# Patient Record
Sex: FEMALE | Race: WHITE | NOT HISPANIC OR LATINO | Employment: OTHER | URBAN - METROPOLITAN AREA
[De-identification: names, ages, dates, MRNs, and addresses within clinical notes are randomized per-mention and may not be internally consistent; named-entity substitution may affect disease eponyms.]

---

## 2017-01-12 DIAGNOSIS — I10 ESSENTIAL HYPERTENSION: Primary | Chronic | ICD-10-CM

## 2017-01-12 DIAGNOSIS — E78.5 HYPERLIPIDEMIA: Chronic | ICD-10-CM

## 2017-01-12 RX ORDER — GEMFIBROZIL 600 MG/1
TABLET, FILM COATED ORAL
Qty: 180 TABLET | Refills: 1 | Status: SHIPPED | OUTPATIENT
Start: 2017-01-12 | End: 2017-01-20

## 2017-01-12 NOTE — TELEPHONE ENCOUNTER
----- Message from Hanh Toledo sent at 1/12/2017  1:31 PM CST -----  Contact: self  Pt calling to schedule annual labs. Please call [phone].

## 2017-01-12 NOTE — TELEPHONE ENCOUNTER
Fasting labs ordered.  Please make patient aware that additional labs may be needed once she has her visit since this will be the first time I see her as a full physical.     Thank you,  Harish

## 2017-01-19 ENCOUNTER — LAB VISIT (OUTPATIENT)
Dept: LAB | Facility: HOSPITAL | Age: 72
End: 2017-01-19
Attending: INTERNAL MEDICINE
Payer: MEDICARE

## 2017-01-19 DIAGNOSIS — I10 ESSENTIAL HYPERTENSION: Chronic | ICD-10-CM

## 2017-01-19 DIAGNOSIS — E78.5 HYPERLIPIDEMIA: Chronic | ICD-10-CM

## 2017-01-19 LAB
ALBUMIN SERPL BCP-MCNC: 3.6 G/DL
ALP SERPL-CCNC: 92 U/L
ALT SERPL W/O P-5'-P-CCNC: 103 U/L
ANION GAP SERPL CALC-SCNC: 10 MMOL/L
AST SERPL-CCNC: 94 U/L
BASOPHILS # BLD AUTO: 0.02 K/UL
BASOPHILS NFR BLD: 0.3 %
BILIRUB SERPL-MCNC: 0.4 MG/DL
BUN SERPL-MCNC: 16 MG/DL
CALCIUM SERPL-MCNC: 9.9 MG/DL
CHLORIDE SERPL-SCNC: 109 MMOL/L
CHOLEST/HDLC SERPL: 4.5 {RATIO}
CO2 SERPL-SCNC: 22 MMOL/L
CREAT SERPL-MCNC: 0.8 MG/DL
DIFFERENTIAL METHOD: NORMAL
EOSINOPHIL # BLD AUTO: 0.3 K/UL
EOSINOPHIL NFR BLD: 3.8 %
ERYTHROCYTE [DISTWIDTH] IN BLOOD BY AUTOMATED COUNT: 14.5 %
EST. GFR  (AFRICAN AMERICAN): >60 ML/MIN/1.73 M^2
EST. GFR  (NON AFRICAN AMERICAN): >60 ML/MIN/1.73 M^2
GLUCOSE SERPL-MCNC: 106 MG/DL
HCT VFR BLD AUTO: 37.8 %
HDL/CHOLESTEROL RATIO: 22.3 %
HDLC SERPL-MCNC: 184 MG/DL
HDLC SERPL-MCNC: 41 MG/DL
HGB BLD-MCNC: 12.6 G/DL
LDLC SERPL CALC-MCNC: 103.4 MG/DL
LYMPHOCYTES # BLD AUTO: 1.7 K/UL
LYMPHOCYTES NFR BLD: 25.4 %
MCH RBC QN AUTO: 30.2 PG
MCHC RBC AUTO-ENTMCNC: 33.3 %
MCV RBC AUTO: 91 FL
MONOCYTES # BLD AUTO: 0.7 K/UL
MONOCYTES NFR BLD: 10.4 %
NEUTROPHILS # BLD AUTO: 4.1 K/UL
NEUTROPHILS NFR BLD: 59.7 %
NONHDLC SERPL-MCNC: 143 MG/DL
PLATELET # BLD AUTO: 299 K/UL
PMV BLD AUTO: 10.7 FL
POTASSIUM SERPL-SCNC: 4.5 MMOL/L
PROT SERPL-MCNC: 7.2 G/DL
RBC # BLD AUTO: 4.17 M/UL
SODIUM SERPL-SCNC: 141 MMOL/L
TRIGL SERPL-MCNC: 198 MG/DL
WBC # BLD AUTO: 6.8 K/UL

## 2017-01-19 PROCEDURE — 85025 COMPLETE CBC W/AUTO DIFF WBC: CPT

## 2017-01-19 PROCEDURE — 80053 COMPREHEN METABOLIC PANEL: CPT

## 2017-01-19 PROCEDURE — 80061 LIPID PANEL: CPT

## 2017-01-19 PROCEDURE — 36415 COLL VENOUS BLD VENIPUNCTURE: CPT | Mod: PO

## 2017-01-20 ENCOUNTER — LAB VISIT (OUTPATIENT)
Dept: LAB | Facility: HOSPITAL | Age: 72
End: 2017-01-20
Attending: INTERNAL MEDICINE
Payer: MEDICARE

## 2017-01-20 ENCOUNTER — OFFICE VISIT (OUTPATIENT)
Dept: FAMILY MEDICINE | Facility: CLINIC | Age: 72
End: 2017-01-20
Payer: MEDICARE

## 2017-01-20 VITALS
WEIGHT: 140.44 LBS | SYSTOLIC BLOOD PRESSURE: 122 MMHG | TEMPERATURE: 98 F | HEIGHT: 59 IN | HEART RATE: 69 BPM | DIASTOLIC BLOOD PRESSURE: 64 MMHG | OXYGEN SATURATION: 95 % | BODY MASS INDEX: 28.31 KG/M2

## 2017-01-20 DIAGNOSIS — Z00.00 ROUTINE MEDICAL EXAM: Primary | ICD-10-CM

## 2017-01-20 DIAGNOSIS — M94.9 DISORDER OF BONE AND CARTILAGE: ICD-10-CM

## 2017-01-20 DIAGNOSIS — N39.0 RECURRENT UTI: ICD-10-CM

## 2017-01-20 DIAGNOSIS — E78.5 HYPERLIPIDEMIA, UNSPECIFIED HYPERLIPIDEMIA TYPE: Chronic | ICD-10-CM

## 2017-01-20 DIAGNOSIS — I73.9 PAD (PERIPHERAL ARTERY DISEASE): Chronic | ICD-10-CM

## 2017-01-20 DIAGNOSIS — R74.01 TRANSAMINITIS: ICD-10-CM

## 2017-01-20 DIAGNOSIS — I10 ESSENTIAL HYPERTENSION: Chronic | ICD-10-CM

## 2017-01-20 DIAGNOSIS — M89.9 DISORDER OF BONE AND CARTILAGE: ICD-10-CM

## 2017-01-20 DIAGNOSIS — F33.0 MILD RECURRENT MAJOR DEPRESSION: Chronic | ICD-10-CM

## 2017-01-20 DIAGNOSIS — I77.811 ECTATIC ABDOMINAL AORTA: ICD-10-CM

## 2017-01-20 PROBLEM — R74.8 ELEVATED LIVER ENZYMES: Status: ACTIVE | Noted: 2017-01-20

## 2017-01-20 LAB — 25(OH)D3+25(OH)D2 SERPL-MCNC: 40 NG/ML

## 2017-01-20 PROCEDURE — 3078F DIAST BP <80 MM HG: CPT | Mod: S$GLB,,, | Performed by: INTERNAL MEDICINE

## 2017-01-20 PROCEDURE — 99397 PER PM REEVAL EST PAT 65+ YR: CPT | Mod: S$GLB,,, | Performed by: INTERNAL MEDICINE

## 2017-01-20 PROCEDURE — 99999 PR PBB SHADOW E&M-EST. PATIENT-LVL IV: CPT | Mod: PBBFAC,,, | Performed by: INTERNAL MEDICINE

## 2017-01-20 PROCEDURE — 82306 VITAMIN D 25 HYDROXY: CPT

## 2017-01-20 PROCEDURE — 3074F SYST BP LT 130 MM HG: CPT | Mod: S$GLB,,, | Performed by: INTERNAL MEDICINE

## 2017-01-20 PROCEDURE — 36415 COLL VENOUS BLD VENIPUNCTURE: CPT | Mod: PO

## 2017-01-20 PROCEDURE — 99499 UNLISTED E&M SERVICE: CPT | Mod: S$GLB,,, | Performed by: INTERNAL MEDICINE

## 2017-01-20 PROCEDURE — 80074 ACUTE HEPATITIS PANEL: CPT

## 2017-01-20 RX ORDER — NITROFURANTOIN MACROCRYSTALS 50 MG/1
50 CAPSULE ORAL 4 TIMES DAILY
COMMUNITY
End: 2017-04-05

## 2017-01-20 NOTE — MR AVS SNAPSHOT
[facility]  [address]  [place] [address]  Phone: [phone]  Fax: [phone]                  Sophia Landis   2017 11:20 AM   Office Visit    Description:  Female : 1945   Provider:  Xavier Lopez MD   Department:  [facility]           Reason for Visit     Establish Care           Diagnoses this Visit        Comments    Routine medical exam    -  Primary     Essential hypertension         PAD (peripheral artery disease)         Hyperlipidemia, unspecified hyperlipidemia type         Mild recurrent major depression         Disorder of bone and cartilage         Transaminitis         Recurrent UTI                To Do List           Future Appointments        Provider Department Dept Phone    2017 12:00 PM LAB, [place] [facility] [phone]    3/3/2017 8:00 AM LAB, [place] [facility] [phone]      Goals (5 Years of Data)              Today    16    Blood Pressure < 150/90   150/60  140/56  170/58      Follow-Up and Disposition     Return in about 3 months (around 2017) for BP check.      [facility]     [facility] -  Assistance  Registered nurses in the [facility] provide clinical advisement, health education, appointment booking, and other advisory services.  Call for this free service at [phone].             Medications           Message regarding Medications     Verify the changes and/or additions to your medication regime listed below are the same as discussed with your clinician today.  If any of these changes or additions are incorrect, please notify your healthcare provider.        STOP taking these medications     clotrimazole (LOTRIMIN) 1 % cream Apply topically 2 (two) times daily.    hydrochlorothiazide (HYDRODIURIL) 25 MG tablet TAKE 1 TABLET ONE TIME DAILY    gemfibrozil (LOPID) 600 MG tablet TAKE 1 TABLET TWICE DAILY  "BEFORE MEALS           Verify that the below list of medications is an accurate representation of the medications you are currently taking.  If none reported, the list may be blank. If incorrect, please contact your healthcare provider. Carry this list with you in case of emergency.           Current Medications     amlodipine (NORVASC) 10 MG tablet TAKE 1 TABLET ONE TIME DAILY    aspirin (ECOTRIN) 81 MG EC tablet Take 81 mg by mouth once daily.    calcium carbonate (OS-PAOLO) 600 mg (1,500 mg) Tab Take 600 mg by mouth 2 (two) times daily with meals.      metoprolol succinate (TOPROL-XL) 50 MG 24 hr tablet TAKE 1 TABLET ONE TIME DAILY    nitrofurantoin (MACRODANTIN) 50 MG capsule Take 50 mg by mouth 4 (four) times daily.    omeprazole (PRILOSEC) 20 MG capsule TAKE 2 CAPSULES ONE TIME DAILY BEFORE FIRST MEAL OF THE DAY    pravastatin (PRAVACHOL) 80 MG tablet TAKE 1 TABLET ONE TIME DAILY    quinapril (ACCUPRIL) 40 MG tablet TAKE 1 TABLET TWICE DAILY    sertraline (ZOLOFT) 50 MG tablet TAKE 1 TABLET ONE TIME DAILY    estradiol (ESTRACE) 1 MG tablet TAKE 1 TABLET ONE TIME DAILY    fluticasone (FLONASE) 50 mcg/actuation nasal spray USE 1 SPRAY IN EACH NOSTRIL ONE TIME DAILY    omega-3 fatty acids 1,000 mg Cap Take by mouth.             Clinical Reference Information           Vital Signs - Last Recorded  Most recent update: 1/20/2017 11:20 AM by Manuel Padilla MA    BP Pulse Temp Ht Wt SpO2    (!) 150/60 (BP Location: Right arm, Patient Position: Sitting, BP Method: Manual) 69 97.9 °F (36.6 °C) (Oral) 4' 11" (1.499 m) 63.7 kg (140 lb 6.9 oz) 95%    BMI                28.36 kg/m2          Blood Pressure          Most Recent Value    BP  (!)  150/60      Allergies as of 1/20/2017     Antihistamines - Alkylamine    Ciprofloxacin    Penicillins      Immunizations Administered on Date of Encounter - 1/20/2017     None      Orders Placed During Today's Visit     Future Labs/Procedures Expected by Expires    DXA Bone Density " Spine And Hip_Axial Skeleton  1/20/2017 1/20/2018    Hepatitis panel, acute  1/20/2017 3/21/2018    Vitamin D  1/20/2017 1/20/2018    Comprehensive metabolic panel  3/3/2017 1/20/2018      [facility] Sign-Up     Activating your [facility] account is as easy as 1-2-3!     1) Visit [email or web address], select Sign Up Now, enter this activation code and your date of birth, then select Next.  [email or web address]  Expires: 3/6/2017 11:56 AM      2) Create a username and password to use when you visit [facility] in the future and select a security question in case you lose your password and select Next.    3) Enter your e-mail address and click Sign Up!    Additional Information  If you have questions, please e-mail [email or web address] or call [phone] to talk to our [facility] staff. Remember, [facility] is NOT to be used for urgent needs. For medical emergencies, dial 911.         Instructions    Call your urologist and inform them that your liver enzymes are quite high compared to the previous labs.  The only think that is different is the nitrofurantoin and the (unknown name) arthritis pill.  The Nitrofurantoin is likely the culprit.      Stay off of the gemfibrozil and hydrochlorothiazide for now.     Repeat liver enzymes in 6 weeks without an OV    Otherwise no changes.

## 2017-01-20 NOTE — PROGRESS NOTES
Chief Complaint  Chief Complaint   Patient presents with    Establish Care       HPI  Sophia Landis is a 71 y.o. female with medical diagnoses as listed in the medical history and problem list that presents for routine physical.  Pt is known to me with her last appointment 06/2016.  She has been taking a low dose antibiotic (nitrofurantoin) for the past 2 months that was prescribed by Dr. Resendiz in [place] (urology) for a persistent UTI.  She had labs drawn prior to this visit that showed moderately high transaminitis.  No dietary change.  No supplement.  No other change in meds.  She has been out of her gemfibrozil for the last 2 weeks or more.  No belly pain or jaundice.        PAST MEDICAL HISTORY:  Past Medical History   Diagnosis Date    Cataract     Depression     GERD (gastroesophageal reflux disease)     Hyperlipidemia     Hypertension     Menopausal syndrome     PAD (peripheral artery disease)      s/p stent       PAST SURGICAL HISTORY:  Past Surgical History   Procedure Laterality Date    Appendectomy      Hysterectomy       MONTY with BSO    Iliac artery stent Bilateral        SOCIAL HISTORY:  Social History     Social History    Marital status:      Spouse name: N/A    Number of children: N/A    Years of education: N/A     Occupational History    Not on file.     Social History Main Topics    Smoking status: Former Smoker    Smokeless tobacco: Not on file      Comment: .  Retired  at [employer]    Alcohol use No    Drug use: No    Sexual activity: Yes     Partners: Male      Comment:      Other Topics Concern    Not on file     Social History Narrative       FAMILY HISTORY:  Family History   Problem Relation Age of Onset    Amblyopia Mother     Cataracts Mother     Hypertension Mother     Thyroid disease Mother     Cancer Father     Strabismus Sister     Cancer Brother     Diabetes Maternal Aunt     Cancer Maternal Uncle     Diabetes  Maternal Uncle     Cancer Maternal Uncle     Cancer Maternal Uncle     Cancer Maternal Uncle     Blindness Neg Hx     Glaucoma Neg Hx     Macular degeneration Neg Hx     Retinal detachment Neg Hx     Stroke Neg Hx        ALLERGIES AND MEDICATIONS: updated and reviewed.  Review of patient's allergies indicates:   Allergen Reactions    Antihistamines - alkylamine Nausea Only    Ciprofloxacin Nausea Only    Penicillins Hives     Current Outpatient Prescriptions   Medication Sig Dispense Refill    amlodipine (NORVASC) 10 MG tablet TAKE 1 TABLET ONE TIME DAILY 90 tablet 1    aspirin (ECOTRIN) 81 MG EC tablet Take 81 mg by mouth once daily.      calcium carbonate (OS-PAOLO) 600 mg (1,500 mg) Tab Take 600 mg by mouth 2 (two) times daily with meals.        metoprolol succinate (TOPROL-XL) 50 MG 24 hr tablet TAKE 1 TABLET ONE TIME DAILY 90 tablet 1    nitrofurantoin (MACRODANTIN) 50 MG capsule Take 50 mg by mouth 4 (four) times daily.      omeprazole (PRILOSEC) 20 MG capsule TAKE 2 CAPSULES ONE TIME DAILY BEFORE FIRST MEAL OF THE  capsule 1    pravastatin (PRAVACHOL) 80 MG tablet TAKE 1 TABLET ONE TIME DAILY 90 tablet 1    quinapril (ACCUPRIL) 40 MG tablet TAKE 1 TABLET TWICE DAILY 180 tablet 1    sertraline (ZOLOFT) 50 MG tablet TAKE 1 TABLET ONE TIME DAILY 90 tablet 1    estradiol (ESTRACE) 1 MG tablet TAKE 1 TABLET ONE TIME DAILY 90 tablet 1    fluticasone (FLONASE) 50 mcg/actuation nasal spray USE 1 SPRAY IN EACH NOSTRIL ONE TIME DAILY 32 g 3    omega-3 fatty acids 1,000 mg Cap Take by mouth.         No current facility-administered medications for this visit.          ROS  Review of Systems   Constitutional: Negative for chills, fever and unexpected weight change.   HENT: Negative for congestion, ear pain, hearing loss, rhinorrhea, sore throat and trouble swallowing.    Eyes: Negative for discharge, redness and visual disturbance.   Respiratory: Negative for cough, chest tightness, shortness  "of breath and wheezing.    Cardiovascular: Negative for chest pain, palpitations and leg swelling.   Gastrointestinal: Negative for abdominal pain, constipation, diarrhea, nausea and vomiting.   Endocrine: Negative for polydipsia, polyphagia and polyuria.   Genitourinary: Negative for decreased urine volume, dysuria and hematuria.   Musculoskeletal: Negative for arthralgias, joint swelling and myalgias.   Skin: Negative for color change and rash.   Neurological: Positive for light-headedness (orthostatic.  Home BPs running 120s/50s). Negative for dizziness, weakness and headaches.   Psychiatric/Behavioral: Negative for decreased concentration, dysphoric mood, sleep disturbance and suicidal ideas.           Physical Exam  Vitals:    01/20/17 1118   BP: (!) 150/60   BP Location: Right arm   Patient Position: Sitting   BP Method: Manual   Pulse: 69   Temp: 97.9 °F (36.6 °C)   TempSrc: Oral   SpO2: 95%   Weight: 63.7 kg (140 lb 6.9 oz)   Height: 4' 11" (1.499 m)    Body mass index is 28.36 kg/(m^2).  Weight: 63.7 kg (140 lb 6.9 oz)   Height: 4' 11" (149.9 cm)   General appearance: alert, appears stated age, cooperative and no distress  Head: Normocephalic, without obvious abnormality, atraumatic  Eyes: PERRL EOMI conjunctiva clear  Ears: normal TM's and external ear canals both ears  Nose: Nares normal. Septum midline. Mucosa normal. No drainage or sinus tenderness.  Throat: lips, mucosa, and tongue normal; teeth and gums normal  Neck: no adenopathy, no carotid bruit, no JVD, supple, symmetrical, trachea midline and thyroid not enlarged, symmetric, no tenderness/mass/nodules  Back: symmetric, no curvature. ROM normal. No CVA tenderness.  Lungs: clear to auscultation bilaterally  Heart: regular rate and rhythm, S1, S2 normal, no murmur, click, rub or gallop  Abdomen: soft, non-tender; bowel sounds normal; no masses,  no organomegaly  Extremities: extremities normal, atraumatic, no cyanosis or edema  Pulses: 2+ and " symmetric  Neurologic: Grossly normal      Health Maintenance       Date Due Completion Date    DEXA SCAN 1/21/2017 1/21/2014    Pneumococcal (65+) (2 of 2 - PPSV23) 6/13/2017 6/13/2016    Mammogram 6/16/2017 6/16/2016    Lipid Panel 1/19/2018 1/19/2017    Colonoscopy 4/2/2019 4/2/2014    TETANUS VACCINE 5/5/2021 5/5/2011 (Done)    Override on 5/5/2011: Done            Assessment & Plan  Routine medical exam - Discussed healthy diet, regular exercise, necessary labs, age appropriate cancer screening, and routine vaccinations.      Essential hypertension/orthostatic hypotension - stop HCTZ.  Continue other meds.  Patient is asked to monitor BP at home or work, several times per month and return with written values at next office visit.    PAD (peripheral artery disease) - followed by cards.  Still having claudication at about a block    Ectatic abdominal aorta - Stable, asymptomatic chronic condition.  Will continue to maximize risk factor reduction and adjust medication as needed.    Hyperlipidemia, unspecified hyperlipidemia type - The current medical regimen is effective;  continue present plan and medications.    Mild recurrent major depression - The current medical regimen is effective;  continue present plan and medications.    Disorder of bone and cartilage - check Vit D.  Due for repeat DEXA  -     DXA Bone Density Spine And Hip_Axial Skeleton; Future; Expected date: 1/20/17  -     Vitamin D; Future; Expected date: 1/20/17    Transaminitis - hep panel today.  Repeat LFTs in 6 weeks  -     Hepatitis panel, acute; Future; Expected date: 1/20/17  -     Comprehensive metabolic panel; Future; Expected date: 3/3/17    Recurrent UTI - managed by outside urologist.  I asked her to call them to discuss changing from nitrofurantoin to another antibiotic since this antibiotic is the only real change that has occurred in the same timeline as her transaminitis.      Other orders  -     Cancel: US Abdomen Limited; Future;  Expected date: 1/20/17        Health Maintenance reviewed, as above.    Follow-up: Return in about 3 months (around 4/20/2017) for BP check.

## 2017-01-20 NOTE — PATIENT INSTRUCTIONS
Call your urologist and inform them that your liver enzymes are quite high compared to the previous labs.  The only think that is different is the nitrofurantoin and the (unknown name) arthritis pill.  The Nitrofurantoin is likely the culprit.      Stay off of the gemfibrozil and hydrochlorothiazide for now.     Repeat liver enzymes in 6 weeks without an OV    Otherwise no changes.

## 2017-01-23 LAB
HAV IGM SERPL QL IA: NEGATIVE
HBV CORE IGM SERPL QL IA: NEGATIVE
HBV SURFACE AG SERPL QL IA: NEGATIVE
HCV AB SERPL QL IA: NEGATIVE

## 2017-01-30 ENCOUNTER — TELEPHONE (OUTPATIENT)
Dept: FAMILY MEDICINE | Facility: CLINIC | Age: 72
End: 2017-01-30

## 2017-01-30 NOTE — TELEPHONE ENCOUNTER
Spoke w/patient, requesting results of clinic notes be faxed to  (Urologist) at [facility]. Patient states she was taken off the antibiotic per , and she continues to have symptoms. notes faxed.

## 2017-01-30 NOTE — TELEPHONE ENCOUNTER
----- Message from Philomena Lua sent at 1/26/2017 10:46 AM CST -----  Contact: Self   Patient states that  would like a copy of the report of why  took her off of her medication. Please fax to [phone] Thank you

## 2017-02-01 ENCOUNTER — TELEPHONE (OUTPATIENT)
Dept: FAMILY MEDICINE | Facility: CLINIC | Age: 72
End: 2017-02-01

## 2017-02-01 NOTE — TELEPHONE ENCOUNTER
Sp[brooke w/patient, requesting antibiotic for symptom (painful when urinating 9) that started when  took her off the lose dose steroid. No fever/chills stated. Patient is requesting doctor to give her a call.

## 2017-02-01 NOTE — TELEPHONE ENCOUNTER
----- Message from Hanh Toledo sent at 1/31/2017 12:00 PM CST -----  Contact: self  Pt calling to only speak with Dr Lopez. Please call [phone]

## 2017-02-01 NOTE — TELEPHONE ENCOUNTER
Patient is followed by urology in [place] for recurrent UTI.  We had to stop her macrobid due to elevated liver enzymes.  She was supposed to contact her urologist about this.  She needs to call them if she feels she is having a UTI.  If patient needs to speak directly to me, she should be made aware that I have a full schedule today and will be unlikely to call her until the very end of the day.     Thank you,  Harish

## 2017-03-03 ENCOUNTER — LAB VISIT (OUTPATIENT)
Dept: LAB | Facility: HOSPITAL | Age: 72
End: 2017-03-03
Attending: INTERNAL MEDICINE
Payer: MEDICARE

## 2017-03-03 DIAGNOSIS — R74.01 TRANSAMINITIS: ICD-10-CM

## 2017-03-03 LAB
ALBUMIN SERPL BCP-MCNC: 3.6 G/DL
ALP SERPL-CCNC: 108 U/L
ALT SERPL W/O P-5'-P-CCNC: 41 U/L
ANION GAP SERPL CALC-SCNC: 11 MMOL/L
AST SERPL-CCNC: 31 U/L
BILIRUB SERPL-MCNC: 0.3 MG/DL
BUN SERPL-MCNC: 14 MG/DL
CALCIUM SERPL-MCNC: 9.7 MG/DL
CHLORIDE SERPL-SCNC: 107 MMOL/L
CO2 SERPL-SCNC: 22 MMOL/L
CREAT SERPL-MCNC: 0.8 MG/DL
EST. GFR  (AFRICAN AMERICAN): >60 ML/MIN/1.73 M^2
EST. GFR  (NON AFRICAN AMERICAN): >60 ML/MIN/1.73 M^2
GLUCOSE SERPL-MCNC: 106 MG/DL
POTASSIUM SERPL-SCNC: 4.3 MMOL/L
PROT SERPL-MCNC: 7.2 G/DL
SODIUM SERPL-SCNC: 140 MMOL/L

## 2017-03-03 PROCEDURE — 80053 COMPREHEN METABOLIC PANEL: CPT

## 2017-03-03 PROCEDURE — 36415 COLL VENOUS BLD VENIPUNCTURE: CPT | Mod: PO

## 2017-03-06 ENCOUNTER — TELEPHONE (OUTPATIENT)
Dept: FAMILY MEDICINE | Facility: CLINIC | Age: 72
End: 2017-03-06

## 2017-03-06 NOTE — TELEPHONE ENCOUNTER
Please inform the patient that her liver enzymes are back to normal.  No medication changes are recommended at this time.  Keep f/u for late next month    Thank you,  Harish

## 2017-03-17 DIAGNOSIS — I10 ESSENTIAL HYPERTENSION: Chronic | ICD-10-CM

## 2017-03-17 RX ORDER — METOPROLOL SUCCINATE 50 MG/1
TABLET, EXTENDED RELEASE ORAL
Qty: 90 TABLET | Refills: 1 | Status: SHIPPED | OUTPATIENT
Start: 2017-03-17 | End: 2017-06-03 | Stop reason: SDUPTHER

## 2017-03-17 NOTE — TELEPHONE ENCOUNTER
----- Message from Hanh Toledo sent at 3/16/2017 11:04 AM CDT -----  Contact: self  Patient need refill on     metoprolol succinate (TOPROL-XL) 50 MG 24 hr tablet

## 2017-03-20 RX ORDER — PRAVASTATIN SODIUM 80 MG/1
TABLET ORAL
Qty: 90 TABLET | Refills: 1 | Status: SHIPPED | OUTPATIENT
Start: 2017-03-20 | End: 2017-06-03 | Stop reason: SDUPTHER

## 2017-04-05 ENCOUNTER — OFFICE VISIT (OUTPATIENT)
Dept: FAMILY MEDICINE | Facility: CLINIC | Age: 72
End: 2017-04-05
Payer: MEDICARE

## 2017-04-05 VITALS
DIASTOLIC BLOOD PRESSURE: 74 MMHG | OXYGEN SATURATION: 96 % | WEIGHT: 140.44 LBS | TEMPERATURE: 98 F | BODY MASS INDEX: 28.31 KG/M2 | HEIGHT: 59 IN | SYSTOLIC BLOOD PRESSURE: 142 MMHG | HEART RATE: 73 BPM

## 2017-04-05 DIAGNOSIS — I10 ESSENTIAL HYPERTENSION: Chronic | ICD-10-CM

## 2017-04-05 PROCEDURE — 3078F DIAST BP <80 MM HG: CPT | Mod: S$GLB,,, | Performed by: INTERNAL MEDICINE

## 2017-04-05 PROCEDURE — 1159F MED LIST DOCD IN RCRD: CPT | Mod: S$GLB,,, | Performed by: INTERNAL MEDICINE

## 2017-04-05 PROCEDURE — 1160F RVW MEDS BY RX/DR IN RCRD: CPT | Mod: S$GLB,,, | Performed by: INTERNAL MEDICINE

## 2017-04-05 PROCEDURE — 3077F SYST BP >= 140 MM HG: CPT | Mod: S$GLB,,, | Performed by: INTERNAL MEDICINE

## 2017-04-05 PROCEDURE — 99213 OFFICE O/P EST LOW 20 MIN: CPT | Mod: S$GLB,,, | Performed by: INTERNAL MEDICINE

## 2017-04-05 PROCEDURE — 1126F AMNT PAIN NOTED NONE PRSNT: CPT | Mod: S$GLB,,, | Performed by: INTERNAL MEDICINE

## 2017-04-05 PROCEDURE — 1157F ADVNC CARE PLAN IN RCRD: CPT | Mod: S$GLB,,, | Performed by: INTERNAL MEDICINE

## 2017-04-05 PROCEDURE — 99999 PR PBB SHADOW E&M-EST. PATIENT-LVL III: CPT | Mod: PBBFAC,,, | Performed by: INTERNAL MEDICINE

## 2017-04-05 NOTE — ASSESSMENT & PLAN NOTE
The current medical regimen is effective;  continue present plan and medications. Counseled on techniques to help her remember to take her medications.

## 2017-04-05 NOTE — MR AVS SNAPSHOT
[facility]  [address]  [place] [address]  Phone: [phone]  Fax: [phone]                  Sophia Landis   2017 11:00 AM   Office Visit    Description:  Female : 1945   Provider:  Xavier Lopez MD   Department:  [facility]           Reason for Visit     Hypertension           Diagnoses this Visit        Comments    Essential hypertension                To Do List           Goals (5 Years of Data)              Today    17    Blood Pressure < 150/90   156/70  156/70  156/70      Follow-Up and Disposition     Return if symptoms worsen or fail to improve.      [facility] On Call     [facility] On Call Nurse Care Line -  Assistance  Unless otherwise directed by your provider, please contact [facility], our nurse care line that is available for  assistance.     Registered nurses in the [facility] provide: appointment scheduling, clinical advisement, health education, and other advisory services.  Call: [phone] (toll free)               Medications           Message regarding Medications     Verify the changes and/or additions to your medication regime listed below are the same as discussed with your clinician today.  If any of these changes or additions are incorrect, please notify your healthcare provider.        STOP taking these medications     nitrofurantoin (MACRODANTIN) 50 MG capsule Take 50 mg by mouth 4 (four) times daily.           Verify that the below list of medications is an accurate representation of the medications you are currently taking.  If none reported, the list may be blank. If incorrect, please contact your healthcare provider. Carry this list with you in case of emergency.           Current Medications     amlodipine (NORVASC) 10 MG tablet TAKE 1 TABLET ONE TIME DAILY    aspirin (ECOTRIN) 81 MG EC tablet Take 81 mg by mouth once daily.    calcium carbonate (OS-PAOLO) 600 mg (1,500 mg) Tab  "Take 600 mg by mouth 2 (two) times daily with meals.      metoprolol succinate (TOPROL-XL) 50 MG 24 hr tablet TAKE 1 TABLET ONE TIME DAILY    omeprazole (PRILOSEC) 20 MG capsule TAKE 2 CAPSULES ONE TIME DAILY BEFORE FIRST MEAL OF THE DAY    pravastatin (PRAVACHOL) 80 MG tablet TAKE 1 TABLET ONE TIME DAILY    quinapril (ACCUPRIL) 40 MG tablet TAKE 1 TABLET TWICE DAILY    sertraline (ZOLOFT) 50 MG tablet TAKE 1 TABLET ONE TIME DAILY    estradiol (ESTRACE) 1 MG tablet TAKE 1 TABLET ONE TIME DAILY    fluticasone (FLONASE) 50 mcg/actuation nasal spray USE 1 SPRAY IN EACH NOSTRIL ONE TIME DAILY    omega-3 fatty acids 1,000 mg Cap Take by mouth.             Clinical Reference Information           Your Vitals Were     BP Pulse Temp Height Weight SpO2    156/70 (BP Location: Left arm, Patient Position: Sitting, BP Method: Manual) 73 97.9 °F (36.6 °C) (Oral) 4' 11" (1.499 m) 63.7 kg (140 lb 6.9 oz) 96%    BMI                28.36 kg/m2          Blood Pressure          Most Recent Value    BP  (!)  156/70      Allergies as of 4/5/2017     Antihistamines - Alkylamine    Ciprofloxacin    Penicillins      Immunizations Administered on Date of Encounter - 4/5/2017     None      [facility] Sign-Up     Activating your [facility] account is as easy as 1-2-3!     1) Visit [email or web address], select Sign Up Now, enter this activation code and your date of birth, then select Next.  [email or web address]  Expires: 5/20/2017 11:27 AM      2) Create a username and password to use when you visit [facility] in the future and select a security question in case you lose your password and select Next.    3) Enter your e-mail address and click Sign Up!    Additional Information  If you have questions, please e-mail [email or web address] or call [phone] to talk to our [facility] staff. Remember, [facility] is NOT to be used for urgent needs. For medical emergencies, dial 911.         Instructions    Try setting a reminder in your phone.  You can also " move the amlodipine and metoprolol to at night if this is helpful for remembering to take it.        Language Assistance Services     ATTENTION: Language assistance services are available, free of charge. Please call [phone].      ATENCIÓN: Si sonali morgan, tiene a ware disposición servicios gratuitos de asistencia lingüística. Llame al [phone].     CHÚ Ý: N?u b?n nói Ti?ng Vi?t, có các d?ch v? h? tr? ngôn ng? mi?n phí dành cho b?n. G?i s? [phone].         [facility] complies with applicable Federal civil rights laws and does not discriminate on the basis of race, color, national origin, age, disability, or sex.

## 2017-04-05 NOTE — PATIENT INSTRUCTIONS
Try setting a reminder in your phone.  You can also move the amlodipine and metoprolol to at night if this is helpful for remembering to take it.

## 2017-04-05 NOTE — PROGRESS NOTES
Chief Complaint  Chief Complaint   Patient presents with    Hypertension     F/U       HPI  Sophia Landis is a 71 y.o. female with medical diagnoses as listed in the medical history and problem list that presents for HTN follow up.  Pt is known to me with her last appointment 1/20/2017.  She has been taking and toelrating her medications.  Often forgets to take the AM doses and will take them at night.  Most of her medications are once a day except quinapril .  No CP, SOB, palpitations.  Home BPs are running 130s-140s/70s-80s.      PAST MEDICAL HISTORY:  Past Medical History:   Diagnosis Date    Cataract     Depression     GERD (gastroesophageal reflux disease)     Hyperlipidemia     Hypertension     Menopausal syndrome     PAD (peripheral artery disease)     s/p stent       PAST SURGICAL HISTORY:  Past Surgical History:   Procedure Laterality Date    APPENDECTOMY      HYSTERECTOMY      MONTY with BSO    ILIAC ARTERY STENT Bilateral        SOCIAL HISTORY:  Social History     Social History    Marital status:      Spouse name: N/A    Number of children: N/A    Years of education: N/A     Occupational History    Not on file.     Social History Main Topics    Smoking status: Former Smoker    Smokeless tobacco: Not on file      Comment: .  Retired  at [employer].    Alcohol use No    Drug use: No    Sexual activity: Yes     Partners: Male      Comment:      Other Topics Concern    Not on file     Social History Narrative    No narrative on file       FAMILY HISTORY:  Family History   Problem Relation Age of Onset    Amblyopia Mother     Cataracts Mother     Hypertension Mother     Thyroid disease Mother     Cancer Father     Strabismus Sister     Cancer Brother     Diabetes Maternal Aunt     Cancer Maternal Uncle     Diabetes Maternal Uncle     Cancer Maternal Uncle     Cancer Maternal Uncle     Cancer Maternal Uncle     Blindness Neg Hx      Glaucoma Neg Hx     Macular degeneration Neg Hx     Retinal detachment Neg Hx     Stroke Neg Hx        ALLERGIES AND MEDICATIONS: updated and reviewed.  Review of patient's allergies indicates:   Allergen Reactions    Antihistamines - alkylamine Nausea Only    Ciprofloxacin Nausea Only    Penicillins Hives     Current Outpatient Prescriptions   Medication Sig Dispense Refill    amlodipine (NORVASC) 10 MG tablet TAKE 1 TABLET ONE TIME DAILY 90 tablet 1    aspirin (ECOTRIN) 81 MG EC tablet Take 81 mg by mouth once daily.      calcium carbonate (OS-PAOLO) 600 mg (1,500 mg) Tab Take 600 mg by mouth 2 (two) times daily with meals.        metoprolol succinate (TOPROL-XL) 50 MG 24 hr tablet TAKE 1 TABLET ONE TIME DAILY 90 tablet 1    omeprazole (PRILOSEC) 20 MG capsule TAKE 2 CAPSULES ONE TIME DAILY BEFORE FIRST MEAL OF THE  capsule 1    pravastatin (PRAVACHOL) 80 MG tablet TAKE 1 TABLET ONE TIME DAILY 90 tablet 1    quinapril (ACCUPRIL) 40 MG tablet TAKE 1 TABLET TWICE DAILY 180 tablet 1    sertraline (ZOLOFT) 50 MG tablet TAKE 1 TABLET ONE TIME DAILY 90 tablet 1    estradiol (ESTRACE) 1 MG tablet TAKE 1 TABLET ONE TIME DAILY 90 tablet 1    fluticasone (FLONASE) 50 mcg/actuation nasal spray USE 1 SPRAY IN EACH NOSTRIL ONE TIME DAILY 32 g 3    omega-3 fatty acids 1,000 mg Cap Take by mouth.         No current facility-administered medications for this visit.          ROS  Review of Systems   Constitutional: Negative for diaphoresis and fever.   Respiratory: Negative for cough and shortness of breath.    Cardiovascular: Negative for chest pain, palpitations and leg swelling.   Neurological: Negative for dizziness, syncope, light-headedness and headaches.           Physical Exam  Vitals:    04/05/17 1109 04/05/17 1128   BP: (!) 156/70 (!) 142/74   BP Location: Left arm    Patient Position: Sitting    BP Method: Manual    Pulse: 73    Temp: 97.9 °F (36.6 °C)    TempSrc: Oral    SpO2: 96%    Weight: 63.7  "kg (140 lb 6.9 oz)    Height: 4' 11" (1.499 m)     Body mass index is 28.36 kg/(m^2).  Weight: 63.7 kg (140 lb 6.9 oz)   Height: 4' 11" (149.9 cm)   General appearance: alert, appears stated age, cooperative and no distress  Neck: no adenopathy, no carotid bruit, no JVD, supple, symmetrical, trachea midline and thyroid not enlarged, symmetric, no tenderness/mass/nodules  Lungs: clear to auscultation bilaterally  Heart: regular rate and rhythm, S1, S2 normal, no murmur, click, rub or gallop  Extremities: extremities normal, atraumatic, no cyanosis or edema  Pulses: 2+ and symmetric  Neurologic: Grossly normal      Health Maintenance       Date Due Completion Date    DEXA SCAN 1/21/2017 1/21/2014    Pneumococcal (65+) (2 of 2 - PPSV23) 6/13/2017 6/13/2016    Mammogram 6/16/2017 6/16/2016    Lipid Panel 1/19/2018 1/19/2017    Colonoscopy 4/2/2019 4/2/2014    TETANUS VACCINE 5/5/2021 5/5/2011 (Done)    Override on 5/5/2011: Done            Assessment & Plan  Problem List Items Addressed This Visit        Other    Essential hypertension (Chronic)    Current Assessment & Plan     The current medical regimen is effective;  continue present plan and medications. Counseled on techniques to help her remember to take her medications.                  Health Maintenance reviewed, deferred by patient.    Follow-up: Return if symptoms worsen or fail to improve.  "

## 2017-05-25 DIAGNOSIS — I10 ESSENTIAL HYPERTENSION: Chronic | ICD-10-CM

## 2017-05-25 DIAGNOSIS — K21.9 GASTROESOPHAGEAL REFLUX DISEASE, ESOPHAGITIS PRESENCE NOT SPECIFIED: ICD-10-CM

## 2017-05-25 RX ORDER — AMLODIPINE BESYLATE 10 MG/1
TABLET ORAL
Qty: 90 TABLET | Refills: 1 | Status: SHIPPED | OUTPATIENT
Start: 2017-05-25 | End: 2017-11-13 | Stop reason: SDUPTHER

## 2017-05-25 RX ORDER — OMEPRAZOLE 20 MG/1
CAPSULE, DELAYED RELEASE ORAL
Qty: 180 CAPSULE | Refills: 1 | Status: SHIPPED | OUTPATIENT
Start: 2017-05-25 | End: 2017-12-05 | Stop reason: SDUPTHER

## 2017-05-25 RX ORDER — SERTRALINE HYDROCHLORIDE 50 MG/1
TABLET, FILM COATED ORAL
Qty: 90 TABLET | Refills: 1 | Status: SHIPPED | OUTPATIENT
Start: 2017-05-25 | End: 2017-11-13 | Stop reason: SDUPTHER

## 2017-06-03 DIAGNOSIS — I10 ESSENTIAL HYPERTENSION: Chronic | ICD-10-CM

## 2017-06-04 RX ORDER — PRAVASTATIN SODIUM 80 MG/1
TABLET ORAL
Qty: 90 TABLET | Refills: 1 | Status: SHIPPED | OUTPATIENT
Start: 2017-06-04 | End: 2017-12-13 | Stop reason: SDUPTHER

## 2017-06-04 RX ORDER — METOPROLOL SUCCINATE 50 MG/1
TABLET, EXTENDED RELEASE ORAL
Qty: 90 TABLET | Refills: 1 | Status: SHIPPED | OUTPATIENT
Start: 2017-06-04 | End: 2017-12-13 | Stop reason: SDUPTHER

## 2017-07-18 ENCOUNTER — OFFICE VISIT (OUTPATIENT)
Dept: FAMILY MEDICINE | Facility: CLINIC | Age: 72
End: 2017-07-18
Payer: MEDICARE

## 2017-07-18 ENCOUNTER — HOSPITAL ENCOUNTER (OUTPATIENT)
Dept: RADIOLOGY | Facility: CLINIC | Age: 72
Discharge: HOME | End: 2017-07-18
Attending: INTERNAL MEDICINE
Payer: MEDICARE

## 2017-07-18 VITALS
DIASTOLIC BLOOD PRESSURE: 68 MMHG | OXYGEN SATURATION: 96 % | WEIGHT: 140.44 LBS | HEIGHT: 59 IN | SYSTOLIC BLOOD PRESSURE: 158 MMHG | HEART RATE: 76 BPM | TEMPERATURE: 98 F | BODY MASS INDEX: 28.31 KG/M2

## 2017-07-18 DIAGNOSIS — Z12.31 OTHER SCREENING MAMMOGRAM: ICD-10-CM

## 2017-07-18 DIAGNOSIS — E78.5 HYPERLIPIDEMIA, UNSPECIFIED HYPERLIPIDEMIA TYPE: Chronic | ICD-10-CM

## 2017-07-18 DIAGNOSIS — I77.811 ECTATIC ABDOMINAL AORTA: Chronic | ICD-10-CM

## 2017-07-18 DIAGNOSIS — I73.9 PAD (PERIPHERAL ARTERY DISEASE): Chronic | ICD-10-CM

## 2017-07-18 DIAGNOSIS — I10 ESSENTIAL HYPERTENSION: Chronic | ICD-10-CM

## 2017-07-18 DIAGNOSIS — M94.9 DISORDER OF BONE AND CARTILAGE: ICD-10-CM

## 2017-07-18 DIAGNOSIS — F33.0 MILD RECURRENT MAJOR DEPRESSION: Chronic | ICD-10-CM

## 2017-07-18 DIAGNOSIS — M89.9 DISORDER OF BONE AND CARTILAGE: ICD-10-CM

## 2017-07-18 DIAGNOSIS — Z00.00 ENCOUNTER FOR PREVENTIVE HEALTH EXAMINATION: Primary | ICD-10-CM

## 2017-07-18 PROCEDURE — 99999 PR PBB SHADOW E&M-EST. PATIENT-LVL V: CPT | Mod: PBBFAC,,, | Performed by: NURSE PRACTITIONER

## 2017-07-18 PROCEDURE — 90732 PPSV23 VACC 2 YRS+ SUBQ/IM: CPT | Mod: S$GLB,,, | Performed by: NURSE PRACTITIONER

## 2017-07-18 PROCEDURE — 77080 DXA BONE DENSITY AXIAL: CPT | Mod: 26,,, | Performed by: INTERNAL MEDICINE

## 2017-07-18 PROCEDURE — G0009 ADMIN PNEUMOCOCCAL VACCINE: HCPCS | Mod: S$GLB,,, | Performed by: NURSE PRACTITIONER

## 2017-07-18 PROCEDURE — G0439 PPPS, SUBSEQ VISIT: HCPCS | Mod: S$GLB,,, | Performed by: NURSE PRACTITIONER

## 2017-07-18 PROCEDURE — 99499 UNLISTED E&M SERVICE: CPT | Mod: S$GLB,,, | Performed by: NURSE PRACTITIONER

## 2017-07-18 PROCEDURE — 77080 DXA BONE DENSITY AXIAL: CPT | Mod: TC,PO

## 2017-07-18 NOTE — PROGRESS NOTES
"Sophia Landis presented for a  Medicare AWV and comprehensive Health Risk Assessment today. The following components were reviewed and updated:    · Medical history  · Family History  · Social history  · Allergies and Current Medications  · Health Risk Assessment  · Health Maintenance  · Care Team     ** See Completed Assessments for Annual Wellness Visit within the encounter summary.**       The following assessments were completed:  · Living Situation  · CAGE  · Depression Screening  · Timed Get Up and Go  · Whisper Test  · Cognitive Function Screening  · Nutrition Screening  · ADL Screening  · PAQ Screening    Vitals:    07/18/17 1041   BP: (!) 158/68   BP Location: Right arm   Patient Position: Sitting   BP Method: Manual   Pulse: 76   Temp: 97.9 °F (36.6 °C)   TempSrc: Oral   SpO2: 96%   Weight: 63.7 kg (140 lb 6.9 oz)   Height: 4' 11" (1.499 m)     Body mass index is 28.36 kg/m².  Physical Exam   Constitutional: She is oriented to person, place, and time. She appears well-developed and well-nourished.   HENT:   Head: Normocephalic and atraumatic.   Cardiovascular: Normal rate, regular rhythm and normal heart sounds.    Pulmonary/Chest: Effort normal and breath sounds normal. No respiratory distress. She has no decreased breath sounds.   Neurological: She is alert and oriented to person, place, and time.   Skin: She is not diaphoretic. No pallor.   Psychiatric: She has a normal mood and affect. Her speech is normal and behavior is normal.         Diagnoses and health risks identified today and associated recommendations/orders:    1. Encounter for preventive health examination    2. Ectatic abdominal aorta    3. Essential hypertension    4. Hyperlipidemia, unspecified hyperlipidemia type    5. PAD (peripheral artery disease)    6. Mild recurrent major depression    7. Other screening mammogram  - Mammo Digital Screening Bilateral With CAD; Future      Problem List Items Addressed This Visit     PAD (peripheral " artery disease) (Chronic)    Current Assessment & Plan     Stable. Monitor         Mild recurrent major depression (Chronic)    Current Assessment & Plan     Stable on Zoloft         Hyperlipidemia (Chronic)    Current Assessment & Plan     Stable and controlled. Continue current treatment plan as previously prescribed with your PCP.   The patient is asked to make an attempt to improve diet and exercise patterns to aid in medical management of this problem.  Followed by PCP         Essential hypertension (Chronic)    Current Assessment & Plan     Stable and controlled. Continue current treatment plan as previously prescribed with your PCP.   The patient is asked to make an attempt to improve diet and exercise patterns to aid in medical management of this problem.  Followed by PCP           Ectatic abdominal aorta (Chronic)    Overview     US 2012         Current Assessment & Plan     Stable. Monitor           Other Visit Diagnoses     Encounter for preventive health examination    -  Primary    Other screening mammogram        Relevant Orders    Mammo Digital Screening Bilateral With CAD            Provided Sophia with a 5-10 year written screening schedule and personal prevention plan. Recommendations were developed using the USPSTF age appropriate recommendations. Education, counseling, and referrals were provided as needed. After Visit Summary printed and given to patient which includes a list of additional screenings\tests needed.    Return in about 1 year (around 7/18/2018).    Joel Jimenez, MONTANAP-C

## 2017-07-18 NOTE — ASSESSMENT & PLAN NOTE
Stable and controlled. Continue current treatment plan as previously prescribed with your PCP.   The patient is asked to make an attempt to improve diet and exercise patterns to aid in medical management of this problem.  Followed by PCP

## 2017-07-18 NOTE — PATIENT INSTRUCTIONS
Counseling and Referral of Other Preventative  (Italic type indicates deductible and co-insurance are waived)    Patient Name: Sophia Landis  Today's Date: 7/18/2017      SERVICE LIMITATIONS RECOMMENDATION    Vaccines    · Pneumococcal (once after 65)    · Influenza (annually)    · Hepatitis B (if medium/high risk)    · Prevnar 13      Hepatitis B medium/high risk factors:       - End-stage renal disease       - Hemophiliacs who received Factor VII or         IX concentrates       - Clients of institutions for the mentally             retarded       - Persons who live in the same house as          a HepB carrier       - Homosexual men       - Illicit injectable drug abusers     Pneumococcal: Recommended to patient, declined     Influenza: N/A     Hepatitis B: N/A     Prevnar 13: Done, no repeat necessary    Mammogram (biennial age 50-74)  Annually (age 40 or over)  Last done 06/16/2016, recommend to repeat every 1  years    Pap (up to age 70 and after 70 if unknown history or abnormal study last 10 years)    N/A     The USPSTF recommends against screening for cervical cancer in women older than age 65 years who have had adequate prior screening and are not otherwise at high risk for cervical cancer.      Colorectal cancer screening (to age 75)    · Fecal occult blood test (annual)  · Flexible sigmoidoscopy (5y)  · Screening colonoscopy (10y)  · Barium enema   Last done 04/02/2014, recommend to repeat every 5  years    Diabetes self-management training (no USPSTF recommendations)  Requires referral by treating physician for patient with diabetes or renal disease. 10 hours of initial DSMT sessions of no less than 30 minutes each in a continuous 12-month period. 2 hours of follow-up DSMT in subsequent years.  N/A    Bone mass measurements (age 65 & older, biennial)  Requires diagnosis related to osteoporosis or estrogen deficiency. Biennial benefit unless patient has history of long-term glucocorticoid  Last done  01/21/2014, recommend to repeat every 2  years    Glaucoma screening (no USPSTF recommendation)  Diabetes mellitus, family history   , age 50 or over    American, age 65 or over  Annual eye exam    Medical nutrition therapy for diabetes or renal disease (no recommended schedule)  Requires referral by treating physician for patient with diabetes or renal disease or kidney transplant within the past 3 years.  Can be provided in same year as diabetes self-management training (DSMT), and CMS recommends medical nutrition therapy take place after DSMT. Up to 3 hours for initial year and 2 hours in subsequent years.  N/A    Cardiovascular screening blood tests (every 5 years)  · Fasting lipid panel  Order as a panel if possible  Last done 01/19/2017, recommend to repeat every 1  years    Diabetes screening tests (at least every 3 years, Medicare covers annually or at 6-month intervals for prediabetic patients)  · Fasting blood sugar (FBS) or glucose tolerance test (GTT)  Patient must be diagnosed with one of the following:       - Hypertension       - Dyslipidemia       - Obesity (BMI 30kg/m2)       - Previous elevated impaired FBS or GTT       ... or any two of the following:       - Overweight (BMI 25 but <30)       - Family history of diabetes       - Age 65 or older       - History of gestational diabetes or birth of baby weighing more than 9 pounds  Last done 01/19/2017, recommend to repeat every 1  years    Abdominal aortic aneurysm screening (once)  · Sonogram   Limited to patients who meet one of the following criteria:       - Men who are 65-75 years old and have smoked more than 100 cigarette in their lifetime       - Anyone with a family history of abdominal aortic aneurysm       - Anyone recommended for screening by the USPSTF  N/A    HIV screening (annually for increased risk patients)  · HIV-1 and HIV-2 by EIA, or MARI, rapid antibody test or oral mucosa transudate  Patients must be  at increased risk for HIV infection per USPSTF guidelines or pregnant. Tests covered annually for patient at increased risk or as requested by the patient. Pregnant patients may receive up to 3 tests during pregnancy.  Risks discussed, screening is not recommended    Smoking cessation counseling (up to 8 sessions per year)  Patients must be asymptomatic of tobacco-related conditions to receive as a preventative service.  Non-smoker    Subsequent annual wellness visit  At least 12 months since last AWV  Return in one year     The following information is provided to all patients.  This information is to help you find resources for any of the problems found today that may be affecting your health:                Living healthy guide: www.[facility].[place].[facility]      Understanding Diabetes: www.diabetes.org      Eating healthy: www.cdc.gov/healthyweight      CDC home safety checklist: www.cdc.gov/steadi/patient.html      Agency on Aging: www.goea.[place].[facility]      Alcoholics anonymous (AA): www.aa.org      Physical Activity: www.shoaib.nih.gov/gd3qlno      Tobacco use: www.quitwithusla.org

## 2017-08-15 ENCOUNTER — TELEPHONE (OUTPATIENT)
Dept: FAMILY MEDICINE | Facility: CLINIC | Age: 72
End: 2017-08-15

## 2017-08-15 DIAGNOSIS — I10 ESSENTIAL HYPERTENSION: Primary | Chronic | ICD-10-CM

## 2017-08-15 NOTE — TELEPHONE ENCOUNTER
----- Message from Paige Hercules sent at 8/15/2017 11:31 AM CDT -----  Contact: self  Patient is requesting an order to have lab work done prior to her appt next week .     [phone] SX

## 2017-08-18 ENCOUNTER — LAB VISIT (OUTPATIENT)
Dept: LAB | Facility: HOSPITAL | Age: 72
End: 2017-08-18
Attending: INTERNAL MEDICINE
Payer: MEDICARE

## 2017-08-18 DIAGNOSIS — I10 ESSENTIAL HYPERTENSION: Chronic | ICD-10-CM

## 2017-08-18 LAB
ANION GAP SERPL CALC-SCNC: 9 MMOL/L
BUN SERPL-MCNC: 14 MG/DL
CALCIUM SERPL-MCNC: 10 MG/DL
CHLORIDE SERPL-SCNC: 110 MMOL/L
CO2 SERPL-SCNC: 23 MMOL/L
CREAT SERPL-MCNC: 0.8 MG/DL
EST. GFR  (AFRICAN AMERICAN): >60 ML/MIN/1.73 M^2
EST. GFR  (NON AFRICAN AMERICAN): >60 ML/MIN/1.73 M^2
GLUCOSE SERPL-MCNC: 125 MG/DL
POTASSIUM SERPL-SCNC: 4.6 MMOL/L
SODIUM SERPL-SCNC: 142 MMOL/L

## 2017-08-18 PROCEDURE — 36415 COLL VENOUS BLD VENIPUNCTURE: CPT | Mod: PO

## 2017-08-18 PROCEDURE — 80048 BASIC METABOLIC PNL TOTAL CA: CPT

## 2017-08-22 ENCOUNTER — OFFICE VISIT (OUTPATIENT)
Dept: FAMILY MEDICINE | Facility: CLINIC | Age: 72
End: 2017-08-22
Payer: MEDICARE

## 2017-08-22 VITALS
HEART RATE: 74 BPM | HEIGHT: 59 IN | WEIGHT: 141.56 LBS | DIASTOLIC BLOOD PRESSURE: 80 MMHG | BODY MASS INDEX: 28.54 KG/M2 | TEMPERATURE: 98 F | SYSTOLIC BLOOD PRESSURE: 130 MMHG | OXYGEN SATURATION: 97 %

## 2017-08-22 DIAGNOSIS — G89.29 CHRONIC LEFT-SIDED LOW BACK PAIN WITHOUT SCIATICA: ICD-10-CM

## 2017-08-22 DIAGNOSIS — I73.9 PAD (PERIPHERAL ARTERY DISEASE): Chronic | ICD-10-CM

## 2017-08-22 DIAGNOSIS — Z80.8 FAMILY HISTORY OF MALIGNANT MELANOMA: ICD-10-CM

## 2017-08-22 DIAGNOSIS — N39.41 URGE INCONTINENCE: ICD-10-CM

## 2017-08-22 DIAGNOSIS — E78.5 HYPERLIPIDEMIA, UNSPECIFIED HYPERLIPIDEMIA TYPE: Chronic | ICD-10-CM

## 2017-08-22 DIAGNOSIS — I10 ESSENTIAL HYPERTENSION: Primary | Chronic | ICD-10-CM

## 2017-08-22 DIAGNOSIS — M54.50 CHRONIC LEFT-SIDED LOW BACK PAIN WITHOUT SCIATICA: ICD-10-CM

## 2017-08-22 PROCEDURE — 3075F SYST BP GE 130 - 139MM HG: CPT | Mod: S$GLB,,, | Performed by: INTERNAL MEDICINE

## 2017-08-22 PROCEDURE — 3079F DIAST BP 80-89 MM HG: CPT | Mod: S$GLB,,, | Performed by: INTERNAL MEDICINE

## 2017-08-22 PROCEDURE — 1159F MED LIST DOCD IN RCRD: CPT | Mod: S$GLB,,, | Performed by: INTERNAL MEDICINE

## 2017-08-22 PROCEDURE — 99214 OFFICE O/P EST MOD 30 MIN: CPT | Mod: S$GLB,,, | Performed by: INTERNAL MEDICINE

## 2017-08-22 PROCEDURE — 99499 UNLISTED E&M SERVICE: CPT | Mod: S$GLB,,, | Performed by: INTERNAL MEDICINE

## 2017-08-22 PROCEDURE — 99999 PR PBB SHADOW E&M-EST. PATIENT-LVL IV: CPT | Mod: PBBFAC,,, | Performed by: INTERNAL MEDICINE

## 2017-08-22 PROCEDURE — 1125F AMNT PAIN NOTED PAIN PRSNT: CPT | Mod: S$GLB,,, | Performed by: INTERNAL MEDICINE

## 2017-08-22 PROCEDURE — 3008F BODY MASS INDEX DOCD: CPT | Mod: S$GLB,,, | Performed by: INTERNAL MEDICINE

## 2017-08-22 RX ORDER — MELOXICAM 15 MG/1
15 TABLET ORAL DAILY
Qty: 90 TABLET | Refills: 1 | Status: SHIPPED | OUTPATIENT
Start: 2017-08-22 | End: 2018-01-15 | Stop reason: SDUPTHER

## 2017-08-22 NOTE — MEDICAL/APP STUDENT
Subjective:       Patient ID: Sophia Landis is a 71 y.o. female.    Chief Complaint: Foreign Body in Skin; Hypertension (f/u); and Hyperlipidemia    HPI   Patient is a 70 yo F w/ h/o of HTN and hyperlipidemia who presents for follow up and discussion of lab results. Patient is currently compliant on amlodipine 10mg, metoprolol 50mg, and quinapril 40mg, as well as pravastatin 80mg. She also takes a baby aspirin daily and omeprazole 20mg, calcium/vit D supplements, and sertraline 50mg. She is experiencing no side effects with the medications and has no related problems. Her current  BP is 130/80    Patient is requesting dermatology referral due to family hx of melanoma in her father. She is concerned about spots popping up on her arms and back as she spends a good amount of time in the sun. She would like a skin check. Patient is also complaining of left hip arthritis. The pain has been present for many years and she is unable to walk and move and sit on the left side without eliciting pain in the left hip. Over the counter pain mediations do not help,and she is requesting a medication that might be more effective in pain management. 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 The mother lives with the patient and she is often engaged in heavy lifting and maneuvering as a part of her mother's activities of daily living.     Patient also reports that she was unable to complete a mammogram when it was ordered recently due to personal issues. She is requesting a rescheduling.     Patient Active Problem List   Diagnosis    Essential hypertension    Hyperlipidemia    Mild recurrent major depression    PAD (peripheral artery disease)    GERD (gastroesophageal reflux disease)    Menopausal syndrome    Sleep apnea syndrome    Recurrent UTI    Ectatic abdominal aorta       Review of Systems   Constitutional: Negative for activity change, appetite change, fever and unexpected weight  "change.   HENT: Negative for congestion, postnasal drip, rhinorrhea, sinus pressure and trouble swallowing.    Eyes: Negative for visual disturbance.   Respiratory: Negative for cough, chest tightness and shortness of breath.    Cardiovascular: Negative for chest pain, palpitations and leg swelling.   Gastrointestinal: Negative for abdominal distention, abdominal pain, blood in stool, constipation, diarrhea, nausea and vomiting.        Occasional bleeding hemorrhoids    Genitourinary: Positive for difficulty urinating and urgency. Negative for dysuria, enuresis, frequency and hematuria.        Incontinence on occasion  Trouble urinating, occasional strain, feels as though "my rectum is falling out of the front"   Musculoskeletal: Positive for arthralgias and joint swelling. Negative for back pain.        Ankle swelling on the right       Objective:       Vitals:    08/22/17 1307   BP: 130/80   Pulse: 74   Temp: 98.2 °F (36.8 °C)       Physical Exam   Constitutional: She is oriented to person, place, and time. She appears well-developed and well-nourished.   HENT:   Head: Normocephalic and atraumatic.   Right Ear: External ear normal.   Left Ear: External ear normal.   Nose: Nose normal.   Mouth/Throat: Oropharynx is clear and moist.   Neck: Normal range of motion. Neck supple. No thyromegaly present.   Cardiovascular: Normal rate, regular rhythm, normal heart sounds and intact distal pulses.    No murmur heard.  Pulmonary/Chest: Effort normal and breath sounds normal. She has no wheezes. She has no rales.   Abdominal: Soft. Bowel sounds are normal. She exhibits no distension. There is tenderness. There is guarding.   Mild discomfort in mid epigastric region with deep palpation, involuntary guarding after moment of discomfort   Lymphadenopathy:     She has no cervical adenopathy.   Neurological: She is alert and oriented to person, place, and time.   Psychiatric: She has a normal mood and affect. Her behavior is " normal. Judgment and thought content normal.       Assessment:       1. Essential hypertension    2. Hyperlipidemia, unspecified hyperlipidemia type    3. PAD (peripheral artery disease)    4. Possible rectocele/cystocele   5. Left SI joint arthritis  Plan:     1. Essential HTN  - Patient currently taking amlodipine 10 mg, metoprolol 50 mg, and quinapril 40 mg  - Patient not experiencing s/e   - BP is stable 130/80  - Continue current medication regimen as patient is compliant     2. Hyperlipidemia  - Patient currently taking pravastatin 80 mg  - Patient not experiencing s/e  - Continue current medications    3. PAD  - Peripheral pulses present, no claudication symptoms currently  - Patient currently taking aspirin and statin   - Continue monitoring     4. Possible Rectocele/Cystocele  - Likely due to urinary symptoms    5. Left SI Joint Arthritis  - Unlikely to be hip pain, examination normal ROM  - Will prescribe Meloxicam  - F/u if pain persists despite new medication   - Referral to Uro-gynecology

## 2017-08-22 NOTE — PROGRESS NOTES
"Chief Complaint  Chief Complaint   Patient presents with    Hypertension     f/u    Hyperlipidemia    Hip Pain       HPI  Sophia Landis is a 71 y.o. female with medical diagnoses as listed in the medical history and problem list that presents for f/u HTN HLD and c/o hip pain.  Pt is known to me with her last appointment 7/18/2017.  She is taking and tolerating her medications without perceived side effects.  No CP, SOB, palpitations, abdominal pain.  She is having left sided "hip pain".  Indicated pain as being in the buttocks.  No weakness or radicular symptoms.  No pain in inguinal line.  Pain is worse with lifting her leg such as to get in the car.  Was on meloxicam in the past with good relief.  She stopped his on her own when she had a transaminitis 2/2 macrobid and wanted to limit meds.  She still has urinary incontinence issues.  No fecal incontinence but reports that it "feels like my rectum is falling out."  Reports that she can feel a bulge in the inferior vagina.  States she can "feel stool," at the introitus.  No actual stool leakage.        PAST MEDICAL HISTORY:  Past Medical History:   Diagnosis Date    Cataract     Depression     GERD (gastroesophageal reflux disease)     Hyperlipidemia     Hypertension     Menopausal syndrome     PAD (peripheral artery disease)     s/p stent       PAST SURGICAL HISTORY:  Past Surgical History:   Procedure Laterality Date    APPENDECTOMY      HYSTERECTOMY      MONTY with BSO    ILIAC ARTERY STENT Bilateral        SOCIAL HISTORY:  Social History     Social History    Marital status:      Spouse name: N/A    Number of children: N/A    Years of education: N/A     Occupational History    Not on file.     Social History Main Topics    Smoking status: Former Smoker    Smokeless tobacco: Former User      Comment: .  Retired  at [employer].    Alcohol use No    Drug use: No    Sexual activity: Yes     Partners: Male      " Comment:      Other Topics Concern    Not on file     Social History Narrative    No narrative on file       FAMILY HISTORY:  Family History   Problem Relation Age of Onset    Amblyopia Mother     Cataracts Mother     Hypertension Mother     Thyroid disease Mother     Cancer Father     Strabismus Sister     Cancer Brother     Diabetes Maternal Aunt     Cancer Maternal Uncle     Diabetes Maternal Uncle     Cancer Maternal Uncle     Cancer Maternal Uncle     Cancer Maternal Uncle     Blindness Neg Hx     Glaucoma Neg Hx     Macular degeneration Neg Hx     Retinal detachment Neg Hx     Stroke Neg Hx        ALLERGIES AND MEDICATIONS: updated and reviewed.  Review of patient's allergies indicates:   Allergen Reactions    Antihistamines - alkylamine Nausea Only    Ciprofloxacin Nausea Only    Penicillins Hives     Current Outpatient Prescriptions   Medication Sig Dispense Refill    amlodipine (NORVASC) 10 MG tablet TAKE 1 TABLET ONE TIME DAILY 90 tablet 1    calcium carbonate (OS-PAOLO) 600 mg (1,500 mg) Tab Take 600 mg by mouth 2 (two) times daily with meals.        metoprolol succinate (TOPROL-XL) 50 MG 24 hr tablet TAKE 1 TABLET EVERY DAY 90 tablet 1    omeprazole (PRILOSEC) 20 MG capsule TAKE 2 CAPSULES ONE TIME DAILY BEFORE FIRST MEAL OF THE  capsule 1    pravastatin (PRAVACHOL) 80 MG tablet TAKE 1 TABLET EVERY DAY 90 tablet 1    quinapril (ACCUPRIL) 40 MG tablet TAKE 1 TABLET TWICE DAILY 180 tablet 1    sertraline (ZOLOFT) 50 MG tablet TAKE 1 TABLET ONE TIME DAILY 90 tablet 1    aspirin (ECOTRIN) 81 MG EC tablet Take 81 mg by mouth once daily.      meloxicam (MOBIC) 15 MG tablet Take 1 tablet (15 mg total) by mouth once daily. Change to daily as needed for pain if possible 90 tablet 1     No current facility-administered medications for this visit.          ROS  Review of Systems   Constitutional: Negative for chills, fever and unexpected weight change.   HENT: Negative  "for congestion, ear pain, hearing loss, rhinorrhea, sore throat and trouble swallowing.    Eyes: Negative for discharge, redness and visual disturbance.   Respiratory: Negative for cough, chest tightness, shortness of breath and wheezing.    Cardiovascular: Negative for chest pain, palpitations and leg swelling.   Gastrointestinal: Negative for abdominal pain, constipation, diarrhea, nausea and vomiting.   Endocrine: Negative for polydipsia, polyphagia and polyuria.   Genitourinary: Positive for urgency. Negative for decreased urine volume, dysuria, hematuria and pelvic pain.   Musculoskeletal: Positive for arthralgias and back pain. Negative for joint swelling and myalgias.   Skin: Negative for color change and rash.   Neurological: Negative for dizziness, weakness, light-headedness and headaches.   Psychiatric/Behavioral: Negative for decreased concentration, dysphoric mood, sleep disturbance and suicidal ideas.           Physical Exam  Vitals:    08/22/17 1307   BP: 130/80   BP Location: Left arm   Patient Position: Sitting   BP Method: Small (Manual)   Pulse: 74   Temp: 98.2 °F (36.8 °C)   SpO2: 97%   Weight: 64.2 kg (141 lb 8.6 oz)   Height: 4' 11" (1.499 m)    Body mass index is 28.59 kg/m².  Weight: 64.2 kg (141 lb 8.6 oz)   Height: 4' 11" (149.9 cm)   General appearance: alert, appears stated age, cooperative and no distress  Head: Normocephalic, without obvious abnormality, atraumatic  Eyes: PERRL EOMI conjunctiva clear  Neck: no adenopathy, no carotid bruit, no JVD, supple, symmetrical, trachea midline and thyroid not enlarged, symmetric, no tenderness/mass/nodules  Back: symmetric, no curvature. ROM normal. No CVA tenderness.  Lungs: clear to auscultation bilaterally  Heart: regular rate and rhythm, S1, S2 normal, no murmur, click, rub or gallop  Abdomen: soft, non-tender; bowel sounds normal; no masses,  no organomegaly  Extremities: extremities normal, atraumatic, no cyanosis or edema  Pulses: 2+ and " symmetric  Neurologic: Grossly normal   Hips: No effusion erythema warmth or TTP.  FROM without pain.        Health Maintenance       Date Due Completion Date    Mammogram 06/16/2017 6/16/2016    Influenza Vaccine 08/01/2017 10/31/2016 (Done)    Override on 10/31/2016: Done (majoria on barataria)    Override on 11/1/2015: Done (Majoria)    Override on 12/16/2014: Done (done 9/2014)    Lipid Panel 01/19/2018 1/19/2017    Colonoscopy 04/02/2019 4/2/2014    DEXA SCAN 07/18/2020 7/18/2017    Override on 7/18/2017: Done    TETANUS VACCINE 05/05/2021 5/5/2011 (Done)    Override on 5/5/2011: Done            Assessment & Plan  Problem List Items Addressed This Visit        Cardiac/Vascular    Essential hypertension - Primary (Chronic)    Current Assessment & Plan     The current medical regimen is effective;  continue present plan and medications.          Hyperlipidemia (Chronic)    Current Assessment & Plan     On high dose statin.  The current medical regimen is effective;  continue present plan and medications.          PAD (peripheral artery disease) (Chronic)    Current Assessment & Plan     Continue medical management.            Other Visit Diagnoses     Family history of malignant melanoma    -  Referred for screening skin exam.     Relevant Orders    Ambulatory consult to Dermatology    Chronic left-sided low back pain without sciatica    -  Intermittent symptoms.  Restart meloxicam.  Suspect some SI joint etiology.     Relevant Medications    meloxicam (MOBIC) 15 MG tablet    Urge incontinence    -  Will refer to Urogyn as she is not interested in returning to the outside urologist that she previously saw.  History concerning for rectocele/cystocele    Relevant Orders    Ambulatory Referral to Urogynecology            Health Maintenance reviewed, I counseled the patient on the new recommendations for breast cancer screening and risk versus benefit of MMGs.  Patient will not proceed with MMG this year.  .    Follow-up: Return in about 6 months (around 2/22/2018) for Routine Physical.

## 2017-08-22 NOTE — ASSESSMENT & PLAN NOTE
On high dose statin.  The current medical regimen is effective;  continue present plan and medications.

## 2017-08-24 ENCOUNTER — TELEPHONE (OUTPATIENT)
Dept: UROGYNECOLOGY | Facility: CLINIC | Age: 72
End: 2017-08-24

## 2017-08-24 ENCOUNTER — TELEPHONE (OUTPATIENT)
Dept: FAMILY MEDICINE | Facility: CLINIC | Age: 72
End: 2017-08-24

## 2017-08-24 NOTE — TELEPHONE ENCOUNTER
Called pt and schedule appointment for new consult due to urge incontinence. Pt voiced understanding and call was ended.

## 2017-08-24 NOTE — TELEPHONE ENCOUNTER
----- Message from Una Doll MA sent at 8/24/2017 11:50 AM CDT -----  Good morning, patient Sophia Landis #[id number] need a scheduled appointment with the first available physician.    Dx: Urge incontinence        Thanks  Una ESPARZA

## 2017-08-30 ENCOUNTER — INITIAL CONSULT (OUTPATIENT)
Dept: UROGYNECOLOGY | Facility: CLINIC | Age: 72
End: 2017-08-30
Payer: MEDICARE

## 2017-08-30 VITALS
DIASTOLIC BLOOD PRESSURE: 64 MMHG | WEIGHT: 142.63 LBS | BODY MASS INDEX: 28.76 KG/M2 | SYSTOLIC BLOOD PRESSURE: 140 MMHG | HEIGHT: 59 IN

## 2017-08-30 DIAGNOSIS — R15.2 RECTAL URGENCY: ICD-10-CM

## 2017-08-30 DIAGNOSIS — N39.46 URINARY INCONTINENCE, MIXED: Primary | ICD-10-CM

## 2017-08-30 DIAGNOSIS — K59.09 CHRONIC CONSTIPATION: ICD-10-CM

## 2017-08-30 DIAGNOSIS — N95.2 VAGINAL ATROPHY: ICD-10-CM

## 2017-08-30 DIAGNOSIS — R19.8 IRREGULAR BOWEL HABITS: ICD-10-CM

## 2017-08-30 PROCEDURE — 3077F SYST BP >= 140 MM HG: CPT | Mod: S$GLB,,, | Performed by: OBSTETRICS & GYNECOLOGY

## 2017-08-30 PROCEDURE — 3078F DIAST BP <80 MM HG: CPT | Mod: S$GLB,,, | Performed by: OBSTETRICS & GYNECOLOGY

## 2017-08-30 PROCEDURE — 51701 INSERT BLADDER CATHETER: CPT | Mod: S$GLB,,, | Performed by: OBSTETRICS & GYNECOLOGY

## 2017-08-30 PROCEDURE — 1126F AMNT PAIN NOTED NONE PRSNT: CPT | Mod: S$GLB,,, | Performed by: OBSTETRICS & GYNECOLOGY

## 2017-08-30 PROCEDURE — 1159F MED LIST DOCD IN RCRD: CPT | Mod: S$GLB,,, | Performed by: OBSTETRICS & GYNECOLOGY

## 2017-08-30 PROCEDURE — 3008F BODY MASS INDEX DOCD: CPT | Mod: S$GLB,,, | Performed by: OBSTETRICS & GYNECOLOGY

## 2017-08-30 PROCEDURE — 99999 PR PBB SHADOW E&M-EST. PATIENT-LVL IV: CPT | Mod: PBBFAC,,, | Performed by: OBSTETRICS & GYNECOLOGY

## 2017-08-30 PROCEDURE — 99205 OFFICE O/P NEW HI 60 MIN: CPT | Mod: 25,S$GLB,, | Performed by: OBSTETRICS & GYNECOLOGY

## 2017-08-30 PROCEDURE — 87086 URINE CULTURE/COLONY COUNT: CPT

## 2017-08-30 NOTE — PATIENT INSTRUCTIONS
Bladder Irritants  Certain foods and drinks have been associated with worsening symptoms of urinary frequency, urgency, urge incontinence, or bladder pain. If you suffer from any of these conditions, you may wish to try eliminating one or more of these foods from your diet and see if your symptoms improve. If bladder symptoms are related to dietary factors, strict adherence to a diet thateliminates the food should bring marked relief in 10 days. Once you are feeling better, you can begin to add foods back into your diet, one at a time. If symptoms return, you will be able to identify the irritant. As you add foods back to your diet it is very important that you drink significant amounts of water.    -----------------------------------------------------------------------------------------------  List of Common Bladder Irritants*  Alcoholic beverages  Apples and apple juice  Cantaloupe  Carbonated beverages  Chili and spicy foods  Chocolate  Citrus fruit  Coffee (including decaffeinated)  Cranberries and cranberry juice  Grapes  Guava  Milk Products: milk, cheese, cottage cheese, yogurt, ice cream  Peaches  Pineapple  Plums  Strawberries  Sugar especially artificial sweeteners, saccharin, aspartame, corn sweeteners, honey, fructose, sucrose, lactose  Tea  Tomatoes and tomato juice  Vitamin B complex  Vinegar  *Most people are not sensitive to ALL of these products; your goal is to find the foods that make YOUR symptoms worse.  ---------------------------------------------------------------------------------------------------    Low-acid fruit substitutions include apricots, papaya, pears and watermelon. Coffee drinkers can drink Kava or other lowacid instant drinks. Tea drinkers can substitute non-citrus herbal and sun brewed teas. Calcium carbonate co-buffered with calcium ascorbate can be substituted for Vitamin C. Prelief is a dietary supplement that works as an acid blocker for the bladder.    Where to get more  information:        Overcoming Bladder Disorders by Luzmaria Rockwell and Rose Olguin, 1990        You Dont Have to Live with Cystitis! By Claudia Hernandez, 1988  · http://[email or web address].org/oab    -----------------------------------------------------    Fiber Information Sheet  Your doctor has recommended that you follow a high fiber diet. The addition of fiber to your diet can make an enormous difference in your bowel control and regularity. Fiber helps people whether they lose stool or have trouble with constipation. Fiber works by bulking the stool and keeping it formed, yet making the movement soft and easy to pass. Fiber helps keep moisture within the stool so that neither diarrhea nor hard stool occurs. Fiber makes the bowels work more regularly, but it is not a laxative. An additional bonus from eating a high fiber diet is that your risk of cancer is reduced, too.    Most of us eat some high fiber foods already, but nearly all of us do not eat the necessary amount. For example, a slice of whole wheat bread contains only about 10% of the daily recommended amount of fiber. This means if you are relying on only whole wheat bread to meet the recommended fiber requirements, you would need to eat  between 10-18 slices every day! Please note that fiber is NOT in any meat or dairy product. It is only found in grains, vegetables and fruits. The recommended daily fiber intake is 20-25 grams. Foods having high fiber content include:     Fiber One Cereal, ½ cup 13.0 g   Mast beans, ¾ cup 10.4 g   Wheat bran cereal, 1 oz 10.0 g   Kidney beans, ¾ cup 9.3 g   All Bran Cereal, ½ cup 6.0 g   Oat Bran Cereal, hot, 1 oz 4.0 g   Banana, 1medium 3.8 g   Canned pears, ½ cup 3.7 g   3 prunes or ¼ cup raisins 3.5 g   Whole Wheat Total, 1 cup 3.0 g   Carrots, ½ cup 3.2 g   Apple, small 2.8 g   Broccoli, ½ cup 2.8 g   Cauliflower, ½ cup 2.6 g   Oatmeal, 1 oz 2.5 g   Whole Wheat Toast 2.0 g    Cheerios, 1 1/3 cup 2.0 g   Baked potato with skin 2.0 g   Corn, ½ cup 1.9 g   Popcorn, 3 cups 1.9 g   Orange, medium 1.9 g   Granola bar 1.0 g   Lettuce, ½ cup 0.9 g    If you dont think that you can get enough fiber through your everyday diet, there are many good fiber supplements you can take along with eating your high fiber diet. Some of these are: Metamucil (1 heaping teaspoon or 1-2 wafers), Citrucel (1 tablespoon), Fiberall (1-2 wafers or 1 teaspoon), Perdium (2 rounded teaspoons) and 1-2 teaspoons unprocessed bran (to mix with foods)    You may need to use the fiber supplement up to 3-4 times daily to produce normal elimination. Please follow specific package directions or call us for help in regulating the dose. You may notice some bloating and/or increased gas at first. These symptoms can be relieved by adding fiber to your diet slowly. Once your body gets used to this increased fiber, these symptoms will go away.   ---------------------------------------------------------------    1)  Urge incontinence:  --Empty bladder every 3 hours.  Empty well: wait a minute, lean forward on toilet.    --Avoid dietary irritants (see sheet).  Keep diary x 3-5 days to determine your irritants.  --KEGELS: do 10 in AM and 10 in PM, holding each x 10 seconds.  When you feel urge to go, STOP, KEGEL, and when urge has passed, then go to bathroom.  Consider PT in future.    --URGE: consider medication in the future. Takes 2-4 weeks to see if will have effect.  For dry mouth: get sour, sugar free lozenge or gum.    --STRESS:  Pessary vs. Sling.     2)  Constipation with fecal smearing:  Controlling constipation may help bladder urgency/leakage and fiber may better control cholesterol and blood glucose.  Start daily fiber.  Take 1 tsp of fiber powder (psyllium or other sugar-free powder).  Mix in 8 oz of water.  Take x 3-5 days.  Then, increase fiber by 1 tsp every 3-5 days until stool is easy to pass, well-formed, not  irregular.  Stop and continue at that dose.   Do not exceed 6 tsps/day.  May also use over the counter stool softener (EX Colace) 1-2 x/day.  AVOID laxatives.  --keep diary and see if anything makes stool worse; if so, cut back    3) Vaginal atrophy (dryness):   Use REPLENS or REFRESH OTC: 1/2 applicator full in vagina twice a week.      4)  Well-woman:  --please schedule mammogram: risks of not doing discussed  --talk with PCP about getting bone density (screening for osteoporosis) if needed  --colonoscopy due 2018  --no further paps needed; but should have GYN exam annually    5)  RTC if needed.

## 2017-08-30 NOTE — PROGRESS NOTES
"[facility]  [address] [facility] [address]    Sophia Landis  [id number]  1945 August 30, 2017    Consulting Physician: Xavier Lopez MD   GYN: none  Primary M.D.: Xavier Lopez MD    Chief Complaint   Patient presents with    Urinary Incontinence    Encopresis    Urinary Urgency       HPI: Pt complaining of both urinary and fecal incontinence. Occurring for a few months. Used to be able to hold it, but not anymore. Fecal incontinence started about 6 months. Occurs about once/month.    1)  UI:  (--) MECHE < (+) UUI  X 1years.  (+) pads:soaking through the pads/day, usually severe wetness and occasionally/night usually minimum wetness.  Daytime frequency: Q 5 hours.  Nocturia: No: 1/night.   (--) dysuria,  (--) hematuria,  (+) frequent UTIs - 3 UTIs within the last year with hematuria (bright red blood).  (+) complete bladder emptying. Usually empties completely    2)  POP:  Absent.  Symptoms:(--).  (--) vaginal bleeding. (--) vaginal discharge. (+) sexually active.  (--) dyspareunia.   (+)  Vaginal dryness.  (--) vaginal estrogen use. Taken off of vaginal estrogen cream.    3)  BM:  (+) constipation/straining - strains occasionally.  (--) chronic diarrhea. (--) hematochezia.  (+) fecal incontinence.  (+) fecal smearing/urgency - unable to hold the stool in once she feels urge.  (--) complete evacuation - tends to "go a little, waits, goes a bit more" etc.  Can feel pressure on the back wall of vagina when defecating. Also has rectal hemorrhoids.    Past Medical History  Past Medical History:   Diagnosis Date    Cataract     Depression     GERD (gastroesophageal reflux disease)     Hyperlipidemia     Hypertension     Menopausal syndrome     PAD (peripheral artery disease)     s/p stent        Past Surgical History  Past Surgical History:   Procedure Laterality Date    APPENDECTOMY      HYSTERECTOMY      MONTY with BSO    ILIAC ARTERY STENT Bilateral    Appy route: " open surgery    Hysterectomy: Yes - complete. Took out uterus 1st.  Then, had to go back and remove ovaries.   Date: ~40 years ago (age 30).  Indication: unknown.    Type: open abdominal surgery  Cervix present: No  Ovaries present: No  Other procedures at time of hysterectomy:  none    Past Ob History     x 2.  C/s x 0.    Largest infant weight: 4 lb 15 oz  yes FAVD. yes lateral episiotomy.      Gynecologic History  LMP: No LMP recorded. Patient has had a hysterectomy.  Age of menarche: 9  Age of menopause: 30 (hysterectomy)  Menstrual history: Periods lasted 6-7 days. Every 30 days.  Pap test: Before hysterectomy, but not since.  History of abnormal paps: No.  History of STIs:  No  Mammogram: Date of last: .  Result: Normal  Colonoscopy: Date of last: .  Result:  2 polyps were resected.  Repeat due: Q 5 years  DEXA:  Date of last: Years ago.  Result: Normal.  Repeat due:  unknown.     Family History  Family History   Problem Relation Age of Onset    Amblyopia Mother     Cataracts Mother     Hypertension Mother     Thyroid disease Mother     Cancer Father     Strabismus Sister     Cancer Brother     Diabetes Maternal Aunt     Cancer Maternal Uncle     Diabetes Maternal Uncle     Cancer Maternal Uncle     Cancer Maternal Uncle     Cancer Maternal Uncle     Blindness Neg Hx     Glaucoma Neg Hx     Macular degeneration Neg Hx     Retinal detachment Neg Hx     Stroke Neg Hx       Colon CA: No  Breast CA: No  GYN CA: No   CA: No    Social History  History   Smoking Status    Former Smoker   Smokeless Tobacco    Former User     Comment: .  Retired  at [employer]   .  Cessation date: .  PPD:  1 x 30 years.   History   Alcohol Use No   .  Drinks occasionally - has a couple of margaritas on cruises.  History   Drug Use No   .  The patient is .  Resides in [place] [address].  Employment status: retired.    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    Allergies  Review of patient's allergies indicates:   Allergen Reactions    Antihistamines - alkylamine Nausea Only    Ciprofloxacin Nausea Only    Penicillins Hives       Medications  Current Outpatient Prescriptions on File Prior to Visit   Medication Sig Dispense Refill    amlodipine (NORVASC) 10 MG tablet TAKE 1 TABLET ONE TIME DAILY 90 tablet 1    calcium carbonate (OS-PAOLO) 600 mg (1,500 mg) Tab Take 600 mg by mouth 2 (two) times daily with meals.        meloxicam (MOBIC) 15 MG tablet Take 1 tablet (15 mg total) by mouth once daily. Change to daily as needed for pain if possible 90 tablet 1    metoprolol succinate (TOPROL-XL) 50 MG 24 hr tablet TAKE 1 TABLET EVERY DAY 90 tablet 1    omeprazole (PRILOSEC) 20 MG capsule TAKE 2 CAPSULES ONE TIME DAILY BEFORE FIRST MEAL OF THE  capsule 1    pravastatin (PRAVACHOL) 80 MG tablet TAKE 1 TABLET EVERY DAY 90 tablet 1    quinapril (ACCUPRIL) 40 MG tablet TAKE 1 TABLET TWICE DAILY 180 tablet 1    sertraline (ZOLOFT) 50 MG tablet TAKE 1 TABLET ONE TIME DAILY 90 tablet 1    aspirin (ECOTRIN) 81 MG EC tablet Take 81 mg by mouth once daily.       No current facility-administered medications on file prior to visit.        Review of Systems A 14 point ROS was reviewed with pertinent positives as noted above in the history of present illness.      Constitutional: negative  Eyes: negative  Endocrine: negative  Gastrointestinal: negative  Cardiovascular: negative  Respiratory: negative  Allergic/Immunologic: negative  Integumentary: negative  Psychiatric: negative  Musculoskeletal: negative   Ear/Nose/Throat: negative  Neurologic: negative  Genitourinary: SEE HPI  Hematologic/Lymphatic: negative   Breast: negative    Urogynecologic Exam  BP (!) 140/64 (BP Location: Left arm, Patient Position: Sitting)   Ht 4' 11" (1.499 m)   Wt 64.7 kg (142 lb 10.2 oz)   BMI 28.81 kg/m²     GENERAL APPEARANCE:  The patient is well-developed, well-nourished.  Neck:  " Supple with no thyromegaly, no carotid bruits.  Heart:  Regular rate and rhythm, no murmurs, rubs or gallops.  Lungs:  Clear.  No CVA tenderness.  Abdomen:  Soft, nontender, nondistended, no hepatosplenomegaly.  Incisions:  Vertical midline well-healed    PELVIC:    External genitalia:  Normal Bartholins, Skenes and labia bilaterally.    Urethra:  No caruncle, diverticulum or masses.  (+) hypermobility.    Vagina:  Atrophy (+) , no bladder masses or tender, no discharge.    Cervix:  absent  Uterus: uterus absent  Adnexa: Not palpable.    POP-Q:   Deferred.  No obvious POP present with valsalva.     NEUROLOGIC:  Cranial nerves 2 through 12 intact.  Strength 5/5.  DTRs 2+ lower extremities.  S2 through 4 normal.  Sacral reflexes intact.    EXT: ZARATE, 2+ pulses bilaterally, no C/C/E    COUGH STRESS TEST:  negative  KEGEL: 1 /5--mostly gluteal squeeze    RECTAL:    External:  Normal, (--) hemorrhoids, (--) dovetailing.   Internal:   (--) tenderness, (--) masses, Normal resting tone, Abnormal - decreased active tone. EAS feels intact circumferentially. Hard stool noted in rectal vault.     PVR: 10 mL    Impression    No diagnosis found.    Initial Plan  The patient was counseled regarding these issues. The patient was given a summary sheet containing each of these issues with possible options for evaluation and management. When appropriate, we also reviewed computer-generated diagrams specific to their diagnoses..  All questions were addressed to the patient's satisfaction.    1)  Urge incontinence:  --Empty bladder every 3 hours.  Empty well: wait a minute, lean forward on toilet.    --Avoid dietary irritants (see sheet).  Keep diary x 3-5 days to determine your irritants.  --KEGELS: do 10 in AM and 10 in PM, holding each x 10 seconds.  When you feel urge to go, STOP, KEGEL, and when urge has passed, then go to bathroom.  Consider PT in future. --URGE: consider medication in the future. Takes 2-4 weeks to see if will  "have effect.  For dry mouth: get sour, sugar free lozenge or gum.    --STRESS:  Pessary vs. Sling.     2)  Constipation with fecal smearing:  Controlling constipation may help bladder urgency/leakage and fiber may better control cholesterol and blood glucose.  Start daily fiber.  Take 1 tsp of fiber powder (psyllium or other sugar-free powder).  Mix in 8 oz of water.  Take x 3-5 days.  Then, increase fiber by 1 tsp every 3-5 days until stool is easy to pass, well-formed, not irregular.  Stop and continue at that dose.   Do not exceed 6 tsps/day.  May also use over the counter stool softener (EX Colace) 1-2 x/day.  AVOID laxatives.  --keep diary and see if anything makes stool worse; if so, cut back  --asked if patient understood plan and if she was ok with trying fiber at a lower dose, gradually weaning up.  She responds that she "doesn't really understand" how fiber can help both loose and hard stool.  Explained again the concept behind having constipated, hard stool (which was noted in rectal vault on today's exam), often followed by loose stool, as common pattern for constipation.  We also discussed that if she achieves good stool consistency, they might be less likely to smudge in her underwear.  She seems like she might try fiber,? Keep diary--seems very wary. When asked if she would like to follow up, she states "I think I can tell if I'm getting better on my own."    3) Vaginal atrophy (dryness):   Use REPLENS or REFRESH OTC: 1/2 applicator full in vagina twice a week.    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  Hard to tell to which test she is referring (bone scan).  Advised to ask her PCP so can make sure up to date.    --colonoscopy due 2018  --no further paps needed; but should have GYN exam annually; she asks "why this is necessary;" explained that we look for other things besides cervical CA screening at her annual and reviewed these things    5)  RTC if needed.  In general, patient seemed skeptical of and resistant to any plans I offered today.  She asked me to "hurry up" several times. When asked if she would like to follow up, she states "I think I can tell if I'm getting better on my own."  Let patient know that we want to help her get better and are happy to see her back if she decides otherwise.    Approximately 45 min were spent in consult, 90 % in discussion.     Thank you for requesting consultation of your patient.  I look forward to participating in their care.    Speedy Gonzáles  Female Pelvic Medicine and Reconstructive Surgery  [facility] [place], [place]    "

## 2017-08-30 NOTE — LETTER
August 30, 2017      Xavier Lopez MD  [address]  [place] [address]           [facility] [address] [place] [address]  Phone: [phone]          Patient: Sophia Landis   MR Number: [id number]   YOB: 1945   Date of Visit: 8/30/2017       Dear Dr. Xavier Lopez:    Thank you for referring Sophia Landis to me for evaluation. Attached you will find relevant portions of my assessment and plan of care.    If you have questions, please do not hesitate to call me. I look forward to following Sophia Landis along with you.    Sincerely,    Speedy Gonzáles MD    Enclosure  CC:  No Recipients    If you would like to receive this communication electronically, please contact [email or web address] or [phone] to request more information on [employer] access.    For providers and/or their staff who would like to refer a patient to [facility], please contact us through our one-stop-shop provider referral line, [facility], at [phone].    If you feel you have received this communication in error or would no longer like to receive these types of communications, please e-mail [email or web address]

## 2017-08-31 LAB — BACTERIA UR CULT: NO GROWTH

## 2017-09-01 ENCOUNTER — TELEPHONE (OUTPATIENT)
Dept: UROGYNECOLOGY | Facility: CLINIC | Age: 72
End: 2017-09-01

## 2017-09-01 NOTE — TELEPHONE ENCOUNTER
----- Message from Speedy Gonzáles MD sent at 9/1/2017  9:49 AM CDT -----  Please let the patient know urine C&S was negative for infection.  Thanks!

## 2017-09-04 RX ORDER — QUINAPRIL 40 MG/1
40 TABLET ORAL 2 TIMES DAILY
Qty: 180 TABLET | Refills: 1 | Status: SHIPPED | OUTPATIENT
Start: 2017-09-04 | End: 2018-01-15 | Stop reason: SDUPTHER

## 2017-09-05 ENCOUNTER — TELEPHONE (OUTPATIENT)
Dept: UROGYNECOLOGY | Facility: CLINIC | Age: 72
End: 2017-09-05

## 2017-09-05 NOTE — TELEPHONE ENCOUNTER
Called pt to notified her of negative urine C&S results. Pt voiced understanding and call was ended.

## 2017-11-13 DIAGNOSIS — I10 ESSENTIAL HYPERTENSION: Chronic | ICD-10-CM

## 2017-11-14 RX ORDER — SERTRALINE HYDROCHLORIDE 50 MG/1
TABLET, FILM COATED ORAL
Qty: 90 TABLET | Refills: 1 | Status: SHIPPED | OUTPATIENT
Start: 2017-11-14 | End: 2018-03-27 | Stop reason: SDUPTHER

## 2017-11-14 RX ORDER — AMLODIPINE BESYLATE 10 MG/1
TABLET ORAL
Qty: 90 TABLET | Refills: 1 | Status: SHIPPED | OUTPATIENT
Start: 2017-11-14 | End: 2018-03-27 | Stop reason: SDUPTHER

## 2017-11-16 ENCOUNTER — OFFICE VISIT (OUTPATIENT)
Dept: FAMILY MEDICINE | Facility: CLINIC | Age: 72
End: 2017-11-16
Payer: MEDICARE

## 2017-11-16 ENCOUNTER — HOSPITAL ENCOUNTER (OUTPATIENT)
Dept: RADIOLOGY | Facility: HOSPITAL | Age: 72
Discharge: HOME | End: 2017-11-16
Attending: INTERNAL MEDICINE
Payer: MEDICARE

## 2017-11-16 VITALS
DIASTOLIC BLOOD PRESSURE: 86 MMHG | HEIGHT: 59 IN | BODY MASS INDEX: 27.96 KG/M2 | HEART RATE: 88 BPM | SYSTOLIC BLOOD PRESSURE: 138 MMHG | WEIGHT: 138.69 LBS | OXYGEN SATURATION: 95 % | TEMPERATURE: 98 F

## 2017-11-16 DIAGNOSIS — J20.9 ACUTE BRONCHITIS, UNSPECIFIED ORGANISM: Primary | ICD-10-CM

## 2017-11-16 DIAGNOSIS — J20.9 ACUTE BRONCHITIS, UNSPECIFIED ORGANISM: ICD-10-CM

## 2017-11-16 PROCEDURE — 94640 AIRWAY INHALATION TREATMENT: CPT | Mod: S$GLB,,, | Performed by: INTERNAL MEDICINE

## 2017-11-16 PROCEDURE — 71020 XR CHEST PA AND LATERAL: CPT | Mod: 26,,, | Performed by: RADIOLOGY

## 2017-11-16 PROCEDURE — 99214 OFFICE O/P EST MOD 30 MIN: CPT | Mod: 25,S$GLB,, | Performed by: INTERNAL MEDICINE

## 2017-11-16 PROCEDURE — 99999 PR PBB SHADOW E&M-EST. PATIENT-LVL III: CPT | Mod: PBBFAC,,, | Performed by: INTERNAL MEDICINE

## 2017-11-16 PROCEDURE — 71020 XR CHEST PA AND LATERAL: CPT | Mod: TC,PO

## 2017-11-16 RX ORDER — METHYLPREDNISOLONE 4 MG/1
TABLET ORAL
Qty: 1 PACKAGE | Refills: 0 | Status: SHIPPED | OUTPATIENT
Start: 2017-11-16 | End: 2018-08-22

## 2017-11-16 RX ORDER — IPRATROPIUM BROMIDE AND ALBUTEROL SULFATE 2.5; .5 MG/3ML; MG/3ML
3 SOLUTION RESPIRATORY (INHALATION)
Status: COMPLETED | OUTPATIENT
Start: 2017-11-16 | End: 2017-11-16

## 2017-11-16 RX ORDER — ALBUTEROL SULFATE 90 UG/1
1-2 AEROSOL, METERED RESPIRATORY (INHALATION)
Qty: 1 INHALER | Refills: 6 | Status: SHIPPED | OUTPATIENT
Start: 2017-11-16 | End: 2023-05-25 | Stop reason: CLARIF

## 2017-11-16 RX ADMIN — IPRATROPIUM BROMIDE AND ALBUTEROL SULFATE 3 ML: 2.5; .5 SOLUTION RESPIRATORY (INHALATION) at 03:11

## 2017-11-16 NOTE — PROGRESS NOTES
Assessment & Plan  Problem List Items Addressed This Visit     None      Visit Diagnoses     Acute bronchitis, unspecified organism    -  Primary  -    Start prednisone and albuterol.  I taught the patient the correct technique for HFAwithout a spacer.   actuations.   Will also check CXR to ensure no consolidation is starting.     Relevant Medications    albuterol-ipratropium 2.5mg-0.5mg/3mL nebulizer solution 3 mL (Completed)    albuterol 90 mcg/actuation inhaler    methylPREDNISolone (MEDROL DOSEPACK) 4 mg tablet    Other Relevant Orders    X-Ray Chest PA And Lateral            Health Maintenance reviewed, deferred.    Follow-up: Return if symptoms worsen or fail to improve.  ______________________________________________________________________    Chief Complaint  Chief Complaint   Patient presents with    Cough    Sore Throat       HPI  Sophia Landis is a 72 y.o. female with medical diagnoses as listed in the medical history and problem list that presents for cough, sore throat for 6 days.  Pt is known to me with her last appointment 8/22/2017.      Felt feverish and had night sweats last night.  Lots of body aches.  Cough is nonproductive.  Particularly bad at night.  Nose is producing mostly clear mucus.  Tried taking cough drops and tylenol without       PAST MEDICAL HISTORY:  Past Medical History:   Diagnosis Date    Cataract     Depression     GERD (gastroesophageal reflux disease)     Hyperlipidemia     Hypertension     Menopausal syndrome     PAD (peripheral artery disease)     s/p stent       PAST SURGICAL HISTORY:  Past Surgical History:   Procedure Laterality Date    APPENDECTOMY      HYSTERECTOMY      MONTY with BSO    ILIAC ARTERY STENT Bilateral        SOCIAL HISTORY:  Social History     Social History    Marital status:      Spouse name: N/A    Number of children: N/A    Years of education: N/A     Occupational History    Not on file.     Social History Main Topics    Smoking  status: Former Smoker    Smokeless tobacco: Former User      Comment: .  Retired  at [employer].    Alcohol use No    Drug use: No    Sexual activity: Yes     Partners: Male      Comment:      Other Topics Concern    Not on file     Social History Narrative    No narrative on file       FAMILY HISTORY:  Family History   Problem Relation Age of Onset    Amblyopia Mother     Cataracts Mother     Hypertension Mother     Thyroid disease Mother     Cancer Father     Strabismus Sister     Cancer Brother     Diabetes Maternal Aunt     Cancer Maternal Uncle     Diabetes Maternal Uncle     Cancer Maternal Uncle     Cancer Maternal Uncle     Cancer Maternal Uncle     Blindness Neg Hx     Glaucoma Neg Hx     Macular degeneration Neg Hx     Retinal detachment Neg Hx     Stroke Neg Hx        ALLERGIES AND MEDICATIONS: updated and reviewed.  Review of patient's allergies indicates:   Allergen Reactions    Antihistamines - alkylamine Nausea Only    Ciprofloxacin Nausea Only    Penicillins Hives     Current Outpatient Prescriptions   Medication Sig Dispense Refill    amLODIPine (NORVASC) 10 MG tablet TAKE 1 TABLET EVERY DAY 90 tablet 1    calcium carbonate (OS-PAOLO) 600 mg (1,500 mg) Tab Take 600 mg by mouth 2 (two) times daily with meals.        meloxicam (MOBIC) 15 MG tablet Take 1 tablet (15 mg total) by mouth once daily. Change to daily as needed for pain if possible 90 tablet 1    metoprolol succinate (TOPROL-XL) 50 MG 24 hr tablet TAKE 1 TABLET EVERY DAY 90 tablet 1    omeprazole (PRILOSEC) 20 MG capsule TAKE 2 CAPSULES ONE TIME DAILY BEFORE FIRST MEAL OF THE  capsule 1    pravastatin (PRAVACHOL) 80 MG tablet TAKE 1 TABLET EVERY DAY 90 tablet 1    quinapril (ACCUPRIL) 40 MG tablet Take 1 tablet (40 mg total) by mouth 2 (two) times daily. 180 tablet 1    sertraline (ZOLOFT) 50 MG tablet TAKE 1 TABLET EVERY DAY 90 tablet 1    albuterol 90 mcg/actuation  "inhaler Inhale 1-2 puffs into the lungs every 4 to 6 hours as needed for Wheezing or Shortness of Breath (chest tightness). 1 Inhaler 6    aspirin (ECOTRIN) 81 MG EC tablet Take 81 mg by mouth once daily.      methylPREDNISolone (MEDROL DOSEPACK) 4 mg tablet use as directed 1 Package 0     No current facility-administered medications for this visit.          ROS  Review of Systems   Constitutional: Positive for fever. Negative for chills and unexpected weight change.   HENT: Positive for congestion. Negative for ear pain, hearing loss, rhinorrhea, sore throat and trouble swallowing.    Eyes: Negative for discharge, redness and visual disturbance.   Respiratory: Positive for cough and wheezing. Negative for chest tightness and shortness of breath.    Cardiovascular: Negative for chest pain, palpitations and leg swelling.   Gastrointestinal: Negative for abdominal pain, constipation, diarrhea, nausea and vomiting.   Endocrine: Negative for polydipsia, polyphagia and polyuria.   Genitourinary: Negative for decreased urine volume, dysuria and hematuria.   Musculoskeletal: Negative for arthralgias, joint swelling and myalgias.   Skin: Negative for color change and rash.   Neurological: Negative for dizziness, weakness, light-headedness and headaches.   Psychiatric/Behavioral: Negative for decreased concentration, dysphoric mood, sleep disturbance and suicidal ideas.           Physical Exam  Vitals:    11/16/17 1434 11/16/17 1610   BP: (!) 150/80 138/86   Pulse: 88    Temp: 98.3 °F (36.8 °C)    SpO2: 95%    Weight: 62.9 kg (138 lb 10.7 oz)    Height: 4' 11" (1.499 m)     Body mass index is 28.01 kg/m².  Weight: 62.9 kg (138 lb 10.7 oz)   Height: 4' 11" (149.9 cm)   Physical Exam   Constitutional: She is oriented to person, place, and time. She appears well-developed and well-nourished. No distress.   HENT:   Head: Normocephalic and atraumatic.   Eyes: Conjunctivae, EOM and lids are normal. Pupils are equal, round, and " reactive to light. No scleral icterus.   Neck: Full passive range of motion without pain. Neck supple. No JVD present. Carotid bruit is not present. No thyromegaly present.   Cardiovascular: Normal rate, regular rhythm, normal heart sounds, intact distal pulses and normal pulses.  Exam reveals no S3, no S4 and no friction rub.    No murmur heard.  Pulmonary/Chest: Effort normal. She has wheezes in the right lower field and the left lower field. She has no rhonchi. She has no rales.   Abdominal: Soft. Bowel sounds are normal. There is no tenderness.   Musculoskeletal: She exhibits no edema or tenderness.   Lymphadenopathy:        Head (right side): No submental and no submandibular adenopathy present.        Head (left side): No submental and no submandibular adenopathy present.     She has no cervical adenopathy.   Neurological: She is alert and oriented to person, place, and time.   Motor grossly intact.  Sensory grossly intact.  Symmetric facial movements palate elevated symmetrically tongue midline   Skin: Skin is warm and dry. No rash noted.   Psychiatric: She has a normal mood and affect. Her speech is normal and behavior is normal. Thought content normal.       Post Duoneb reassessment - LLF with faint expiratory wheeze.  RLF with expiratory wheeze and faint rhonchi.     Health Maintenance       Date Due Completion Date    Influenza Vaccine 08/01/2017 10/31/2016 (Done)    Override on 10/31/2016: Done (majoria on barataria)    Override on 11/1/2015: Done (Majoria)    Override on 12/16/2014: Done (done 9/2014)    Lipid Panel 01/19/2018 1/19/2017    Mammogram 06/16/2018 6/16/2016    Colonoscopy 04/02/2019 4/2/2014    DEXA SCAN 07/18/2020 7/18/2017    Override on 7/18/2017: Done    TETANUS VACCINE 05/05/2021 5/5/2011 (Done)    Override on 5/5/2011: Done

## 2017-11-17 ENCOUNTER — TELEPHONE (OUTPATIENT)
Dept: FAMILY MEDICINE | Facility: CLINIC | Age: 72
End: 2017-11-17

## 2017-11-17 NOTE — TELEPHONE ENCOUNTER
Please call the patient and inform her that there were no signs of pneumonia on her CXR.     Thank you,  Harish

## 2017-12-05 DIAGNOSIS — K21.9 GASTROESOPHAGEAL REFLUX DISEASE, ESOPHAGITIS PRESENCE NOT SPECIFIED: ICD-10-CM

## 2017-12-05 RX ORDER — OMEPRAZOLE 20 MG/1
CAPSULE, DELAYED RELEASE ORAL
Qty: 180 CAPSULE | Refills: 1 | Status: SHIPPED | OUTPATIENT
Start: 2017-12-05 | End: 2018-04-25 | Stop reason: SDUPTHER

## 2017-12-13 DIAGNOSIS — I10 ESSENTIAL HYPERTENSION: Chronic | ICD-10-CM

## 2017-12-13 RX ORDER — PRAVASTATIN SODIUM 80 MG/1
TABLET ORAL
Qty: 90 TABLET | Refills: 1 | Status: SHIPPED | OUTPATIENT
Start: 2017-12-13 | End: 2018-04-25 | Stop reason: SDUPTHER

## 2017-12-13 RX ORDER — METOPROLOL SUCCINATE 50 MG/1
TABLET, EXTENDED RELEASE ORAL
Qty: 90 TABLET | Refills: 1 | Status: SHIPPED | OUTPATIENT
Start: 2017-12-13 | End: 2018-04-25 | Stop reason: SDUPTHER

## 2018-01-15 DIAGNOSIS — G89.29 CHRONIC LEFT-SIDED LOW BACK PAIN WITHOUT SCIATICA: ICD-10-CM

## 2018-01-15 DIAGNOSIS — M54.50 CHRONIC LEFT-SIDED LOW BACK PAIN WITHOUT SCIATICA: ICD-10-CM

## 2018-01-16 RX ORDER — MELOXICAM 15 MG/1
15 TABLET ORAL DAILY
Qty: 90 TABLET | Refills: 1 | Status: SHIPPED | OUTPATIENT
Start: 2018-01-16 | End: 2018-05-11 | Stop reason: SDUPTHER

## 2018-01-16 RX ORDER — QUINAPRIL 40 MG/1
TABLET ORAL
Qty: 180 TABLET | Refills: 1 | Status: SHIPPED | OUTPATIENT
Start: 2018-01-16 | End: 2018-05-11 | Stop reason: SDUPTHER

## 2018-03-27 DIAGNOSIS — F33.0 MILD RECURRENT MAJOR DEPRESSION: Chronic | ICD-10-CM

## 2018-03-27 DIAGNOSIS — I10 ESSENTIAL HYPERTENSION: Primary | Chronic | ICD-10-CM

## 2018-03-27 RX ORDER — AMLODIPINE BESYLATE 10 MG/1
TABLET ORAL
Qty: 90 TABLET | Refills: 1 | Status: SHIPPED | OUTPATIENT
Start: 2018-03-27 | End: 2018-05-11 | Stop reason: SDUPTHER

## 2018-03-27 RX ORDER — SERTRALINE HYDROCHLORIDE 50 MG/1
TABLET, FILM COATED ORAL
Qty: 90 TABLET | Refills: 1 | Status: SHIPPED | OUTPATIENT
Start: 2018-03-27 | End: 2018-05-11 | Stop reason: SDUPTHER

## 2018-04-10 ENCOUNTER — PES CALL (OUTPATIENT)
Dept: ADMINISTRATIVE | Facility: CLINIC | Age: 73
End: 2018-04-10

## 2018-04-25 DIAGNOSIS — I10 ESSENTIAL HYPERTENSION: Chronic | ICD-10-CM

## 2018-04-25 DIAGNOSIS — E78.5 HYPERLIPIDEMIA, UNSPECIFIED HYPERLIPIDEMIA TYPE: Primary | Chronic | ICD-10-CM

## 2018-04-25 DIAGNOSIS — K21.9 GASTROESOPHAGEAL REFLUX DISEASE, ESOPHAGITIS PRESENCE NOT SPECIFIED: ICD-10-CM

## 2018-04-25 RX ORDER — OMEPRAZOLE 20 MG/1
CAPSULE, DELAYED RELEASE ORAL
Qty: 180 CAPSULE | Refills: 0 | Status: SHIPPED | OUTPATIENT
Start: 2018-04-25 | End: 2018-05-11 | Stop reason: SDUPTHER

## 2018-04-25 RX ORDER — METOPROLOL SUCCINATE 50 MG/1
TABLET, EXTENDED RELEASE ORAL
Qty: 90 TABLET | Refills: 0 | Status: SHIPPED | OUTPATIENT
Start: 2018-04-25 | End: 2018-05-11 | Stop reason: SDUPTHER

## 2018-04-25 RX ORDER — PRAVASTATIN SODIUM 80 MG/1
TABLET ORAL
Qty: 90 TABLET | Refills: 0 | Status: SHIPPED | OUTPATIENT
Start: 2018-04-25 | End: 2018-05-11 | Stop reason: SDUPTHER

## 2018-04-25 NOTE — TELEPHONE ENCOUNTER
Attempted to contact patient to schedule a routine check up with labs. No answer, unable leave VM.

## 2018-05-11 ENCOUNTER — TELEPHONE (OUTPATIENT)
Dept: FAMILY MEDICINE | Facility: CLINIC | Age: 73
End: 2018-05-11

## 2018-05-11 ENCOUNTER — OFFICE VISIT (OUTPATIENT)
Dept: FAMILY MEDICINE | Facility: CLINIC | Age: 73
End: 2018-05-11
Payer: MEDICARE

## 2018-05-11 ENCOUNTER — LAB VISIT (OUTPATIENT)
Dept: LAB | Facility: HOSPITAL | Age: 73
End: 2018-05-11
Attending: INTERNAL MEDICINE
Payer: MEDICARE

## 2018-05-11 VITALS
WEIGHT: 141 LBS | TEMPERATURE: 98 F | DIASTOLIC BLOOD PRESSURE: 88 MMHG | SYSTOLIC BLOOD PRESSURE: 138 MMHG | BODY MASS INDEX: 28.43 KG/M2 | HEIGHT: 59 IN | HEART RATE: 74 BPM | OXYGEN SATURATION: 96 %

## 2018-05-11 DIAGNOSIS — R26.89 LOSS OF BALANCE: ICD-10-CM

## 2018-05-11 DIAGNOSIS — Z12.31 ENCOUNTER FOR SCREENING MAMMOGRAM FOR BREAST CANCER: ICD-10-CM

## 2018-05-11 DIAGNOSIS — R53.83 FATIGUE, UNSPECIFIED TYPE: ICD-10-CM

## 2018-05-11 DIAGNOSIS — I10 ESSENTIAL HYPERTENSION: Chronic | ICD-10-CM

## 2018-05-11 DIAGNOSIS — Z00.00 ROUTINE MEDICAL EXAM: Primary | ICD-10-CM

## 2018-05-11 DIAGNOSIS — R73.09 OTHER ABNORMAL GLUCOSE: ICD-10-CM

## 2018-05-11 DIAGNOSIS — M54.50 CHRONIC LEFT-SIDED LOW BACK PAIN WITHOUT SCIATICA: ICD-10-CM

## 2018-05-11 DIAGNOSIS — I73.9 PAD (PERIPHERAL ARTERY DISEASE): Chronic | ICD-10-CM

## 2018-05-11 DIAGNOSIS — G47.30 SLEEP APNEA, UNSPECIFIED TYPE: Chronic | ICD-10-CM

## 2018-05-11 DIAGNOSIS — I77.811 ECTATIC ABDOMINAL AORTA: Chronic | ICD-10-CM

## 2018-05-11 DIAGNOSIS — E78.5 HYPERLIPIDEMIA, UNSPECIFIED HYPERLIPIDEMIA TYPE: Chronic | ICD-10-CM

## 2018-05-11 DIAGNOSIS — G89.29 CHRONIC LEFT-SIDED LOW BACK PAIN WITHOUT SCIATICA: ICD-10-CM

## 2018-05-11 DIAGNOSIS — K21.9 GASTROESOPHAGEAL REFLUX DISEASE, ESOPHAGITIS PRESENCE NOT SPECIFIED: ICD-10-CM

## 2018-05-11 DIAGNOSIS — F33.0 MILD RECURRENT MAJOR DEPRESSION: Chronic | ICD-10-CM

## 2018-05-11 DIAGNOSIS — R09.89 BILATERAL CAROTID BRUITS: ICD-10-CM

## 2018-05-11 LAB
ALBUMIN SERPL BCP-MCNC: 3.9 G/DL
ALP SERPL-CCNC: 110 U/L
ALT SERPL W/O P-5'-P-CCNC: 27 U/L
ANION GAP SERPL CALC-SCNC: 11 MMOL/L
AST SERPL-CCNC: 22 U/L
BASOPHILS # BLD AUTO: 0.03 K/UL
BASOPHILS NFR BLD: 0.4 %
BILIRUB SERPL-MCNC: 0.4 MG/DL
BUN SERPL-MCNC: 18 MG/DL
CALCIUM SERPL-MCNC: 10.3 MG/DL
CHLORIDE SERPL-SCNC: 109 MMOL/L
CHOLEST SERPL-MCNC: 189 MG/DL
CHOLEST/HDLC SERPL: 3.6 {RATIO}
CO2 SERPL-SCNC: 22 MMOL/L
CREAT SERPL-MCNC: 0.8 MG/DL
DIFFERENTIAL METHOD: ABNORMAL
EOSINOPHIL # BLD AUTO: 0.2 K/UL
EOSINOPHIL NFR BLD: 2.4 %
ERYTHROCYTE [DISTWIDTH] IN BLOOD BY AUTOMATED COUNT: 15.4 %
EST. GFR  (AFRICAN AMERICAN): >60 ML/MIN/1.73 M^2
EST. GFR  (NON AFRICAN AMERICAN): >60 ML/MIN/1.73 M^2
ESTIMATED AVG GLUCOSE: 114 MG/DL
GLUCOSE SERPL-MCNC: 99 MG/DL
HBA1C MFR BLD HPLC: 5.6 %
HCT VFR BLD AUTO: 40.3 %
HDLC SERPL-MCNC: 53 MG/DL
HDLC SERPL: 28 %
HGB BLD-MCNC: 13.2 G/DL
IMM GRANULOCYTES # BLD AUTO: 0.02 K/UL
IMM GRANULOCYTES NFR BLD AUTO: 0.3 %
LDLC SERPL CALC-MCNC: 93 MG/DL
LYMPHOCYTES # BLD AUTO: 2.3 K/UL
LYMPHOCYTES NFR BLD: 32.1 %
MCH RBC QN AUTO: 28.5 PG
MCHC RBC AUTO-ENTMCNC: 32.8 G/DL
MCV RBC AUTO: 87 FL
MONOCYTES # BLD AUTO: 0.6 K/UL
MONOCYTES NFR BLD: 9.1 %
NEUTROPHILS # BLD AUTO: 3.9 K/UL
NEUTROPHILS NFR BLD: 55.7 %
NONHDLC SERPL-MCNC: 136 MG/DL
NRBC BLD-RTO: 0 /100 WBC
PLATELET # BLD AUTO: 269 K/UL
PMV BLD AUTO: 10.5 FL
POTASSIUM SERPL-SCNC: 4.4 MMOL/L
PROT SERPL-MCNC: 7.6 G/DL
RBC # BLD AUTO: 4.63 M/UL
SODIUM SERPL-SCNC: 142 MMOL/L
TRIGL SERPL-MCNC: 215 MG/DL
TSH SERPL DL<=0.005 MIU/L-ACNC: 2.75 UIU/ML
VIT B12 SERPL-MCNC: 377 PG/ML
WBC # BLD AUTO: 7 K/UL

## 2018-05-11 PROCEDURE — 99999 PR PBB SHADOW E&M-EST. PATIENT-LVL III: CPT | Mod: PBBFAC,,, | Performed by: INTERNAL MEDICINE

## 2018-05-11 PROCEDURE — 3075F SYST BP GE 130 - 139MM HG: CPT | Mod: CPTII,S$GLB,, | Performed by: INTERNAL MEDICINE

## 2018-05-11 PROCEDURE — 80061 LIPID PANEL: CPT

## 2018-05-11 PROCEDURE — 82607 VITAMIN B-12: CPT

## 2018-05-11 PROCEDURE — 80053 COMPREHEN METABOLIC PANEL: CPT

## 2018-05-11 PROCEDURE — 36415 COLL VENOUS BLD VENIPUNCTURE: CPT | Mod: PO

## 2018-05-11 PROCEDURE — 99499 UNLISTED E&M SERVICE: CPT | Mod: S$GLB,,, | Performed by: INTERNAL MEDICINE

## 2018-05-11 PROCEDURE — 3079F DIAST BP 80-89 MM HG: CPT | Mod: CPTII,S$GLB,, | Performed by: INTERNAL MEDICINE

## 2018-05-11 PROCEDURE — 85025 COMPLETE CBC W/AUTO DIFF WBC: CPT

## 2018-05-11 PROCEDURE — 99397 PER PM REEVAL EST PAT 65+ YR: CPT | Mod: S$GLB,,, | Performed by: INTERNAL MEDICINE

## 2018-05-11 PROCEDURE — 84443 ASSAY THYROID STIM HORMONE: CPT

## 2018-05-11 PROCEDURE — 83036 HEMOGLOBIN GLYCOSYLATED A1C: CPT

## 2018-05-11 RX ORDER — SERTRALINE HYDROCHLORIDE 50 MG/1
50 TABLET, FILM COATED ORAL DAILY
Qty: 90 TABLET | Refills: 1 | Status: SHIPPED | OUTPATIENT
Start: 2018-05-11 | End: 2019-03-13 | Stop reason: SDUPTHER

## 2018-05-11 RX ORDER — OMEPRAZOLE 20 MG/1
CAPSULE, DELAYED RELEASE ORAL
Qty: 180 CAPSULE | Refills: 1 | Status: SHIPPED | OUTPATIENT
Start: 2018-05-11 | End: 2018-11-08 | Stop reason: SDUPTHER

## 2018-05-11 RX ORDER — AMLODIPINE BESYLATE 10 MG/1
10 TABLET ORAL DAILY
Qty: 90 TABLET | Refills: 1 | Status: SHIPPED | OUTPATIENT
Start: 2018-05-11 | End: 2019-04-08 | Stop reason: SDUPTHER

## 2018-05-11 RX ORDER — MELOXICAM 15 MG/1
15 TABLET ORAL DAILY
Qty: 90 TABLET | Refills: 1 | Status: SHIPPED | OUTPATIENT
Start: 2018-05-11 | End: 2019-11-13 | Stop reason: SDUPTHER

## 2018-05-11 RX ORDER — METOPROLOL SUCCINATE 50 MG/1
50 TABLET, EXTENDED RELEASE ORAL DAILY
Qty: 90 TABLET | Refills: 1 | Status: SHIPPED | OUTPATIENT
Start: 2018-05-11 | End: 2018-11-08 | Stop reason: SDUPTHER

## 2018-05-11 RX ORDER — PRAVASTATIN SODIUM 80 MG/1
80 TABLET ORAL DAILY
Qty: 90 TABLET | Refills: 1 | Status: SHIPPED | OUTPATIENT
Start: 2018-05-11 | End: 2018-11-08 | Stop reason: SDUPTHER

## 2018-05-11 RX ORDER — QUINAPRIL 40 MG/1
40 TABLET ORAL 2 TIMES DAILY
Qty: 180 TABLET | Refills: 1 | Status: SHIPPED | OUTPATIENT
Start: 2018-05-11 | End: 2019-03-11 | Stop reason: SDUPTHER

## 2018-05-11 NOTE — ASSESSMENT & PLAN NOTE
Will check home sleep study.  She has been ordered a sleep study for 4 years but has never completed it.  She is now ready

## 2018-05-11 NOTE — PROGRESS NOTES
Assessment & Plan  Problem List Items Addressed This Visit        Psychiatric    Mild recurrent major depression (Chronic)    Current Assessment & Plan     The current medical regimen is effective;  continue present plan and medications.          Relevant Medications    sertraline (ZOLOFT) 50 MG tablet       Cardiac/Vascular    Essential hypertension (Chronic)    Current Assessment & Plan     The current medical regimen is effective;  continue present plan and medications.          Relevant Medications    quinapril (ACCUPRIL) 40 MG tablet    amLODIPine (NORVASC) 10 MG tablet    metoprolol succinate (TOPROL-XL) 50 MG 24 hr tablet    Other Relevant Orders    Lipid panel    CBC auto differential    Comprehensive metabolic panel    Hyperlipidemia (Chronic)    Current Assessment & Plan     Recheck labs.  Continue statin         Relevant Medications    pravastatin (PRAVACHOL) 80 MG tablet    PAD (peripheral artery disease) (Chronic)    Current Assessment & Plan     Recheck ABIs.  May need to see vascular again.          Relevant Orders    US Ankle Brachial Indices Ext LTD WO Str    Ectatic abdominal aorta (Chronic)    Overview     US 2012.  Stable, asymptomatic chronic condition.  Will continue to maximize risk factor reduction and adjust medication as needed.             GI    GERD (gastroesophageal reflux disease)    Current Assessment & Plan     The current medical regimen is effective;  continue present plan and medications.          Relevant Medications    omeprazole (PRILOSEC) 20 MG capsule       Other    Sleep apnea syndrome (Chronic)    Current Assessment & Plan     Will check home sleep study.  She has been ordered a sleep study for 4 years but has never completed it.  She is now ready         Relevant Orders    Home Sleep Studies      Other Visit Diagnoses     Routine medical exam    -  Primary  -    Discussed healthy diet, regular exercise, necessary labs, age appropriate cancer screening, and routine  "vaccinations.       Fatigue, unspecified type     -   Check albs    Relevant Orders    TSH    Hemoglobin A1c    Vitamin B12    Loss of balance     -   Check labs and US    Relevant Orders    Hemoglobin A1c    Vitamin B12    US Carotid Bilateral    Other abnormal glucose    -  Recheck labs    Relevant Orders    Hemoglobin A1c    Bilateral carotid bruits    -  Check carotid US.  Plaque noted on US in 2015    Relevant Orders    US Carotid Bilateral    Chronic left-sided low back pain without sciatica    -  The current medical regimen is effective;  continue present plan and medications. Add tylenol OTC    Relevant Medications    meloxicam (MOBIC) 15 MG tablet    Encounter for screening mammogram for breast cancer    -  Screening Mammo orgered    Relevant Orders    Mammo Digital Screening Bilat with CAD            Health Maintenance reviewed, as above.    Follow-up: Follow-up in about 6 weeks (around 6/22/2018) for follow up for chronic conditions.  ______________________________________________________________________    Chief Complaint  Chief Complaint   Patient presents with    Annual Exam       HPI  Sophia Landis is a 72 y.o. female with medical diagnoses as listed in the medical history and problem list that presents for routine physical.  Pt is known to me with her last appointment 11/16/2017.      She feels that she has no energy.  She has been sleeping a lot during the day.  She is tired "all of the time."  She reports that she feels well resting after a full nights sleep but only for the AM then she is tired all day.  Her  has seen her have apneic episodes.   Also reports that her claudication symptoms are preventing her from walking more than a block.  Hasn't seen cardiology or vascular surgery in many years.  Having some balance issues as well.  Report this is particularly evident with bending forward when working in her garden.       PAST MEDICAL HISTORY:  Past Medical History:   Diagnosis Date    " Cataract     Depression     GERD (gastroesophageal reflux disease)     Hyperlipidemia     Hypertension     Menopausal syndrome     PAD (peripheral artery disease)     s/p stent       PAST SURGICAL HISTORY:  Past Surgical History:   Procedure Laterality Date    APPENDECTOMY      HYSTERECTOMY      MONTY with BSO    ILIAC ARTERY STENT Bilateral        SOCIAL HISTORY:  Social History     Social History    Marital status:      Spouse name: N/A    Number of children: N/A    Years of education: N/A     Occupational History    Not on file.     Social History Main Topics    Smoking status: Former Smoker    Smokeless tobacco: Former User      Comment: .  Retired  at [employer].    Alcohol use No    Drug use: No    Sexual activity: Yes     Partners: Male      Comment:      Other Topics Concern    Not on file     Social History Narrative    No narrative on file       FAMILY HISTORY:  Family History   Problem Relation Age of Onset    Amblyopia Mother     Cataracts Mother     Hypertension Mother     Thyroid disease Mother     Cancer Father     Strabismus Sister     Cancer Brother     Diabetes Maternal Aunt     Cancer Maternal Uncle     Diabetes Maternal Uncle     Cancer Maternal Uncle     Cancer Maternal Uncle     Cancer Maternal Uncle     Blindness Neg Hx     Glaucoma Neg Hx     Macular degeneration Neg Hx     Retinal detachment Neg Hx     Stroke Neg Hx        ALLERGIES AND MEDICATIONS: updated and reviewed.  Review of patient's allergies indicates:   Allergen Reactions    Antihistamines - alkylamine Nausea Only    Ciprofloxacin Nausea Only    Penicillins Hives     Current Outpatient Prescriptions   Medication Sig Dispense Refill    amLODIPine (NORVASC) 10 MG tablet Take 1 tablet (10 mg total) by mouth once daily. 90 tablet 1    calcium carbonate (OS-PAOLO) 600 mg (1,500 mg) Tab Take 600 mg by mouth 2 (two) times daily with meals.        meloxicam  (MOBIC) 15 MG tablet Take 1 tablet (15 mg total) by mouth once daily. Change to daily as needed for pain if possible 90 tablet 1    metoprolol succinate (TOPROL-XL) 50 MG 24 hr tablet Take 1 tablet (50 mg total) by mouth once daily. 90 tablet 1    omeprazole (PRILOSEC) 20 MG capsule TAKE 2 CAPSULES ONE TIME DAILY BEFORE THE FIRST MEAL OF THE  capsule 1    pravastatin (PRAVACHOL) 80 MG tablet Take 1 tablet (80 mg total) by mouth once daily. 90 tablet 1    quinapril (ACCUPRIL) 40 MG tablet Take 1 tablet (40 mg total) by mouth 2 (two) times daily. 180 tablet 1    sertraline (ZOLOFT) 50 MG tablet Take 1 tablet (50 mg total) by mouth once daily. 90 tablet 1    albuterol 90 mcg/actuation inhaler Inhale 1-2 puffs into the lungs every 4 to 6 hours as needed for Wheezing or Shortness of Breath (chest tightness). 1 Inhaler 6    aspirin (ECOTRIN) 81 MG EC tablet Take 81 mg by mouth once daily.      methylPREDNISolone (MEDROL DOSEPACK) 4 mg tablet use as directed 1 Package 0     No current facility-administered medications for this visit.          ROS  Review of Systems   Constitutional: Positive for fatigue. Negative for chills, fever and unexpected weight change.   HENT: Negative for congestion, ear pain, hearing loss, rhinorrhea, sore throat and trouble swallowing.    Eyes: Negative for discharge, redness and visual disturbance.   Respiratory: Negative for cough, chest tightness, shortness of breath and wheezing.    Cardiovascular: Negative for chest pain, palpitations and leg swelling.   Gastrointestinal: Negative for abdominal pain, constipation, diarrhea, nausea and vomiting.   Endocrine: Negative for polydipsia, polyphagia and polyuria.   Genitourinary: Negative for decreased urine volume, dysuria and hematuria.   Musculoskeletal: Positive for arthralgias. Negative for joint swelling and myalgias.   Skin: Negative for color change and rash.   Neurological: Negative for dizziness, weakness, light-headedness  "and headaches.        Loss of balance   Psychiatric/Behavioral: Negative for decreased concentration, dysphoric mood, sleep disturbance and suicidal ideas.           Physical Exam  Vitals:    05/11/18 0827   BP: 138/88   Pulse: 74   Temp: 98.3 °F (36.8 °C)   SpO2: 96%   Weight: 64 kg (141 lb)   Height: 4' 11" (1.499 m)    Body mass index is 28.48 kg/m².  Weight: 64 kg (141 lb)   Height: 4' 11" (149.9 cm)   Physical Exam   Constitutional: She is oriented to person, place, and time. She appears well-developed and well-nourished. No distress.   HENT:   Head: Normocephalic and atraumatic.   Right Ear: Tympanic membrane, external ear and ear canal normal.   Left Ear: Tympanic membrane, external ear and ear canal normal.   Nose: Nose normal. Right sinus exhibits no maxillary sinus tenderness and no frontal sinus tenderness. Left sinus exhibits no maxillary sinus tenderness and no frontal sinus tenderness.   Mouth/Throat: Uvula is midline, oropharynx is clear and moist and mucous membranes are normal. No tonsillar exudate.   Eyes: Conjunctivae, EOM and lids are normal. Pupils are equal, round, and reactive to light. No scleral icterus.   Neck: Full passive range of motion without pain. Neck supple. No JVD present. No spinous process tenderness and no muscular tenderness present. Carotid bruit is present (bialterally, may be radiation of heart murmur). No thyromegaly present.   Cardiovascular: Normal rate, regular rhythm, S1 normal, S2 normal and intact distal pulses.  Exam reveals no S3, no S4 and no friction rub.    Murmur heard.   Systolic (throughout) murmur is present with a grade of 2/6   Pulses:       Dorsalis pedis pulses are 2+ on the right side, and 0 on the left side.        Posterior tibial pulses are 0 on the left side.   Pulmonary/Chest: Effort normal and breath sounds normal. She has no wheezes. She has no rhonchi. She has no rales.   Abdominal: Soft. Bowel sounds are normal. She exhibits no distension. There " is no hepatosplenomegaly. There is no tenderness. There is no rebound and no CVA tenderness.   Musculoskeletal: Normal range of motion. She exhibits no edema or tenderness.   Lymphadenopathy:        Head (right side): No submental and no submandibular adenopathy present.        Head (left side): No submental and no submandibular adenopathy present.     She has no cervical adenopathy.   Neurological: She is alert and oriented to person, place, and time. Coordination normal.   Motor grossly intact.  Sensory grossly intact.  Symmetric facial movements palate elevated symmetrically tongue midline     Skin: Skin is warm and dry. No rash noted. No cyanosis. Nails show no clubbing.   Psychiatric: She has a normal mood and affect. Her speech is normal and behavior is normal. Thought content normal. Cognition and memory are normal.           Health Maintenance       Date Due Completion Date    Lipid Panel 01/19/2018 1/19/2017    Mammogram 06/16/2018 6/16/2016    Influenza Vaccine 08/01/2018 10/31/2016 (Done)    Override on 10/31/2016: Done (majoria on [facility])    Override on 11/1/2015: Done ([facility])    Override on 12/16/2014: Done (done 9/2014)    Colonoscopy 04/02/2019 4/2/2014    DEXA SCAN 07/18/2020 7/18/2017    Override on 7/18/2017: Done    TETANUS VACCINE 05/05/2021 5/5/2011 (Done)    Override on 5/5/2011: Done

## 2018-05-14 ENCOUNTER — HOSPITAL ENCOUNTER (OUTPATIENT)
Dept: RADIOLOGY | Facility: HOSPITAL | Age: 73
Discharge: HOME | End: 2018-05-14
Attending: INTERNAL MEDICINE
Payer: MEDICARE

## 2018-05-14 VITALS — BODY MASS INDEX: 28.43 KG/M2 | HEIGHT: 59 IN | WEIGHT: 141 LBS

## 2018-05-14 DIAGNOSIS — I73.9 PAD (PERIPHERAL ARTERY DISEASE): Chronic | ICD-10-CM

## 2018-05-14 DIAGNOSIS — R09.89 BILATERAL CAROTID BRUITS: ICD-10-CM

## 2018-05-14 DIAGNOSIS — Z12.31 ENCOUNTER FOR SCREENING MAMMOGRAM FOR BREAST CANCER: ICD-10-CM

## 2018-05-14 DIAGNOSIS — R26.89 LOSS OF BALANCE: ICD-10-CM

## 2018-05-14 PROCEDURE — 93880 EXTRACRANIAL BILAT STUDY: CPT | Mod: 26,,, | Performed by: RADIOLOGY

## 2018-05-14 PROCEDURE — 77067 SCR MAMMO BI INCL CAD: CPT | Mod: TC

## 2018-05-14 PROCEDURE — 77063 BREAST TOMOSYNTHESIS BI: CPT | Mod: 26,,, | Performed by: RADIOLOGY

## 2018-05-14 PROCEDURE — 77067 SCR MAMMO BI INCL CAD: CPT | Mod: 26,,, | Performed by: RADIOLOGY

## 2018-05-14 PROCEDURE — 93880 EXTRACRANIAL BILAT STUDY: CPT | Mod: TC

## 2018-05-14 PROCEDURE — 93922 UPR/L XTREMITY ART 2 LEVELS: CPT | Mod: TC

## 2018-05-14 PROCEDURE — 93922 UPR/L XTREMITY ART 2 LEVELS: CPT | Mod: 26,,, | Performed by: STUDENT IN AN ORGANIZED HEALTH CARE EDUCATION/TRAINING PROGRAM

## 2018-05-15 ENCOUNTER — TELEPHONE (OUTPATIENT)
Dept: FAMILY MEDICINE | Facility: CLINIC | Age: 73
End: 2018-05-15

## 2018-05-15 DIAGNOSIS — I73.9 PAD (PERIPHERAL ARTERY DISEASE): Primary | Chronic | ICD-10-CM

## 2018-05-15 DIAGNOSIS — I65.21 STENOSIS OF RIGHT CAROTID ARTERY: ICD-10-CM

## 2018-05-15 NOTE — TELEPHONE ENCOUNTER
Please inform the patient that there has been some progression of the blockage in her legs and neck.  I would like her to follow up with Vascular and have placed this referral.      Thank you,  Harish

## 2018-05-18 ENCOUNTER — TELEPHONE (OUTPATIENT)
Dept: SLEEP MEDICINE | Facility: HOSPITAL | Age: 73
End: 2018-05-18

## 2018-05-29 ENCOUNTER — TELEPHONE (OUTPATIENT)
Dept: SLEEP MEDICINE | Facility: HOSPITAL | Age: 73
End: 2018-05-29

## 2018-05-31 ENCOUNTER — HOSPITAL ENCOUNTER (OUTPATIENT)
Dept: SLEEP MEDICINE | Facility: HOSPITAL | Age: 73
Discharge: HOME | End: 2018-05-31
Attending: INTERNAL MEDICINE
Payer: MEDICARE

## 2018-05-31 DIAGNOSIS — G47.30 SLEEP APNEA, UNSPECIFIED TYPE: Chronic | ICD-10-CM

## 2018-05-31 PROCEDURE — 95800 PR SLEEP STUDY, UNATTENDED, RECORD HEART RATE/O2 SAT/RESP ANAL/SLEEP TIME: ICD-10-PCS | Mod: 26,,, | Performed by: INTERNAL MEDICINE

## 2018-05-31 PROCEDURE — 95800 SLP STDY UNATTENDED: CPT | Mod: 26,,, | Performed by: INTERNAL MEDICINE

## 2018-05-31 PROCEDURE — 95800 SLP STDY UNATTENDED: CPT

## 2018-05-31 NOTE — PATIENT INSTRUCTIONS
Your Home sleep study will be scored and interpreted by one of our physicians who are board certified in sleep medicine.  Within two weeks the results will be sent to the physician who referred you. Your physician should then contact you to go over the results, along with any recommendations. If you do not hear from your physician within two weeks, please call them.

## 2018-06-07 ENCOUNTER — TELEPHONE (OUTPATIENT)
Dept: FAMILY MEDICINE | Facility: CLINIC | Age: 73
End: 2018-06-07

## 2018-06-07 DIAGNOSIS — G47.33 OSA ON CPAP: ICD-10-CM

## 2018-06-07 NOTE — TELEPHONE ENCOUNTER
Please inform the patient that her home sleep study has returned showing that she indeed has sleep apnea and a trial of using a CPAP machine is recommended.     I am ready to order her supplies, but would like to know if she prefers the nasal mask or the full face mask.  Most patient's find the nasal mask more comfortable.      Thank you,  Harish

## 2018-06-21 ENCOUNTER — OFFICE VISIT (OUTPATIENT)
Dept: VASCULAR SURGERY | Facility: CLINIC | Age: 73
End: 2018-06-21
Payer: MEDICARE

## 2018-06-21 VITALS
WEIGHT: 143.88 LBS | SYSTOLIC BLOOD PRESSURE: 130 MMHG | HEART RATE: 68 BPM | HEIGHT: 59 IN | DIASTOLIC BLOOD PRESSURE: 60 MMHG | BODY MASS INDEX: 29 KG/M2

## 2018-06-21 DIAGNOSIS — R09.89 LEFT CAROTID BRUIT: ICD-10-CM

## 2018-06-21 DIAGNOSIS — I73.9 PVD (PERIPHERAL VASCULAR DISEASE) WITH CLAUDICATION: Primary | ICD-10-CM

## 2018-06-21 PROCEDURE — 99999 PR PBB SHADOW E&M-EST. PATIENT-LVL IV: CPT | Mod: PBBFAC,,, | Performed by: SURGERY

## 2018-06-21 PROCEDURE — 99204 OFFICE O/P NEW MOD 45 MIN: CPT | Mod: S$GLB,,, | Performed by: SURGERY

## 2018-06-21 PROCEDURE — 3075F SYST BP GE 130 - 139MM HG: CPT | Mod: CPTII,S$GLB,, | Performed by: SURGERY

## 2018-06-21 PROCEDURE — 3078F DIAST BP <80 MM HG: CPT | Mod: CPTII,S$GLB,, | Performed by: SURGERY

## 2018-06-21 RX ORDER — CILOSTAZOL 50 MG/1
50 TABLET ORAL 2 TIMES DAILY
Qty: 90 TABLET | Refills: 11 | Status: SHIPPED | OUTPATIENT
Start: 2018-06-21 | End: 2018-12-13

## 2018-06-21 NOTE — PATIENT INSTRUCTIONS
Low-Salt Diet  This diet removes foods that are high in salt. It also limits the amount of salt you use when cooking. It is most often used for people with high blood pressure, edema (fluid retention), and kidney, liver, or heart disease.  Table salt contains the mineral sodium. Your body needs sodium to work normally. But too much sodium can make your health problems worse. Your healthcare provider is recommending a low-salt (also called low-sodium) diet for you. Your total daily allowance of salt is 1,500 to 2,300 milligrams (mg). It is less than 1 teaspoon of table salt. This means you can have only about 500 to 700 mg of sodium at each meal. People with certain health problems should limit salt intake to the lower end of the recommended range.    When you cook, dont add much salt. If you can cook without using salt, even better. Dont add salt to your food at the table.  When shopping, read food labels. Salt is often called sodium on the label. Choose foods that are salt-free, low salt, or very low salt. Note that foods with reduced salt may not lower your salt intake enough.    Beans, potatoes, and pasta  Ok: Dry beans, split peas, lentils, potatoes, rice, macaroni, pasta, spaghetti without added salt  Avoid: Potato chips, tortilla chips, and similar products  Breads and cereals  Ok: Low-sodium breads, rolls, cereals, and cakes; low-salt crackers, matzo crackers  Avoid: Salted crackers, pretzels, popcorn, Spanish toast, pancakes, muffins  Dairy  Ok: Milk, chocolate milk, hot chocolate mix, low-salt cheeses, and yogurt  Avoid: Processed cheese and cheese spreads; Roquefort, Camembert, and cottage cheese; buttermilk, instant breakfast drink  Desserts  Ok: Ice cream, frozen yogurt, juice bars, gelatin, cookies and pies, sugar, honey, jelly, hard candy  Avoid: Most pies, cakes and cookies prepared or processed with salt; instant pudding  Drinks  Ok: Tea, coffee, fizzy (carbonated) drinks, juices  Avoid: Flavored  coffees, electrolyte replacement drinks, sports drinks  Meats  Ok: All fresh meat, fish, poultry, low-salt tuna, eggs, egg substitute  Avoid: Smoked, pickled, brine-cured, or salted meats and fish. This includes heredia, chipped beef, corned beef, hot dogs, deli meats, ham, kosher meats, salt pork, sausage, canned tuna, salted codfish, smoked salmon, herring, sardines, or anchovies.  Seasonings and spices  Ok: Most seasonings are okay. Good substitutes for salt include: fresh herb blends, hot sauce, lemon, garlic, contreras, vinegar, dry mustard, parsley, cilantro, horseradish, tomato paste, regular margarine, mayonnaise, unsalted butter, cream cheese, vegetable oil, cream, low-salt salad dressing and gravy.  Avoid: Regular ketchup, relishes, pickles, soy sauce, teriyaki sauce, Worcestershire sauce, BBQ sauce, tartar sauce, meat tenderizer, chili sauce, regular gravy, regular salad dressing, salted butter  Soups  Ok: Low-salt soups and broths made with allowed foods  Avoid: Bouillon cubes, soups with smoked or salted meats, regular soup and broth  Vegetables  Ok: Most vegetables are okay; also low-salt tomato and vegetable juices  Avoid: Sauerkraut and other brine-soaked vegetables; pickles and other pickled vegetables; tomato juice, olives  Date Last Reviewed: 8/1/2016 © 2000-2017 [employer]. [address] [place] [address]. All rights reserved. This information is not intended as a substitute for professional medical care. Always follow your healthcare professional's instructions.        Tips for Using Less Salt    Most people with heart problems need to eat less salt (sodium). Reducing the amount of salt you eat may help control your blood pressure. The higher your blood pressure, the greater your risk for heart disease, stroke, blindness, and kidney problems.  At the store  · Make low-salt choices by reading labels carefully. Look for the total amount of sodium per serving.  · Use more fresh  food. Buy more fruits and vegetables. Select lean meats, fish, and poultry.  · Use fewer frozen, canned, and packaged foods which often contain a lot of sodium.  · Use plain frozen vegetables without sauces or toppings. These products are often low- or no-sodium.  · Opt for reduced-sodium or no-salt-added versions of canned vegetables and soups.  In the kitchen  · Don't add salt to food when you're cooking. Season with flavorings such as onion, garlic, pepper, salt-free herbal blends, and lemon or lime juice.  · Use a cookbook containing low-salt recipes. It can give you ideas for tasty meals that are healthy for your heart.  · Sprinkle salt-free herbal blends on vegetables and meat.  · Drain and rinse canned foods, such as canned beans and vegetables, before cooking or eating.  Eating out  · Tell the  you're on a low-salt diet. Ask questions about the menu.  · Order fish, chicken, and meat broiled, baked, poached, or grilled without salt, butter, or breading.  · Use lemon, pepper, and salt-free herb mixes to add flavor.  · Choose plain steamed rice, boiled noodles, and baked or boiled potatoes. Top potatoes with chives and a little sour cream.     Beware! Salt goes by many other names. Limit foods with these words listed as ingredients: salt, sodium, soy sauce, baking soda, baking powder, MSG, monosodium, Na (the chemical symbol for sodium). Some antacids are also high in salt.   Date Last Reviewed: 6/19/2015  © 4762-6579 [employer]. [address] [place] [address], [place] [address]. All rights reserved. This information is not intended as a substitute for professional medical care. Always follow your healthcare professional's instructions.        Taking Aspirin for Atherosclerosis       Aspirin is a medicine often prescribed to treat atherosclerosis. This condition affects your arteries. These are the blood vessels that carry blood away from your heart. Having atherosclerosis means youre at greater  risk of a heart attack or stroke. Aspirin can help prevent these from happening.  How atherosclerosis affects your arteries   A fatty material (plaque) can build up in your arteries. This makes it harder for blood to flow through them. A blood clot can then form on the plaque. This may block the artery, cutting off blood flow. This can cause conditions such as coronary artery disease (CAD) and peripheral arterial disease (PAD):  · CAD occurs when plaque builds up in the coronary artery. This artery supplies the heart with oxygen-rich blood.  · PAD occurs when plaque forms in leg arteries.  The same things that cause CAD and PAD can also cause plaque to form in other arteries in your body, such as those in the brain. When plaque occurs in any of these arteries, it raises your risk of heart attack or stroke.  What aspirin does  Aspirin is a blood-thinner (antiplatelet medicine). It helps keep blood clots from forming. This reduces the risk of blockage. Aspirin can be taken daily if you are at high risk of or have already had a heart attack or stroke. It is also used after a procedure called a stent placement. This is when a tiny wire mesh tube, or stent, is placed in an artery to keep it open. Aspirin helps prevent blood clots from forming on the stent.  Taking aspirin safely  Tell your healthcare provider about any medicines you are taking. This includes:  · All prescription medicines  · Over-the-counter medicines  · Herbs, vitamins, and other supplements  Also mention if you have a history of ulcers or bleeding problems. Ask whether you will need to stop taking aspirin before having surgery or dental work. Always take medicines as directed.  Tips for taking aspirin  · Have a routine. For example, take aspirin with the same meal each day.  · Dont take more than prescribed. A low dose gives the same benefit as a higher one, with a lower risk of side effects.  · Dont skip doses. Aspirin needs to be taken each day to  work well.  · Keep track of what you take. A pillbox with days of the week can help, especially if you take several medicines. Or use a list or chart to keep track.  When to call your healthcare provider  Side effects of aspirin are not usually serious. If you do have problems, changing your dose may help. Call your healthcare provider if you have any of the following:  · Excessive bruising (some bruising is normal)  · Nosebleeds, bleeding gums, or other excessive bleeding  · An upset stomach or stomach pain   Date Last Reviewed: 6/1/2016 © 2000-2017 [employer]. [address] [place] [address]. All rights reserved. This information is not intended as a substitute for professional medical care. Always follow your healthcare professional's instructions.        A Walking Program for Peripheral Arterial Disease (PAD)  Peripheral arterial disease (PAD) is a condition where the arteries in the legs are severely damaged. When you have PAD, walking can be painful. So you may start to walk less. Walking less makes your leg muscles weaker. It then becomes harder and more painful to walk. A walking program can break this cycle. This sheet gives you tips on how to get started.     Walking farther without pain  Exercise strengthens leg muscles that are out of shape. It also trains these muscles to work with less oxygen. This helps your leg muscles work better even with reduced blood flow to your legs. When you have PAD, a walking program can be helpful. Your program can:  · Let you walk longer and farther without claudication. This is an ache in your legs during exercise that goes away with rest.  · Let you do more and be more active  · Add to your overall health and well-being  · Help you control your blood sugar and blood pressure  · Help you become healthier with no risk and at little or no cost  Getting started  Your local hospital, vascular center, or cardiac rehab center may have a special walking  program for people with PAD. If so, this is your best option. But if you cant find a program, or its not covered by insurance, you can still walk on your own. Follow these steps at each session:  · Step 1. Start at a pace that lets you walk for 5 to 10 minutes before you start to feel claudication. This feeling is unpleasant, but it doesnt hurt you. Keep going until the pain makes you feel that you need to stop.  · Step 2. Stop and rest for 3 to 5 minutes, just long enough for the pain to go away. You can rest standing or sitting. (Some people like to bring along a cane or a lightweight folding chair.)  · Step 3. Again walk at a pace that lets you walk for 5 to 10 minutes more before you feel pain. This may be slower than your starting pace in step 1. Then rest again.  · Step 4. Repeat this process until youve walked for 45 minutes. This should be about 60 to 80 minutes total, including resting time. You may not be able to do a full 45 minutes at first. Do as much as you can, and increase your time as you improve.  Making the most of your program  · Walk at least 3 times a week. Take no more than 2 days off between sessions. If you stop walking, even for a week or two, you can lose the health benefits of your program.  · Find a good place to walk. A treadmill or a track may be better at first. That way, you wont run the risk of going too far and finding that you cant walk back. Be sure to have a place to walk indoors in bad weather, such as a gym or a mall.  · Wear shoes with sturdy, flexible soles. The heel should fit without slipping. You should have room to wiggle your toes.  · Keep track of how long you walk. A pedometer will show your daily progress. It can also show how much farther you can walk as time goes by.  · Ask a friend to keep you company. You may enjoy walking with someone else. Or you may want to make your walking sessions a time to relax by yourself.  · Make it fun. Listen to music while you  walk and rest. Walk in a favorite park. Get to know the people at the gym, or the folks that you pass on your route. While you rest, you can window-shop, read, or feed the birds. Do whatever will help you enjoy your exercise sessions.  Date Last Reviewed: 6/1/2016  © 0342-0950 [employer]. [address] [place] [address], [place] [address]. All rights reserved. This information is not intended as a substitute for professional medical care. Always follow your healthcare professional's instructions.        Exercise for a Healthier Heart  You may wonder how you can improve the health of your heart. If youre thinking about exercise, youre on the right track. You dont need to become an athlete, but you do need a certain amount of brisk exercise to help strengthen your heart. If you have been diagnosed with a heart condition, your doctor may recommend exercise to help stabilize your condition. To help make exercise a habit, choose safe, fun activities.     Exercise with a friend. When activity is fun, you're more likely to stick with it.     Be sure to check with your healthcare provider before starting an exercise program.   Why exercise?  Exercising regularly offers many healthy rewards. It can help you do all of the following:  · Improve your blood cholesterol level to help prevent further heart trouble  · Lower your blood pressure to help prevent a stroke or heart attack  · Control diabetes, or reduce your risk of getting this disease  · Improve your heart and lung function  · Reach and maintain a healthy weight  · Make your muscles stronger and more limber so you can stay active  · Prevent falls and fractures by slowing the loss of bone mass (osteoporosis)  · Manage stress better  · Reduce your blood pressure  · Improve your sense of self and your body image  Exercise tips  Ease into your routine. Set small goals. Then build on them.  Exercise on most days. Aim for a total of 150 or more minutes of moderate  to  vigorous intensity activity each week. Consider 40 minutes, 3 to 4 times a week. For best results, activity should last for 40 minutes on average. It is OK to work up to the 40 minute period over time. Examples of moderate-intensity activity is walking 1 mile in 15 minutes or 30 to 45 minutes of yard work.  Step up your daily activity level. Along with your exercise program, try being more active throughout the day. Walk instead of drive. Do more household tasks or yard work.  Choose one or more activities you enjoy. Walking is one of the easiest things you can do. You can also try swimming, riding a bike, dancing, or taking an exercise class.  Stop exercising and call your doctor if you:  · Have chest pain or feel dizzy or lightheaded  · Feel burning, tightness, pressure, or heaviness in your chest, neck, shoulders, back, or arms  · Have unusual shortness of breath  · Have increased joint or muscle pain  · Have palpitations or an irregular heartbeat   Date Last Reviewed: 5/1/2016  © 8285-1542 [employer]. [address] [place] [address]. All rights reserved. This information is not intended as a substitute for professional medical care. Always follow your healthcare professional's instructions.        Eating Heart-Healthy Foods  Eating has a big impact on your heart health. In fact, eating healthier can improve several of your heart risks at once. For instance, it helps you manage weight, cholesterol, and blood pressure. Here are ideas to help you make heart-healthy changes without giving up all the foods and flavors you love.  Getting started  · Talk with your health care provider about eating plans, such as the DASH or Mediterranean diet. You may also be referred to a dietitian.  · Change a few things at a time. Give yourself time to get used to a few eating changes before adding more.  · Work to create a tasty, healthy eating plan that you can stick to for the rest of your  life.    Goals for healthy eating  Below are some tips to improve your eating habits:  · Limit saturated fats and trans fats. Saturated fats raise your levels of cholesterol, so keep these fats to a minimum. They are found in foods such as fatty meats, whole milk, cheese, and palm and coconut oils. Avoid trans fats because they lower good cholesterol as well as raise bad cholesterol. Trans fats are most often found in processed foods.  · Reduce sodium (salt) intake. Eating too much salt may increase your blood pressure. Limit your sodium intake to 2,300 milligrams (mg) per day, or less if your health care provider recommends it. Dining out less often and eating fewer processed foods are two great ways to decrease the amount of salt you consume.  · Managing calories. A calorie is a unit of energy. Your body burns calories for fuel, but if you eat more calories than your body burns, the extras are stored as fat. Your health care provider can help you create a diet plan to manage your calories. This will likely include eating healthier foods as well as exercising regularly. To help you track your progress, keep a diary to record what you eat and how often you exercise.  Choose the right foods  Aim to make these foods staples of your diet. If you have diabetes, you may have different recommendations than what is listed here:  · Fruits and vegetable provide plenty of nutrients without a lot of calories. At meals, fill half your plate with these foods. Split the other half of your plate between whole grains and lean protein.  · Whole grains are high in fiber and rich in vitamins and nutrients. Good choices include whole-wheat bread, pasta, and brown rice.  · Lean proteins give you nutrition with less fat. Good choices include fish, skinless chicken, and beans.  · Low-fat or nonfat dairy provides nutrients without a lot of fat. Try low-fat or nonfat milk, cheese, or yogurt.  · Healthy fats can be good for you in small  amounts. These are unsaturated fats, such as olive oil, nuts, and fish. Try to have at least 2 servings per week of fatty fish such as salmon, sardines, mackerel, rainbow trout, and albacore tuna. These contain omega-3 fatty acids, which are good for your heart. Flaxseed is another source of a heart-healthy fat.  More on heart healthy eating    Read food labels  Healthy eating starts at the grocery store. Be sure to pay attention to food labels on packaged foods. Look for products that are high in fiber and protein, and low in saturated fat, cholesterol, and sodium. Avoid products that contain trans fat. And pay close attention to serving size. For instance, if you plan to eat two servings, double all the numbers on the label.  Prepare food right  A key part of healthy cooking is cutting down on added fat and salt. Look on the internet for lower-fat, lower-sodium recipes. Also, try these tips:  · Remove fat from meat and skin from poultry before cooking.  · Skim fat from the surface of soups and sauces.  · Broil, boil, bake, steam, grill, and microwave food without added fats.  · Choose ingredients that spice up your food without adding calories, fat, or sodium. Try these items: horseradish, hot sauce, lemon, mustard, nonfat salad dressings, and vinegar. For salt-free herbs and spices, try basil, cilantro, cinnamon, pepper, and rosemary.  Date Last Reviewed: 6/25/2015  © 3264-8876 [employer]. [address] [place] [address], [place] [address]. All rights reserved. This information is not intended as a substitute for professional medical care. Always follow your healthcare professional's instructions.

## 2018-06-21 NOTE — LETTER
June 21, 2018        Xavier Lopez MD  [address] [facility]  [place] [address]             [facility] - Vascular Surgery  [address]  [facility] [address]  Phone: [phone]  Fax: [phone]   Patient: Sophia Landis   MR Number: [id number]   YOB: 1945   Date of Visit: 6/21/2018       Dear Dr. Lopez:    Thank you for referring Sophia Landis to me for evaluation. Below are the relevant portions of my assessment and plan of care.            If you have questions, please do not hesitate to call me. I look forward to following Sophia along with you.    Sincerely,      Dave Huynh MD           CC  No Recipients

## 2018-06-21 NOTE — PROGRESS NOTES
Dave Huynh MD [facility]                         06/21/2018    HPI:  Sophia Landis is a 72 y.o. female with   Patient Active Problem List   Diagnosis    Essential hypertension    Hyperlipidemia    Mild recurrent major depression    PAD (peripheral artery disease)    GERD (gastroesophageal reflux disease)    Menopausal syndrome    ANDRES on CPAP    Recurrent UTI    Ectatic abdominal aorta    Urinary incontinence, mixed    Vaginal atrophy    Irregular bowel habits    Chronic constipation    Rectal urgency    Stenosis of right carotid artery    being managed by PCP and specialists who is here today for evaluation of PVD and BOLA.  H/o bilateral iliac stenting 9/2017 by Dr. Erickson.  She has c/o bilateral calf at 1/2 - 1 block.  No tissue loss.  Patient states location is bilateral calf occurring for 11 yrs, slowly worsening.  Associated signs and symptoms include ankle edema.  Quality is burning and severity is 7/10.  Symptoms began after her 2007 iliac stenting.  Alleviating factors include rest.  Worsening factors include ambulation.    no MI  no Stroke  Tobacco use: no, quit 15 yrs ago    Past Medical History:   Diagnosis Date    Cataract     Depression     GERD (gastroesophageal reflux disease)     Hyperlipidemia     Hypertension     Menopausal syndrome     PAD (peripheral artery disease)     s/p stent     Past Surgical History:   Procedure Laterality Date    APPENDECTOMY      HYSTERECTOMY      MONTY with BSO    ILIAC ARTERY STENT Bilateral      Family History   Problem Relation Age of Onset    Amblyopia Mother     Cataracts Mother     Hypertension Mother     Thyroid disease Mother     Cancer Father     Strabismus Sister     Cancer Brother     Diabetes Maternal Aunt     Cancer Maternal Uncle     Diabetes Maternal Uncle     Cancer Maternal Uncle     Cancer Maternal Uncle     Cancer Maternal Uncle     Blindness Neg Hx      Glaucoma Neg Hx     Macular degeneration Neg Hx     Retinal detachment Neg Hx     Stroke Neg Hx      Social History     Social History    Marital status:      Spouse name: N/A    Number of children: N/A    Years of education: N/A     Occupational History    Not on file.     Social History Main Topics    Smoking status: Former Smoker    Smokeless tobacco: Former User      Comment: .  Retired  at [employer].    Alcohol use No    Drug use: No    Sexual activity: Yes     Partners: Male      Comment:      Other Topics Concern    Not on file     Social History Narrative    No narrative on file       Current Outpatient Prescriptions:     albuterol 90 mcg/actuation inhaler, Inhale 1-2 puffs into the lungs every 4 to 6 hours as needed for Wheezing or Shortness of Breath (chest tightness)., Disp: 1 Inhaler, Rfl: 6    amLODIPine (NORVASC) 10 MG tablet, Take 1 tablet (10 mg total) by mouth once daily., Disp: 90 tablet, Rfl: 1    calcium carbonate (OS-PAOLO) 600 mg (1,500 mg) Tab, Take 600 mg by mouth 2 (two) times daily with meals.  , Disp: , Rfl:     meloxicam (MOBIC) 15 MG tablet, Take 1 tablet (15 mg total) by mouth once daily. Change to daily as needed for pain if possible, Disp: 90 tablet, Rfl: 1    methylPREDNISolone (MEDROL DOSEPACK) 4 mg tablet, use as directed, Disp: 1 Package, Rfl: 0    metoprolol succinate (TOPROL-XL) 50 MG 24 hr tablet, Take 1 tablet (50 mg total) by mouth once daily., Disp: 90 tablet, Rfl: 1    omeprazole (PRILOSEC) 20 MG capsule, TAKE 2 CAPSULES ONE TIME DAILY BEFORE THE FIRST MEAL OF THE DAY, Disp: 180 capsule, Rfl: 1    pravastatin (PRAVACHOL) 80 MG tablet, Take 1 tablet (80 mg total) by mouth once daily., Disp: 90 tablet, Rfl: 1    quinapril (ACCUPRIL) 40 MG tablet, Take 1 tablet (40 mg total) by mouth 2 (two) times daily., Disp: 180 tablet, Rfl: 1    sertraline (ZOLOFT) 50 MG tablet, Take 1 tablet (50 mg total) by mouth once daily.,  Disp: 90 tablet, Rfl: 1    aspirin (ECOTRIN) 81 MG EC tablet, Take 81 mg by mouth once daily., Disp: , Rfl:     REVIEW OF SYSTEMS:  General: No fevers or chills; ENT: No sore throat; Allergy and Immunology: no persistent infections; Hematological and Lymphatic: No history of bleeding or easy bruising; Endocrine: negative; Respiratory: no cough, shortness of breath, or wheezing; Cardiovascular: no chest pain or dyspnea on exertion; Gastrointestinal: no abdominal pain/back, change in bowel habits, or bloody stools; Genito-Urinary: no dysuria, trouble voiding, or hematuria; Musculoskeletal: negative; Neurological: no TIA or stroke symptoms; Psychiatric: no nervousness, anxiety or depression.    PHYSICAL EXAM:   Right Arm BP - Sittin/60 (18 1516)  Left Arm BP - Sittin/60 (18 1516)  Pulse: 68         General appearance:  Alert, well-appearing, and in no distress.  Oriented to person, place, and time                    Neurological: Normal speech, no focal findings noted; CN II - XII grossly intact. RLE with sensation to light touch, LLE with sensation to light touch.            Musculoskeletal: Digits/nail without cyanosis/clubbing.  Strength 5/5 BLE.                    Neck: Supple, no significant adenopathy, L carotid bruit can be auscultated                  Chest:  Clear to auscultation, no wheezes, rales or rhonchi, symmetric air entry. No use of accessory muscles               Cardiac: Normal rate and regular rhythm, S1 and S2 normal            Abdomen: Soft, nontender, nondistended, no masses or organomegaly, no hernia     No rebound tenderness noted; bowel sounds normal     No groin adenopathy      Extremities:   1+ R femoral pulse, 1+ L femoral pulse     1+ R popliteal pulse, 1+ L popliteal pulse     2+ R PT pulse, 2+ L PT pulse     2+ R DP pulse, 2+ L DP pulse     no RLE edema, no LLE edema    Skin: RLE without tissue loss; LLE without tissue loss    LAB RESULTS:  No results found for:  CBC  Lab Results   Component Value Date    LABPROT 9.6 09/04/2007    INR 0.9 09/04/2007     Lab Results   Component Value Date     05/11/2018    K 4.4 05/11/2018     05/11/2018    CO2 22 (L) 05/11/2018    GLU 99 05/11/2018    BUN 18 05/11/2018    CREATININE 0.8 05/11/2018    CALCIUM 10.3 05/11/2018    ANIONGAP 11 05/11/2018    EGFRNONAA >60.0 05/11/2018     Lab Results   Component Value Date    WBC 7.00 05/11/2018    RBC 4.63 05/11/2018    HGB 13.2 05/11/2018    HCT 40.3 05/11/2018    MCV 87 05/11/2018    MCH 28.5 05/11/2018    MCHC 32.8 05/11/2018    RDW 15.4 (H) 05/11/2018     05/11/2018    MPV 10.5 05/11/2018    GRAN 3.9 05/11/2018    GRAN 55.7 05/11/2018    LYMPH 2.3 05/11/2018    LYMPH 32.1 05/11/2018    MONO 0.6 05/11/2018    MONO 9.1 05/11/2018    EOS 0.2 05/11/2018    BASO 0.03 05/11/2018    EOSINOPHIL 2.4 05/11/2018    BASOPHIL 0.4 05/11/2018    DIFFMETHOD Automated 05/11/2018     .  Lab Results   Component Value Date    HGBA1C 5.6 05/11/2018       IMAGING:  All pertinent imaging has been reviewed and interpreted independently.    LISA 0.9/0.7    Carotid US with <70% stenosis bilaterally    IMP/PLAN:  72 y.o. female with   Patient Active Problem List   Diagnosis    Essential hypertension    Hyperlipidemia    Mild recurrent major depression    PAD (peripheral artery disease)    GERD (gastroesophageal reflux disease)    Menopausal syndrome    ANDRES on CPAP    Recurrent UTI    Ectatic abdominal aorta    Urinary incontinence, mixed    Vaginal atrophy    Irregular bowel habits    Chronic constipation    Rectal urgency    Stenosis of right carotid artery    being managed by PCP and specialists who is here today for evaluation of PVD and BOLA.    -Asymptomatic BOLA < 70% - rec best medical therapy with routine surveillance   -BLE non lifestyle limiting claudication - will start Pletal  -Heart healthy lifestyle  -RTC 6 mo with carotid US and BLE arterial US/LISA    I spent 30 minutes  evaluating this patient and greater than 50% of the time was spent counseling, coordinator care and discussing the plan of care.  All questions were answered and patient stated understanding with agreement with the above treatment plan.    Dave Huynh MD [facility]  [facility] and Endovascular Surgery

## 2018-06-21 NOTE — LETTER
June 21, 2018      Xavier Lopez MD  [address] [facility]  [place] [address]           [facility]  [address] [facility] [address]  [place] [address]  Phone: [phone]  Fax: [phone]          Patient: Sophia Landis   MR Number: [id number]   YOB: 1945   Date of Visit: 6/21/2018       Dear Dr. Xavier Lopez:    Thank you for referring Sophia Landis to me for evaluation. Attached you will find relevant portions of my assessment and plan of care.    If you have questions, please do not hesitate to call me. I look forward to following Sophia Landis along with you.    Sincerely,    Dave Huynh MD    Enclosure  CC:  No Recipients    If you would like to receive this communication electronically, please contact [email or web address] or [phone] to request more information on App[employer] Link access.    For providers and/or their staff who would like to refer a patient to [facility], please contact us through our one-stop-shop provider referral line, [facility], at [phone].    If you feel you have received this communication in error or would no longer like to receive these types of communications, please e-mail [email or web address]

## 2018-06-25 ENCOUNTER — OFFICE VISIT (OUTPATIENT)
Dept: FAMILY MEDICINE | Facility: CLINIC | Age: 73
End: 2018-06-25
Payer: MEDICARE

## 2018-06-25 VITALS
WEIGHT: 143 LBS | HEART RATE: 77 BPM | OXYGEN SATURATION: 97 % | DIASTOLIC BLOOD PRESSURE: 74 MMHG | TEMPERATURE: 98 F | SYSTOLIC BLOOD PRESSURE: 160 MMHG | HEIGHT: 59 IN | BODY MASS INDEX: 28.83 KG/M2

## 2018-06-25 DIAGNOSIS — R53.83 CAREGIVER WITH FATIGUE: ICD-10-CM

## 2018-06-25 DIAGNOSIS — I73.9 PAD (PERIPHERAL ARTERY DISEASE): Chronic | ICD-10-CM

## 2018-06-25 DIAGNOSIS — I10 ESSENTIAL HYPERTENSION: Primary | Chronic | ICD-10-CM

## 2018-06-25 DIAGNOSIS — G47.33 OSA ON CPAP: Chronic | ICD-10-CM

## 2018-06-25 PROCEDURE — 99499 UNLISTED E&M SERVICE: CPT | Mod: S$GLB,,, | Performed by: INTERNAL MEDICINE

## 2018-06-25 PROCEDURE — 99999 PR PBB SHADOW E&M-EST. PATIENT-LVL III: CPT | Mod: PBBFAC,,, | Performed by: INTERNAL MEDICINE

## 2018-06-25 PROCEDURE — 3078F DIAST BP <80 MM HG: CPT | Mod: CPTII,S$GLB,, | Performed by: INTERNAL MEDICINE

## 2018-06-25 PROCEDURE — 3077F SYST BP >= 140 MM HG: CPT | Mod: CPTII,S$GLB,, | Performed by: INTERNAL MEDICINE

## 2018-06-25 PROCEDURE — 99214 OFFICE O/P EST MOD 30 MIN: CPT | Mod: S$GLB,,, | Performed by: INTERNAL MEDICINE

## 2018-06-25 NOTE — ASSESSMENT & PLAN NOTE
Elevated but has not taken meds today.  Will take meds when she gets home.  Nurse BP check in 2-4 weeks

## 2018-06-25 NOTE — PROGRESS NOTES
Assessment & Plan  Problem List Items Addressed This Visit        Cardiac/Vascular    Essential hypertension - Primary (Chronic)    Current Assessment & Plan     Elevated but has not taken meds today.  Will take meds when she gets home.  Nurse BP check in 2-4 weeks         PAD (peripheral artery disease) (Chronic)    Current Assessment & Plan     Followed by Vascular.  Monitor             Other    ANDRES on CPAP (Chronic)    Current Assessment & Plan     Continue trial of Auto-Pap           Other Visit Diagnoses     Caregiver with fatigue    -  Discussed need to take some time for herself.  Recommended that she look into caregiver resources through [employer] regarding caregiver fatigue.             Health Maintenance reviewed, up to date.    Follow-up: Follow-up if symptoms worsen or fail to improve, for Routine Physical.  ______________________________________________________________________    Chief Complaint  Chief Complaint   Patient presents with    Hypertension    PAD    Sleep Apnea    Follow-up       HPI  Sophia Landis is a 72 y.o. female with medical diagnoses as listed in the medical history and problem list that presents for HTN, PAD, ANDRES follow up.  Pt is known to me with her last appointment 5/11/2018.      She is present with her  who reports that she missed her antihypertensive dosing.  No CP, SOB, palpitations.  She is normally well controlled.  She recently saw vascular surgery for her PAD and was started on Pletal.  Having some nausea.  She has only been taking the medication for a couple of days.  She is using her Auto-CPAP.  Reports that she often pulls the mask off at night.  Still getting used to it. Loss of balance has been stable.        PAST MEDICAL HISTORY:  Past Medical History:   Diagnosis Date    Cataract     Depression     GERD (gastroesophageal reflux disease)     Hyperlipidemia     Hypertension     Menopausal syndrome     PAD (peripheral artery disease)     s/p stent        PAST SURGICAL HISTORY:  Past Surgical History:   Procedure Laterality Date    APPENDECTOMY      HYSTERECTOMY      MONTY with BSO    ILIAC ARTERY STENT Bilateral        SOCIAL HISTORY:  Social History     Social History    Marital status:      Spouse name: N/A    Number of children: N/A    Years of education: N/A     Occupational History    Not on file.     Social History Main Topics    Smoking status: Former Smoker    Smokeless tobacco: Former User      Comment: .  Retired  at [employer].    Alcohol use No    Drug use: No    Sexual activity: Yes     Partners: Male      Comment:      Other Topics Concern    Not on file     Social History Narrative    No narrative on file       FAMILY HISTORY:  Family History   Problem Relation Age of Onset    Amblyopia Mother     Cataracts Mother     Hypertension Mother     Thyroid disease Mother     Cancer Father     Strabismus Sister     Cancer Brother     Diabetes Maternal Aunt     Cancer Maternal Uncle     Diabetes Maternal Uncle     Cancer Maternal Uncle     Cancer Maternal Uncle     Cancer Maternal Uncle     Blindness Neg Hx     Glaucoma Neg Hx     Macular degeneration Neg Hx     Retinal detachment Neg Hx     Stroke Neg Hx        ALLERGIES AND MEDICATIONS: updated and reviewed.  Review of patient's allergies indicates:   Allergen Reactions    Antihistamines - alkylamine Nausea Only    Ciprofloxacin Nausea Only    Penicillins Hives     Current Outpatient Prescriptions   Medication Sig Dispense Refill    amLODIPine (NORVASC) 10 MG tablet Take 1 tablet (10 mg total) by mouth once daily. 90 tablet 1    calcium carbonate (OS-PAOLO) 600 mg (1,500 mg) Tab Take 600 mg by mouth 2 (two) times daily with meals.        cilostazol (PLETAL) 50 MG Tab Take 1 tablet (50 mg total) by mouth 2 (two) times daily. If patient tolerate medication after 4 weeks, increase dose to 100 mg twice daily. 90 tablet 11    meloxicam  (MOBIC) 15 MG tablet Take 1 tablet (15 mg total) by mouth once daily. Change to daily as needed for pain if possible 90 tablet 1    metoprolol succinate (TOPROL-XL) 50 MG 24 hr tablet Take 1 tablet (50 mg total) by mouth once daily. 90 tablet 1    omeprazole (PRILOSEC) 20 MG capsule TAKE 2 CAPSULES ONE TIME DAILY BEFORE THE FIRST MEAL OF THE  capsule 1    pravastatin (PRAVACHOL) 80 MG tablet Take 1 tablet (80 mg total) by mouth once daily. 90 tablet 1    quinapril (ACCUPRIL) 40 MG tablet Take 1 tablet (40 mg total) by mouth 2 (two) times daily. 180 tablet 1    sertraline (ZOLOFT) 50 MG tablet Take 1 tablet (50 mg total) by mouth once daily. 90 tablet 1    albuterol 90 mcg/actuation inhaler Inhale 1-2 puffs into the lungs every 4 to 6 hours as needed for Wheezing or Shortness of Breath (chest tightness). 1 Inhaler 6    aspirin (ECOTRIN) 81 MG EC tablet Take 81 mg by mouth once daily.      methylPREDNISolone (MEDROL DOSEPACK) 4 mg tablet use as directed 1 Package 0     No current facility-administered medications for this visit.          ROS  Review of Systems   Constitutional: Negative for chills, fever and unexpected weight change.   HENT: Negative for congestion, ear pain, hearing loss, rhinorrhea, sore throat and trouble swallowing.    Eyes: Negative for discharge, redness and visual disturbance.   Respiratory: Negative for cough, chest tightness, shortness of breath and wheezing.    Cardiovascular: Negative for chest pain, palpitations and leg swelling.   Gastrointestinal: Negative for abdominal pain, constipation, diarrhea, nausea and vomiting.   Endocrine: Negative for polydipsia, polyphagia and polyuria.   Genitourinary: Negative for decreased urine volume, dysuria and hematuria.   Musculoskeletal: Negative for arthralgias, joint swelling and myalgias.   Skin: Negative for color change and rash.   Neurological: Negative for dizziness, weakness, light-headedness and headaches.  "  Psychiatric/Behavioral: Negative for decreased concentration, dysphoric mood, sleep disturbance and suicidal ideas.           Physical Exam  Vitals:    06/25/18 1028 06/25/18 1114   BP: (!) 180/60 (!) 160/74   Pulse: 77    Temp: 97.9 °F (36.6 °C)    SpO2: 97%    Weight: 64.9 kg (143 lb)    Height: 4' 11" (1.499 m)     Body mass index is 28.88 kg/m².  Weight: 64.9 kg (143 lb)   Height: 4' 11" (149.9 cm)   Physical Exam   Constitutional: She is oriented to person, place, and time. She appears well-developed and well-nourished. No distress.   HENT:   Head: Normocephalic and atraumatic.   Eyes: Conjunctivae, EOM and lids are normal. Pupils are equal, round, and reactive to light. No scleral icterus.   Cardiovascular: Normal rate, regular rhythm, intact distal pulses and normal pulses.  Exam reveals no S3, no S4 and no friction rub.    Murmur heard.  Pulmonary/Chest: Effort normal and breath sounds normal. She has no wheezes. She has no rhonchi. She has no rales.   Abdominal: Soft. Bowel sounds are normal. There is no tenderness.   Musculoskeletal: She exhibits no edema or tenderness.   Lymphadenopathy:        Head (right side): No submental and no submandibular adenopathy present.        Head (left side): No submental and no submandibular adenopathy present.     She has no cervical adenopathy.   Neurological: She is alert and oriented to person, place, and time.   Motor grossly intact.  Sensory grossly intact.  Symmetric facial movements palate elevated symmetrically tongue midline   Skin: Skin is warm and dry. No rash noted.   Psychiatric: She has a normal mood and affect. Her speech is normal and behavior is normal. Thought content normal.           Health Maintenance       Date Due Completion Date    Influenza Vaccine 08/01/2018 9/29/2017    Override on 10/31/2016: Done (majoria on barataria)    Override on 11/1/2015: Done (Majoria)    Override on 12/16/2014: Done (done 9/2014)    Colonoscopy 04/02/2019 4/2/2014    " Lipid Panel 05/11/2019 5/11/2018    High Dose Statin 06/25/2019 6/25/2018    Mammogram 05/14/2020 5/14/2018    DEXA SCAN 07/18/2020 7/18/2017    Override on 7/18/2017: Done    TETANUS VACCINE 05/05/2021 5/5/2011 (Done)    Override on 5/5/2011: Done

## 2018-07-18 ENCOUNTER — CLINICAL SUPPORT (OUTPATIENT)
Dept: FAMILY MEDICINE | Facility: CLINIC | Age: 73
End: 2018-07-18
Payer: MEDICARE

## 2018-07-18 VITALS — HEART RATE: 66 BPM | SYSTOLIC BLOOD PRESSURE: 130 MMHG | DIASTOLIC BLOOD PRESSURE: 80 MMHG | OXYGEN SATURATION: 98 %

## 2018-07-18 DIAGNOSIS — I10 ESSENTIAL HYPERTENSION: Primary | ICD-10-CM

## 2018-07-18 PROCEDURE — 99499 UNLISTED E&M SERVICE: CPT | Mod: S$GLB,,, | Performed by: INTERNAL MEDICINE

## 2018-07-18 PROCEDURE — 99999 PR PBB SHADOW E&M-EST. PATIENT-LVL III: CPT | Mod: PBBFAC,,,

## 2018-07-18 RX ORDER — HYDROCHLOROTHIAZIDE 25 MG/1
25 TABLET ORAL DAILY
COMMUNITY
End: 2020-08-03 | Stop reason: SDUPTHER

## 2018-07-18 NOTE — PROGRESS NOTES
Sophia Landis 72 y.o. female is here today for Blood Pressure check.   History of HTN yes.    Review of patient's allergies indicates:   Allergen Reactions    Antihistamines - alkylamine Nausea Only    Ciprofloxacin Nausea Only    Penicillins Hives     Creatinine   Date Value Ref Range Status   05/11/2018 0.8 0.5 - 1.4 mg/dL Final     Sodium   Date Value Ref Range Status   05/11/2018 142 136 - 145 mmol/L Final     Potassium   Date Value Ref Range Status   05/11/2018 4.4 3.5 - 5.1 mmol/L Final   ]  Patient denies taking blood pressure medications on a regular basis at the same time of the day. Reports that she takes it when she remembers.     Current Outpatient Prescriptions:     albuterol 90 mcg/actuation inhaler, Inhale 1-2 puffs into the lungs every 4 to 6 hours as needed for Wheezing or Shortness of Breath (chest tightness)., Disp: 1 Inhaler, Rfl: 6    amLODIPine (NORVASC) 10 MG tablet, Take 1 tablet (10 mg total) by mouth once daily., Disp: 90 tablet, Rfl: 1    calcium carbonate (OS-PAOLO) 600 mg (1,500 mg) Tab, Take 600 mg by mouth 2 (two) times daily with meals.  , Disp: , Rfl:     cilostazol (PLETAL) 50 MG Tab, Take 1 tablet (50 mg total) by mouth 2 (two) times daily. If patient tolerate medication after 4 weeks, increase dose to 100 mg twice daily., Disp: 90 tablet, Rfl: 11    hydroCHLOROthiazide (HYDRODIURIL) 25 MG tablet, Take 25 mg by mouth once daily., Disp: , Rfl:     meloxicam (MOBIC) 15 MG tablet, Take 1 tablet (15 mg total) by mouth once daily. Change to daily as needed for pain if possible, Disp: 90 tablet, Rfl: 1    metoprolol succinate (TOPROL-XL) 50 MG 24 hr tablet, Take 1 tablet (50 mg total) by mouth once daily., Disp: 90 tablet, Rfl: 1    omeprazole (PRILOSEC) 20 MG capsule, TAKE 2 CAPSULES ONE TIME DAILY BEFORE THE FIRST MEAL OF THE DAY, Disp: 180 capsule, Rfl: 1    pravastatin (PRAVACHOL) 80 MG tablet, Take 1 tablet (80 mg total) by mouth once daily., Disp: 90 tablet, Rfl: 1     quinapril (ACCUPRIL) 40 MG tablet, Take 1 tablet (40 mg total) by mouth 2 (two) times daily., Disp: 180 tablet, Rfl: 1    sertraline (ZOLOFT) 50 MG tablet, Take 1 tablet (50 mg total) by mouth once daily., Disp: 90 tablet, Rfl: 1    aspirin (ECOTRIN) 81 MG EC tablet, Take 81 mg by mouth once daily., Disp: , Rfl:     methylPREDNISolone (MEDROL DOSEPACK) 4 mg tablet, use as directed, Disp: 1 Package, Rfl: 0  Does patient have record of home blood pressure readings yes. Readings have been averaging 130's/60's.   Last dose of blood pressure medication was taken at approx 8:30am.  Patient is asymptomatic.   Complains of none.    Patient states that her legs were swelling so she restarted her HCTZ 25 mg daily.     Vitals:    07/18/18 1336   BP: 130/80   BP Location: Left arm   Patient Position: Sitting   BP Method: Medium (Manual)   Pulse: 66   SpO2: 98%         Dr. Lopez notified.

## 2018-08-16 ENCOUNTER — TELEPHONE (OUTPATIENT)
Dept: FAMILY MEDICINE | Facility: CLINIC | Age: 73
End: 2018-08-16

## 2018-08-16 NOTE — TELEPHONE ENCOUNTER
----- Message from Hanh Toledo sent at 8/16/2018 12:15 PM CDT -----  Contact: self  Pt calling to get a letter stating the she can not walk up 10 stairs. Pt states she is having her house raised and she is trying to get an elevator installed. Please call [phone].

## 2018-08-16 NOTE — LETTER
August 20, 2018    Sophia Landis  [address] [facility]  [place] [address]             [facility]  [address] [facility]  [place] [address]  Phone: [phone]  Fax: [phone] To whom it may concern,    08/20/18      Per Dr. Xavier Lopez, patient has Peripheral Artery Disease which causes leg pain when patient walks and also has trouble walking up stairs. Okay to install an elevator per patient's wishes.    If you have any questions or concerns, please don't hesitate to call.    Sincerely,        Victoria Roman MA

## 2018-08-22 ENCOUNTER — OFFICE VISIT (OUTPATIENT)
Dept: FAMILY MEDICINE | Facility: CLINIC | Age: 73
End: 2018-08-22
Payer: MEDICARE

## 2018-08-22 VITALS
DIASTOLIC BLOOD PRESSURE: 48 MMHG | WEIGHT: 141 LBS | HEIGHT: 59 IN | SYSTOLIC BLOOD PRESSURE: 136 MMHG | OXYGEN SATURATION: 96 % | BODY MASS INDEX: 28.43 KG/M2 | TEMPERATURE: 98 F | HEART RATE: 74 BPM

## 2018-08-22 DIAGNOSIS — I73.9 CLAUDICATION IN PERIPHERAL VASCULAR DISEASE: ICD-10-CM

## 2018-08-22 DIAGNOSIS — E78.5 HYPERLIPIDEMIA, UNSPECIFIED HYPERLIPIDEMIA TYPE: Chronic | ICD-10-CM

## 2018-08-22 DIAGNOSIS — G47.33 OSA ON CPAP: Primary | Chronic | ICD-10-CM

## 2018-08-22 DIAGNOSIS — I65.21 STENOSIS OF RIGHT CAROTID ARTERY: Chronic | ICD-10-CM

## 2018-08-22 DIAGNOSIS — I10 ESSENTIAL HYPERTENSION: Chronic | ICD-10-CM

## 2018-08-22 DIAGNOSIS — I77.811 ECTATIC ABDOMINAL AORTA: Chronic | ICD-10-CM

## 2018-08-22 DIAGNOSIS — I73.9 PAD (PERIPHERAL ARTERY DISEASE): Chronic | ICD-10-CM

## 2018-08-22 PROCEDURE — 99999 PR PBB SHADOW E&M-EST. PATIENT-LVL IV: CPT | Mod: PBBFAC,,, | Performed by: NURSE PRACTITIONER

## 2018-08-22 PROCEDURE — 3075F SYST BP GE 130 - 139MM HG: CPT | Mod: CPTII,S$GLB,, | Performed by: NURSE PRACTITIONER

## 2018-08-22 PROCEDURE — 3078F DIAST BP <80 MM HG: CPT | Mod: CPTII,S$GLB,, | Performed by: NURSE PRACTITIONER

## 2018-08-22 PROCEDURE — 99214 OFFICE O/P EST MOD 30 MIN: CPT | Mod: S$GLB,,, | Performed by: NURSE PRACTITIONER

## 2018-08-22 NOTE — PROGRESS NOTES
"History of Present Illness   Sophia Landis is a 72 y.o. woman with medical history as listed below who presents today to discuss CPAP and ANDRES. She states that she does not like wearing the CPAP as she feels the mask is "smothering her." She wakes up about four hours in to sleep and removes the mask. She is wondering if it would be okay to stop using the mask. She has an appointment next week with the CPAP nurse. She is also concerned with her leg pain, she is wondering if this will always be present. She does have PAD, taking Pletal, and is followed by vascular surgery. She also reports left knee pain at times, but she is not always remembering to take her Mobic. 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 She has no additional complaints and is otherwise healthy on today's visit.    Past Medical History:   Diagnosis Date    Cataract     Depression     GERD (gastroesophageal reflux disease)     Hyperlipidemia     Hypertension     Menopausal syndrome     PAD (peripheral artery disease)     s/p stent       Past Surgical History:   Procedure Laterality Date    APPENDECTOMY      HYSTERECTOMY      MONTY with BSO    ILIAC ARTERY STENT Bilateral        Social History     Socioeconomic History    Marital status:      Spouse name: None    Number of children: None    Years of education: None    Highest education level: None   Social Needs    Financial resource strain: None    Food insecurity - worry: None    Food insecurity - inability: None    Transportation needs - medical: None    Transportation needs - non-medical: None   Occupational History    None   Tobacco Use    Smoking status: Former Smoker    Smokeless tobacco: Former User    Tobacco comment: .  Retired  at [employer].   Substance and Sexual Activity    Alcohol use: No    Drug use: No    Sexual activity: Yes     " "Partners: Male     Comment:    Other Topics Concern    None   Social History Narrative    None       Family History   Problem Relation Age of Onset    Amblyopia Mother     Cataracts Mother     Hypertension Mother     Thyroid disease Mother     Cancer Father     Strabismus Sister     Cancer Brother     Diabetes Maternal Aunt     Cancer Maternal Uncle     Diabetes Maternal Uncle     Cancer Maternal Uncle     Cancer Maternal Uncle     Cancer Maternal Uncle     Blindness Neg Hx     Glaucoma Neg Hx     Macular degeneration Neg Hx     Retinal detachment Neg Hx     Stroke Neg Hx        Review of Systems  Review of Systems   Respiratory: Negative for shortness of breath.    Cardiovascular: Positive for claudication. Negative for chest pain, palpitations, orthopnea and leg swelling.   Musculoskeletal: Positive for joint pain and myalgias.     A complete review of systems was otherwise negative.    Physical Exam  BP (!) 136/48 (BP Location: Right arm, Patient Position: Sitting, BP Method: Medium (Manual))   Pulse 74   Temp 98 °F (36.7 °C) (Oral)   Ht 4' 11" (1.499 m)   Wt 64 kg (141 lb)   SpO2 96%   BMI 28.48 kg/m²   General appearance: alert, appears stated age, cooperative and no distress  Lungs: clear to auscultation bilaterally  Heart: regular rate and rhythm, S1, S2 normal, no murmur, click, rub or gallop  Extremities: extremities normal, atraumatic, no cyanosis or edema and Homans sign is negative, no sign of DVT  Pulses: 2+ and symmetric  Skin: Skin color, texture, turgor normal. No rashes or lesions  Neurologic: Grossly normal    Assessment/Plan  ANDRES on CPAP  We discussed that given her sleep study results, we do recommend that she use the CPAP at night.  Keep your follow-up in one week with CPAP nurse to discuss different types of mask or tips to help with the discomfort to increase compliance.   RTC PRN.    PAD (peripheral artery disease)  Continue the Pletal with the Mobic as " needed.  Follow-up with vascular surgery if pain/claudication persists.    Claudication in peripheral vascular disease  As above.    Ectatic abdominal aorta  Stable.    Stenosis of right carotid artery  Stable.    Hyperlipidemia, unspecified hyperlipidemia type  The current medical regimen is effective;  continue present plan and medications.    Essential hypertension  The current medical regimen is effective;  continue present plan and medications.    Follow-up in about 1 week (around 8/29/2018) for follow-up as scheduled..

## 2018-11-08 DIAGNOSIS — E78.5 HYPERLIPIDEMIA, UNSPECIFIED HYPERLIPIDEMIA TYPE: Chronic | ICD-10-CM

## 2018-11-08 DIAGNOSIS — I10 ESSENTIAL HYPERTENSION: Chronic | ICD-10-CM

## 2018-11-08 DIAGNOSIS — K21.9 GASTROESOPHAGEAL REFLUX DISEASE, ESOPHAGITIS PRESENCE NOT SPECIFIED: ICD-10-CM

## 2018-11-08 RX ORDER — OMEPRAZOLE 20 MG/1
CAPSULE, DELAYED RELEASE ORAL
Qty: 180 CAPSULE | Refills: 0 | Status: SHIPPED | OUTPATIENT
Start: 2018-11-08 | End: 2019-01-09 | Stop reason: SDUPTHER

## 2018-11-08 RX ORDER — PRAVASTATIN SODIUM 80 MG/1
TABLET ORAL
Qty: 90 TABLET | Refills: 0 | Status: SHIPPED | OUTPATIENT
Start: 2018-11-08 | End: 2019-01-09 | Stop reason: SDUPTHER

## 2018-11-08 RX ORDER — METOPROLOL SUCCINATE 50 MG/1
TABLET, EXTENDED RELEASE ORAL
Qty: 90 TABLET | Refills: 0 | Status: SHIPPED | OUTPATIENT
Start: 2018-11-08 | End: 2019-01-09 | Stop reason: SDUPTHER

## 2018-11-26 ENCOUNTER — OFFICE VISIT (OUTPATIENT)
Dept: FAMILY MEDICINE | Facility: CLINIC | Age: 73
End: 2018-11-26
Payer: MEDICARE

## 2018-11-26 VITALS
BODY MASS INDEX: 28.76 KG/M2 | TEMPERATURE: 98 F | WEIGHT: 142.63 LBS | DIASTOLIC BLOOD PRESSURE: 58 MMHG | HEIGHT: 59 IN | OXYGEN SATURATION: 95 % | SYSTOLIC BLOOD PRESSURE: 130 MMHG | HEART RATE: 84 BPM

## 2018-11-26 DIAGNOSIS — I10 ESSENTIAL HYPERTENSION: ICD-10-CM

## 2018-11-26 DIAGNOSIS — K21.9 GASTROESOPHAGEAL REFLUX DISEASE, ESOPHAGITIS PRESENCE NOT SPECIFIED: ICD-10-CM

## 2018-11-26 DIAGNOSIS — Z86.010 HISTORY OF COLONIC POLYPS: ICD-10-CM

## 2018-11-26 DIAGNOSIS — F39 MOOD DISORDER: ICD-10-CM

## 2018-11-26 DIAGNOSIS — K52.9 CHRONIC DIARRHEA: Primary | ICD-10-CM

## 2018-11-26 PROCEDURE — 1101F PT FALLS ASSESS-DOCD LE1/YR: CPT | Mod: CPTII,HCNC,S$GLB, | Performed by: INTERNAL MEDICINE

## 2018-11-26 PROCEDURE — 99215 OFFICE O/P EST HI 40 MIN: CPT | Mod: HCNC,S$GLB,, | Performed by: INTERNAL MEDICINE

## 2018-11-26 PROCEDURE — 3078F DIAST BP <80 MM HG: CPT | Mod: CPTII,HCNC,S$GLB, | Performed by: INTERNAL MEDICINE

## 2018-11-26 PROCEDURE — 3075F SYST BP GE 130 - 139MM HG: CPT | Mod: CPTII,HCNC,S$GLB, | Performed by: INTERNAL MEDICINE

## 2018-11-26 PROCEDURE — 99999 PR PBB SHADOW E&M-EST. PATIENT-LVL IV: CPT | Mod: PBBFAC,HCNC,, | Performed by: INTERNAL MEDICINE

## 2018-11-26 NOTE — PROGRESS NOTES
Chief complaint:  Chronic diarrhea    73-year-old white female new to me.  She wishes to establish care and get an opinion on chronic problem.  She reports that over 2 years she has had some watery diarrhea associated with intermittent fecal incontinence.  Typically every morning when she gets up to urinate she will do so and then have watery diarrhea.  Some days it is only 1 some days is occasionally 4-5 times.  It is never at night.  She is due for colonoscopy having had history of polyps.  She never has any bleeding or mucus in the bowels.  No abdominal pain. [place] it does appear that it was almost a year and half ago that she saw Urogynecology for both the urinary and fecal incontinence.  It does not appear that by review of the notes a fecal incontinence was much of an issue.  She was somewhat dissatisfied having been told she was constipated at that time and she never feels she has ever been constipated.  She has not associated anything with particular foods but we did discuss possible malabsorption.  She has had some right upper quadrant pain in the past but at least 6 years ago her CT scan and ultrasound were unrevealing for any gallstones or gallbladder pathology but we did discuss such malabsorption issues can occur in that setting.  She has no history of pancreatitis but we discussed pancreatic insufficiency and the numerous causes for her chronic diarrhea.  I reassured her overall pattern of chronic diarrhea appears benign and this could be irritable bowel syndrome with that as a diagnosis of exclusion.    Her 5 year follow-up colonoscopy will be due early 2019 so will put in a referral to GI as she may well need EGD as well as colonoscopy and appropriate stool studies.    Review of systems:  No weight loss, no nausea vomiting, no constipation, no bleeding, no abdominal pain or bloating.  No UTI symptom          Past Medical History:    Diagnosis Date    Cataract     Chronic diarrhea 11/26/2018    Depression     GERD (gastroesophageal reflux disease)     History of colonic polyps 11/26/2018    Hyperlipidemia     Hypertension     Menopausal syndrome     PAD (peripheral artery disease)     s/p stent     Past Surgical History:   Procedure Laterality Date    APPENDECTOMY      COLONOSCOPY N/A 4/2/2014    Performed by [facility]yam Ch[facility]vez [facility] at [facility] ENDO    HYSTERECTOMY      MONTY with BSO    ILIAC ARTERY STENT Bilateral      Social History     Socioeconomic History    Marital status:      Spouse name: Not on file    Number of children: Not on file    Years of education: Not on file    Highest education level: Not on file   Social Needs    Financial resource strain: Not on file    Food insecurity - worry: Not on file    Food insecurity - inability: Not on file    Transportation needs - medical: Not on file    Transportation needs - non-medical: Not on file   Occupational History    Not on file   Tobacco Use    Smoking status: Former Smoker    Smokeless tobacco: Former User    Tobacco comment: .  Retired  at [employer].   Substance and Sexual Activity    Alcohol use: No    Drug use: No    Sexual activity: Yes     Partners: Male     Comment:    Other Topics Concern    Not on file   Social History Narrative    Not on file     family history includes Amblyopia in her mother; Cancer in her brother, father, maternal uncle, maternal uncle, maternal uncle, and maternal uncle; Cataracts in her mother; Diabetes in her maternal aunt and maternal uncle; Hypertension in her mother; Strabismus in her sister; Thyroid disease in her mother.      Labs, x-ray, ultrasound, CT scan, colonoscopy reports and prior clinical notes reviewed and summarized time patient counseled.    Exam otherwise deferred    Sophia was seen today for diarrhea.    Diagnoses and all orders for this visit:    Chronic diarrhea, long  discussion today regarding diagnosis symptoms workup and differential diagnosis.  She was reassured.  After workup we may well establish if she has irritable bowel syndrome.  Does respond to anti motility agents.  As recommended by Urogynecology although not yet instituted, I do recommend she agrees to try some fiber.  Her diarrhea has benign features.  Consideration for malabsorption syndrome and so forth was discussed.  Very unlikely to be infectious but does need appropriate stool studies, GI scopes and so forth will refer to local GI and she is agreeable to that plan of action.  She does say symptoms started prior to start of pletal.Total time over 45 minutes with over 50% counseling.  -     Ambulatory referral to Gastroenterology    History of colonic polyps, due for 5 year scope    Essential hypertension, chronic and stable    Gastroesophageal reflux disease, esophagitis presence not specified, long-term use of PPI, do not believe that is associated with current symptoms    Mood disorder, symptoms do not appear to be associated with Zoloft

## 2018-11-27 ENCOUNTER — TELEPHONE (OUTPATIENT)
Dept: FAMILY MEDICINE | Facility: CLINIC | Age: 73
End: 2018-11-27

## 2018-11-27 NOTE — TELEPHONE ENCOUNTER
----- Message from Alice Davies sent at 11/27/2018  3:57 PM CST -----  Contact: Self   Pt says she misplaced letter to say she cant walk up and down stairs. She is asking for another copy. Please call at [phone].

## 2018-12-13 ENCOUNTER — TELEPHONE (OUTPATIENT)
Dept: VASCULAR SURGERY | Facility: CLINIC | Age: 73
End: 2018-12-13

## 2018-12-13 RX ORDER — CILOSTAZOL 50 MG/1
50 TABLET ORAL 2 TIMES DAILY
Qty: 90 TABLET | Refills: 11 | Status: CANCELLED | OUTPATIENT
Start: 2018-12-13 | End: 2019-12-13

## 2018-12-13 RX ORDER — CILOSTAZOL 50 MG/1
100 TABLET ORAL 2 TIMES DAILY
Qty: 90 TABLET | Refills: 11 | Status: SHIPPED | OUTPATIENT
Start: 2018-12-13 | End: 2018-12-20 | Stop reason: SDUPTHER

## 2018-12-13 NOTE — TELEPHONE ENCOUNTER
Spoke to patient to explain that he ordered the medication but it printed out and it didn't send to the pharmacy. She stated she still had some left so she would just take the 2 pills twice a day to equal 100mg twice daily. Explained that would be fine. She stated she was coming this way next week and she could  the prescription then.

## 2018-12-18 ENCOUNTER — HOSPITAL ENCOUNTER (OUTPATIENT)
Dept: CARDIOLOGY | Facility: HOSPITAL | Age: 73
Discharge: HOME | End: 2018-12-18
Attending: SURGERY
Payer: MEDICARE

## 2018-12-18 DIAGNOSIS — I73.9 PVD (PERIPHERAL VASCULAR DISEASE) WITH CLAUDICATION: ICD-10-CM

## 2018-12-18 DIAGNOSIS — R09.89 LEFT CAROTID BRUIT: ICD-10-CM

## 2018-12-18 LAB
LEFT ABI: 0.61
LEFT ANT TIBIAL SYS PSV: 47 CM/S
LEFT ARM BP: 134 MMHG
LEFT CFA PSV: 115 CM/S
LEFT DORSALIS PEDIS: 90 MMHG
LEFT EXTERNAL ILIAC PSV: 105 CM/S
LEFT PERONEAL SYS PSV: 33 CM/S
LEFT POPLITEAL PSV: 80 CM/S
LEFT POST TIBIAL SYS PSV: 38 CM/S
LEFT POSTERIOR TIBIAL: 70 MMHG
LEFT PROFUNDA SYS PSV: 56 CM/S
LEFT SUPER FEMORAL DIST SYS PSV: 89 CM/S
LEFT SUPER FEMORAL MID SYS PSV: 99 CM/S
LEFT SUPER FEMORAL OSTIAL SYS PSV: 90 CM/S
LEFT SUPER FEMORAL PROX SYS PSV: 99 CM/S
LEFT TBI: 0.31
LEFT TIB/PER TRUNK SYS PSV: 72 CM/S
LEFT TOE PRESSURE: 46 MMHG
OHS CV LEFT LOWER EXTREMITY ABI (NO CALC): 0.61
OHS CV RIGHT ABI LOWER EXTREMITY (NO CALC): 0.88
RIGHT ABI: 0.88
RIGHT ANT TIBIAL SYS PSV: 83 CM/S
RIGHT ARM BP: 147 MMHG
RIGHT CFA PSV: 188 CM/S
RIGHT DORSALIS PEDIS: 117 MMHG
RIGHT EXTERNAL ILLIAC PSV: 145 CM/S
RIGHT PERONEAL SYS PSV: 72 CM/S
RIGHT POPLITEAL PSV: 104 CM/S
RIGHT POST TIBIAL SYS PSV: 57 CM/S
RIGHT POSTERIOR TIBIAL: 130 MMHG
RIGHT PROFUNDA SYS PSV: 224 CM/S
RIGHT SUPER FEMORAL DIST SYS PSV: 168 CM/S
RIGHT SUPER FEMORAL MID SYS PSV: 138 CM/S
RIGHT SUPER FEMORAL OSTIAL SYS PSV: 148 CM/S
RIGHT SUPER FEMORAL PROX SYS PSV: 125 CM/S
RIGHT TBI: 0.67
RIGHT TIB/PER TRUNK SYS PSV: 96 CM/S
RIGHT TOE PRESSURE: 99 MMHG

## 2018-12-18 PROCEDURE — 93925 LOWER EXTREMITY STUDY: CPT | Mod: HCNC

## 2018-12-18 PROCEDURE — 93880 EXTRACRANIAL BILAT STUDY: CPT | Mod: HCNC

## 2018-12-18 PROCEDURE — 93925 LOWER EXTREMITY STUDY: CPT | Mod: 26,HCNC,, | Performed by: SURGERY

## 2018-12-18 PROCEDURE — 93922 UPR/L XTREMITY ART 2 LEVELS: CPT | Mod: HCNC

## 2018-12-18 PROCEDURE — 93922 UPR/L XTREMITY ART 2 LEVELS: CPT | Mod: 26,HCNC,, | Performed by: SURGERY

## 2018-12-18 PROCEDURE — 93880 EXTRACRANIAL BILAT STUDY: CPT | Mod: 26,HCNC,, | Performed by: SURGERY

## 2018-12-20 ENCOUNTER — OFFICE VISIT (OUTPATIENT)
Dept: VASCULAR SURGERY | Facility: CLINIC | Age: 73
End: 2018-12-20
Payer: MEDICARE

## 2018-12-20 VITALS
DIASTOLIC BLOOD PRESSURE: 78 MMHG | BODY MASS INDEX: 28.35 KG/M2 | SYSTOLIC BLOOD PRESSURE: 138 MMHG | WEIGHT: 140.63 LBS | HEIGHT: 59 IN

## 2018-12-20 DIAGNOSIS — I73.9 PVD (PERIPHERAL VASCULAR DISEASE): Primary | ICD-10-CM

## 2018-12-20 DIAGNOSIS — I65.23 CAROTID STENOSIS, ASYMPTOMATIC, BILATERAL: ICD-10-CM

## 2018-12-20 LAB
LEFT CBA DIAS: 10 CM/S
LEFT CBA SYS: 142 CM/S
LEFT CCA DIST DIAS: 10 CM/S
LEFT CCA DIST SYS: 88 CM/S
LEFT CCA MID DIAS: 8 CM/S
LEFT CCA MID SYS: 82 CM/S
LEFT CCA PROX DIAS: 8 CM/S
LEFT CCA PROX SYS: 107 CM/S
LEFT ECA DIAS: 8 CM/S
LEFT ECA SYS: 157 CM/S
LEFT ICA DIST DIAS: 10 CM/S
LEFT ICA DIST SYS: 136 CM/S
LEFT ICA MID DIAS: 27 CM/S
LEFT ICA MID SYS: 192 CM/S
LEFT ICA PROX DIAS: 16 CM/S
LEFT ICA PROX SYS: 184 CM/S
LEFT VERTEBRAL DIAS: 12 CM/S
LEFT VERTEBRAL SYS: 42 CM/S
OHS CV CAROTID RIGHT ICA EDV HIGHEST: 16
OHS CV CAROTID ULTRASOUND LEFT ICA/CCA RATIO: 1.79
OHS CV CAROTID ULTRASOUND RIGHT ICA/CCA RATIO: 1.05
OHS CV PV CAROTID LEFT HIGHEST CCA: 107
OHS CV PV CAROTID LEFT HIGHEST ICA: 192
OHS CV PV CAROTID RIGHT HIGHEST CCA: 170
OHS CV PV CAROTID RIGHT HIGHEST ICA: 179
OHS CV US CAROTID LEFT HIGHEST EDV: 27
RIGHT CBA DIAS: 12 CM/S
RIGHT CBA SYS: 146 CM/S
RIGHT CCA DIST DIAS: 14 CM/S
RIGHT CCA DIST SYS: 170 CM/S
RIGHT CCA MID DIAS: 10 CM/S
RIGHT CCA MID SYS: 129 CM/S
RIGHT CCA PROX DIAS: 9 CM/S
RIGHT CCA PROX SYS: 121 CM/S
RIGHT ECA DIAS: 14 CM/S
RIGHT ECA SYS: 17 CM/S
RIGHT ICA DIST DIAS: 16 CM/S
RIGHT ICA DIST SYS: 109 CM/S
RIGHT ICA MID DIAS: 13 CM/S
RIGHT ICA MID SYS: 127 CM/S
RIGHT ICA PROX DIAS: 8 CM/S
RIGHT ICA PROX SYS: 179 CM/S
RIGHT VERTEBRAL DIAS: 10 CM/S
RIGHT VERTEBRAL SYS: 55 CM/S

## 2018-12-20 PROCEDURE — 3078F DIAST BP <80 MM HG: CPT | Mod: CPTII,HCNC,S$GLB, | Performed by: SURGERY

## 2018-12-20 PROCEDURE — 3075F SYST BP GE 130 - 139MM HG: CPT | Mod: CPTII,HCNC,S$GLB, | Performed by: SURGERY

## 2018-12-20 PROCEDURE — 1101F PT FALLS ASSESS-DOCD LE1/YR: CPT | Mod: CPTII,HCNC,S$GLB, | Performed by: SURGERY

## 2018-12-20 PROCEDURE — 99999 PR PBB SHADOW E&M-EST. PATIENT-LVL III: CPT | Mod: PBBFAC,HCNC,, | Performed by: SURGERY

## 2018-12-20 PROCEDURE — 99214 OFFICE O/P EST MOD 30 MIN: CPT | Mod: HCNC,S$GLB,, | Performed by: SURGERY

## 2018-12-20 RX ORDER — CILOSTAZOL 50 MG/1
100 TABLET ORAL 2 TIMES DAILY
Qty: 120 TABLET | Refills: 11 | Status: SHIPPED | OUTPATIENT
Start: 2018-12-20 | End: 2019-12-20

## 2018-12-20 NOTE — PATIENT INSTRUCTIONS
Taking Aspirin for Atherosclerosis       Aspirin is a medicine often prescribed to treat atherosclerosis. This condition affects your arteries. These are the blood vessels that carry blood away from your heart. Having atherosclerosis means youre at greater risk of a heart attack or stroke. Aspirin can help prevent these from happening.  How atherosclerosis affects your arteries   A fatty material (plaque) can build up in your arteries. This makes it harder for blood to flow through them. A blood clot can then form on the plaque. This may block the artery, cutting off blood flow. This can cause conditions such as coronary artery disease (CAD) and peripheral arterial disease (PAD):  · CAD occurs when plaque builds up in the coronary artery. This artery supplies the heart with oxygen-rich blood.  · PAD occurs when plaque forms in leg arteries.  The same things that cause CAD and PAD can also cause plaque to form in other arteries in your body, such as those in the brain. When plaque occurs in any of these arteries, it raises your risk of heart attack or stroke.  What aspirin does  Aspirin is a blood-thinner (antiplatelet medicine). It helps keep blood clots from forming. This reduces the risk of blockage. Aspirin can be taken daily if you are at high risk of or have already had a heart attack or stroke. It is also used after a procedure called a stent placement. This is when a tiny wire mesh tube, or stent, is placed in an artery to keep it open. Aspirin helps prevent blood clots from forming on the stent.  Taking aspirin safely  Tell your healthcare provider about any medicines you are taking. This includes:  · All prescription medicines  · Over-the-counter medicines  · Herbs, vitamins, and other supplements  Also mention if you have a history of ulcers or bleeding problems. Ask whether you will need to stop taking aspirin before having surgery or dental work. Always take medicines as directed.  Tips for taking  aspirin  · Have a routine. For example, take aspirin with the same meal each day.  · Dont take more than prescribed. A low dose gives the same benefit as a higher one, with a lower risk of side effects.  · Dont skip doses. Aspirin needs to be taken each day to work well.  · Keep track of what you take. A pillbox with days of the week can help, especially if you take several medicines. Or use a list or chart to keep track.  When to call your healthcare provider  Side effects of aspirin are not usually serious. If you do have problems, changing your dose may help. Call your healthcare provider if you have any of the following:  · Excessive bruising (some bruising is normal)  · Nosebleeds, bleeding gums, or other excessive bleeding  · An upset stomach or stomach pain   Date Last Reviewed: 6/1/2016 © 2000-2017 [employer]. [address] [place] [address]. All rights reserved. This information is not intended as a substitute for professional medical care. Always follow your healthcare professional's instructions.        Carotid Artery Problems: Blockage     A healthy carotid artery lets blood flow easily to the brain.   The blood carries oxygen and nutrients throughout the body. The carotid arteries are large blood vessels that carry blood to the brain. Certain health problems can make the inside of the carotid arteries narrow and rough. Over time, this damage increases the chances of having a stroke. A stroke is a sudden loss of brain function.   Open carotid arteries  In a healthy carotid artery, the inside of the artery is open. The lining of the artery wall is also smooth. This lets blood flow freely from the heart to the brain. The brain gets all the oxygen and nutrients it needs to function well.  Narrowed carotid arteries  Health factors, such as high blood pressure, high cholesterol, smoking, and diabetes, can damage artery walls and make them rough. This allows cholesterol and other  particles in the blood to stick to the artery walls and form plaque or fatty deposits. As the plaque builds up, it can narrow the artery. Blood may also collect on the plaque and form blood clots.  How a stroke happens     Emboli can enter the bloodstream and travel to the brain. Brain tissue is damaged when emboli block arteries in the brain.   A stroke can happen when plaque in the carotid artery ruptures. This can allow small pieces of plaque to break off into the bloodstream. At the same time, rupture can make more blood clots. The pieces of plaque and tiny blood clots or emboli can flow in the blood until they get stuck in a small blood vessel in the brain. This blocks blood flow to part of the brain and causes a stroke.  Date Last Reviewed: 6/8/2015  © 8603-4683 [employer]. [address] [place] [address], [place] [address]. All rights reserved. This information is not intended as a substitute for professional medical care. Always follow your healthcare professional's instructions.

## 2018-12-20 NOTE — PROGRESS NOTES
Dave Huynh MD [facility]                         12/20/2018    HPI:  Sophia Landis is a 73 y.o. female with   Patient Active Problem List   Diagnosis    Essential hypertension    Hyperlipidemia    Mild recurrent major depression    PAD (peripheral artery disease)    GERD (gastroesophageal reflux disease)    Menopausal syndrome    ANDRES on CPAP    Recurrent UTI    Ectatic abdominal aorta    Urinary incontinence, mixed    Vaginal atrophy    Irregular bowel habits    Chronic constipation    Rectal urgency    Stenosis of right carotid artery    History of colonic polyps    Chronic diarrhea    being managed by PCP and specialists who is here today for evaluation of PVD and BOLA.  H/o bilateral iliac stenting 9/2017 by Dr. Erickson.  She has c/o bilateral calf at 1/2 - 1 block.  No tissue loss.  Patient states location is bilateral calf occurring for 11 yrs, slowly worsening.  Associated signs and symptoms include ankle edema.  Quality is burning and severity is 7/10.  Symptoms began after her 2007 iliac stenting.  Alleviating factors include rest.  Worsening factors include ambulation.    no MI  no Stroke  Tobacco use: no, quit 15 yrs ago    Interval history: Doing well, no claudication now.  Compliant with Pletal.      Past Medical History:   Diagnosis Date    Cataract     Chronic diarrhea 11/26/2018    Depression     GERD (gastroesophageal reflux disease)     History of colonic polyps 11/26/2018    Hyperlipidemia     Hypertension     Menopausal syndrome     PAD (peripheral artery disease)     s/p stent     Past Surgical History:   Procedure Laterality Date    APPENDECTOMY      COLONOSCOPY N/A 4/2/2014    Performed by [facility] at [facility]    HYSTERECTOMY      MONTY with BSO    ILIAC ARTERY STENT Bilateral      Family History   Problem Relation Age of Onset    Amblyopia Mother     Cataracts Mother     Hypertension Mother      Thyroid disease Mother     Cancer Father     Strabismus Sister     Cancer Brother     Diabetes Maternal Aunt     Cancer Maternal Uncle     Diabetes Maternal Uncle     Cancer Maternal Uncle     Cancer Maternal Uncle     Cancer Maternal Uncle     Blindness Neg Hx     Glaucoma Neg Hx     Macular degeneration Neg Hx     Retinal detachment Neg Hx     Stroke Neg Hx      Social History     Socioeconomic History    Marital status:      Spouse name: Not on file    Number of children: Not on file    Years of education: Not on file    Highest education level: Not on file   Social Needs    Financial resource strain: Not on file    Food insecurity - worry: Not on file    Food insecurity - inability: Not on file    Transportation needs - medical: Not on file    Transportation needs - non-medical: Not on file   Occupational History    Not on file   Tobacco Use    Smoking status: Former Smoker    Smokeless tobacco: Former User    Tobacco comment: .  Retired  at [employer].   Substance and Sexual Activity    Alcohol use: No    Drug use: No    Sexual activity: Yes     Partners: Male     Comment:    Other Topics Concern    Not on file   Social History Narrative    Not on file       Current Outpatient Medications:     amLODIPine (NORVASC) 10 MG tablet, Take 1 tablet (10 mg total) by mouth once daily., Disp: 90 tablet, Rfl: 1    cilostazol (PLETAL) 50 MG Tab, Take 2 tablets (100 mg total) by mouth 2 (two) times daily. If patient tolerate medication after 4 weeks, increase dose to 100 mg twice daily., Disp: 90 tablet, Rfl: 11    meloxicam (MOBIC) 15 MG tablet, Take 1 tablet (15 mg total) by mouth once daily. Change to daily as needed for pain if possible, Disp: 90 tablet, Rfl: 1    metoprolol succinate (TOPROL-XL) 50 MG 24 hr tablet, TAKE 1 TABLET ONE TIME DAILY, Disp: 90 tablet, Rfl: 0    omeprazole (PRILOSEC) 20 MG capsule, TAKE 2 CAPSULES ONE TIME DAILY  BEFORE THE FIRST MEAL OF THE DAY, Disp: 180 capsule, Rfl: 0    pravastatin (PRAVACHOL) 80 MG tablet, TAKE 1 TABLET ONE TIME DAILY, Disp: 90 tablet, Rfl: 0    quinapril (ACCUPRIL) 40 MG tablet, Take 1 tablet (40 mg total) by mouth 2 (two) times daily., Disp: 180 tablet, Rfl: 1    sertraline (ZOLOFT) 50 MG tablet, Take 1 tablet (50 mg total) by mouth once daily., Disp: 90 tablet, Rfl: 1    albuterol 90 mcg/actuation inhaler, Inhale 1-2 puffs into the lungs every 4 to 6 hours as needed for Wheezing or Shortness of Breath (chest tightness)., Disp: 1 Inhaler, Rfl: 6    aspirin (ECOTRIN) 81 MG EC tablet, Take 81 mg by mouth once daily., Disp: , Rfl:     calcium carbonate (OS-PAOLO) 600 mg (1,500 mg) Tab, Take 600 mg by mouth 2 (two) times daily with meals.  , Disp: , Rfl:     hydroCHLOROthiazide (HYDRODIURIL) 25 MG tablet, Take 25 mg by mouth once daily., Disp: , Rfl:     REVIEW OF SYSTEMS:  General: No fevers or chills; ENT: No sore throat; Allergy and Immunology: no persistent infections; Hematological and Lymphatic: No history of bleeding or easy bruising; Endocrine: negative; Respiratory: no cough, shortness of breath, or wheezing; Cardiovascular: no chest pain or dyspnea on exertion; Gastrointestinal: no abdominal pain/back, change in bowel habits, or bloody stools; Genito-Urinary: no dysuria, trouble voiding, or hematuria; Musculoskeletal: negative; Neurological: no TIA or stroke symptoms; Psychiatric: no nervousness, anxiety or depression.    PHYSICAL EXAM:                General appearance:  Alert, well-appearing, and in no distress.  Oriented to person, place, and time                    Neurological: Normal speech, no focal findings noted; CN II - XII grossly intact. RLE with sensation to light touch, LLE with sensation to light touch.            Musculoskeletal: Digits/nail without cyanosis/clubbing.  Strength 5/5 BLE.                    Neck: Supple, no significant adenopathy, L carotid bruit can be  auscultated                  Chest:  Clear to auscultation, no wheezes, rales or rhonchi, symmetric air entry. No use of accessory muscles               Cardiac: Normal rate and regular rhythm, S1 and S2 normal            Abdomen: Soft, nontender, nondistended, no masses or organomegaly, no hernia     No rebound tenderness noted; bowel sounds normal     No groin adenopathy      Extremities:1+ R femoral pulse, 1+ L femoral pulse     1+ R popliteal pulse, 1+ L popliteal pulse     2+ R PT pulse, 2+ L PT pulse     2+ R DP pulse, 2+ L DP pulse     no RLE edema, no LLE edema    Skin: RLE without tissue loss; LLE without tissue loss    LAB RESULTS:  No results found for: CBC  Lab Results   Component Value Date    LABPROT 9.6 09/04/2007    INR 0.9 09/04/2007     Lab Results   Component Value Date     05/11/2018    K 4.4 05/11/2018     05/11/2018    CO2 22 (L) 05/11/2018    GLU 99 05/11/2018    BUN 18 05/11/2018    CREATININE 0.8 05/11/2018    CALCIUM 10.3 05/11/2018    ANIONGAP 11 05/11/2018    EGFRNONAA >60.0 05/11/2018     Lab Results   Component Value Date    WBC 7.00 05/11/2018    RBC 4.63 05/11/2018    HGB 13.2 05/11/2018    HCT 40.3 05/11/2018    MCV 87 05/11/2018    MCH 28.5 05/11/2018    MCHC 32.8 05/11/2018    RDW 15.4 (H) 05/11/2018     05/11/2018    MPV 10.5 05/11/2018    GRAN 3.9 05/11/2018    GRAN 55.7 05/11/2018    LYMPH 2.3 05/11/2018    LYMPH 32.1 05/11/2018    MONO 0.6 05/11/2018    MONO 9.1 05/11/2018    EOS 0.2 05/11/2018    BASO 0.03 05/11/2018    EOSINOPHIL 2.4 05/11/2018    BASOPHIL 0.4 05/11/2018    DIFFMETHOD Automated 05/11/2018     .  Lab Results   Component Value Date    HGBA1C 5.6 05/11/2018       IMAGING:  All pertinent imaging has been reviewed and interpreted independently.    LISA 0.9/0.7    Carotid US with <70% stenosis bilaterally    12/2018:   Carotid US with 40-59% stenosis bilateral ICA    1. Right lower extremity arterial ultrasound shows hemodynamically significant  stenosis of the mid iliac stent.  There is no hemodynamically significant arterial occlusive disease distally.  Right lower extremity pressures indicate mild arterial occlusive disease.  2. Left lower extremity arterial ultrasound shows no hemodynamically significant arterial occlusive disease.  Left lower extremity pressures indicate mild arterial occlusive disease.    1. Right lower extremity pressures and waveforms indicate mild arterial occlusive disease above the level of the ankle.  Toe pressure and waveform indicate no hemodynamically significant stenosis.  2. Left lower extremity pressures and waveforms indicate mild arterial occlusive disease.    IMP/PLAN:  73 y.o. female with   Patient Active Problem List   Diagnosis    Essential hypertension    Hyperlipidemia    Mild recurrent major depression    PAD (peripheral artery disease)    GERD (gastroesophageal reflux disease)    Menopausal syndrome    ANDRES on CPAP    Recurrent UTI    Ectatic abdominal aorta    Urinary incontinence, mixed    Vaginal atrophy    Irregular bowel habits    Chronic constipation    Rectal urgency    Stenosis of right carotid artery    History of colonic polyps    Chronic diarrhea    being managed by PCP and specialists who is here today for evaluation of PVD and BOLA.    -Asymptomatic BOLA 40-59% - rec best medical therapy with routine surveillance   -BLE non lifestyle limiting claudication, now resolved with R iliac stent mid, LISA stable - cont ASA, Pletal and Heart healthy lifestyle  -RTC 6 mo with carotid US and BLE arterial US/LISA    I spent 20 minutes evaluating this patient and greater than 50% of the time was spent counseling, coordinator care and discussing the plan of care.  All questions were answered and patient stated understanding with agreement with the above treatment plan.    Dave Huynh MD [facility]  Vascular and Endovascular Surgery

## 2018-12-20 NOTE — LETTER
December 20, 2018        Sarthak Johnson MD  [address] [facility]  [place] [address]             [facility]  [address] [facility] [address]  [place] [address]  Phone: [phone]  Fax: [phone]   Patient: Sophia Landis   MR Number: [id number]   YOB: 1945   Date of Visit: 12/20/2018       Dear Dr. Johnson:    Thank you for referring Sophia Landis to me for evaluation. Below are the relevant portions of my assessment and plan of care.            If you have questions, please do not hesitate to call me. I look forward to following Sophia along with you.    Sincerely,      Dave Huynh MD           CC  No Recipients

## 2019-01-09 DIAGNOSIS — E78.5 HYPERLIPIDEMIA, UNSPECIFIED HYPERLIPIDEMIA TYPE: Chronic | ICD-10-CM

## 2019-01-09 DIAGNOSIS — K21.9 GASTROESOPHAGEAL REFLUX DISEASE, ESOPHAGITIS PRESENCE NOT SPECIFIED: ICD-10-CM

## 2019-01-09 DIAGNOSIS — I10 ESSENTIAL HYPERTENSION: Chronic | ICD-10-CM

## 2019-01-10 RX ORDER — PRAVASTATIN SODIUM 80 MG/1
TABLET ORAL
Qty: 90 TABLET | Refills: 0 | Status: SHIPPED | OUTPATIENT
Start: 2019-01-10 | End: 2019-03-13 | Stop reason: SDUPTHER

## 2019-01-10 RX ORDER — OMEPRAZOLE 20 MG/1
CAPSULE, DELAYED RELEASE ORAL
Qty: 180 CAPSULE | Refills: 0 | Status: SHIPPED | OUTPATIENT
Start: 2019-01-10 | End: 2019-03-13 | Stop reason: SDUPTHER

## 2019-01-10 RX ORDER — METOPROLOL SUCCINATE 50 MG/1
TABLET, EXTENDED RELEASE ORAL
Qty: 90 TABLET | Refills: 0 | Status: SHIPPED | OUTPATIENT
Start: 2019-01-10 | End: 2019-03-13 | Stop reason: SDUPTHER

## 2019-01-31 ENCOUNTER — PES CALL (OUTPATIENT)
Dept: ADMINISTRATIVE | Facility: CLINIC | Age: 74
End: 2019-01-31

## 2019-02-28 ENCOUNTER — TELEPHONE (OUTPATIENT)
Dept: FAMILY MEDICINE | Facility: CLINIC | Age: 74
End: 2019-02-28

## 2019-02-28 DIAGNOSIS — Z12.11 ENCOUNTER FOR SCREENING COLONOSCOPY FOR NON-HIGH-RISK PATIENT: Primary | ICD-10-CM

## 2019-02-28 NOTE — TELEPHONE ENCOUNTER
----- Message from Anna Barajas sent at 2/28/2019  1:36 PM CST -----  Contact: Sophia [phone]  The patient is requesting an order for a colonoscopy done at [facility].

## 2019-03-11 ENCOUNTER — OFFICE VISIT (OUTPATIENT)
Dept: FAMILY MEDICINE | Facility: CLINIC | Age: 74
End: 2019-03-11
Payer: MEDICARE

## 2019-03-11 VITALS
HEIGHT: 59 IN | WEIGHT: 138.44 LBS | DIASTOLIC BLOOD PRESSURE: 60 MMHG | HEART RATE: 78 BPM | TEMPERATURE: 99 F | SYSTOLIC BLOOD PRESSURE: 144 MMHG | OXYGEN SATURATION: 98 % | BODY MASS INDEX: 27.91 KG/M2

## 2019-03-11 DIAGNOSIS — Z00.00 ENCOUNTER FOR PREVENTIVE HEALTH EXAMINATION: Primary | ICD-10-CM

## 2019-03-11 DIAGNOSIS — F33.0 MILD RECURRENT MAJOR DEPRESSION: Chronic | ICD-10-CM

## 2019-03-11 DIAGNOSIS — I10 ESSENTIAL HYPERTENSION: Chronic | ICD-10-CM

## 2019-03-11 DIAGNOSIS — E78.5 HYPERLIPIDEMIA, UNSPECIFIED HYPERLIPIDEMIA TYPE: Chronic | ICD-10-CM

## 2019-03-11 DIAGNOSIS — I77.811 ECTATIC ABDOMINAL AORTA: Chronic | ICD-10-CM

## 2019-03-11 DIAGNOSIS — I65.21 STENOSIS OF RIGHT CAROTID ARTERY: Chronic | ICD-10-CM

## 2019-03-11 DIAGNOSIS — I73.9 PAD (PERIPHERAL ARTERY DISEASE): Chronic | ICD-10-CM

## 2019-03-11 PROCEDURE — 99999 PR PBB SHADOW E&M-EST. PATIENT-LVL IV: ICD-10-PCS | Mod: PBBFAC,HCNC,, | Performed by: NURSE PRACTITIONER

## 2019-03-11 PROCEDURE — 3078F PR MOST RECENT DIASTOLIC BLOOD PRESSURE < 80 MM HG: ICD-10-PCS | Mod: HCNC,CPTII,S$GLB, | Performed by: NURSE PRACTITIONER

## 2019-03-11 PROCEDURE — G0439 PR MEDICARE ANNUAL WELLNESS SUBSEQUENT VISIT: ICD-10-PCS | Mod: HCNC,S$GLB,, | Performed by: NURSE PRACTITIONER

## 2019-03-11 PROCEDURE — 99999 PR PBB SHADOW E&M-EST. PATIENT-LVL IV: CPT | Mod: PBBFAC,HCNC,, | Performed by: NURSE PRACTITIONER

## 2019-03-11 PROCEDURE — 3077F SYST BP >= 140 MM HG: CPT | Mod: HCNC,CPTII,S$GLB, | Performed by: NURSE PRACTITIONER

## 2019-03-11 PROCEDURE — G0439 PPPS, SUBSEQ VISIT: HCPCS | Mod: HCNC,S$GLB,, | Performed by: NURSE PRACTITIONER

## 2019-03-11 PROCEDURE — 3078F DIAST BP <80 MM HG: CPT | Mod: HCNC,CPTII,S$GLB, | Performed by: NURSE PRACTITIONER

## 2019-03-11 PROCEDURE — 3077F PR MOST RECENT SYSTOLIC BLOOD PRESSURE >= 140 MM HG: ICD-10-PCS | Mod: HCNC,CPTII,S$GLB, | Performed by: NURSE PRACTITIONER

## 2019-03-11 RX ORDER — QUINAPRIL 40 MG/1
40 TABLET ORAL 2 TIMES DAILY
Qty: 180 TABLET | Refills: 1 | Status: SHIPPED | OUTPATIENT
Start: 2019-03-11 | End: 2019-11-01

## 2019-03-11 NOTE — PROGRESS NOTES
"Sophia Landis presented for an initial Medicare AWV today. The following components were reviewed and updated:    · Medical history  · Family History  · Social history  · Allergies and Current Medications  · Health Risk Assessment  · Health Maintenance  · Care Team    **See Completed Assessments for Annual Wellness visit with in the encounter summary    The following assessments were completed:  · Depression Screening  · Cognitive function Screening  · Timed Get Up Test  · Whisper Test    Vitals:    03/11/19 1204   BP: (!) 144/60   BP Location: Right arm   Patient Position: Sitting   Pulse: 78   Temp: 98.5 °F (36.9 °C)   TempSrc: Oral   SpO2: 98%   Weight: 62.8 kg (138 lb 7.2 oz)   Height: 4' 11" (1.499 m)     Body mass index is 27.96 kg/m².   ]    \      Diagnoses and health risks identified today and associated recommendations/orders:  1. Encounter for preventive health examination  Education provided about preventive health examinations and procedures; discussed patient's health concerns, holistically addressed patient's health plan.      2. Essential hypertension  - quinapril (ACCUPRIL) 40 MG tablet; Take 1 tablet (40 mg total) by mouth 2 (two) times daily.  Dispense: 180 tablet; Refill: 1  Discussed sodium restriction, maintaining ideal body weight and regular exercise program as physiologic means to achieve blood pressure control. The patient will strive towards this. The current medical regimen is effective; continue present plan and medications. Recommended patient to check home readings to monitor and see me for followup as scheduled or sooner as needed. Patient was educated that both decongestant and anti-inflammatory medication may raise blood pressure    3. Ectatic abdominal aorta  The current medical regimen is effective;  continue present plan and medications.    4. Hyperlipidemia, unspecified hyperlipidemia type  We discussed low fat diet and regular exercise.The current medical regimen is effective; " continue present plan and medications.       5. PAD (peripheral artery disease)  The current medical regimen is effective;  continue present plan and medications.        6. Mild recurrent major depression  The current medical regimen is effective;  continue present plan and medications.      7. Stenosis of right carotid artery  The current medical regimen is effective;  continue present plan and medications.        Provided Sophia with a 5-10 year written screening schedule and personal prevention plan. Recommendations were developed using the USPSTF age appropriate recommendations. Education, counseling, and referrals were provided as needed.  After Visit Summary printed and given to patient which includes a list of additional screenings\tests needed.    No Follow-up on file.      Gloria Govea NP

## 2019-03-11 NOTE — PATIENT INSTRUCTIONS
Counseling and Referral of Other Preventative  (Italic type indicates deductible and co-insurance are waived)    Patient Name: Sophia Landis  Today's Date: 3/11/2019    Health Maintenance       Date Due Completion Date    Aspirin/Antiplatelet Therapy 09/09/1963 ---    Influenza Vaccine 08/01/2018 9/29/2017    Override on 10/31/2016: Done (majoria on barataria)    Override on 11/1/2015: Done (Majoria)    Override on 12/16/2014: Done (done 9/2014)    Colonoscopy 04/02/2019 4/2/2014    Lipid Panel 05/11/2019 5/11/2018    High Dose Statin 01/10/2020 1/10/2019    Mammogram 05/14/2020 5/14/2018    DEXA SCAN 07/18/2020 7/18/2017    Override on 7/18/2017: Done    TETANUS VACCINE 05/05/2021 5/5/2011 (Done)    Override on 5/5/2011: Done        No orders of the defined types were placed in this encounter.    The following information is provided to all patients.  This information is to help you find resources for any of the problems found today that may be affecting your health:                Living healthy guide: www.[facility].[place].gov      Understanding Diabetes: www.diabetes.org      Eating healthy: www.cdc.gov/healthyweight      CDC home safety checklist: www.cdc.gov/steadi/patient.html      Agency on Aging: www.goea.[place].[facility]      Alcoholics anonymous (AA): www.aa.org      Physical Activity: www.shoaib.nih.gov/mv9zguu      Tobacco use: www.quitwithusla.org

## 2019-03-13 DIAGNOSIS — I10 ESSENTIAL HYPERTENSION: Chronic | ICD-10-CM

## 2019-03-13 DIAGNOSIS — F33.0 MILD RECURRENT MAJOR DEPRESSION: Chronic | ICD-10-CM

## 2019-03-13 DIAGNOSIS — K21.9 GASTROESOPHAGEAL REFLUX DISEASE, ESOPHAGITIS PRESENCE NOT SPECIFIED: ICD-10-CM

## 2019-03-13 DIAGNOSIS — E78.5 HYPERLIPIDEMIA, UNSPECIFIED HYPERLIPIDEMIA TYPE: Chronic | ICD-10-CM

## 2019-03-13 RX ORDER — SERTRALINE HYDROCHLORIDE 50 MG/1
TABLET, FILM COATED ORAL
Qty: 90 TABLET | Refills: 0 | Status: SHIPPED | OUTPATIENT
Start: 2019-03-13 | End: 2019-05-15 | Stop reason: SDUPTHER

## 2019-03-14 RX ORDER — METOPROLOL SUCCINATE 50 MG/1
TABLET, EXTENDED RELEASE ORAL
Qty: 90 TABLET | Refills: 0 | Status: SHIPPED | OUTPATIENT
Start: 2019-03-14 | End: 2019-05-15 | Stop reason: SDUPTHER

## 2019-03-14 RX ORDER — PRAVASTATIN SODIUM 80 MG/1
TABLET ORAL
Qty: 90 TABLET | Refills: 0 | Status: SHIPPED | OUTPATIENT
Start: 2019-03-14 | End: 2019-05-15 | Stop reason: SDUPTHER

## 2019-03-14 RX ORDER — OMEPRAZOLE 20 MG/1
CAPSULE, DELAYED RELEASE ORAL
Qty: 180 CAPSULE | Refills: 0 | Status: SHIPPED | OUTPATIENT
Start: 2019-03-14 | End: 2019-05-15 | Stop reason: SDUPTHER

## 2019-03-15 ENCOUNTER — TELEPHONE (OUTPATIENT)
Dept: FAMILY MEDICINE | Facility: CLINIC | Age: 74
End: 2019-03-15

## 2019-03-18 ENCOUNTER — TELEPHONE (OUTPATIENT)
Dept: FAMILY MEDICINE | Facility: CLINIC | Age: 74
End: 2019-03-18

## 2019-03-18 NOTE — TELEPHONE ENCOUNTER
Looks like she is on pletal.  These messages probably should go to over the prescribing doctor is for that medicine with since it came to me, she can hold the pletal for however long she needs to per protocol.    Please forward this back to the , person who initiated the message, thanks

## 2019-03-18 NOTE — TELEPHONE ENCOUNTER
----- Message from Shalini Reid LPN sent at 3/18/2019  7:02 AM CDT -----      ----- Message -----  From: Niecy Mckeon  Sent: 3/15/2019   1:53 PM  To: Alex GARCIA Staff    Good afternoon I am sending this note because Mrs. Landis is ready to schedule for her colonoscopy and she is on a anticoagulant, So she will need to be cleared before we can schedule her. Can someone please take a moment and look over it. I will be faxing it back to you. Thanks

## 2019-03-25 ENCOUNTER — TELEPHONE (OUTPATIENT)
Dept: FAMILY MEDICINE | Facility: CLINIC | Age: 74
End: 2019-03-25

## 2019-04-04 DIAGNOSIS — Z12.11 COLON CANCER SCREENING: Primary | ICD-10-CM

## 2019-04-04 NOTE — PRE-PROCEDURE INSTRUCTIONS
Client verbalized good understanding of instructions to hold Pletal for 2 days before appointment.

## 2019-04-08 DIAGNOSIS — I10 ESSENTIAL HYPERTENSION: Chronic | ICD-10-CM

## 2019-04-09 RX ORDER — AMLODIPINE BESYLATE 10 MG/1
TABLET ORAL
Qty: 90 TABLET | Refills: 1 | Status: SHIPPED | OUTPATIENT
Start: 2019-04-09 | End: 2019-10-18 | Stop reason: SDUPTHER

## 2019-04-15 ENCOUNTER — LAB VISIT (OUTPATIENT)
Dept: LAB | Facility: HOSPITAL | Age: 74
End: 2019-04-15
Attending: INTERNAL MEDICINE
Payer: MEDICARE

## 2019-04-15 ENCOUNTER — TELEPHONE (OUTPATIENT)
Dept: FAMILY MEDICINE | Facility: CLINIC | Age: 74
End: 2019-04-15

## 2019-04-15 DIAGNOSIS — E78.5 HYPERLIPIDEMIA, UNSPECIFIED HYPERLIPIDEMIA TYPE: ICD-10-CM

## 2019-04-15 DIAGNOSIS — I10 ESSENTIAL HYPERTENSION: ICD-10-CM

## 2019-04-15 DIAGNOSIS — I10 ESSENTIAL HYPERTENSION: Primary | ICD-10-CM

## 2019-04-15 LAB
ALBUMIN SERPL BCP-MCNC: 3.8 G/DL (ref 3.5–5.2)
ALP SERPL-CCNC: 99 U/L (ref 55–135)
ALT SERPL W/O P-5'-P-CCNC: 20 U/L (ref 10–44)
ANION GAP SERPL CALC-SCNC: 8 MMOL/L (ref 8–16)
AST SERPL-CCNC: 21 U/L (ref 10–40)
BASOPHILS # BLD AUTO: 0.04 K/UL (ref 0–0.2)
BASOPHILS NFR BLD: 0.6 % (ref 0–1.9)
BILIRUB SERPL-MCNC: 0.3 MG/DL (ref 0.1–1)
BUN SERPL-MCNC: 15 MG/DL (ref 8–23)
CALCIUM SERPL-MCNC: 10 MG/DL (ref 8.7–10.5)
CHLORIDE SERPL-SCNC: 111 MMOL/L (ref 95–110)
CHOLEST SERPL-MCNC: 159 MG/DL (ref 120–199)
CHOLEST/HDLC SERPL: 2.8 {RATIO} (ref 2–5)
CO2 SERPL-SCNC: 22 MMOL/L (ref 23–29)
CREAT SERPL-MCNC: 0.9 MG/DL (ref 0.5–1.4)
DIFFERENTIAL METHOD: ABNORMAL
EOSINOPHIL # BLD AUTO: 0.2 K/UL (ref 0–0.5)
EOSINOPHIL NFR BLD: 3.1 % (ref 0–8)
ERYTHROCYTE [DISTWIDTH] IN BLOOD BY AUTOMATED COUNT: 15.3 % (ref 11.5–14.5)
EST. GFR  (AFRICAN AMERICAN): >60 ML/MIN/1.73 M^2
EST. GFR  (NON AFRICAN AMERICAN): >60 ML/MIN/1.73 M^2
GLUCOSE SERPL-MCNC: 87 MG/DL (ref 70–110)
HCT VFR BLD AUTO: 39.5 % (ref 37–48.5)
HDLC SERPL-MCNC: 56 MG/DL (ref 40–75)
HDLC SERPL: 35.2 % (ref 20–50)
HGB BLD-MCNC: 12.7 G/DL (ref 12–16)
IMM GRANULOCYTES # BLD AUTO: 0.02 K/UL (ref 0–0.04)
IMM GRANULOCYTES NFR BLD AUTO: 0.3 % (ref 0–0.5)
LDLC SERPL CALC-MCNC: 75.2 MG/DL (ref 63–159)
LYMPHOCYTES # BLD AUTO: 1.8 K/UL (ref 1–4.8)
LYMPHOCYTES NFR BLD: 25.6 % (ref 18–48)
MCH RBC QN AUTO: 28.8 PG (ref 27–31)
MCHC RBC AUTO-ENTMCNC: 32.2 G/DL (ref 32–36)
MCV RBC AUTO: 90 FL (ref 82–98)
MONOCYTES # BLD AUTO: 0.7 K/UL (ref 0.3–1)
MONOCYTES NFR BLD: 10.4 % (ref 4–15)
NEUTROPHILS # BLD AUTO: 4.1 K/UL (ref 1.8–7.7)
NEUTROPHILS NFR BLD: 60 % (ref 38–73)
NONHDLC SERPL-MCNC: 103 MG/DL
NRBC BLD-RTO: 0 /100 WBC
PLATELET # BLD AUTO: 291 K/UL (ref 150–350)
PMV BLD AUTO: 10.2 FL (ref 9.2–12.9)
POTASSIUM SERPL-SCNC: 4.6 MMOL/L (ref 3.5–5.1)
PROT SERPL-MCNC: 7.4 G/DL (ref 6–8.4)
RBC # BLD AUTO: 4.41 M/UL (ref 4–5.4)
SODIUM SERPL-SCNC: 141 MMOL/L (ref 136–145)
TRIGL SERPL-MCNC: 139 MG/DL (ref 30–150)
TSH SERPL DL<=0.005 MIU/L-ACNC: 2.32 UIU/ML (ref 0.4–4)
WBC # BLD AUTO: 6.84 K/UL (ref 3.9–12.7)

## 2019-04-15 PROCEDURE — 36415 COLL VENOUS BLD VENIPUNCTURE: CPT | Mod: HCNC,PO

## 2019-04-15 PROCEDURE — 84443 ASSAY THYROID STIM HORMONE: CPT | Mod: HCNC

## 2019-04-15 PROCEDURE — 80061 LIPID PANEL: CPT | Mod: HCNC

## 2019-04-15 PROCEDURE — 85025 COMPLETE CBC W/AUTO DIFF WBC: CPT | Mod: HCNC

## 2019-04-15 PROCEDURE — 80053 COMPREHEN METABOLIC PANEL: CPT | Mod: HCNC

## 2019-04-15 NOTE — TELEPHONE ENCOUNTER
Pt had appt for today but was canceled and rescheduled. Pt would like to know if she can have her fasting blood work done today, please put in orders and I will schedule

## 2019-05-13 ENCOUNTER — OFFICE VISIT (OUTPATIENT)
Dept: FAMILY MEDICINE | Facility: CLINIC | Age: 74
End: 2019-05-13
Payer: MEDICARE

## 2019-05-13 VITALS
BODY MASS INDEX: 27.82 KG/M2 | WEIGHT: 138 LBS | TEMPERATURE: 98 F | DIASTOLIC BLOOD PRESSURE: 80 MMHG | OXYGEN SATURATION: 98 % | SYSTOLIC BLOOD PRESSURE: 116 MMHG | HEART RATE: 82 BPM | HEIGHT: 59 IN

## 2019-05-13 DIAGNOSIS — Z86.010 HISTORY OF COLONIC POLYPS: ICD-10-CM

## 2019-05-13 DIAGNOSIS — G47.33 OSA ON CPAP: ICD-10-CM

## 2019-05-13 DIAGNOSIS — Z00.00 ROUTINE MEDICAL EXAM: Primary | ICD-10-CM

## 2019-05-13 DIAGNOSIS — I10 ESSENTIAL HYPERTENSION: ICD-10-CM

## 2019-05-13 DIAGNOSIS — Z12.31 ENCOUNTER FOR SCREENING MAMMOGRAM FOR BREAST CANCER: ICD-10-CM

## 2019-05-13 DIAGNOSIS — F39 MOOD DISORDER: ICD-10-CM

## 2019-05-13 DIAGNOSIS — N95.1 MENOPAUSAL SYMPTOMS: ICD-10-CM

## 2019-05-13 DIAGNOSIS — N32.81 OVERACTIVE BLADDER: ICD-10-CM

## 2019-05-13 DIAGNOSIS — K52.9 CHRONIC DIARRHEA: ICD-10-CM

## 2019-05-13 DIAGNOSIS — I73.9 PAD (PERIPHERAL ARTERY DISEASE): ICD-10-CM

## 2019-05-13 DIAGNOSIS — E78.5 HYPERLIPIDEMIA, UNSPECIFIED HYPERLIPIDEMIA TYPE: ICD-10-CM

## 2019-05-13 PROCEDURE — 99397 PR PREVENTIVE VISIT,EST,65 & OVER: ICD-10-PCS | Mod: HCNC,S$GLB,, | Performed by: INTERNAL MEDICINE

## 2019-05-13 PROCEDURE — 1101F PT FALLS ASSESS-DOCD LE1/YR: CPT | Mod: HCNC,CPTII,S$GLB, | Performed by: INTERNAL MEDICINE

## 2019-05-13 PROCEDURE — 1101F PR PT FALLS ASSESS DOC 0-1 FALLS W/OUT INJ PAST YR: ICD-10-PCS | Mod: HCNC,CPTII,S$GLB, | Performed by: INTERNAL MEDICINE

## 2019-05-13 PROCEDURE — 99214 PR OFFICE/OUTPT VISIT, EST, LEVL IV, 30-39 MIN: ICD-10-PCS | Mod: 25,HCNC,S$GLB, | Performed by: INTERNAL MEDICINE

## 2019-05-13 PROCEDURE — 99999 PR PBB SHADOW E&M-EST. PATIENT-LVL III: CPT | Mod: PBBFAC,HCNC,, | Performed by: INTERNAL MEDICINE

## 2019-05-13 PROCEDURE — 3074F PR MOST RECENT SYSTOLIC BLOOD PRESSURE < 130 MM HG: ICD-10-PCS | Mod: HCNC,CPTII,S$GLB, | Performed by: INTERNAL MEDICINE

## 2019-05-13 PROCEDURE — 99397 PER PM REEVAL EST PAT 65+ YR: CPT | Mod: HCNC,S$GLB,, | Performed by: INTERNAL MEDICINE

## 2019-05-13 PROCEDURE — 99499 UNLISTED E&M SERVICE: CPT | Mod: S$GLB,,, | Performed by: INTERNAL MEDICINE

## 2019-05-13 PROCEDURE — 99499 RISK ADDL DX/OHS AUDIT: ICD-10-PCS | Mod: S$GLB,,, | Performed by: INTERNAL MEDICINE

## 2019-05-13 PROCEDURE — 3079F PR MOST RECENT DIASTOLIC BLOOD PRESSURE 80-89 MM HG: ICD-10-PCS | Mod: HCNC,CPTII,S$GLB, | Performed by: INTERNAL MEDICINE

## 2019-05-13 PROCEDURE — 99214 OFFICE O/P EST MOD 30 MIN: CPT | Mod: 25,HCNC,S$GLB, | Performed by: INTERNAL MEDICINE

## 2019-05-13 PROCEDURE — 99999 PR PBB SHADOW E&M-EST. PATIENT-LVL III: ICD-10-PCS | Mod: PBBFAC,HCNC,, | Performed by: INTERNAL MEDICINE

## 2019-05-13 PROCEDURE — 3074F SYST BP LT 130 MM HG: CPT | Mod: HCNC,CPTII,S$GLB, | Performed by: INTERNAL MEDICINE

## 2019-05-13 PROCEDURE — 3079F DIAST BP 80-89 MM HG: CPT | Mod: HCNC,CPTII,S$GLB, | Performed by: INTERNAL MEDICINE

## 2019-05-13 RX ORDER — OXYBUTYNIN CHLORIDE 5 MG/1
5 TABLET, EXTENDED RELEASE ORAL DAILY
Qty: 30 TABLET | Refills: 11 | Status: SHIPPED | OUTPATIENT
Start: 2019-05-13 | End: 2020-08-11

## 2019-05-13 NOTE — PROGRESS NOTES
Chief complaint:  Physical exam    73-year-old white female new to me 11/18.  Here for her physical.  Her mammogram is due and she would like to get it at the hospital.  Her colonoscopy is due and she does have it scheduled already for next week.  She is active without any other increased risk.  No family history of breast cancer.    Her 5 year follow-up colonoscopy will be due early 2019 so will put in a referral to GI as she may well need EGD as well as colonoscopy and appropriate stool studies. --  Never saw GI but has scope set next week    ROS:   CONST: weight stable. EYES: no vision change. ENT: no sore throat. CV: no chest pain w/ exertion. RESP: no shortness of breath. GI: no nausea, vomiting, diarrhea. No dysphagia. : no urinary issues. MUSCULOSKELETAL: no new myalgias or arthralgias. SKIN: no new changes. NEURO: no focal deficits. PSYCH: no new issues. ENDOCRINE: no polyuria. HEME: no lymph nodes. ALLERGY: no general pruritis.om          Past Medical History:   Diagnosis Date    Cataract     Chronic diarrhea 11/26/2018    Depression     GERD (gastroesophageal reflux disease)     History of colonic polyps 11/26/2018    Hyperlipidemia     Hypertension     Menopausal syndrome     PAD (peripheral artery disease)     s/p stent     Past Surgical History:   Procedure Laterality Date    APPENDECTOMY      COLONOSCOPY N/A 4/2/2014    Performed by [facility] at [facility] ENDO    HYSTERECTOMY      MONTY with BSO    ILIAC ARTERY STENT Bilateral      Social History     Socioeconomic History    Marital status:      Spouse name: Not on file    Number of children: Not on file    Years of education: Not on file    Highest education level: Not on file   Occupational History    Not on file   Social Needs    Financial resource strain: Not on file    Food insecurity:     Worry: Not on file     Inability: Not on file    Transportation needs:     Medical: Not on file     Non-medical: Not on file    Tobacco Use    Smoking status: Former Smoker    Smokeless tobacco: Former User    Tobacco comment: .  Retired  at [employer].   Substance and Sexual Activity    Alcohol use: No    Drug use: No    Sexual activity: Yes     Partners: Male     Comment:    Lifestyle    Physical activity:     Days per week: Not on file     Minutes per session: Not on file    Stress: Not on file   Relationships    Social connections:     Talks on phone: Not on file     Gets together: Not on file     Attends Islam service: Not on file     Active member of club or organization: Not on file     Attends meetings of clubs or organizations: Not on file     Relationship status: Not on file   Other Topics Concern    Not on file   Social History Narrative    Not on file     family history includes Amblyopia in her mother; Cancer in her brother, father, maternal uncle, maternal uncle, maternal uncle, and maternal uncle; Cataracts in her mother; Diabetes in her maternal aunt and maternal uncle; Hypertension in her mother; Strabismus in her sister; Thyroid disease in her mother.      Labs, x-ray, ultrasound, CT scan, colonoscopy reports and prior clinical notes reviewed and summarized time patient counseled.    Gen: no distress  EYES: conjunctiva clear, non-icteric, PERRL  ENT: nose clear, nasal mucosa normal, oropharynx clear and moist, teeth good  NECK:supple, thyroid non-palpable  RESP: effort is good, lungs clear  CV: heart RRR w/o murmur, gallops or rubs; no carotid bruits, no edema  GI: abdomen soft, non-distended, non-tender, no hepatosplenomegaly  MS: gait normal, no clubbing or cyanosis of the digits  SKIN: no rashes, warm to touch      Sophia was seen today for annual exam.    Diagnoses and all orders for this visit:    Routine medical exam, arrange mammogram and apparently colonoscopy is arranged, all recent blood work reviewed and unremarkable    Encounter for screening mammogram for breast  cancer  -     Mammo Digital Screening Bilat; Future    History of colonic polyps                                                    Additional evaluation and management issues:    Additionally, patient has numerous other medical issues to address today.  She has hypertension which appears to be under good control.  She has some peripheral artery disease apparently but no claudication.  Her mood disorder is under control.  She has underlying sleep apnea.  She does apparently continue with her chronic diarrhea which has been ongoing for a year.  It looks like I attempted to refer her to GI but apparently she never went and had stool studies but her overall pattern is been stable.  She does have a colonoscopy set apparently at [facility].  We will see how those results turn out and may well need her to sit down and have a GI consult to work out other causes for ongoing diarrhea.  There is no worrisome signs or symptoms.    She also reports some urgency incontinence.  Sometimes it is pretty bad.  Whenever she runs the water or has psychological cues she will get urgency and incontinence.  We discussed a trial of overactive bladder medication with low-dose oxybutynin 5 mg.  We discussed dry mouth and possibly taking it only when she needs to and also discussed frequent voiding.    She also reports menopausal symptoms with dryness and dyspareunia as well as moodiness.  She was on hormones in the past.  She has had a hysterectomy.  She did purchase Premarin from [employer] [facility] and is taking half a pill a day in for a week noted improvement in the dyspareunia and mood.  She was on apparently 1 mg of estrogen in the past.  We discussed cost coverage issues.  I will give her the 0.3 mg Premarin and sent to a local pharmacy and she can also use a discount card to find out if it would be cheaper and other pharmacies and I will be happy to provide her with that given her symptoms, no family history of breast cancer will  update her mammogram.  All the separate issues reviewed patient counseled and her evaluation and management will be based upon time counseling.Total time over 25 minutes with over 50% counseling.                Assessment and plan:            Essential hypertension, chronic and stable    Hyperlipidemia, unspecified hyperlipidemia type, chronic and stable    PAD (peripheral artery disease), chronic and stable    Mood disorder, chronic and stable    ANDRES on CPAP    Chronic diarrhea, ongoing without change, colonoscopy set    Overactive bladder, new problem associated with incontinence, trial of oxybutynin    Menopausal symptoms and dyspareunia, she does not have a gyn and if his any ongoing issues discussed my limited experience treating these issues and will likely need to refer her to gyn but for now will restart the lowest dose effective hormone therapy    Other orders  -     estrogens, conjugated, (PREMARIN) 0.3 MG tablet; Take 1 tablet (0.3 mg total) by mouth once daily.  -     oxybutynin (DITROPAN-XL) 5 MG TR24; Take 1 tablet (5 mg total) by mouth once daily.

## 2019-05-15 DIAGNOSIS — F33.0 MILD RECURRENT MAJOR DEPRESSION: Chronic | ICD-10-CM

## 2019-05-15 DIAGNOSIS — I10 ESSENTIAL HYPERTENSION: Chronic | ICD-10-CM

## 2019-05-15 DIAGNOSIS — E78.5 HYPERLIPIDEMIA, UNSPECIFIED HYPERLIPIDEMIA TYPE: Chronic | ICD-10-CM

## 2019-05-15 DIAGNOSIS — K21.9 GASTROESOPHAGEAL REFLUX DISEASE, ESOPHAGITIS PRESENCE NOT SPECIFIED: ICD-10-CM

## 2019-05-16 RX ORDER — OMEPRAZOLE 20 MG/1
CAPSULE, DELAYED RELEASE ORAL
Qty: 180 CAPSULE | Refills: 2 | Status: SHIPPED | OUTPATIENT
Start: 2019-05-16 | End: 2020-08-03 | Stop reason: SDUPTHER

## 2019-05-16 RX ORDER — PRAVASTATIN SODIUM 80 MG/1
TABLET ORAL
Qty: 90 TABLET | Refills: 2 | Status: SHIPPED | OUTPATIENT
Start: 2019-05-16 | End: 2020-08-03 | Stop reason: SDUPTHER

## 2019-05-16 RX ORDER — SERTRALINE HYDROCHLORIDE 50 MG/1
TABLET, FILM COATED ORAL
Qty: 90 TABLET | Refills: 3 | Status: SHIPPED | OUTPATIENT
Start: 2019-05-16 | End: 2019-11-01 | Stop reason: SDUPTHER

## 2019-05-16 RX ORDER — METOPROLOL SUCCINATE 50 MG/1
TABLET, EXTENDED RELEASE ORAL
Qty: 90 TABLET | Refills: 2 | Status: SHIPPED | OUTPATIENT
Start: 2019-05-16 | End: 2020-04-03 | Stop reason: SDUPTHER

## 2019-05-19 ENCOUNTER — ANESTHESIA EVENT (OUTPATIENT)
Dept: ENDOSCOPY | Facility: HOSPITAL | Age: 74
End: 2019-05-19
Payer: MEDICARE

## 2019-05-20 ENCOUNTER — ANESTHESIA (OUTPATIENT)
Dept: ENDOSCOPY | Facility: HOSPITAL | Age: 74
End: 2019-05-20
Payer: MEDICARE

## 2019-05-20 ENCOUNTER — HOSPITAL ENCOUNTER (OUTPATIENT)
Facility: HOSPITAL | Age: 74
Discharge: HOME | End: 2019-05-20
Attending: INTERNAL MEDICINE | Admitting: INTERNAL MEDICINE
Payer: MEDICARE

## 2019-05-20 VITALS
BODY MASS INDEX: 27.42 KG/M2 | DIASTOLIC BLOOD PRESSURE: 91 MMHG | WEIGHT: 136 LBS | HEART RATE: 86 BPM | OXYGEN SATURATION: 93 % | RESPIRATION RATE: 94 BRPM | SYSTOLIC BLOOD PRESSURE: 147 MMHG | HEIGHT: 59 IN | TEMPERATURE: 98 F

## 2019-05-20 DIAGNOSIS — Z12.11 COLON CANCER SCREENING: ICD-10-CM

## 2019-05-20 PROCEDURE — 45385 PR COLONOSCOPY,REMV LESN,SNARE: ICD-10-PCS | Mod: PT,HCNC,, | Performed by: INTERNAL MEDICINE

## 2019-05-20 PROCEDURE — 45380 COLONOSCOPY AND BIOPSY: CPT | Mod: 59,HCNC,, | Performed by: INTERNAL MEDICINE

## 2019-05-20 PROCEDURE — 88305 TISSUE EXAM BY PATHOLOGIST: CPT | Mod: 26,HCNC,, | Performed by: PATHOLOGY

## 2019-05-20 PROCEDURE — 27201089 HC SNARE, DISP (ANY): Mod: HCNC | Performed by: INTERNAL MEDICINE

## 2019-05-20 PROCEDURE — 45385 COLONOSCOPY W/LESION REMOVAL: CPT | Mod: HCNC | Performed by: INTERNAL MEDICINE

## 2019-05-20 PROCEDURE — 45385 COLONOSCOPY W/LESION REMOVAL: CPT | Mod: PT,HCNC,, | Performed by: INTERNAL MEDICINE

## 2019-05-20 PROCEDURE — D9220A PRA ANESTHESIA: ICD-10-PCS | Mod: PT,HCNC,ANES, | Performed by: ANESTHESIOLOGY

## 2019-05-20 PROCEDURE — 25000003 PHARM REV CODE 250: Mod: HCNC | Performed by: ANESTHESIOLOGY

## 2019-05-20 PROCEDURE — 45380 COLONOSCOPY AND BIOPSY: CPT | Mod: HCNC | Performed by: INTERNAL MEDICINE

## 2019-05-20 PROCEDURE — 27201012 HC FORCEPS, HOT/COLD, DISP: Mod: HCNC | Performed by: INTERNAL MEDICINE

## 2019-05-20 PROCEDURE — 37000008 HC ANESTHESIA 1ST 15 MINUTES: Mod: HCNC | Performed by: INTERNAL MEDICINE

## 2019-05-20 PROCEDURE — 63600175 PHARM REV CODE 636 W HCPCS: Mod: HCNC | Performed by: NURSE ANESTHETIST, CERTIFIED REGISTERED

## 2019-05-20 PROCEDURE — D9220A PRA ANESTHESIA: Mod: PT,HCNC,ANES, | Performed by: ANESTHESIOLOGY

## 2019-05-20 PROCEDURE — 88305 TISSUE EXAM BY PATHOLOGIST: CPT | Mod: HCNC,59 | Performed by: PATHOLOGY

## 2019-05-20 PROCEDURE — 37000009 HC ANESTHESIA EA ADD 15 MINS: Mod: HCNC | Performed by: INTERNAL MEDICINE

## 2019-05-20 PROCEDURE — 45380 PR COLONOSCOPY,BIOPSY: ICD-10-PCS | Mod: 59,HCNC,, | Performed by: INTERNAL MEDICINE

## 2019-05-20 PROCEDURE — 88305 TISSUE SPECIMEN TO PATHOLOGY - SURGERY: ICD-10-PCS | Mod: 26,HCNC,, | Performed by: PATHOLOGY

## 2019-05-20 RX ORDER — LIDOCAINE HYDROCHLORIDE 20 MG/ML
INJECTION, SOLUTION EPIDURAL; INFILTRATION; INTRACAUDAL; PERINEURAL
Status: DISCONTINUED
Start: 2019-05-20 | End: 2019-05-20 | Stop reason: HOSPADM

## 2019-05-20 RX ORDER — PROPOFOL 10 MG/ML
VIAL (ML) INTRAVENOUS
Status: DISCONTINUED | OUTPATIENT
Start: 2019-05-20 | End: 2019-05-20

## 2019-05-20 RX ORDER — LIDOCAINE HCL/PF 100 MG/5ML
SYRINGE (ML) INTRAVENOUS
Status: DISCONTINUED | OUTPATIENT
Start: 2019-05-20 | End: 2019-05-20

## 2019-05-20 RX ORDER — SODIUM CHLORIDE 9 MG/ML
INJECTION, SOLUTION INTRAVENOUS CONTINUOUS
Status: DISCONTINUED | OUTPATIENT
Start: 2019-05-20 | End: 2019-05-20 | Stop reason: HOSPADM

## 2019-05-20 RX ORDER — LIDOCAINE HYDROCHLORIDE 10 MG/ML
1 INJECTION, SOLUTION EPIDURAL; INFILTRATION; INTRACAUDAL; PERINEURAL ONCE
Status: DISCONTINUED | OUTPATIENT
Start: 2019-05-20 | End: 2019-05-20 | Stop reason: HOSPADM

## 2019-05-20 RX ORDER — PROPOFOL 10 MG/ML
VIAL (ML) INTRAVENOUS
Status: COMPLETED
Start: 2019-05-20 | End: 2019-05-20

## 2019-05-20 RX ADMIN — PROPOFOL 50 MG: 10 INJECTION, EMULSION INTRAVENOUS at 02:05

## 2019-05-20 RX ADMIN — PROPOFOL 30 MG: 10 INJECTION, EMULSION INTRAVENOUS at 02:05

## 2019-05-20 RX ADMIN — PROPOFOL 20 MG: 10 INJECTION, EMULSION INTRAVENOUS at 02:05

## 2019-05-20 RX ADMIN — SODIUM CHLORIDE: 0.9 INJECTION, SOLUTION INTRAVENOUS at 12:05

## 2019-05-20 RX ADMIN — PROPOFOL 100 MG: 10 INJECTION, EMULSION INTRAVENOUS at 02:05

## 2019-05-20 RX ADMIN — LIDOCAINE HYDROCHLORIDE 100 MG: 20 INJECTION, SOLUTION INTRAVENOUS at 02:05

## 2019-05-20 NOTE — OR NURSING
Procedure completed. Dr. Arias at bedside discussed results with patient and spouse. Patient ready for discharge. No apparent distress noted

## 2019-05-20 NOTE — ANESTHESIA PREPROCEDURE EVALUATION
05/20/2019  Sophia Landis is a 73 y.o., female.    Anesthesia Evaluation    I have reviewed the Patient Summary Reports.    I have reviewed the Nursing Notes.      Review of Systems  Anesthesia Hx:  No problems with previous Anesthesia  Denies Family Hx of Anesthesia complications.   Denies Personal Hx of Anesthesia complications.   Social:  Former Smoker, No Alcohol Use    Cardiovascular:   Hypertension Denies MI.   Denies Dysrhythmias.  PVD 2018 carotid U/S: 30-59% stenosis bilaterally; antegrade vertebral flow    2015 Echo: EF 55%; diastolic dysfunction   Pulmonary:   Sleep Apnea    Hepatic/GI:   Denies GERD.    Neurological:  Neurology Normal    Endocrine:  Endocrine Normal    Psych:   Psychiatric History          Physical Exam  General:  Well nourished    Airway/Jaw/Neck:  Airway Findings: Mouth Opening: Normal Tongue: Normal  Mallampati: II  TM Distance: 4 - 6 cm  Jaw/Neck Findings:  Neck ROM: Normal ROM     Eyes/Ears/Nose:  Eyes/Ears/Nose Findings:    Dental:  Dental Findings: In tact   Chest/Lungs:  Chest/Lungs Findings: Clear to auscultation, Normal Respiratory Rate     Heart/Vascular:  Heart Findings: Rate: Normal  Rhythm: Regular Rhythm        Mental Status:  Mental Status Findings:  Cooperative, Alert and Oriented       Wt Readings from Last 3 Encounters:   05/13/19 62.6 kg (138 lb 0.1 oz)   03/11/19 62.8 kg (138 lb 7.2 oz)   12/20/18 63.8 kg (140 lb 10.5 oz)     Temp Readings from Last 3 Encounters:   05/13/19 36.9 °C (98.4 °F)   03/11/19 36.9 °C (98.5 °F) (Oral)   11/26/18 36.8 °C (98.2 °F) (Oral)     BP Readings from Last 3 Encounters:   05/13/19 116/80   03/11/19 (!) 144/60   12/20/18 138/78     Pulse Readings from Last 3 Encounters:   05/13/19 82   03/11/19 78   11/26/18 84     Lab Results   Component Value Date    WBC 6.84 04/15/2019    HGB 12.7 04/15/2019    HCT 39.5 04/15/2019    MCV 90  04/15/2019     04/15/2019     CMP  Sodium   Date Value Ref Range Status   04/15/2019 141 136 - 145 mmol/L Final     Potassium   Date Value Ref Range Status   04/15/2019 4.6 3.5 - 5.1 mmol/L Final     Chloride   Date Value Ref Range Status   04/15/2019 111 (H) 95 - 110 mmol/L Final     CO2   Date Value Ref Range Status   04/15/2019 22 (L) 23 - 29 mmol/L Final     Glucose   Date Value Ref Range Status   04/15/2019 87 70 - 110 mg/dL Final     BUN, Bld   Date Value Ref Range Status   04/15/2019 15 8 - 23 mg/dL Final     Creatinine   Date Value Ref Range Status   04/15/2019 0.9 0.5 - 1.4 mg/dL Final     Calcium   Date Value Ref Range Status   04/15/2019 10.0 8.7 - 10.5 mg/dL Final     Total Protein   Date Value Ref Range Status   04/15/2019 7.4 6.0 - 8.4 g/dL Final     Albumin   Date Value Ref Range Status   04/15/2019 3.8 3.5 - 5.2 g/dL Final     Total Bilirubin   Date Value Ref Range Status   04/15/2019 0.3 0.1 - 1.0 mg/dL Final     Comment:     For infants and newborns, interpretation of results should be based  on gestational age, weight and in agreement with clinical  observations.  Premature Infant recommended reference ranges:  Up to 24 hours.............<8.0 mg/dL  Up to 48 hours............<12.0 mg/dL  3-5 days..................<15.0 mg/dL  6-29 days.................<15.0 mg/dL       Alkaline Phosphatase   Date Value Ref Range Status   04/15/2019 99 55 - 135 U/L Final     AST   Date Value Ref Range Status   04/15/2019 21 10 - 40 U/L Final     ALT   Date Value Ref Range Status   04/15/2019 20 10 - 44 U/L Final     Anion Gap   Date Value Ref Range Status   04/15/2019 8 8 - 16 mmol/L Final     eGFR if    Date Value Ref Range Status   04/15/2019 >60.0 >60 mL/min/1.73 m^2 Final     eGFR if non    Date Value Ref Range Status   04/15/2019 >60.0 >60 mL/min/1.73 m^2 Final     Comment:     Calculation used to obtain the estimated glomerular filtration  rate (eGFR) is the CKD-EPI  equation.            Anesthesia Plan  Type of Anesthesia, risks & benefits discussed:  Anesthesia Type:  general  Patient's Preference:   Intra-op Monitoring Plan: standard ASA monitors  Intra-op Monitoring Plan Comments:   Post Op Pain Control Plan: multimodal analgesia and per primary service following discharge from PACU  Post Op Pain Control Plan Comments:   Induction:   IV  Beta Blocker:  Patient is not currently on a Beta-Blocker (No further documentation required).       Informed Consent: Patient understands risks and agrees with Anesthesia plan.  Questions answered. Anesthesia consent signed with patient.  ASA Score: 3     Day of Surgery Review of History & Physical: I have interviewed and examined the patient. I have reviewed the patient's H&P dated:            Ready For Surgery From Anesthesia Perspective.

## 2019-05-20 NOTE — DISCHARGE INSTRUCTIONS
Understanding Colon and Rectal Polyps    The colon (also called the large intestine) is a muscular tube that forms the last part of the digestive tract. It absorbs water and stores food waste. The colon is about 4 to 6 feet long. The rectum is the last 6 inches of the colon. The colon and rectum have a smooth lining composed of millions of cells. Changes in these cells can lead to growths in the colon that can become cancerous and should be removed. Multiple tests are available to screen for colon cancer, but the colonoscopy is the most recommended test. During colonoscopy, these polyps can be removed. How often you need this test depends on many things including your condition, your family history, symptoms, and what the findings were at the previous colonoscopy.   When the colon lining changes  Changes that happen in the cells that line the colon or rectum can lead to growths called polyps. Over a period of years, polyps can turn cancerous. Removing polyps early may prevent cancer from ever forming.  Polyps  Polyps are fleshy clumps of tissue that form on the lining of the colon or rectum. Small polyps are usually benign (not cancerous). However, over time, cells in a polyp can change and become cancerous. Certain types of polyps known as adenomatous polyps are premalignant. The risk for invasive cancer increases with the size of the polyp and certain cell and gene features. This means that they can become cancerous if they're not removed. Hyperplastic polyps are benign. They can grow quite large and not turn cancerous.   Cancer  Almost all colorectal cancers start when polyp cells begin growing abnormally. As a cancerous tumor grows, it may involve more and more of the colon or rectum. In time, cancer can also grow beyond the colon or rectum and spread to nearby organs or to glands called lymph nodes. The cells can also travel to other parts of the body. This is known as metastasis. The earlier a cancerous  tumor is removed, the better the chance of preventing its spread.    Date Last Reviewed: 8/1/2016  © 0086-5605 [employer]. [address] [place] [address], [place] [address]. All rights reserved. This information is not intended as a substitute for professional medical care. Always follow your healthcare professional's instructions.        Discharge Instructions: Eating a High-Fiber Diet  Your health care provider has prescribed a high-fiber diet for you. Fiber is what gives strength and structure to plants. Most grains, beans, vegetables, and fruits contain fiber. Foods rich in fiber are often low in calories and fat, but they fill you up more. These foods may also reduce the risk of certain health problems.  There are two types of fiber:  · Insoluble fiber. This is found in whole grains, cereals, and certain fruits and vegetables (such as apple skins, corn, and beans). Insoluble fiber is made up mainly of plant cell walls. It may prevent constipation and reduce the risk of certain types of cancer.  · Soluble fiber. This type of fiber is found in oats, beans, nuts, and certain fruits and vegetables (such as strawberries and peas). Soluble fiber turns to gel in the digestive system, slowing the movement of the digestive tract. It helps control blood sugar levels and can reduce cholesterol, which may help lower the risk of heart disease. Soluble fiber can also help control appetite.     Home care  · Know how much fiber you need a day. The recommended daily amount of fiber is 25 grams for women and 38 grams for men. After age 50, daily fiber needs drop to 21 grams for women and 30 grams for men.  · Ask your doctor about a fiber supplement. (Always take fiber supplements with a large glass of water.)  · Keep track of how much fiber you eat.  · Eat a variety of foods high in fiber.  · Learn to read and understand food labels.  · Ask your healthcare provider how much water you should be drinking.  · Look for these  high-fiber foods:  ¨ Whole-grain breads and cereals  § 6 ounces a day give you about 18 grams of fiber (1 ounce is equal to 1 slice of bread, 1 cup of dry cereal, or 1/2 cup of cooked rice).  § Include wheat and oat bran cereals, whole-wheat muffins or toast, and corn tortillas in your meals.  ¨ Fruits   § 2 cups a day give you about 8 grams of fiber.  § Apples, oranges, strawberries, pears, and bananas are good sources.  § Fruit juice does not contain as much fiber as the fruit it was made from.  ¨ Vegetables  § 2½ cups a day give you about 11 grams of fiber. Add asparagus, carrots, broccoli, peas, and corn to your meals.  ¨ Legumes  § 1/4 cup a day (in place of meat) gives you about 4 grams of fiber. Try navy beans, lentils, chickpeas, and soybeans.  ¨ Seeds   § A small handful of seeds gives you about 3 grams of fiber. Try sunflower seeds.  Follow-up  Make a follow-up appointment with a nutritionist as directed by our staff.  Date Last Reviewed: 6/1/2015  © 9487-1977 [employer]. [address] [place] [address], [place] [address]. All rights reserved. This information is not intended as a substitute for professional medical care. Always follow your healthcare professional's instructions.        Understanding Diverticulosis and Diverticulitis     Pouches or diverticula usually occur in the lower part of the colon called the sigmoid.     The colon (large intestine) is the last part of the digestive tract. It absorbs water from stool and changes it from a liquid to a solid. In certain cases, small pouches called diverticula can form in the colon wall. This condition is called diverticulosis. The pouches can become infected. If this happens, it becomes a more serious problem called diverticulitis. These problems can be painful. But they can be managed.  Managing your condition  Diet changes or medicines may be prescribed.   If you have diverticulosis  Recommendations include:  · Diet changes are often enough to  control symptoms. The main changes are adding fiber (roughage) and drinking more water. Fiber absorbs water as it travels through your colon. This helps your stool stay soft and move smoothly. Water helps this process.  · If needed, you may be told to take over-the-counter stool softeners.  · To help relieve pain, antispasmodic medicines may be prescribed.  · Watch for changes in your bowel movements. Tell the healthcare provider if you notice any changes.  · Begin an exercise program. Ask your healthcare provider how to get started.  · Get plenty of rest and sleep.   If you have diverticulitis  Treatment depends on how bad your symptoms are.  · For mild symptoms. You may be put on a liquid diet for a short time. Antibiotics are usually prescribed. If these two steps relieve your symptoms, you may then be prescribed a high-fiber diet. If you still have symptoms, your healthcare provider will discuss more treatment choices with you.  · For severe symptoms. You may need to be admitted to the hospital. There, you can be given IV antibiotics and fluids. You will also be put on a low-fiber or liquid diet. Although not common, surgery is needed in some people with severe symptoms.  [place] to colon health     Diverticulitis occurs when the pouches become infected or inflamed.     Help keep your colon healthy with a diet that includes plenty of high-fiber fruits, vegetables, and whole grains. Drink plenty of liquids like water and juice. Maintain a healthy lifestyle including regular exercise, stress management, and adequate rest and sleep.   Date Last Reviewed: 7/1/2016  © 4336-5476 [employer] [address] [place] [address], [place] [address]. All rights reserved. This information is not intended as a substitute for professional medical care. Always follow your healthcare professional's instructions.

## 2019-05-20 NOTE — H&P
Endoscopy Pre-Procedure H&P    Reason for Procedure: history of polyps    HPI:  Pt is a 73 y.o. female who presents for surveillance colonoscopy due to history of polyps.  No family history and no GI symptoms.  Her last colonoscopy was in 2014 with 2 polyps.    Past Medical History:   Diagnosis Date    Cataract     Chronic diarrhea 11/26/2018    Depression     GERD (gastroesophageal reflux disease)     History of colonic polyps 11/26/2018    Hyperlipidemia     Hypertension     Menopausal syndrome     PAD (peripheral artery disease)     s/p stent       Past Surgical History:   Procedure Laterality Date    APPENDECTOMY      COLONOSCOPY N/A 4/2/2014    [facility]    HYSTERECTOMY      MONTY with BSO    ILIAC ARTERY STENT Bilateral        Family History   Problem Relation Age of Onset    Amblyopia Mother     Cataracts Mother     Hypertension Mother     Thyroid disease Mother     Cancer Father     Strabismus Sister     Cancer Brother     Diabetes Maternal Aunt     Cancer Maternal Uncle     Diabetes Maternal Uncle     Cancer Maternal Uncle     Cancer Maternal Uncle     Cancer Maternal Uncle     Blindness Neg Hx     Glaucoma Neg Hx     Macular degeneration Neg Hx     Retinal detachment Neg Hx     Stroke Neg Hx        Social History     Tobacco Use    Smoking status: Former Smoker    Smokeless tobacco: Former User    Tobacco comment: .  Retired  at [employer].   Substance Use Topics    Alcohol use: No    Drug use: No       Review of patient's allergies indicates:   Allergen Reactions    Antihistamines - alkylamine Nausea Only    Ciprofloxacin Nausea Only    Penicillins Hives       No current facility-administered medications on file prior to encounter.      Current Outpatient Medications on File Prior to Encounter   Medication Sig Dispense Refill    calcium carbonate (OS-PAOLO) 600 mg (1,500 mg) Tab Take 600 mg by mouth 2 (two)  "times daily with meals.        cilostazol (PLETAL) 50 MG Tab Take 2 tablets (100 mg total) by mouth 2 (two) times daily. If patient tolerate medication after 4 weeks, increase dose to 100 mg twice daily. 120 tablet 11    hydroCHLOROthiazide (HYDRODIURIL) 25 MG tablet Take 25 mg by mouth once daily.      meloxicam (MOBIC) 15 MG tablet Take 1 tablet (15 mg total) by mouth once daily. Change to daily as needed for pain if possible 90 tablet 1    albuterol 90 mcg/actuation inhaler Inhale 1-2 puffs into the lungs every 4 to 6 hours as needed for Wheezing or Shortness of Breath (chest tightness). 1 Inhaler 6    aspirin (ECOTRIN) 81 MG EC tablet Take 81 mg by mouth once daily.         ROS: Negative x 10    Patient Vitals for the past 24 hrs:   BP Pulse Resp SpO2 Height Weight   05/20/19 1219 (!) 156/70 77 20 98 % 4' 11" (1.499 m) 61.7 kg (136 lb)     Gen: Well developed, well nourished, no apparent distress  HEENT: Anicteric, PERRL, MMM  CV: Regular rate and rhythm, no murmurs   Lungs: CTAB, no increased work of breathing  Abd: Soft, NT, ND, normal BS, no HSM  Ext: No C/C/E  Neuro: Alert and oriented to person, place, time; no weakness  Skin: No rashes/lesions  Psych: Normal mood and affect    Assessment:  Sophia Landis is a 73 y.o. female who presents for surveillance colonoscopy due to history of polyps.    Recommendations:  1. Colonoscopy  2. F/U with PCP     Kenia Arias MD    "

## 2019-05-20 NOTE — [employer] PATIENT INSTRUCTIONS
Discharge Summary/Instructions after an Endoscopic Procedure  Patient Name: Sophia Landis  Patient MRN: [id number]  Patient YOB: 1945  Monday, May 20, 2019  Kenia Arias MD  RESTRICTIONS:  During your procedure today, you received medications for sedation.  These   medications may affect your judgment, balance and coordination.  Therefore,   for 24 hours, you have the following restrictions:   - DO NOT drive a car, operate machinery, make legal/financial decisions,   sign important papers or drink alcohol.    ACTIVITY:  Today: no heavy lifting, straining or running due to procedural   sedation/anesthesia.  The following day: return to full activity including work.  DIET:  Eat and drink normally unless instructed otherwise.     TREATMENT FOR COMMON SIDE EFFECTS:  - Mild abdominal pain, nausea, belching, bloating or excessive gas:  rest,   eat lightly and use a heating pad.  - Sore Throat: treat with throat lozenges and/or gargle with warm salt   water.  - Because air was used during the procedure, expelling large amounts of air   from your rectum or belching is normal.  - If a bowel prep was taken, you may not have a bowel movement for 1-3 days.    This is normal.  SYMPTOMS TO WATCH FOR AND REPORT TO YOUR PHYSICIAN:  1. Abdominal pain or bloating, other than gas cramps.  2. Chest pain.  3. Back pain.  4. Signs of infection such as: chills or fever occurring within 24 hours   after the procedure.  5. Rectal bleeding, which would show as bright red, maroon, or black stools.   (A tablespoon of blood from the rectum is not serious, especially if   hemorrhoids are present.)  6. Vomiting.  7. Weakness or dizziness.  GO DIRECTLY TO THE NEAREST EMERGENCY ROOM IF YOU HAVE ANY OF THE FOLLOWING:      Difficulty breathing              Chills and/or fever over 101 F   Persistent vomiting and/or vomiting blood   Severe abdominal pain   Severe chest pain   Black, tarry stools   Bleeding- more than one tablespoon   Any  other symptom or condition that you feel may need urgent attention  Your doctor recommends these additional instructions:  If any biopsies were taken, your doctors clinic will contact you in 1 to 2   weeks with any results.  - Patient has a contact number available for emergencies.  The signs and   symptoms of potential delayed complications were discussed with the   patient.  Return to normal activities tomorrow.  Written discharge   instructions were provided to the patient.   - Discharge patient to home.   - Resume previous diet today.   - Await pathology results.   - Continue present medications.   - Repeat colonoscopy in 5-10 years for surveillance based on pathology   results.   - Return to primary care physician in 1 month.   - The findings and recommendations were discussed with the patient and their   family.   - Return to GI clinic in 1 month for evaluation and management of chronic   diarrhea.  For questions, problems or results please call your physician - Kenia Arias MD at Work:  [phone].  [facility] can be reached at [phone]     IF A COMPLICATION OR EMERGENCY SITUATION ARISES AND YOU ARE UNABLE TO REACH   YOUR PHYSICIAN - GO DIRECTLY TO THE EMERGENCY ROOM.  Kenia Arias MD  5/20/2019 2:36:34 PM  This report has been verified and signed electronically.  [employer]

## 2019-05-20 NOTE — TRANSFER OF CARE
"Anesthesia Transfer of Care Note    Patient: Sophia Landis    Procedure(s) Performed: Procedure(s) (LRB):  COLONOSCOPY (N/A)    Patient location: PACU    Anesthesia Type: general    Transport from OR: Transported from OR on room air with adequate spontaneous ventilation    Post pain: adequate analgesia    Post assessment: tolerated procedure well and no apparent anesthetic complications    Post vital signs: stable    Level of consciousness: awake, alert and oriented    Nausea/Vomiting: no nausea/vomiting    Complications: none    Transfer of care protocol was followed      Last vitals:   Visit Vitals  /62   Pulse 78   Temp 36.8 °C (98.2 °F) (Oral)   Resp 16   Ht 4' 11" (1.499 m)   Wt 61.7 kg (136 lb)   SpO2 (!) 94%   BMI 27.47 kg/m²     "

## 2019-05-21 NOTE — ANESTHESIA POSTPROCEDURE EVALUATION
Anesthesia Post Evaluation    Patient: Sophia Landis    Procedure(s) Performed: Procedure(s) (LRB):  COLONOSCOPY (N/A)    Final Anesthesia Type: general  Patient location during evaluation: GI PACU  Patient participation: Yes- Able to Participate  Level of consciousness: awake and alert  Post-procedure vital signs: reviewed and stable  Pain management: adequate  Airway patency: patent  PONV status at discharge: No PONV  Anesthetic complications: no      Cardiovascular status: hemodynamically stable  Respiratory status: unassisted and spontaneous ventilation  Hydration status: euvolemic  Follow-up not needed.          Vitals Value Taken Time   /91 5/20/2019  3:19 PM   Temp 36.8 °C (98.2 °F) 5/20/2019  2:38 PM   Pulse 86 5/20/2019  3:19 PM   Resp 94 5/20/2019  3:19 PM   SpO2 93 % 5/20/2019  3:03 PM         Event Time     Out of Recovery 15:20:00          Pain/Alisha Score: Alisha Score: 10 (5/20/2019  3:19 PM)

## 2019-10-18 DIAGNOSIS — I10 ESSENTIAL HYPERTENSION: Chronic | ICD-10-CM

## 2019-10-18 RX ORDER — AMLODIPINE BESYLATE 10 MG/1
TABLET ORAL
Qty: 90 TABLET | Refills: 1 | Status: SHIPPED | OUTPATIENT
Start: 2019-10-18 | End: 2019-11-13 | Stop reason: SDUPTHER

## 2019-10-18 NOTE — TELEPHONE ENCOUNTER
----- Message from Marjorie Bhavin sent at 10/18/2019 12:00 PM CDT -----  Contact: Self [phone]  Type: RX Refill Request    Who Called: Self    Have you contacted your pharmacy:    Refill or New Rx:Refill    RX Name and Strength:amLODIPine (NORVASC) 10 MG tablet & quinapril (ACCUPRIL) 40 MG tablet    Is this a 30 day or 90 day RX:90 day    Preferred Pharmacy with phone number:  [facility] [place] - [id number]  [id number]  [place] [address]  Phone: [phone] Fax: [phone]    Local or Mail Order:Local    Would the patient rather a call back or a response via [facility]? Call back    Best Call Back Number:[phone]    Additional Info: Patient is completely out of her medication

## 2019-11-01 ENCOUNTER — OFFICE VISIT (OUTPATIENT)
Dept: FAMILY MEDICINE | Facility: CLINIC | Age: 74
End: 2019-11-01
Payer: MEDICARE

## 2019-11-01 VITALS
HEART RATE: 92 BPM | DIASTOLIC BLOOD PRESSURE: 70 MMHG | WEIGHT: 137.56 LBS | TEMPERATURE: 99 F | SYSTOLIC BLOOD PRESSURE: 130 MMHG | HEIGHT: 57 IN | OXYGEN SATURATION: 93 % | BODY MASS INDEX: 29.68 KG/M2

## 2019-11-01 DIAGNOSIS — F39 MOOD DISORDER: ICD-10-CM

## 2019-11-01 DIAGNOSIS — F33.0 MILD RECURRENT MAJOR DEPRESSION: Chronic | ICD-10-CM

## 2019-11-01 DIAGNOSIS — N32.81 OVERACTIVE BLADDER: ICD-10-CM

## 2019-11-01 DIAGNOSIS — E78.5 HYPERLIPIDEMIA, UNSPECIFIED HYPERLIPIDEMIA TYPE: ICD-10-CM

## 2019-11-01 DIAGNOSIS — Z86.010 HISTORY OF COLONIC POLYPS: ICD-10-CM

## 2019-11-01 DIAGNOSIS — I10 ESSENTIAL HYPERTENSION: Primary | ICD-10-CM

## 2019-11-01 DIAGNOSIS — I73.9 PAD (PERIPHERAL ARTERY DISEASE): ICD-10-CM

## 2019-11-01 DIAGNOSIS — G47.33 OSA ON CPAP: ICD-10-CM

## 2019-11-01 PROCEDURE — 1101F PT FALLS ASSESS-DOCD LE1/YR: CPT | Mod: HCNC,CPTII,S$GLB, | Performed by: INTERNAL MEDICINE

## 2019-11-01 PROCEDURE — 99214 OFFICE O/P EST MOD 30 MIN: CPT | Mod: HCNC,S$GLB,, | Performed by: INTERNAL MEDICINE

## 2019-11-01 PROCEDURE — 99214 PR OFFICE/OUTPT VISIT, EST, LEVL IV, 30-39 MIN: ICD-10-PCS | Mod: HCNC,S$GLB,, | Performed by: INTERNAL MEDICINE

## 2019-11-01 PROCEDURE — 3078F DIAST BP <80 MM HG: CPT | Mod: HCNC,CPTII,S$GLB, | Performed by: INTERNAL MEDICINE

## 2019-11-01 PROCEDURE — 99999 PR PBB SHADOW E&M-EST. PATIENT-LVL III: CPT | Mod: PBBFAC,HCNC,, | Performed by: INTERNAL MEDICINE

## 2019-11-01 PROCEDURE — 3075F PR MOST RECENT SYSTOLIC BLOOD PRESS GE 130-139MM HG: ICD-10-PCS | Mod: HCNC,CPTII,S$GLB, | Performed by: INTERNAL MEDICINE

## 2019-11-01 PROCEDURE — 1101F PR PT FALLS ASSESS DOC 0-1 FALLS W/OUT INJ PAST YR: ICD-10-PCS | Mod: HCNC,CPTII,S$GLB, | Performed by: INTERNAL MEDICINE

## 2019-11-01 PROCEDURE — 3075F SYST BP GE 130 - 139MM HG: CPT | Mod: HCNC,CPTII,S$GLB, | Performed by: INTERNAL MEDICINE

## 2019-11-01 PROCEDURE — 3078F PR MOST RECENT DIASTOLIC BLOOD PRESSURE < 80 MM HG: ICD-10-PCS | Mod: HCNC,CPTII,S$GLB, | Performed by: INTERNAL MEDICINE

## 2019-11-01 PROCEDURE — 99999 PR PBB SHADOW E&M-EST. PATIENT-LVL III: ICD-10-PCS | Mod: PBBFAC,HCNC,, | Performed by: INTERNAL MEDICINE

## 2019-11-01 RX ORDER — SERTRALINE HYDROCHLORIDE 100 MG/1
100 TABLET, FILM COATED ORAL DAILY
Qty: 90 TABLET | Refills: 90 | Status: SHIPPED | OUTPATIENT
Start: 2019-11-01 | End: 2020-08-03 | Stop reason: SDUPTHER

## 2019-11-01 NOTE — PROGRESS NOTES
Chief complaint:  Checkup    Physical exam 5/19    74-year-old white female new to me 11/18.       Reviewed her  colonoscopy w 1 Tubular adenoma 5/19 -5 yrs .  She is active without any other increased risk.  No family history of breast cancer.      She has hypertension which appears to be under good control.  Quit ACE x 2 wks and ok -keep checking.     She has some peripheral artery disease apparently but no claudication.      Her mood disorder is under control. Caring for 98 y/o mother.  She is on Zoloft 50.  She has been a little bit more irritable.  She is agreeable to go to 100.  She also has a granddaughter that lives behind them that has wrecked her car may we involved in drugs all this is causing stress.     She has underlying sleep apnea.      She does apparently continue with her chronic diarrhea which has been ongoing for a year.  It looks like I attempted to refer her to GI but apparently she never went and had stool studies but her overall pattern is been stable.  She does have a colonoscopy set apparently at [facility].  We will see how those results turn out and may well need her to sit down and have a GI consult to work out other causes for ongoing diarrhea.  There is no worrisome signs or symptoms.    She also reports some urgency incontinence.  Sometimes it is pretty bad.  Whenever she runs the water or has psychological cues she will get urgency and incontinence.  We discussed a trial of overactive bladder medication with low-dose oxybutynin 5 mg. And it worked well.   We discussed dry mouth and possibly taking it only when she needs to and also discussed frequent voiding.    She also reports menopausal symptoms with dryness and dyspareunia as well as moodiness.  She was on hormones in the past.  She has had a hysterectomy.  She did purchase Premarin from [employer] 6.25 and is taking half a pill a day in for a week noted improvement in the dyspareunia and mood.  She was on apparently 1 mg of  estrogen in the past.  We discussed cost coverage issues.  I will give her the 0.3 mg Premarin and sent to a local pharmacy and she can also use a discount card to find out if it would be cheaper and other pharmacies and I will be happy to provide her with that given her symptoms, no family history of breast cancer will update her mammogram.        All  issues reviewed patient counseled and her evaluation and management will be based upon time counseling.Total time over 25 minutes with over 50% counseling.      ROS:   CONST: weight stable. EYES: no vision change. ENT: no sore throat. CV: no chest pain w/ exertion. RESP: no shortness of breath. GI: no nausea, vomiting, diarrhea. No dysphagia. : no urinary issues. MUSCULOSKELETAL: no new myalgias or arthralgias. SKIN: no new changes. NEURO: no focal deficits. PSYCH: no new issues. ENDOCRINE: no polyuria. HEME: no lymph nodes. ALLERGY: no general pruritis.om          Past Medical History:   Diagnosis Date    Cataract     Chronic diarrhea 11/26/2018    Depression     GERD (gastroesophageal reflux disease)     History of colonic polyps - colonoscopy w 1 Tubular adenoma 5/19 -5 yrs 11/26/2018    Hyperlipidemia     Hypertension     Menopausal syndrome     PAD (peripheral artery disease)     s/p stent     Past Surgical History:   Procedure Laterality Date    APPENDECTOMY      COLONOSCOPY N/A 5/20/2019    Procedure: COLONOSCOPY;  Surgeon: Kenia Arias MD;  Location: [facility];  Service: Endoscopy;  Laterality: N/A;  RX PLETAL ok to hold    HYSTERECTOMY      MONTY with BSO    ILIAC ARTERY STENT Bilateral      Social History     Socioeconomic History    Marital status:      Spouse name: Not on file    Number of children: Not on file    Years of education: Not on file    Highest education level: Not on file   Occupational History    Not on file   Social Needs    Financial resource strain: Not on file    Food insecurity:     Worry: Not on file      Inability: Not on file    Transportation needs:     Medical: Not on file     Non-medical: Not on file   Tobacco Use    Smoking status: Former Smoker    Smokeless tobacco: Former User    Tobacco comment: .  Retired  at [employer].   Substance and Sexual Activity    Alcohol use: No    Drug use: No    Sexual activity: Yes     Partners: Male     Comment:    Lifestyle    Physical activity:     Days per week: Not on file     Minutes per session: Not on file    Stress: Not on file   Relationships    Social connections:     Talks on phone: Not on file     Gets together: Not on file     Attends [employer] service: Not on file     Active member of club or organization: Not on file     Attends meetings of clubs or organizations: Not on file     Relationship status: Not on file   Other Topics Concern    Not on file   Social History Narrative    Not on file     family history includes Amblyopia in her mother; Cancer in her brother, father, maternal uncle, maternal uncle, maternal uncle, and maternal uncle; Cataracts in her mother; Diabetes in her maternal aunt and maternal uncle; Hypertension in her mother; Strabismus in her sister; Thyroid disease in her mother.      Labs, x-ray, ultrasound, CT scan, colonoscopy reports and prior clinical notes reviewed and summarized time patient counseled.    Gen: no distress        Assessment and plan:      Sophia was seen today for medication problem.    Diagnoses and all orders for this visit:    Essential hypertension, chronic and stable, continue to monitor off ACE-inhibitor which she just discontinued on her own for no particular reason    History of colonic polyps, reviewed    Hyperlipidemia, unspecified hyperlipidemia type, reviewed    PAD (peripheral artery disease), no claudication so no real needs to see vascular, continue pletal    Mood disorder, situational stress, increase Zoloft to 100    ANDRES on CPAP    Overactive bladder, responded well  to low-dose oxybutynin    Mild recurrent major depression  -     sertraline (ZOLOFT) 100 MG tablet; Take 1 tablet (100 mg total) by mouth once daily.

## 2019-11-12 RX ORDER — CILOSTAZOL 50 MG/1
TABLET ORAL
Qty: 360 TABLET | Refills: 0 | Status: SHIPPED | OUTPATIENT
Start: 2019-11-12 | End: 2020-04-02

## 2019-11-13 DIAGNOSIS — M54.50 CHRONIC LEFT-SIDED LOW BACK PAIN WITHOUT SCIATICA: ICD-10-CM

## 2019-11-13 DIAGNOSIS — G89.29 CHRONIC LEFT-SIDED LOW BACK PAIN WITHOUT SCIATICA: ICD-10-CM

## 2019-11-13 DIAGNOSIS — I10 ESSENTIAL HYPERTENSION: Chronic | ICD-10-CM

## 2019-11-14 RX ORDER — AMLODIPINE BESYLATE 10 MG/1
TABLET ORAL
Qty: 90 TABLET | Refills: 12 | Status: SHIPPED | OUTPATIENT
Start: 2019-11-14 | End: 2020-08-03 | Stop reason: SDUPTHER

## 2019-11-14 RX ORDER — MELOXICAM 15 MG/1
15 TABLET ORAL DAILY
Qty: 90 TABLET | Refills: 12 | Status: SHIPPED | OUTPATIENT
Start: 2019-11-14 | End: 2020-08-03 | Stop reason: SDUPTHER

## 2020-02-03 ENCOUNTER — PES CALL (OUTPATIENT)
Dept: ADMINISTRATIVE | Facility: CLINIC | Age: 75
End: 2020-02-03

## 2020-04-02 RX ORDER — CILOSTAZOL 50 MG/1
100 TABLET ORAL 2 TIMES DAILY
Qty: 360 TABLET | Refills: 11 | Status: SHIPPED | OUTPATIENT
Start: 2020-04-02 | End: 2020-08-03 | Stop reason: SDUPTHER

## 2020-04-03 DIAGNOSIS — I10 ESSENTIAL HYPERTENSION: Chronic | ICD-10-CM

## 2020-04-03 RX ORDER — METOPROLOL SUCCINATE 50 MG/1
50 TABLET, EXTENDED RELEASE ORAL DAILY
Qty: 90 TABLET | Refills: 2 | Status: SHIPPED | OUTPATIENT
Start: 2020-04-03 | End: 2020-08-03 | Stop reason: SDUPTHER

## 2020-04-06 DIAGNOSIS — I65.23 CAROTID STENOSIS, ASYMPTOMATIC, BILATERAL: ICD-10-CM

## 2020-04-06 DIAGNOSIS — I73.9 PVD (PERIPHERAL VASCULAR DISEASE): Primary | ICD-10-CM

## 2020-05-07 ENCOUNTER — HOSPITAL ENCOUNTER (OUTPATIENT)
Dept: CARDIOLOGY | Facility: HOSPITAL | Age: 75
Discharge: HOME | End: 2020-05-07
Attending: SURGERY
Payer: MEDICARE

## 2020-05-07 DIAGNOSIS — I65.23 CAROTID STENOSIS, ASYMPTOMATIC, BILATERAL: ICD-10-CM

## 2020-05-07 DIAGNOSIS — I73.9 PVD (PERIPHERAL VASCULAR DISEASE): ICD-10-CM

## 2020-05-07 PROCEDURE — 93925 LOWER EXTREMITY STUDY: CPT | Mod: 50,HCNC

## 2020-05-07 PROCEDURE — 93925 CV US DOPPLER ARTERIAL LEGS BILATERAL (CUPID ONLY): ICD-10-PCS | Mod: 26,HCNC,, | Performed by: SURGERY

## 2020-05-07 PROCEDURE — 93922 CV US ANKLE BRACHIAL INDICES WO STRESS W TOE TRACINGS (CUPID ONLY): ICD-10-PCS | Mod: 26,HCNC,, | Performed by: SURGERY

## 2020-05-07 PROCEDURE — 93922 UPR/L XTREMITY ART 2 LEVELS: CPT | Mod: 50,HCNC

## 2020-05-07 PROCEDURE — 93880 EXTRACRANIAL BILAT STUDY: CPT | Mod: HCNC

## 2020-05-07 PROCEDURE — 93880 EXTRACRANIAL BILAT STUDY: CPT | Mod: 26,HCNC,, | Performed by: SURGERY

## 2020-05-07 PROCEDURE — 93880 CV US DOPPLER CAROTID (CUPID ONLY): ICD-10-PCS | Mod: 26,HCNC,, | Performed by: SURGERY

## 2020-05-07 PROCEDURE — 93925 LOWER EXTREMITY STUDY: CPT | Mod: 26,HCNC,, | Performed by: SURGERY

## 2020-05-07 PROCEDURE — 93922 UPR/L XTREMITY ART 2 LEVELS: CPT | Mod: 26,HCNC,, | Performed by: SURGERY

## 2020-05-08 LAB
LEFT ABI: 0.7
LEFT ANT TIBIAL SYS PSV: 22 CM/S
LEFT ARM BP: 149 MMHG
LEFT CBA DIAS: 15 CM/S
LEFT CBA SYS: 543 CM/S
LEFT CCA DIST DIAS: 16 CM/S
LEFT CCA DIST SYS: 108 CM/S
LEFT CCA MID DIAS: 11 CM/S
LEFT CCA MID SYS: 90 CM/S
LEFT CCA PROX DIAS: 11 CM/S
LEFT CCA PROX SYS: 95 CM/S
LEFT CFA PSV: 83 CM/S
LEFT DORSALIS PEDIS: 110 MMHG
LEFT ECA DIAS: 9 CM/S
LEFT ECA SYS: 155 CM/S
LEFT EXTERNAL ILIAC PSV: 92 CM/S
LEFT ICA DIST DIAS: 16 CM/S
LEFT ICA DIST SYS: 121 CM/S
LEFT ICA MID DIAS: 12 CM/S
LEFT ICA MID SYS: 106 CM/S
LEFT ICA PROX DIAS: 8 CM/S
LEFT ICA PROX SYS: 136 CM/S
LEFT PERONEAL SYS PSV: 30 CM/S
LEFT POPLITEAL PSV: 45 CM/S
LEFT POST TIBIAL SYS PSV: 19 CM/S
LEFT POSTERIOR TIBIAL: 80 MMHG
LEFT PROFUNDA SYS PSV: 48 CM/S
LEFT SUPER FEMORAL DIST SYS PSV: 63 CM/S
LEFT SUPER FEMORAL MID SYS PSV: 74 CM/S
LEFT SUPER FEMORAL OSTIAL SYS PSV: 71 CM/S
LEFT SUPER FEMORAL PROX SYS PSV: 61 CM/S
LEFT TBI: 0.63
LEFT TIB/PER TRUNK SYS PSV: 39 CM/S
LEFT TOE PRESSURE: 99 MMHG
LEFT VERTEBRAL DIAS: 5 CM/S
LEFT VERTEBRAL SYS: 28 CM/S
OHS CV CAROTID RIGHT ICA EDV HIGHEST: 14
OHS CV CAROTID ULTRASOUND LEFT ICA/CCA RATIO: 1.26
OHS CV CAROTID ULTRASOUND RIGHT ICA/CCA RATIO: 0.99
OHS CV LEFT COMMON ILIAC ARTERY PSV: 570 CM/S
OHS CV LEFT LOWER EXTREMITY ABI (NO CALC): 0.7
OHS CV PV CAROTID LEFT HIGHEST CCA: 108
OHS CV PV CAROTID LEFT HIGHEST ICA: 136
OHS CV PV CAROTID RIGHT HIGHEST CCA: 190
OHS CV PV CAROTID RIGHT HIGHEST ICA: 189
OHS CV RIGHT ABI LOWER EXTREMITY (NO CALC): 0.91
OHS CV US CAROTID LEFT HIGHEST EDV: 16
OHS CV US RIGHT COMMON ILIAC PSV: 148 CM/S
RIGHT ABI: 0.91
RIGHT ANT TIBIAL SYS PSV: 95 CM/S
RIGHT ARM BP: 158 MMHG
RIGHT CBA DIAS: 18 CM/S
RIGHT CBA SYS: 165 CM/S
RIGHT CCA DIST DIAS: 14 CM/S
RIGHT CCA DIST SYS: 157 CM/S
RIGHT CCA MID DIAS: 17 CM/S
RIGHT CCA MID SYS: 190 CM/S
RIGHT CCA PROX DIAS: 10 CM/S
RIGHT CCA PROX SYS: 153 CM/S
RIGHT CFA PSV: 185 CM/S
RIGHT DORSALIS PEDIS: 136 MMHG
RIGHT ECA DIAS: 17 CM/S
RIGHT ECA SYS: 192 CM/S
RIGHT EXTERNAL ILLIAC PSV: 181 CM/S
RIGHT ICA DIST DIAS: 14 CM/S
RIGHT ICA DIST SYS: 106 CM/S
RIGHT ICA MID DIAS: 12 CM/S
RIGHT ICA MID SYS: 122 CM/S
RIGHT ICA PROX DIAS: 11 CM/S
RIGHT ICA PROX SYS: 189 CM/S
RIGHT PERONEAL SYS PSV: 76 CM/S
RIGHT POPLITEAL PSV: 101 CM/S
RIGHT POST TIBIAL SYS PSV: 71 CM/S
RIGHT POSTERIOR TIBIAL: 144 MMHG
RIGHT PROFUNDA SYS PSV: 92 CM/S
RIGHT SUPER FEMORAL DIST SYS PSV: 189 CM/S
RIGHT SUPER FEMORAL MID SYS PSV: 148 CM/S
RIGHT SUPER FEMORAL OSTIAL SYS PSV: 148 CM/S
RIGHT SUPER FEMORAL PROX SYS PSV: 137 CM/S
RIGHT TBI: 0.83
RIGHT TIB/PER TRUNK SYS PSV: 96 CM/S
RIGHT TOE PRESSURE: 131 MMHG
RIGHT VERTEBRAL DIAS: 10 CM/S
RIGHT VERTEBRAL SYS: 89 CM/S

## 2020-05-11 ENCOUNTER — TELEPHONE (OUTPATIENT)
Dept: VASCULAR SURGERY | Facility: CLINIC | Age: 75
End: 2020-05-11

## 2020-05-11 ENCOUNTER — PES CALL (OUTPATIENT)
Dept: ADMINISTRATIVE | Facility: CLINIC | Age: 75
End: 2020-05-11

## 2020-05-11 NOTE — TELEPHONE ENCOUNTER
Attempted to contact patient as she missed her appointment. No voice mail on either number listed in the system.

## 2020-05-20 ENCOUNTER — PATIENT OUTREACH (OUTPATIENT)
Dept: ADMINISTRATIVE | Facility: OTHER | Age: 75
End: 2020-05-20

## 2020-05-20 DIAGNOSIS — Z12.31 BREAST CANCER SCREENING BY MAMMOGRAM: Primary | ICD-10-CM

## 2020-05-21 ENCOUNTER — OFFICE VISIT (OUTPATIENT)
Dept: VASCULAR SURGERY | Facility: CLINIC | Age: 75
End: 2020-05-21
Payer: MEDICARE

## 2020-05-21 VITALS
SYSTOLIC BLOOD PRESSURE: 130 MMHG | DIASTOLIC BLOOD PRESSURE: 68 MMHG | BODY MASS INDEX: 30.83 KG/M2 | WEIGHT: 142.88 LBS | HEIGHT: 57 IN

## 2020-05-21 DIAGNOSIS — I74.9 ARTERIAL EMBOLISM: ICD-10-CM

## 2020-05-21 DIAGNOSIS — I65.23 CAROTID STENOSIS, ASYMPTOMATIC, BILATERAL: Primary | ICD-10-CM

## 2020-05-21 PROCEDURE — 99214 OFFICE O/P EST MOD 30 MIN: CPT | Mod: HCNC,S$GLB,, | Performed by: SURGERY

## 2020-05-21 PROCEDURE — 1125F AMNT PAIN NOTED PAIN PRSNT: CPT | Mod: HCNC,S$GLB,, | Performed by: SURGERY

## 2020-05-21 PROCEDURE — 1101F PT FALLS ASSESS-DOCD LE1/YR: CPT | Mod: HCNC,CPTII,S$GLB, | Performed by: SURGERY

## 2020-05-21 PROCEDURE — 3078F PR MOST RECENT DIASTOLIC BLOOD PRESSURE < 80 MM HG: ICD-10-PCS | Mod: HCNC,CPTII,S$GLB, | Performed by: SURGERY

## 2020-05-21 PROCEDURE — 99499 UNLISTED E&M SERVICE: CPT | Mod: HCNC,S$GLB,, | Performed by: SURGERY

## 2020-05-21 PROCEDURE — 3078F DIAST BP <80 MM HG: CPT | Mod: HCNC,CPTII,S$GLB, | Performed by: SURGERY

## 2020-05-21 PROCEDURE — 1159F MED LIST DOCD IN RCRD: CPT | Mod: HCNC,S$GLB,, | Performed by: SURGERY

## 2020-05-21 PROCEDURE — 3075F SYST BP GE 130 - 139MM HG: CPT | Mod: HCNC,CPTII,S$GLB, | Performed by: SURGERY

## 2020-05-21 PROCEDURE — 3075F PR MOST RECENT SYSTOLIC BLOOD PRESS GE 130-139MM HG: ICD-10-PCS | Mod: HCNC,CPTII,S$GLB, | Performed by: SURGERY

## 2020-05-21 PROCEDURE — 99214 PR OFFICE/OUTPT VISIT, EST, LEVL IV, 30-39 MIN: ICD-10-PCS | Mod: HCNC,S$GLB,, | Performed by: SURGERY

## 2020-05-21 PROCEDURE — 1125F PR PAIN SEVERITY QUANTIFIED, PAIN PRESENT: ICD-10-PCS | Mod: HCNC,S$GLB,, | Performed by: SURGERY

## 2020-05-21 PROCEDURE — 1159F PR MEDICATION LIST DOCUMENTED IN MEDICAL RECORD: ICD-10-PCS | Mod: HCNC,S$GLB,, | Performed by: SURGERY

## 2020-05-21 PROCEDURE — 1101F PR PT FALLS ASSESS DOC 0-1 FALLS W/OUT INJ PAST YR: ICD-10-PCS | Mod: HCNC,CPTII,S$GLB, | Performed by: SURGERY

## 2020-05-21 PROCEDURE — 99999 PR PBB SHADOW E&M-EST. PATIENT-LVL III: CPT | Mod: PBBFAC,HCNC,, | Performed by: SURGERY

## 2020-05-21 PROCEDURE — 99499 RISK ADDL DX/OHS AUDIT: ICD-10-PCS | Mod: HCNC,S$GLB,, | Performed by: SURGERY

## 2020-05-21 PROCEDURE — 99999 PR PBB SHADOW E&M-EST. PATIENT-LVL III: ICD-10-PCS | Mod: PBBFAC,HCNC,, | Performed by: SURGERY

## 2020-05-21 NOTE — PROGRESS NOTES
Dave Huynh MD [facility]                         05/21/2020    HPI:  Sophia Landis is a 74 y.o. female with   Patient Active Problem List   Diagnosis    Essential hypertension    Hyperlipidemia    Mild recurrent major depression    PAD (peripheral artery disease)    GERD (gastroesophageal reflux disease)    Menopausal syndrome    ANDRES on CPAP    Recurrent UTI    Ectatic abdominal aorta    Urinary incontinence, mixed    Vaginal atrophy    Irregular bowel habits    Chronic constipation    Rectal urgency    Stenosis of right carotid artery    History of colonic polyps    Chronic diarrhea    Colon cancer screening    being managed by PCP and specialists who is here today for evaluation of PVD and BOLA.  H/o bilateral iliac stenting 9/2017 by Dr. Erickson.  She has c/o bilateral calf at 1/2 - 1 block.  No tissue loss.  Patient states location is bilateral calf occurring for 11 yrs, slowly worsening.  Associated signs and symptoms include ankle edema.  Quality is burning and severity is 7/10.  Symptoms began after her 2007 iliac stenting.  Alleviating factors include rest.  Worsening factors include ambulation.    no MI  no Stroke  Tobacco use: no, quit 15 yrs ago    12/2019:  Doing well, no claudication now.  Compliant with Pletal.      Interval history:  C/o dizziness associated with bending forward and difficulty with balance at rest.  Also c/o RLE weakness when walking, no claudication.  Compliant with ASA, statin and Pletal; BP well controlled.  No TIA or CVA.    Past Medical History:   Diagnosis Date    Cataract     Chronic diarrhea 11/26/2018    Depression     GERD (gastroesophageal reflux disease)     History of colonic polyps 11/26/2018    Hyperlipidemia     Hypertension     Menopausal syndrome     PAD (peripheral artery disease)     s/p stent     Past Surgical History:   Procedure Laterality Date    APPENDECTOMY       COLONOSCOPY N/A 5/20/2019    Procedure: COLONOSCOPY;  Surgeon: Kenia Arias MD;  Location: [facility];  Service: Endoscopy;  Laterality: N/A;  RX PLETAL ok to hold    HYSTERECTOMY      MONTY with BSO    ILIAC ARTERY STENT Bilateral      Family History   Problem Relation Age of Onset    Amblyopia Mother     Cataracts Mother     Hypertension Mother     Thyroid disease Mother     Cancer Father     Strabismus Sister     Cancer Brother     Diabetes Maternal Aunt     Cancer Maternal Uncle     Diabetes Maternal Uncle     Cancer Maternal Uncle     Cancer Maternal Uncle     Cancer Maternal Uncle     Blindness Neg Hx     Glaucoma Neg Hx     Macular degeneration Neg Hx     Retinal detachment Neg Hx     Stroke Neg Hx      Social History     Socioeconomic History    Marital status:      Spouse name: Not on file    Number of children: Not on file    Years of education: Not on file    Highest education level: Not on file   Occupational History    Not on file   Social Needs    Financial resource strain: Not on file    Food insecurity:     Worry: Not on file     Inability: Not on file    Transportation needs:     Medical: Not on file     Non-medical: Not on file   Tobacco Use    Smoking status: Former Smoker    Smokeless tobacco: Former User    Tobacco comment: .  Retired  at [employer].   Substance and Sexual Activity    Alcohol use: No    Drug use: No    Sexual activity: Yes     Partners: Male     Comment:    Lifestyle    Physical activity:     Days per week: Not on file     Minutes per session: Not on file    Stress: Not on file   Relationships    Social connections:     Talks on phone: Not on file     Gets together: Not on file     Attends [employer]alian service: Not on file     Active member of club or organization: Not on file     Attends meetings of clubs or organizations: Not on file     Relationship status: Not on file   Other Topics Concern    Not on file    Social History Narrative    Not on file       Current Outpatient Medications:     amLODIPine (NORVASC) 10 MG tablet, TAKE 1 TABLET ONE TIME DAILY, Disp: 90 tablet, Rfl: 12    aspirin (ECOTRIN) 81 MG EC tablet, Take 81 mg by mouth once daily., Disp: , Rfl:     calcium carbonate (OS-PAOLO) 600 mg (1,500 mg) Tab, Take 600 mg by mouth 2 (two) times daily with meals.  , Disp: , Rfl:     cilostazoL (PLETAL) 50 MG Tab, Take 2 tablets (100 mg total) by mouth 2 (two) times daily., Disp: 360 tablet, Rfl: 11    hydroCHLOROthiazide (HYDRODIURIL) 25 MG tablet, Take 25 mg by mouth once daily., Disp: , Rfl:     meloxicam (MOBIC) 15 MG tablet, Take 1 tablet (15 mg total) by mouth once daily. Change to daily as needed for pain if possible, Disp: 90 tablet, Rfl: 12    metoprolol succinate (TOPROL-XL) 50 MG 24 hr tablet, Take 1 tablet (50 mg total) by mouth once daily., Disp: 90 tablet, Rfl: 2    omeprazole (PRILOSEC) 20 MG capsule, TAKE 2 CAPSULES ONE TIME DAILY BEFORE THE FIRST MEAL OF THE DAY, Disp: 180 capsule, Rfl: 2    pravastatin (PRAVACHOL) 80 MG tablet, TAKE 1 TABLET EVERY DAY, Disp: 90 tablet, Rfl: 2    sertraline (ZOLOFT) 100 MG tablet, Take 1 tablet (100 mg total) by mouth once daily., Disp: 90 tablet, Rfl: 90    albuterol 90 mcg/actuation inhaler, Inhale 1-2 puffs into the lungs every 4 to 6 hours as needed for Wheezing or Shortness of Breath (chest tightness). (Patient not taking: Reported on 5/21/2020), Disp: 1 Inhaler, Rfl: 6    estrogens, conjugated, (PREMARIN) 0.3 MG tablet, Take 1 tablet (0.3 mg total) by mouth once daily., Disp: 30 tablet, Rfl: 11    oxybutynin (DITROPAN-XL) 5 MG TR24, Take 1 tablet (5 mg total) by mouth once daily., Disp: 30 tablet, Rfl: 11    REVIEW OF SYSTEMS:  General: No fevers or chills; ENT: No sore throat; Allergy and Immunology: no persistent infections; Hematological and Lymphatic: No history of bleeding or easy bruising; Endocrine: negative; Respiratory: no cough, shortness  of breath, or wheezing; Cardiovascular: no chest pain or dyspnea on exertion; Gastrointestinal: no abdominal pain/back, change in bowel habits, or bloody stools; Genito-Urinary: no dysuria, trouble voiding, or hematuria; Musculoskeletal: negative; Neurological: no TIA or stroke symptoms; Psychiatric: no nervousness, anxiety or depression.    PHYSICAL EXAM:                General appearance:  Alert, well-appearing, and in no distress.  Oriented to person, place, and time                    Neurological: Normal speech, no focal findings noted; CN II - XII grossly intact. RLE with sensation to light touch, LLE with sensation to light touch.            Musculoskeletal: Digits/nail without cyanosis/clubbing.  Strength 5/5 BLE.                    Neck: Supple, no significant adenopathy, bilateral carotid bruit can be auscultated                  Chest:  Clear to auscultation, no wheezes, rales or rhonchi, symmetric air entry. No use of accessory muscles               Cardiac: Normal rate and regular rhythm, S1 and S2 normal            Abdomen: Soft, nontender, nondistended, no masses or organomegaly, no hernia     No rebound tenderness noted; bowel sounds normal     No groin adenopathy      Extremities:2+ R femoral pulse, 1+ L femoral pulse     1+ R popliteal pulse, 1+ L popliteal pulse     2+ R PT pulse, 2+ L PT pulse     2+ R DP pulse, 2+ L DP pulse     no RLE edema, no LLE edema    Skin: RLE without tissue loss; LLE without tissue loss    LAB RESULTS:  No results found for: CBC  Lab Results   Component Value Date    LABPROT 9.6 09/04/2007    INR 0.9 09/04/2007     Lab Results   Component Value Date     04/15/2019    K 4.6 04/15/2019     (H) 04/15/2019    CO2 22 (L) 04/15/2019    GLU 87 04/15/2019    BUN 15 04/15/2019    CREATININE 0.9 04/15/2019    CALCIUM 10.0 04/15/2019    ANIONGAP 8 04/15/2019    EGFRNONAA >60.0 04/15/2019     Lab Results   Component Value Date    WBC 6.84 04/15/2019    RBC 4.41  04/15/2019    HGB 12.7 04/15/2019    HCT 39.5 04/15/2019    MCV 90 04/15/2019    MCH 28.8 04/15/2019    MCHC 32.2 04/15/2019    RDW 15.3 (H) 04/15/2019     04/15/2019    MPV 10.2 04/15/2019    GRAN 4.1 04/15/2019    GRAN 60.0 04/15/2019    LYMPH 1.8 04/15/2019    LYMPH 25.6 04/15/2019    MONO 0.7 04/15/2019    MONO 10.4 04/15/2019    EOS 0.2 04/15/2019    BASO 0.04 04/15/2019    EOSINOPHIL 3.1 04/15/2019    BASOPHIL 0.6 04/15/2019    DIFFMETHOD Automated 04/15/2019     .  Lab Results   Component Value Date    HGBA1C 5.6 05/11/2018       IMAGING:  All pertinent imaging has been reviewed and interpreted independently.    LISA 0.9/0.7    Carotid US with <70% stenosis bilaterally    12/2018:   Carotid US with 40-59% stenosis bilateral ICA    1. Right lower extremity arterial ultrasound shows hemodynamically significant stenosis of the mid iliac stent.  There is no hemodynamically significant arterial occlusive disease distally.  Right lower extremity pressures indicate mild arterial occlusive disease.  2. Left lower extremity arterial ultrasound shows no hemodynamically significant arterial occlusive disease.  Left lower extremity pressures indicate mild arterial occlusive disease.    1. Right lower extremity pressures and waveforms indicate mild arterial occlusive disease above the level of the ankle.  Toe pressure and waveform indicate no hemodynamically significant stenosis.  2. Left lower extremity pressures and waveforms indicate mild arterial occlusive disease.    5/2020:   1. Right carotid ultrasound shows 40-49% internal carotid artery stenosis.  Antegrade vertebral artery flow.  2. Left carotid ultrasound shows 70-79% carotid bulb stenosis and 40-49% internal carotid artery stenosis.  Antegrade vertebral artery flow.    1. Right lower extremity arterial ultrasound shows no hemodynamically significant stenosis.  Pressures indicate no arterial occlusive disease.  2. Left lower extremity arterial ultrasound  shows common iliac artery hemodynamically significant stenosis.  Pressures indicate mild arterial occlusive disease.  3. Bilateral iliac stents are not well visualized.    IMP/PLAN:  74 y.o. female with   Patient Active Problem List   Diagnosis    Essential hypertension    Hyperlipidemia    Mild recurrent major depression    PAD (peripheral artery disease)    GERD (gastroesophageal reflux disease)    Menopausal syndrome    ANDRES on CPAP    Recurrent UTI    Ectatic abdominal aorta    Urinary incontinence, mixed    Vaginal atrophy    Irregular bowel habits    Chronic constipation    Rectal urgency    Stenosis of right carotid artery    History of colonic polyps    Chronic diarrhea    Colon cancer screening    being managed by PCP and specialists who is here today for evaluation of PVD and BOLA.    -Left carotid ultrasound shows 70-79% carotid bulb stenosis and bilateral 40-49% internal carotid artery stenosis - rec best medical therapy with routine surveillance with repeat US in 6 mo  -Stenosis of L iliac stent on US with 1+ L femoral pulse, LISA stable - cont ASA, Pletal and Heart healthy lifestyle; obtain CTA runoff  -Chest pain and SOB - rec ER evaluation and Cardiology mgmt  -Neuro referral for gait disturbance, memory loss and weakness  -RTC 2 weeks with CTA runoff to evaluate iliac stents    I spent 11 minutes evaluating this patient and greater than 50% of the time was spent counseling, coordinator care and discussing the plan of care.  All questions were answered and patient stated understanding with agreement with the above treatment plan.    Dave Huynh MD [facility]  Vascular and Endovascular Surgery

## 2020-05-21 NOTE — PATIENT INSTRUCTIONS
Understanding Peripheral Arterial Disease    Peripheral arteries deliver oxygen-rich blood to the tissues outside the heart. As you age, your arteries become stiffer and thicker. In addition, risk factors, such as smoking and high cholesterol, can damage the artery lining. This allows a buildup of fat and other materials (plaque) to form within the artery walls. The buildup of plaque narrows the space inside the artery and sometimes blocks blood flow. Peripheral arterial disease (PAD) happens when blood flow through the arteries is reduced because of plaque buildup. It often happens in the legs and feet, but can also happen elsewhere in the body. If this buildup happens in the a large artery in the neck (carotid artery), it can be a major contributor to stroke.  A healthy artery  An artery is a muscular tube that carries oxgen rich blood and nutrients from the heart to the rest of the body. It has a smooth lining and flexible walls that allow blood to pass freely. When active, muscles need more oxygen. This increases blood flow. Healthy arteries can adapt to meet this need.  A damaged artery    PAD begins when the lining of an artery is damaged. This is often because of risk factors, such as smoking, older age, or diabetes. Plaque then starts to form within the artery wall. At this stage, blood flows normally, so youre not likely to have symptoms.  A narrowed artery    If plaque continues to build up, the space inside the artery narrows. The artery walls become less able to expand. The artery still provides enough blood and oxygen to your muscles during rest. But when youre active, the increased demand for blood cant be met. As a result, your leg may cramp or ache when you walk.  A blocked artery    An artery can become blocked by plaque or by a blood clot lodged in a narrowed section. When this happens, oxygen cant reach the muscle below the blockage. Then you may feel pain when lying down or when you are not  active (rest pain). This type of pain is especially common at night when youre lying flat. In time, the affected tissue can die. This can lead to the loss of a toe or foot.  Date Last Reviewed: 5/1/2016 © 2000-2017 [employer]. [address] [place] [address]. All rights reserved. This information is not intended as a substitute for professional medical care. Always follow your healthcare professional's instructions.        Peripheral Artery Disease (PAD)     Peripheral artery disease (PAD) occurs when the arteries that carry blood to the limbs are narrowed or blocked. This is usually due to a buildup of a fatty substance called plaque in the walls of the arteries.  PAD most often affects the arteries in the legs. When these arteries are narrowed or blocked, blood flow to the legs is reduced. This can cause leg and foot pain and other symptoms. If severe enough, reduced blood flow to the legs can lead to tissue death (gangrene) and the loss of a toe, foot, or leg. Having PAD also makes it more likely that arteries in other body areas are blocked. For instance, arteries that carry blood to the heart or brain may be affected. This raises the chances of heart attack and stroke.  Risk factors  Certain factors can make PAD more likely. They include:  · Smoking  · Diabetes  · High blood pressure  · Unhealthy cholesterol levels  · Obesity  · Inactive lifestyle  · Older age  · Family history of PAD  Symptoms  Many people with PAD have no symptoms. If symptoms do occur, they can include:  · Pain in the muscles of the calves, thighs or hips that gets worse with activity and better with rest (intermittent claudication)  · Achy, tired, or heavy feeling in the legs  · Weakness, numbness, tingling, or loss of feeling in the legs  · Changes in skin color of the legs  · Sores on the legs and feet  · Cold leg, feet, or toes  · Pain the feet or toes even when lying down (rest pain)  Home care  PAD is a  chronic (lifelong) condition. Treatment is focused on managing your condition and lowering your health risks. This may include doing the following:  · If you smoke, quit. This helps prevent further damage to your arteries and lowers your health risks. Ask your provider about medicines or products that can help you quit smoking. Also consider joining a stop-smoking program or support group.  · Be more active. This helps you lose weight and manage problems such as high blood pressure and unhealthy cholesterol levels. Start a walking program if advised to by your provider. Your provider may also help you form a safe exercise program that is right for your needs.  · Make healthy eating changes. This includes eating less fat, salt, and sugar.  · Take medicines for high blood pressure, unhealthy cholesterol levels, and diabetes as directed.  · Have your blood pressure and cholesterol levels checked as often as directed.  · If you have diabetes, try to keep your blood sugar well controlled. Test your blood sugar as directed.  · If you are overweight, talk to your provider about forming a weight-loss plan.  · Watch for cuts, scrapes, or open sores on your feet. Poor blood flow to the feet may slow healing and increase the risk of infection from these problems.   Follow-up care  Follow up with your healthcare provider, or as advised. If imaging tests such as ultrasound were done, they will be reviewed by a doctor. You will be told the results and any new findings that may affect your care.  When to seek medical advice   Call your healthcare provider right away if any of these occur:  · Sudden severe pain in the legs or feet  · Sudden cold, pale or blue color in the legs or feet  · Weakness or numbness in the legs or feet that worsens  · Any sore or wound in the legs or feet that wont heal  · Weak pulse in your legs or feet  Know the Signs of Heart Attack and Stroke  People with PAD are at high risk for heart attack and  stroke. Knowing the signs of these problems can help you protect your health and get help when you need it. Call 911 right away if you have any of the following:  Signs of a Heart Attack  · Chest discomfort, such as pain, aching, tightness, or pressure that lasts more than a few minutes, or that comes and goes  · Pain or discomfort in the arms, back, shoulders, neck, or jaw  · Shortness of breath  · Sweating (often a cold, clammy sweat)  · Nausea  · Lightheadedness  Signs of a Stroke  · Sudden numbness or weakness of the face, arms, or legs, especially on one side  · Sudden confusion or trouble speaking or understanding  · Sudden trouble seeing in one or both eyes  · Sudden trouble walking, dizziness, or loss of balance  · Sudden, severe headache with no known cause   Date Last Reviewed: 9/21/2015  © 6506-0302 [employer]. [address] [place] [address]. All rights reserved. This information is not intended as a substitute for professional medical care. Always follow your healthcare professional's instructions.        A Walking Program for Peripheral Arterial Disease (PAD)  Peripheral arterial disease (PAD) is a condition where the arteries in the legs are severely damaged. When you have PAD, walking can be painful. So you may start to walk less. Walking less makes your leg muscles weaker. It then becomes harder and more painful to walk. A walking program can break this cycle. This sheet gives you tips on how to get started.     Walking farther without pain  Exercise strengthens leg muscles that are out of shape. It also trains these muscles to work with less oxygen. This helps your leg muscles work better even with reduced blood flow to your legs. When you have PAD, a walking program can be helpful. Your program can:  · Let you walk longer and farther without claudication. This is an ache in your legs during exercise that goes away with rest.  · Let you do more and be more active  · Add to  your overall health and well-being  · Help you control your blood sugar and blood pressure  · Help you become healthier with no risk and at little or no cost  Getting started  Your local hospital, vascular center, or cardiac rehab center may have a special walking program for people with PAD. If so, this is your best option. But if you cant find a program, or its not covered by insurance, you can still walk on your own. Follow these steps at each session:  · Step 1. Start at a pace that lets you walk for 5 to 10 minutes before you start to feel claudication. This feeling is unpleasant, but it doesnt hurt you. Keep going until the pain makes you feel that you need to stop.  · Step 2. Stop and rest for 3 to 5 minutes, just long enough for the pain to go away. You can rest standing or sitting. (Some people like to bring along a cane or a lightweight folding chair.)  · Step 3. Again walk at a pace that lets you walk for 5 to 10 minutes more before you feel pain. This may be slower than your starting pace in step 1. Then rest again.  · Step 4. Repeat this process until youve walked for 45 minutes. This should be about 60 to 80 minutes total, including resting time. You may not be able to do a full 45 minutes at first. Do as much as you can, and increase your time as you improve.  Making the most of your program  · Walk at least 3 times a week. Take no more than 2 days off between sessions. If you stop walking, even for a week or two, you can lose the health benefits of your program.  · Find a good place to walk. A treadmill or a track may be better at first. That way, you wont run the risk of going too far and finding that you cant walk back. Be sure to have a place to walk indoors in bad weather, such as a gym or a mall.  · Wear shoes with sturdy, flexible soles. The heel should fit without slipping. You should have room to wiggle your toes.  · Keep track of how long you walk. A pedometer will show your daily  progress. It can also show how much farther you can walk as time goes by.  · Ask a friend to keep you company. You may enjoy walking with someone else. Or you may want to make your walking sessions a time to relax by yourself.  · Make it fun. Listen to music while you walk and rest. Walk in a favorite park. Get to know the people at the gym, or the folks that you pass on your route. While you rest, you can window-shop, read, or feed the birds. Do whatever will help you enjoy your exercise sessions.  Date Last Reviewed: 6/1/2016  © 9783-2286 [employer]. [address] [place] [address], [place] [address]. All rights reserved. This information is not intended as a substitute for professional medical care. Always follow your healthcare professional's instructions.

## 2020-05-21 NOTE — LETTER
May 21, 2020        Sarthak Johnson MD  [address] [facility]  [place] [address]             [facility]  [address]  [facility] [address]  Phone: [phone]  Fax: [phone]   Patient: Sophia Landis   MR Number: [id number]   YOB: 1945   Date of Visit: 5/21/2020       Dear Dr. Johnson:    Thank you for referring Sophia Landis to me for evaluation. Below are the relevant portions of my assessment and plan of care.            If you have questions, please do not hesitate to call me. I look forward to following Sophia along with you.    Sincerely,      Dave Huynh MD           CC  No Recipients

## 2020-05-22 ENCOUNTER — OFFICE VISIT (OUTPATIENT)
Dept: CARDIOLOGY | Facility: CLINIC | Age: 75
End: 2020-05-22
Payer: MEDICARE

## 2020-05-22 VITALS
WEIGHT: 141.13 LBS | SYSTOLIC BLOOD PRESSURE: 140 MMHG | BODY MASS INDEX: 28.45 KG/M2 | HEART RATE: 100 BPM | HEIGHT: 59 IN | DIASTOLIC BLOOD PRESSURE: 66 MMHG | OXYGEN SATURATION: 94 %

## 2020-05-22 DIAGNOSIS — I10 ESSENTIAL HYPERTENSION: Primary | ICD-10-CM

## 2020-05-22 DIAGNOSIS — R07.89 CHEST PAIN, ATYPICAL: ICD-10-CM

## 2020-05-22 DIAGNOSIS — E78.00 PURE HYPERCHOLESTEROLEMIA: ICD-10-CM

## 2020-05-22 DIAGNOSIS — I65.21 STENOSIS OF RIGHT CAROTID ARTERY: ICD-10-CM

## 2020-05-22 DIAGNOSIS — I73.9 PAD (PERIPHERAL ARTERY DISEASE): ICD-10-CM

## 2020-05-22 DIAGNOSIS — I77.811 ECTATIC ABDOMINAL AORTA: ICD-10-CM

## 2020-05-22 PROCEDURE — 1126F PR PAIN SEVERITY QUANTIFIED, NO PAIN PRESENT: ICD-10-PCS | Mod: HCNC,S$GLB,, | Performed by: INTERNAL MEDICINE

## 2020-05-22 PROCEDURE — 99204 PR OFFICE/OUTPT VISIT, NEW, LEVL IV, 45-59 MIN: ICD-10-PCS | Mod: HCNC,S$GLB,, | Performed by: INTERNAL MEDICINE

## 2020-05-22 PROCEDURE — 1159F MED LIST DOCD IN RCRD: CPT | Mod: HCNC,S$GLB,, | Performed by: INTERNAL MEDICINE

## 2020-05-22 PROCEDURE — 1101F PR PT FALLS ASSESS DOC 0-1 FALLS W/OUT INJ PAST YR: ICD-10-PCS | Mod: HCNC,CPTII,S$GLB, | Performed by: INTERNAL MEDICINE

## 2020-05-22 PROCEDURE — 3077F SYST BP >= 140 MM HG: CPT | Mod: HCNC,CPTII,S$GLB, | Performed by: INTERNAL MEDICINE

## 2020-05-22 PROCEDURE — 93000 EKG 12-LEAD: ICD-10-PCS | Mod: HCNC,S$GLB,, | Performed by: INTERNAL MEDICINE

## 2020-05-22 PROCEDURE — 99499 RISK ADDL DX/OHS AUDIT: ICD-10-PCS | Mod: HCNC,S$GLB,, | Performed by: INTERNAL MEDICINE

## 2020-05-22 PROCEDURE — 3078F PR MOST RECENT DIASTOLIC BLOOD PRESSURE < 80 MM HG: ICD-10-PCS | Mod: HCNC,CPTII,S$GLB, | Performed by: INTERNAL MEDICINE

## 2020-05-22 PROCEDURE — 3078F DIAST BP <80 MM HG: CPT | Mod: HCNC,CPTII,S$GLB, | Performed by: INTERNAL MEDICINE

## 2020-05-22 PROCEDURE — 99999 PR PBB SHADOW E&M-EST. PATIENT-LVL IV: CPT | Mod: PBBFAC,HCNC,, | Performed by: INTERNAL MEDICINE

## 2020-05-22 PROCEDURE — 93000 ELECTROCARDIOGRAM COMPLETE: CPT | Mod: HCNC,S$GLB,, | Performed by: INTERNAL MEDICINE

## 2020-05-22 PROCEDURE — 99499 UNLISTED E&M SERVICE: CPT | Mod: HCNC,S$GLB,, | Performed by: INTERNAL MEDICINE

## 2020-05-22 PROCEDURE — 1126F AMNT PAIN NOTED NONE PRSNT: CPT | Mod: HCNC,S$GLB,, | Performed by: INTERNAL MEDICINE

## 2020-05-22 PROCEDURE — 99999 PR PBB SHADOW E&M-EST. PATIENT-LVL IV: ICD-10-PCS | Mod: PBBFAC,HCNC,, | Performed by: INTERNAL MEDICINE

## 2020-05-22 PROCEDURE — 1159F PR MEDICATION LIST DOCUMENTED IN MEDICAL RECORD: ICD-10-PCS | Mod: HCNC,S$GLB,, | Performed by: INTERNAL MEDICINE

## 2020-05-22 PROCEDURE — 99204 OFFICE O/P NEW MOD 45 MIN: CPT | Mod: HCNC,S$GLB,, | Performed by: INTERNAL MEDICINE

## 2020-05-22 PROCEDURE — 1101F PT FALLS ASSESS-DOCD LE1/YR: CPT | Mod: HCNC,CPTII,S$GLB, | Performed by: INTERNAL MEDICINE

## 2020-05-22 PROCEDURE — 3077F PR MOST RECENT SYSTOLIC BLOOD PRESSURE >= 140 MM HG: ICD-10-PCS | Mod: HCNC,CPTII,S$GLB, | Performed by: INTERNAL MEDICINE

## 2020-05-22 NOTE — PROGRESS NOTES
Subjective:    Patient ID:  Sophia Landis is a 74 y.o. female who presents for follow-up of Chest Pain      HPI     HTN, HDL, PAD, Carotid disease, ANDRES    Last saw me 2015    Stress test 5/7/15  LVEF: >= 70 %  Impression: NORMAL MYOCARDIAL PERFUSION  1. The perfusion scan is free of evidence for myocardial ischemia or injury.   2. Resting wall motion is physiologic.   3. Resting LV function is normal.      Echo 5/7/15  1 - Normal left ventricular systolic function (EF 55-60%).   2 - Concentric remodeling.   3 - Left ventricular diastolic dysfunction.      Carotid US 5/7/20  1. Right carotid ultrasound shows 40-49% internal carotid artery stenosis.  Antegrade vertebral artery flow.  2. Left carotid ultrasound shows 70-79% carotid bulb stenosis and 40-49% internal carotid artery stenosis.  Antegrade vertebral artery flow.     LE arterial US 5/7/20  1. Right lower extremity arterial ultrasound shows no hemodynamically significant stenosis.  Pressures indicate no arterial occlusive disease.  2. Left lower extremity arterial ultrasound shows common iliac artery hemodynamically significant stenosis.  Pressures indicate mild arterial occlusive disease.  3. Bilateral iliac stents are not well visualized.     5/22/15 Having some FRANCO  Denies CP  Can walk > 1 block without claudication    5/22/20 For several week has been having left should and upper CP - usually at rest, some mild FRANCO  EKG NSR - ok    Review of Systems   Constitution: Negative for decreased appetite.   HENT: Negative for ear discharge.    Eyes: Negative for blurred vision.   Respiratory: Negative for hemoptysis.    Endocrine: Negative for polyphagia.   Hematologic/Lymphatic: Negative for adenopathy.   Skin: Negative for color change.   Musculoskeletal: Negative for joint swelling.   Genitourinary: Negative for bladder incontinence.   Neurological: Negative for brief paralysis.   Psychiatric/Behavioral: Negative for hallucinations.   Allergic/Immunologic:  Negative for hives.        Objective:    Physical Exam   Constitutional: She is oriented to person, place, and time. She appears well-developed and well-nourished.   HENT:   Head: Normocephalic and atraumatic.   Eyes: Pupils are equal, round, and reactive to light. Conjunctivae are normal.   Neck: Normal range of motion. Neck supple.   Cardiovascular: Normal rate, normal heart sounds and intact distal pulses.   Pulmonary/Chest: Effort normal and breath sounds normal.   Abdominal: Soft. Bowel sounds are normal.   Musculoskeletal: Normal range of motion.   Neurological: She is alert and oriented to person, place, and time.   Skin: Skin is warm and dry.         Assessment:       1. Essential hypertension    2. Pure hypercholesterolemia    3. PAD (peripheral artery disease)    4. Ectatic abdominal aorta    5. Stenosis of right carotid artery    6. Chest pain, atypical         Plan:           Echo and lexiscan myoview for CP and FRANCO

## 2020-05-27 ENCOUNTER — HOSPITAL ENCOUNTER (OUTPATIENT)
Dept: RADIOLOGY | Facility: HOSPITAL | Age: 75
Discharge: HOME | End: 2020-05-27
Attending: INTERNAL MEDICINE
Payer: MEDICARE

## 2020-05-27 ENCOUNTER — HOSPITAL ENCOUNTER (OUTPATIENT)
Dept: CARDIOLOGY | Facility: HOSPITAL | Age: 75
Discharge: HOME | End: 2020-05-27
Attending: INTERNAL MEDICINE
Payer: MEDICARE

## 2020-05-27 DIAGNOSIS — I10 ESSENTIAL HYPERTENSION: Chronic | ICD-10-CM

## 2020-05-27 DIAGNOSIS — I65.21 STENOSIS OF RIGHT CAROTID ARTERY: Chronic | ICD-10-CM

## 2020-05-27 DIAGNOSIS — R07.89 CHEST PAIN, ATYPICAL: ICD-10-CM

## 2020-05-27 DIAGNOSIS — I77.811 ECTATIC ABDOMINAL AORTA: Chronic | ICD-10-CM

## 2020-05-27 DIAGNOSIS — I73.9 PAD (PERIPHERAL ARTERY DISEASE): Chronic | ICD-10-CM

## 2020-05-27 DIAGNOSIS — E78.00 PURE HYPERCHOLESTEROLEMIA: Chronic | ICD-10-CM

## 2020-05-27 LAB
AORTIC ROOT ANNULUS: 2.66 CM
AORTIC VALVE CUSP SEPERATION: 1.72 CM
ASCENDING AORTA: 2.71 CM
AV INDEX (PROSTH): 0.8
AV MEAN GRADIENT: 4 MMHG
AV PEAK GRADIENT: 7 MMHG
AV VALVE AREA: 2.4 CM2
AV VELOCITY RATIO: 0.75
CV ECHO LV RWT: 0.43 CM
CV STRESS BASE HR: 77 BPM
DIASTOLIC BLOOD PRESSURE: 71 MMHG
DOP CALC AO PEAK VEL: 1.31 M/S
DOP CALC AO VTI: 26.78 CM
DOP CALC LVOT AREA: 3 CM2
DOP CALC LVOT DIAMETER: 1.95 CM
DOP CALC LVOT PEAK VEL: 0.98 M/S
DOP CALC LVOT STROKE VOLUME: 64.24 CM3
DOP CALCLVOT PEAK VEL VTI: 21.52 CM
E WAVE DECELERATION TIME: 181.69 MSEC
E/A RATIO: 0.81
ECHO LV POSTERIOR WALL: 0.97 CM (ref 0.6–1.1)
FRACTIONAL SHORTENING: 40 % (ref 28–44)
INTERVENTRICULAR SEPTUM: 1.02 CM (ref 0.6–1.1)
IVRT: 72.66 MSEC
LA MAJOR: 4.79 CM
LA MINOR: 4.52 CM
LA WIDTH: 3.48 CM
LEFT ATRIUM SIZE: 3.48 CM
LEFT ATRIUM VOLUME: 47.88 CM3
LEFT INTERNAL DIMENSION IN SYSTOLE: 2.71 CM (ref 2.1–4)
LEFT VENTRICLE DIASTOLIC VOLUME: 92.66 ML
LEFT VENTRICLE SYSTOLIC VOLUME: 27.29 ML
LEFT VENTRICULAR INTERNAL DIMENSION IN DIASTOLE: 4.5 CM (ref 3.5–6)
LEFT VENTRICULAR MASS: 152.22 G
MV PEAK A VEL: 1.39 M/S
MV PEAK E VEL: 1.13 M/S
NUC STRESS DIASTOLIC VOLUME INDEX: 46
NUC STRESS EJECTION FRACTION: 74 %
NUC STRESS SYSTOLIC VOLUME INDEX: 12
OHS CV CPX 85 PERCENT MAX PREDICTED HEART RATE MALE: 120
OHS CV CPX MAX PREDICTED HEART RATE: 141
OHS CV CPX PATIENT IS FEMALE: 1
OHS CV CPX PATIENT IS MALE: 0
OHS CV CPX PEAK DIASTOLIC BLOOD PRESSURE: 69 MMHG
OHS CV CPX PEAK HEAR RATE: 105 BPM
OHS CV CPX PEAK RATE PRESSURE PRODUCT: NORMAL
OHS CV CPX PEAK SYSTOLIC BLOOD PRESSURE: 149 MMHG
OHS CV CPX PERCENT MAX PREDICTED HEART RATE ACHIEVED: 75
OHS CV CPX RATE PRESSURE PRODUCT PRESENTING: NORMAL
PISA TR MAX VEL: 1.9 M/S
PULM VEIN S/D RATIO: 1.22
PV PEAK D VEL: 0.36 M/S
PV PEAK S VEL: 0.44 M/S
PV PEAK VELOCITY: 1.32 CM/S
RA MAJOR: 4.73 CM
RA PRESSURE: 8 MMHG
RA WIDTH: 2.8 CM
RIGHT VENTRICULAR END-DIASTOLIC DIMENSION: 2.92 CM
RV TISSUE DOPPLER FREE WALL SYSTOLIC VELOCITY 1 (APICAL 4 CHAMBER VIEW): 10.96 CM/S
SINUS: 2.94 CM
STJ: 2.6 CM
STRESS ECHO TARGET HR: 124 BPM
SYSTOLIC BLOOD PRESSURE: 164 MMHG
TR MAX PG: 14 MMHG
TRICUSPID ANNULAR PLANE SYSTOLIC EXCURSION: 1.93 CM
TV REST PULMONARY ARTERY PRESSURE: 22 MMHG

## 2020-05-27 PROCEDURE — 93017 CV STRESS TEST TRACING ONLY: CPT | Mod: HCNC

## 2020-05-27 PROCEDURE — 93018 CV STRESS TEST I&R ONLY: CPT | Mod: HCNC,,, | Performed by: INTERNAL MEDICINE

## 2020-05-27 PROCEDURE — 93016 STRESS TEST WITH MYOCARDIAL PERFUSION (CUPID ONLY): ICD-10-PCS | Mod: HCNC,,, | Performed by: INTERNAL MEDICINE

## 2020-05-27 PROCEDURE — 63600175 PHARM REV CODE 636 W HCPCS: Mod: HCNC | Performed by: INTERNAL MEDICINE

## 2020-05-27 PROCEDURE — 93016 CV STRESS TEST SUPVJ ONLY: CPT | Mod: HCNC,,, | Performed by: INTERNAL MEDICINE

## 2020-05-27 PROCEDURE — 78452 HT MUSCLE IMAGE SPECT MULT: CPT | Mod: 26,HCNC,, | Performed by: INTERNAL MEDICINE

## 2020-05-27 PROCEDURE — 93306 ECHO (CUPID ONLY): ICD-10-PCS | Mod: 26,HCNC,, | Performed by: INTERNAL MEDICINE

## 2020-05-27 PROCEDURE — A9502 TC99M TETROFOSMIN: HCPCS | Mod: HCNC

## 2020-05-27 PROCEDURE — 93306 TTE W/DOPPLER COMPLETE: CPT | Mod: HCNC

## 2020-05-27 PROCEDURE — 93306 TTE W/DOPPLER COMPLETE: CPT | Mod: 26,HCNC,, | Performed by: INTERNAL MEDICINE

## 2020-05-27 PROCEDURE — 93018 STRESS TEST WITH MYOCARDIAL PERFUSION (CUPID ONLY): ICD-10-PCS | Mod: HCNC,,, | Performed by: INTERNAL MEDICINE

## 2020-05-27 PROCEDURE — 78452 STRESS TEST WITH MYOCARDIAL PERFUSION (CUPID ONLY): ICD-10-PCS | Mod: 26,HCNC,, | Performed by: INTERNAL MEDICINE

## 2020-05-27 RX ORDER — REGADENOSON 0.08 MG/ML
0.4 INJECTION, SOLUTION INTRAVENOUS ONCE
Status: COMPLETED | OUTPATIENT
Start: 2020-05-27 | End: 2020-05-27

## 2020-05-27 RX ADMIN — REGADENOSON 0.4 MG: 0.08 INJECTION, SOLUTION INTRAVENOUS at 09:05

## 2020-06-02 ENCOUNTER — HOSPITAL ENCOUNTER (OUTPATIENT)
Dept: RADIOLOGY | Facility: HOSPITAL | Age: 75
Discharge: HOME | End: 2020-06-02
Attending: SURGERY
Payer: MEDICARE

## 2020-06-02 DIAGNOSIS — I74.9 ARTERIAL EMBOLISM: ICD-10-CM

## 2020-06-02 PROCEDURE — 25500020 PHARM REV CODE 255: Mod: HCNC | Performed by: SURGERY

## 2020-06-02 PROCEDURE — 75635 CT ANGIO ABDOMINAL ARTERIES: CPT | Mod: TC,HCNC

## 2020-06-02 PROCEDURE — 75635 CTA RUNOFF ABD PEL BILAT LOWER EXT: ICD-10-PCS | Mod: 26,HCNC,, | Performed by: RADIOLOGY

## 2020-06-02 PROCEDURE — 75635 CT ANGIO ABDOMINAL ARTERIES: CPT | Mod: 26,HCNC,, | Performed by: RADIOLOGY

## 2020-06-02 RX ADMIN — IOHEXOL 125 ML: 350 INJECTION, SOLUTION INTRAVENOUS at 02:06

## 2020-06-05 ENCOUNTER — PATIENT OUTREACH (OUTPATIENT)
Dept: ADMINISTRATIVE | Facility: OTHER | Age: 75
End: 2020-06-05

## 2020-06-08 ENCOUNTER — OFFICE VISIT (OUTPATIENT)
Dept: VASCULAR SURGERY | Facility: CLINIC | Age: 75
End: 2020-06-08
Payer: MEDICARE

## 2020-06-08 VITALS
OXYGEN SATURATION: 98 % | BODY MASS INDEX: 28.73 KG/M2 | HEART RATE: 76 BPM | DIASTOLIC BLOOD PRESSURE: 80 MMHG | WEIGHT: 142.5 LBS | HEIGHT: 59 IN | SYSTOLIC BLOOD PRESSURE: 158 MMHG

## 2020-06-08 DIAGNOSIS — Z95.828 STATUS POST INSERTION OF ILIAC ARTERY STENT: ICD-10-CM

## 2020-06-08 DIAGNOSIS — I77.1 ILIAC ARTERY STENOSIS, LEFT: Primary | ICD-10-CM

## 2020-06-08 PROCEDURE — 1101F PT FALLS ASSESS-DOCD LE1/YR: CPT | Mod: HCNC,CPTII,S$GLB, | Performed by: SURGERY

## 2020-06-08 PROCEDURE — 1126F PR PAIN SEVERITY QUANTIFIED, NO PAIN PRESENT: ICD-10-PCS | Mod: HCNC,S$GLB,, | Performed by: SURGERY

## 2020-06-08 PROCEDURE — 1101F PR PT FALLS ASSESS DOC 0-1 FALLS W/OUT INJ PAST YR: ICD-10-PCS | Mod: HCNC,CPTII,S$GLB, | Performed by: SURGERY

## 2020-06-08 PROCEDURE — 1126F AMNT PAIN NOTED NONE PRSNT: CPT | Mod: HCNC,S$GLB,, | Performed by: SURGERY

## 2020-06-08 PROCEDURE — 99214 OFFICE O/P EST MOD 30 MIN: CPT | Mod: HCNC,S$GLB,, | Performed by: SURGERY

## 2020-06-08 PROCEDURE — 3079F DIAST BP 80-89 MM HG: CPT | Mod: HCNC,CPTII,S$GLB, | Performed by: SURGERY

## 2020-06-08 PROCEDURE — 3079F PR MOST RECENT DIASTOLIC BLOOD PRESSURE 80-89 MM HG: ICD-10-PCS | Mod: HCNC,CPTII,S$GLB, | Performed by: SURGERY

## 2020-06-08 PROCEDURE — 1159F PR MEDICATION LIST DOCUMENTED IN MEDICAL RECORD: ICD-10-PCS | Mod: HCNC,S$GLB,, | Performed by: SURGERY

## 2020-06-08 PROCEDURE — 99999 PR PBB SHADOW E&M-EST. PATIENT-LVL III: ICD-10-PCS | Mod: PBBFAC,HCNC,, | Performed by: SURGERY

## 2020-06-08 PROCEDURE — 99999 PR PBB SHADOW E&M-EST. PATIENT-LVL III: CPT | Mod: PBBFAC,HCNC,, | Performed by: SURGERY

## 2020-06-08 PROCEDURE — 99214 PR OFFICE/OUTPT VISIT, EST, LEVL IV, 30-39 MIN: ICD-10-PCS | Mod: HCNC,S$GLB,, | Performed by: SURGERY

## 2020-06-08 PROCEDURE — 1159F MED LIST DOCD IN RCRD: CPT | Mod: HCNC,S$GLB,, | Performed by: SURGERY

## 2020-06-08 PROCEDURE — 3077F SYST BP >= 140 MM HG: CPT | Mod: HCNC,CPTII,S$GLB, | Performed by: SURGERY

## 2020-06-08 PROCEDURE — 99499 RISK ADDL DX/OHS AUDIT: ICD-10-PCS | Mod: HCNC,S$GLB,, | Performed by: SURGERY

## 2020-06-08 PROCEDURE — 99499 UNLISTED E&M SERVICE: CPT | Mod: HCNC,S$GLB,, | Performed by: SURGERY

## 2020-06-08 PROCEDURE — 3077F PR MOST RECENT SYSTOLIC BLOOD PRESSURE >= 140 MM HG: ICD-10-PCS | Mod: HCNC,CPTII,S$GLB, | Performed by: SURGERY

## 2020-06-08 RX ORDER — CLOPIDOGREL BISULFATE 75 MG/1
75 TABLET ORAL DAILY
Qty: 30 TABLET | Refills: 11 | Status: SHIPPED | OUTPATIENT
Start: 2020-06-08 | End: 2020-08-11 | Stop reason: SDUPTHER

## 2020-06-08 NOTE — LETTER
June 8, 2020        Sarthak Johnson MD  [address] [facility]  [place] [address]             [facility]  [address]  [facility] [address]  Phone: [phone]  Fax: [phone]   Patient: Sophia Landis   MR Number: [id number]   YOB: 1945   Date of Visit: 6/8/2020       Dear Dr. Johnson:    Thank you for referring Sophia Landis to me for evaluation. Below are the relevant portions of my assessment and plan of care.            If you have questions, please do not hesitate to call me. I look forward to following Sophia along with you.    Sincerely,      Dave Huynh MD           CC  No Recipients

## 2020-06-08 NOTE — PROGRESS NOTES
Dave Huynh MD [facility]                         06/08/2020    HPI:  Sophia Landis is a 74 y.o. female with   Patient Active Problem List   Diagnosis    Essential hypertension    Hyperlipidemia    Mild recurrent major depression    PAD (peripheral artery disease)    GERD (gastroesophageal reflux disease)    Menopausal syndrome    ANDRES on CPAP    Chest pain, atypical    Recurrent UTI    Ectatic abdominal aorta    Urinary incontinence, mixed    Vaginal atrophy    Irregular bowel habits    Chronic constipation    Rectal urgency    Stenosis of right carotid artery    History of colonic polyps    Chronic diarrhea    Colon cancer screening    being managed by PCP and specialists who is here today for evaluation of PVD and BOLA.  H/o bilateral iliac stenting 9/2017 by Dr. Erickson.  She has c/o bilateral calf at 1/2 - 1 block.  No tissue loss.  Patient states location is bilateral calf occurring for 11 yrs, slowly worsening.  Associated signs and symptoms include ankle edema.  Quality is burning and severity is 7/10.  Symptoms began after her 2007 iliac stenting.  Alleviating factors include rest.  Worsening factors include ambulation.    no MI  no Stroke  Tobacco use: no, quit 15 yrs ago    12/2019:  Doing well, no claudication now.  Compliant with Pletal.      5/21/20:  C/o dizziness associated with bending forward and difficulty with balance at rest.  Also c/o RLE weakness when walking, no claudication.  Compliant with ASA, statin and Pletal; BP well controlled.  No TIA or CVA.    6/8/20:  Patient denies claudication.  She continues to experience dizziness and forgetfulness.  States she is not on Plavix    Past Medical History:   Diagnosis Date    Cataract     Chronic diarrhea 11/26/2018    Depression     GERD (gastroesophageal reflux disease)     History of colonic polyps 11/26/2018    Hyperlipidemia     Hypertension     Menopausal  syndrome     PAD (peripheral artery disease)     s/p stent     Past Surgical History:   Procedure Laterality Date    APPENDECTOMY      COLONOSCOPY N/A 5/20/2019    Procedure: COLONOSCOPY;  Surgeon: Kenia Arias MD;  [facility];  Service: Endoscopy;  Laterality: N/A;  RX PLETAL ok to hold    HYSTERECTOMY      MONTY with BSO    ILIAC ARTERY STENT Bilateral      Family History   Problem Relation Age of Onset    Amblyopia Mother     Cataracts Mother     Hypertension Mother     Thyroid disease Mother     Cancer Father     Strabismus Sister     Cancer Brother     Diabetes Maternal Aunt     Cancer Maternal Uncle     Diabetes Maternal Uncle     Cancer Maternal Uncle     Cancer Maternal Uncle     Cancer Maternal Uncle     Blindness Neg Hx     Glaucoma Neg Hx     Macular degeneration Neg Hx     Retinal detachment Neg Hx     Stroke Neg Hx      Social History     Socioeconomic History    Marital status:      Spouse name: Not on file    Number of children: Not on file    Years of education: Not on file    Highest education level: Not on file   Occupational History    Not on file   Social Needs    Financial resource strain: Not on file    Food insecurity:     Worry: Not on file     Inability: Not on file    Transportation needs:     Medical: Not on file     Non-medical: Not on file   Tobacco Use    Smoking status: Former Smoker    Smokeless tobacco: Former User    Tobacco comment: .  Retired  at [employer].   Substance and Sexual Activity    Alcohol use: No    Drug use: No    Sexual activity: Yes     Partners: Male     Comment:    Lifestyle    Physical activity:     Days per week: Not on file     Minutes per session: Not on file    Stress: Not on file   Relationships    Social connections:     Talks on phone: Not on file     Gets together: Not on file     Attends Alevism service: Not on file     Active member of club or organization: Not on  file     Attends meetings of clubs or organizations: Not on file     Relationship status: Not on file   Other Topics Concern    Not on file   Social History Narrative    Not on file       Current Outpatient Medications:     albuterol 90 mcg/actuation inhaler, Inhale 1-2 puffs into the lungs every 4 to 6 hours as needed for Wheezing or Shortness of Breath (chest tightness)., Disp: 1 Inhaler, Rfl: 6    amLODIPine (NORVASC) 10 MG tablet, TAKE 1 TABLET ONE TIME DAILY, Disp: 90 tablet, Rfl: 12    calcium carbonate (OS-PAOLO) 600 mg (1,500 mg) Tab, Take 600 mg by mouth 2 (two) times daily with meals.  , Disp: , Rfl:     cilostazoL (PLETAL) 50 MG Tab, Take 2 tablets (100 mg total) by mouth 2 (two) times daily., Disp: 360 tablet, Rfl: 11    hydroCHLOROthiazide (HYDRODIURIL) 25 MG tablet, Take 25 mg by mouth once daily., Disp: , Rfl:     meloxicam (MOBIC) 15 MG tablet, Take 1 tablet (15 mg total) by mouth once daily. Change to daily as needed for pain if possible, Disp: 90 tablet, Rfl: 12    metoprolol succinate (TOPROL-XL) 50 MG 24 hr tablet, Take 1 tablet (50 mg total) by mouth once daily., Disp: 90 tablet, Rfl: 2    omeprazole (PRILOSEC) 20 MG capsule, TAKE 2 CAPSULES ONE TIME DAILY BEFORE THE FIRST MEAL OF THE DAY, Disp: 180 capsule, Rfl: 2    pravastatin (PRAVACHOL) 80 MG tablet, TAKE 1 TABLET EVERY DAY, Disp: 90 tablet, Rfl: 2    sertraline (ZOLOFT) 100 MG tablet, Take 1 tablet (100 mg total) by mouth once daily., Disp: 90 tablet, Rfl: 90    aspirin (ECOTRIN) 81 MG EC tablet, Take 81 mg by mouth once daily., Disp: , Rfl:     estrogens, conjugated, (PREMARIN) 0.3 MG tablet, Take 1 tablet (0.3 mg total) by mouth once daily., Disp: 30 tablet, Rfl: 11    oxybutynin (DITROPAN-XL) 5 MG TR24, Take 1 tablet (5 mg total) by mouth once daily., Disp: 30 tablet, Rfl: 11    REVIEW OF SYSTEMS:  General: No fevers or chills; ENT: No sore throat; Allergy and Immunology: no persistent infections; Hematological and  Lymphatic: No history of bleeding or easy bruising; Endocrine: negative; Respiratory: no cough, shortness of breath, or wheezing; Cardiovascular: no chest pain or dyspnea on exertion; Gastrointestinal: no abdominal pain/back, change in bowel habits, or bloody stools; Genito-Urinary: no dysuria, trouble voiding, or hematuria; Musculoskeletal: negative; Neurological: no TIA or stroke symptoms; Psychiatric: no nervousness, anxiety or depression.    PHYSICAL EXAM:      Pulse: 76         General appearance:  Alert, well-appearing, and in no distress.  Oriented to person, place, and time                    Neurological: Normal speech, no focal findings noted; CN II - XII grossly intact. RLE with sensation to light touch, LLE with sensation to light touch.            Musculoskeletal: Digits/nail without cyanosis/clubbing.  Strength 5/5 BLE.                    Neck: Supple, no significant adenopathy, bilateral carotid bruit can be auscultated                  Chest:  Clear to auscultation, no wheezes, rales or rhonchi, symmetric air entry. No use of accessory muscles               Cardiac: Normal rate and regular rhythm, S1 and S2 normal            Abdomen: Soft, nontender, nondistended, no masses or organomegaly, no hernia     No rebound tenderness noted; bowel sounds normal     No groin adenopathy      Extremities:2+ R femoral pulse, 1+ L femoral pulse     1+ R popliteal pulse, 1+ L popliteal pulse     2+ R PT pulse, 2+ L PT pulse     2+ R DP pulse, 2+ L DP pulse     no RLE edema, no LLE edema    Skin: RLE without tissue loss; LLE without tissue loss    LAB RESULTS:  No results found for: CBC  Lab Results   Component Value Date    LABPROT 9.6 09/04/2007    INR 0.9 09/04/2007     Lab Results   Component Value Date     (H) 06/02/2020    K 4.6 06/02/2020     (H) 06/02/2020    CO2 23 06/02/2020    GLU 98 06/02/2020    BUN 17 06/02/2020    CREATININE 1.0 06/02/2020    CALCIUM 9.7 06/02/2020    ANIONGAP 9 06/02/2020     EGFRNONAA 56 (A) 06/02/2020     Lab Results   Component Value Date    WBC 6.84 04/15/2019    RBC 4.41 04/15/2019    HGB 12.7 04/15/2019    HCT 39.5 04/15/2019    MCV 90 04/15/2019    MCH 28.8 04/15/2019    MCHC 32.2 04/15/2019    RDW 15.3 (H) 04/15/2019     04/15/2019    MPV 10.2 04/15/2019    GRAN 4.1 04/15/2019    GRAN 60.0 04/15/2019    LYMPH 1.8 04/15/2019    LYMPH 25.6 04/15/2019    MONO 0.7 04/15/2019    MONO 10.4 04/15/2019    EOS 0.2 04/15/2019    BASO 0.04 04/15/2019    EOSINOPHIL 3.1 04/15/2019    BASOPHIL 0.6 04/15/2019    DIFFMETHOD Automated 04/15/2019     .  Lab Results   Component Value Date    HGBA1C 5.6 05/11/2018       IMAGING:  All pertinent imaging has been reviewed and interpreted independently.    LISA 0.9/0.7    Carotid US with <70% stenosis bilaterally    12/2018:   Carotid US with 40-59% stenosis bilateral ICA    1. Right lower extremity arterial ultrasound shows hemodynamically significant stenosis of the mid iliac stent.  There is no hemodynamically significant arterial occlusive disease distally.  Right lower extremity pressures indicate mild arterial occlusive disease.  2. Left lower extremity arterial ultrasound shows no hemodynamically significant arterial occlusive disease.  Left lower extremity pressures indicate mild arterial occlusive disease.    1. Right lower extremity pressures and waveforms indicate mild arterial occlusive disease above the level of the ankle.  Toe pressure and waveform indicate no hemodynamically significant stenosis.  2. Left lower extremity pressures and waveforms indicate mild arterial occlusive disease.    5/2020:   1. Right carotid ultrasound shows 40-49% internal carotid artery stenosis.  Antegrade vertebral artery flow.  2. Left carotid ultrasound shows 70-79% carotid bulb stenosis and 40-49% internal carotid artery stenosis.  Antegrade vertebral artery flow.    1. Right lower extremity arterial ultrasound shows no hemodynamically significant  stenosis.  Pressures indicate no arterial occlusive disease.  2. Left lower extremity arterial ultrasound shows common iliac artery hemodynamically significant stenosis.  Pressures indicate mild arterial occlusive disease.  3. Bilateral iliac stents are not well visualized.    CTA 6/2020 reviewed.  Distal L HORTENCIA stenosis, 3v runoff bilaterally, kissing iliac stents patent    IMP/PLAN:  74 y.o. female with   Patient Active Problem List   Diagnosis    Essential hypertension    Hyperlipidemia    Mild recurrent major depression    PAD (peripheral artery disease)    GERD (gastroesophageal reflux disease)    Menopausal syndrome    ANDRES on CPAP    Chest pain, atypical    Recurrent UTI    Ectatic abdominal aorta    Urinary incontinence, mixed    Vaginal atrophy    Irregular bowel habits    Chronic constipation    Rectal urgency    Stenosis of right carotid artery    History of colonic polyps    Chronic diarrhea    Colon cancer screening    being managed by PCP and specialists who is here today for evaluation of PVD and BOLA.    -Left carotid ultrasound shows 70-79% carotid bulb stenosis and bilateral 40-49% internal carotid artery stenosis - rec best medical therapy with routine surveillance with repeat US in 6 mo  -Stenosis of L iliac stent on US with 1+ L femoral pulse, LISA stable and stents patent on CTA - cont ASA, Pletal and Heart healthy lifestyle  -Neuro referral for gait disturbance, memory loss and weakness  -RTC 3 mo with LISA - if pressure drop in interval, rec L HORTENCIA angioplasty vs stenting - Plavix stent to pharmacy     I spent 11 minutes evaluating this patient and greater than 50% of the time was spent counseling, coordinator care and discussing the plan of care.  All questions were answered and patient stated understanding with agreement with the above treatment plan.    Dave Huynh MD [facility]  Vascular and Endovascular Surgery

## 2020-06-08 NOTE — PATIENT INSTRUCTIONS
Understanding Peripheral Arterial Disease    Peripheral arteries deliver oxygen-rich blood to the tissues outside the heart. As you age, your arteries become stiffer and thicker. In addition, risk factors, such as smoking and high cholesterol, can damage the artery lining. This allows a buildup of fat and other materials (plaque) to form within the artery walls. The buildup of plaque narrows the space inside the artery and sometimes blocks blood flow. Peripheral arterial disease (PAD) happens when blood flow through the arteries is reduced because of plaque buildup. It often happens in the legs and feet, but can also happen elsewhere in the body. If this buildup happens in the a large artery in the neck (carotid artery), it can be a major contributor to stroke.  A healthy artery  An artery is a muscular tube that carries oxgen rich blood and nutrients from the heart to the rest of the body. It has a smooth lining and flexible walls that allow blood to pass freely. When active, muscles need more oxygen. This increases blood flow. Healthy arteries can adapt to meet this need.  A damaged artery    PAD begins when the lining of an artery is damaged. This is often because of risk factors, such as smoking, older age, or diabetes. Plaque then starts to form within the artery wall. At this stage, blood flows normally, so youre not likely to have symptoms.  A narrowed artery    If plaque continues to build up, the space inside the artery narrows. The artery walls become less able to expand. The artery still provides enough blood and oxygen to your muscles during rest. But when youre active, the increased demand for blood cant be met. As a result, your leg may cramp or ache when you walk.  A blocked artery    An artery can become blocked by plaque or by a blood clot lodged in a narrowed section. When this happens, oxygen cant reach the muscle below the blockage. Then you may feel pain when lying down or when you are not  active (rest pain). This type of pain is especially common at night when youre lying flat. In time, the affected tissue can die. This can lead to the loss of a toe or foot.  Date Last Reviewed: 5/1/2016 © 2000-2017 [employer]. [address] [place] [id number]. All rights reserved. This information is not intended as a substitute for professional medical care. Always follow your healthcare professional's instructions.        Peripheral Artery Disease (PAD)     Peripheral artery disease (PAD) occurs when the arteries that carry blood to the limbs are narrowed or blocked. This is usually due to a buildup of a fatty substance called plaque in the walls of the arteries.  PAD most often affects the arteries in the legs. When these arteries are narrowed or blocked, blood flow to the legs is reduced. This can cause leg and foot pain and other symptoms. If severe enough, reduced blood flow to the legs can lead to tissue death (gangrene) and the loss of a toe, foot, or leg. Having PAD also makes it more likely that arteries in other body areas are blocked. For instance, arteries that carry blood to the heart or brain may be affected. This raises the chances of heart attack and stroke.  Risk factors  Certain factors can make PAD more likely. They include:  · Smoking  · Diabetes  · High blood pressure  · Unhealthy cholesterol levels  · Obesity  · Inactive lifestyle  · Older age  · Family history of PAD  Symptoms  Many people with PAD have no symptoms. If symptoms do occur, they can include:  · Pain in the muscles of the calves, thighs or hips that gets worse with activity and better with rest (intermittent claudication)  · Achy, tired, or heavy feeling in the legs  · Weakness, numbness, tingling, or loss of feeling in the legs  · Changes in skin color of the legs  · Sores on the legs and feet  · Cold leg, feet, or toes  · Pain the feet or toes even when lying down (rest pain)  Home care  PAD is a  chronic (lifelong) condition. Treatment is focused on managing your condition and lowering your health risks. This may include doing the following:  · If you smoke, quit. This helps prevent further damage to your arteries and lowers your health risks. Ask your provider about medicines or products that can help you quit smoking. Also consider joining a stop-smoking program or support group.  · Be more active. This helps you lose weight and manage problems such as high blood pressure and unhealthy cholesterol levels. Start a walking program if advised to by your provider. Your provider may also help you form a safe exercise program that is right for your needs.  · Make healthy eating changes. This includes eating less fat, salt, and sugar.  · Take medicines for high blood pressure, unhealthy cholesterol levels, and diabetes as directed.  · Have your blood pressure and cholesterol levels checked as often as directed.  · If you have diabetes, try to keep your blood sugar well controlled. Test your blood sugar as directed.  · If you are overweight, talk to your provider about forming a weight-loss plan.  · Watch for cuts, scrapes, or open sores on your feet. Poor blood flow to the feet may slow healing and increase the risk of infection from these problems.   Follow-up care  Follow up with your healthcare provider, or as advised. If imaging tests such as ultrasound were done, they will be reviewed by a doctor. You will be told the results and any new findings that may affect your care.  When to seek medical advice   Call your healthcare provider right away if any of these occur:  · Sudden severe pain in the legs or feet  · Sudden cold, pale or blue color in the legs or feet  · Weakness or numbness in the legs or feet that worsens  · Any sore or wound in the legs or feet that wont heal  · Weak pulse in your legs or feet  Know the Signs of Heart Attack and Stroke  People with PAD are at high risk for heart attack and  stroke. Knowing the signs of these problems can help you protect your health and get help when you need it. Call 911 right away if you have any of the following:  Signs of a Heart Attack  · Chest discomfort, such as pain, aching, tightness, or pressure that lasts more than a few minutes, or that comes and goes  · Pain or discomfort in the arms, back, shoulders, neck, or jaw  · Shortness of breath  · Sweating (often a cold, clammy sweat)  · Nausea  · Lightheadedness  Signs of a Stroke  · Sudden numbness or weakness of the face, arms, or legs, especially on one side  · Sudden confusion or trouble speaking or understanding  · Sudden trouble seeing in one or both eyes  · Sudden trouble walking, dizziness, or loss of balance  · Sudden, severe headache with no known cause   Date Last Reviewed: 9/21/2015  © 7676-1698 [employer]. [address] [place] [address]. All rights reserved. This information is not intended as a substitute for professional medical care. Always follow your healthcare professional's instructions.        A Walking Program for Peripheral Arterial Disease (PAD)  Peripheral arterial disease (PAD) is a condition where the arteries in the legs are severely damaged. When you have PAD, walking can be painful. So you may start to walk less. Walking less makes your leg muscles weaker. It then becomes harder and more painful to walk. A walking program can break this cycle. This sheet gives you tips on how to get started.     Walking farther without pain  Exercise strengthens leg muscles that are out of shape. It also trains these muscles to work with less oxygen. This helps your leg muscles work better even with reduced blood flow to your legs. When you have PAD, a walking program can be helpful. Your program can:  · Let you walk longer and farther without claudication. This is an ache in your legs during exercise that goes away with rest.  · Let you do more and be more active  · Add to  your overall health and well-being  · Help you control your blood sugar and blood pressure  · Help you become healthier with no risk and at little or no cost  Getting started  Your local hospital, vascular center, or cardiac rehab center may have a special walking program for people with PAD. If so, this is your best option. But if you cant find a program, or its not covered by insurance, you can still walk on your own. Follow these steps at each session:  · Step 1. Start at a pace that lets you walk for 5 to 10 minutes before you start to feel claudication. This feeling is unpleasant, but it doesnt hurt you. Keep going until the pain makes you feel that you need to stop.  · Step 2. Stop and rest for 3 to 5 minutes, just long enough for the pain to go away. You can rest standing or sitting. (Some people like to bring along a cane or a lightweight folding chair.)  · Step 3. Again walk at a pace that lets you walk for 5 to 10 minutes more before you feel pain. This may be slower than your starting pace in step 1. Then rest again.  · Step 4. Repeat this process until youve walked for 45 minutes. This should be about 60 to 80 minutes total, including resting time. You may not be able to do a full 45 minutes at first. Do as much as you can, and increase your time as you improve.  Making the most of your program  · Walk at least 3 times a week. Take no more than 2 days off between sessions. If you stop walking, even for a week or two, you can lose the health benefits of your program.  · Find a good place to walk. A treadmill or a track may be better at first. That way, you wont run the risk of going too far and finding that you cant walk back. Be sure to have a place to walk indoors in bad weather, such as a gym or a mall.  · Wear shoes with sturdy, flexible soles. The heel should fit without slipping. You should have room to wiggle your toes.  · Keep track of how long you walk. A pedometer will show your daily  progress. It can also show how much farther you can walk as time goes by.  · Ask a friend to keep you company. You may enjoy walking with someone else. Or you may want to make your walking sessions a time to relax by yourself.  · Make it fun. Listen to music while you walk and rest. Walk in a favorite park. Get to know the people at the gym, or the folks that you pass on your route. While you rest, you can window-shop, read, or feed the birds. Do whatever will help you enjoy your exercise sessions.  Date Last Reviewed: 6/1/2016  © 5324-2876 [employer]. [address] [place] [address], [place] [address]. All rights reserved. This information is not intended as a substitute for professional medical care. Always follow your healthcare professional's instructions.

## 2020-06-12 ENCOUNTER — OFFICE VISIT (OUTPATIENT)
Dept: CARDIOLOGY | Facility: CLINIC | Age: 75
End: 2020-06-12
Payer: MEDICARE

## 2020-06-12 VITALS
SYSTOLIC BLOOD PRESSURE: 137 MMHG | OXYGEN SATURATION: 95 % | WEIGHT: 141.13 LBS | BODY MASS INDEX: 28.45 KG/M2 | HEIGHT: 59 IN | DIASTOLIC BLOOD PRESSURE: 60 MMHG | HEART RATE: 98 BPM

## 2020-06-12 DIAGNOSIS — R07.89 CHEST PAIN, ATYPICAL: ICD-10-CM

## 2020-06-12 DIAGNOSIS — I10 ESSENTIAL HYPERTENSION: Primary | Chronic | ICD-10-CM

## 2020-06-12 DIAGNOSIS — I65.21 STENOSIS OF RIGHT CAROTID ARTERY: Chronic | ICD-10-CM

## 2020-06-12 DIAGNOSIS — I77.811 ECTATIC ABDOMINAL AORTA: Chronic | ICD-10-CM

## 2020-06-12 DIAGNOSIS — E78.00 PURE HYPERCHOLESTEROLEMIA: Chronic | ICD-10-CM

## 2020-06-12 PROCEDURE — 3075F SYST BP GE 130 - 139MM HG: CPT | Mod: HCNC,CPTII,S$GLB, | Performed by: INTERNAL MEDICINE

## 2020-06-12 PROCEDURE — 3078F DIAST BP <80 MM HG: CPT | Mod: HCNC,CPTII,S$GLB, | Performed by: INTERNAL MEDICINE

## 2020-06-12 PROCEDURE — 3075F PR MOST RECENT SYSTOLIC BLOOD PRESS GE 130-139MM HG: ICD-10-PCS | Mod: HCNC,CPTII,S$GLB, | Performed by: INTERNAL MEDICINE

## 2020-06-12 PROCEDURE — 1126F AMNT PAIN NOTED NONE PRSNT: CPT | Mod: HCNC,S$GLB,, | Performed by: INTERNAL MEDICINE

## 2020-06-12 PROCEDURE — 1101F PT FALLS ASSESS-DOCD LE1/YR: CPT | Mod: HCNC,CPTII,S$GLB, | Performed by: INTERNAL MEDICINE

## 2020-06-12 PROCEDURE — 1159F PR MEDICATION LIST DOCUMENTED IN MEDICAL RECORD: ICD-10-PCS | Mod: HCNC,S$GLB,, | Performed by: INTERNAL MEDICINE

## 2020-06-12 PROCEDURE — 99213 PR OFFICE/OUTPT VISIT, EST, LEVL III, 20-29 MIN: ICD-10-PCS | Mod: HCNC,S$GLB,, | Performed by: INTERNAL MEDICINE

## 2020-06-12 PROCEDURE — 99999 PR PBB SHADOW E&M-EST. PATIENT-LVL IV: CPT | Mod: PBBFAC,HCNC,, | Performed by: INTERNAL MEDICINE

## 2020-06-12 PROCEDURE — 99213 OFFICE O/P EST LOW 20 MIN: CPT | Mod: HCNC,S$GLB,, | Performed by: INTERNAL MEDICINE

## 2020-06-12 PROCEDURE — 99999 PR PBB SHADOW E&M-EST. PATIENT-LVL IV: ICD-10-PCS | Mod: PBBFAC,HCNC,, | Performed by: INTERNAL MEDICINE

## 2020-06-12 PROCEDURE — 1101F PR PT FALLS ASSESS DOC 0-1 FALLS W/OUT INJ PAST YR: ICD-10-PCS | Mod: HCNC,CPTII,S$GLB, | Performed by: INTERNAL MEDICINE

## 2020-06-12 PROCEDURE — 3078F PR MOST RECENT DIASTOLIC BLOOD PRESSURE < 80 MM HG: ICD-10-PCS | Mod: HCNC,CPTII,S$GLB, | Performed by: INTERNAL MEDICINE

## 2020-06-12 PROCEDURE — 1159F MED LIST DOCD IN RCRD: CPT | Mod: HCNC,S$GLB,, | Performed by: INTERNAL MEDICINE

## 2020-06-12 PROCEDURE — 1126F PR PAIN SEVERITY QUANTIFIED, NO PAIN PRESENT: ICD-10-PCS | Mod: HCNC,S$GLB,, | Performed by: INTERNAL MEDICINE

## 2020-06-12 RX ORDER — NITROGLYCERIN 0.4 MG/1
0.4 TABLET SUBLINGUAL EVERY 5 MIN PRN
Qty: 25 TABLET | Refills: 11 | Status: SHIPPED | OUTPATIENT
Start: 2020-06-12 | End: 2020-08-03 | Stop reason: SDUPTHER

## 2020-06-12 NOTE — PROGRESS NOTES
Subjective:    Patient ID:  Sophia Landis is a 74 y.o. female who presents for follow-up of Results      HPI     HTN, HDL, PAD, Carotid disease, ANDRES        Stress test 5/27/20    Abnormal ernie myocardial perfusion study.    There is a small to moderate sized, mild to moderate intensity, reversible defect consistent with ischemia in the inferobasilar wall(s).    Gated perfusion images showed an ejection fraction of 74% post stress.    There is normal wall motion post stress.     Echo 5/27/20  · Normal left ventricular systolic function. The estimated ejection fraction is 65%.  · Mild concentric left ventricular hypertrophy.  · No wall motion abnormalities.  · Normal right ventricular systolic function.  · Mild mitral regurgitation.  · The estimated PA systolic pressure is 22 mmHg.          Carotid US 5/7/20  1. Right carotid ultrasound shows 40-49% internal carotid artery stenosis.  Antegrade vertebral artery flow.  2. Left carotid ultrasound shows 70-79% carotid bulb stenosis and 40-49% internal carotid artery stenosis.  Antegrade vertebral artery flow.      LE arterial US 5/7/20  1. Right lower extremity arterial ultrasound shows no hemodynamically significant stenosis.  Pressures indicate no arterial occlusive disease.  2. Left lower extremity arterial ultrasound shows common iliac artery hemodynamically significant stenosis.  Pressures indicate mild arterial occlusive disease.  3. Bilateral iliac stents are not well visualized.     5/22/15 Having some FRANCO  Denies CP  Can walk > 1 block without claudication     5/22/20 For several week has been having left should and upper CP - usually at rest, some mild FRANCO  EKG NSR - ok    6/12/20 Denies further CP, stable FRANCO      Review of Systems   Constitution: Negative for decreased appetite.   HENT: Negative for ear discharge.    Eyes: Negative for blurred vision.   Respiratory: Negative for hemoptysis.    Endocrine: Negative for polyphagia.   Hematologic/Lymphatic:  Negative for adenopathy.   Skin: Negative for color change.   Musculoskeletal: Negative for joint swelling.   Genitourinary: Negative for bladder incontinence.   Neurological: Negative for brief paralysis.   Psychiatric/Behavioral: Negative for hallucinations.   Allergic/Immunologic: Negative for hives.        Objective:    Physical Exam   Constitutional: She is oriented to person, place, and time. She appears well-developed and well-nourished.   HENT:   Head: Normocephalic and atraumatic.   Eyes: Pupils are equal, round, and reactive to light. Conjunctivae are normal.   Neck: Normal range of motion. Neck supple.   Cardiovascular: Normal rate, normal heart sounds and intact distal pulses.   Pulmonary/Chest: Effort normal and breath sounds normal.   Abdominal: Soft. Bowel sounds are normal.   Musculoskeletal: Normal range of motion.   Neurological: She is alert and oriented to person, place, and time.   Skin: Skin is warm and dry.         Assessment:       1. Essential hypertension    2. Pure hypercholesterolemia    3. Stenosis of right carotid artery    4. Ectatic abdominal aorta    5. Chest pain, atypical         Plan:       Discussed abnormal stress findings - I have recommended a LHC - she wants to think it over  Will add NTG and reassess in 1 month

## 2020-07-11 ENCOUNTER — PATIENT OUTREACH (OUTPATIENT)
Dept: ADMINISTRATIVE | Facility: OTHER | Age: 75
End: 2020-07-11

## 2020-07-11 NOTE — PROGRESS NOTES
Requested updates within Care Everywhere.  Patient's chart was reviewed for overdue JOSE MARTIN topics.  Immunizations reconciled.

## 2020-07-16 ENCOUNTER — TELEPHONE (OUTPATIENT)
Dept: FAMILY MEDICINE | Facility: CLINIC | Age: 75
End: 2020-07-16

## 2020-07-16 NOTE — TELEPHONE ENCOUNTER
----- Message from Zohra Mendez sent at 7/16/2020  2:34 PM CDT -----  Regarding: self [phone]  .Type: Lab    Caller is requesting to schedule their Lab appointment prior to annual appointment.    Order is not listed in EPIC.  Please enter order and contact patient to schedule.    Name of Caller: self     Preferred Date and Time of Labs: anytime     Date of EPP Appointment: 08/04/2020    Where would they like the lab performed [place]     Would the patient rather a call back or a response via [facility]?  Call back     Best Call Back Number:   [phone]

## 2020-07-27 ENCOUNTER — PATIENT OUTREACH (OUTPATIENT)
Dept: ADMINISTRATIVE | Facility: HOSPITAL | Age: 75
End: 2020-07-27

## 2020-08-03 ENCOUNTER — TELEPHONE (OUTPATIENT)
Dept: FAMILY MEDICINE | Facility: CLINIC | Age: 75
End: 2020-08-03

## 2020-08-03 DIAGNOSIS — G89.29 CHRONIC LEFT-SIDED LOW BACK PAIN WITHOUT SCIATICA: ICD-10-CM

## 2020-08-03 DIAGNOSIS — I10 ESSENTIAL HYPERTENSION: Chronic | ICD-10-CM

## 2020-08-03 DIAGNOSIS — M54.50 CHRONIC LEFT-SIDED LOW BACK PAIN WITHOUT SCIATICA: ICD-10-CM

## 2020-08-03 DIAGNOSIS — E78.5 HYPERLIPIDEMIA, UNSPECIFIED HYPERLIPIDEMIA TYPE: Chronic | ICD-10-CM

## 2020-08-03 DIAGNOSIS — K21.9 GASTROESOPHAGEAL REFLUX DISEASE, ESOPHAGITIS PRESENCE NOT SPECIFIED: ICD-10-CM

## 2020-08-03 DIAGNOSIS — F33.0 MILD RECURRENT MAJOR DEPRESSION: Chronic | ICD-10-CM

## 2020-08-03 RX ORDER — OMEPRAZOLE 20 MG/1
40 CAPSULE, DELAYED RELEASE ORAL DAILY
Qty: 180 CAPSULE | Refills: 0 | Status: SHIPPED | OUTPATIENT
Start: 2020-08-03 | End: 2020-08-11 | Stop reason: SDUPTHER

## 2020-08-03 RX ORDER — NITROGLYCERIN 0.4 MG/1
0.4 TABLET SUBLINGUAL EVERY 5 MIN PRN
Qty: 25 TABLET | Refills: 0 | Status: SHIPPED | OUTPATIENT
Start: 2020-08-03 | End: 2023-02-15 | Stop reason: SDUPTHER

## 2020-08-03 RX ORDER — PRAVASTATIN SODIUM 80 MG/1
80 TABLET ORAL DAILY
Qty: 90 TABLET | Refills: 0 | Status: SHIPPED | OUTPATIENT
Start: 2020-08-03 | End: 2020-08-11 | Stop reason: SDUPTHER

## 2020-08-03 RX ORDER — CILOSTAZOL 50 MG/1
100 TABLET ORAL 2 TIMES DAILY
Qty: 360 TABLET | Refills: 0 | Status: SHIPPED | OUTPATIENT
Start: 2020-08-03 | End: 2020-08-11 | Stop reason: SDUPTHER

## 2020-08-03 RX ORDER — HYDROCHLOROTHIAZIDE 25 MG/1
25 TABLET ORAL DAILY
Qty: 90 TABLET | Refills: 0 | Status: SHIPPED | OUTPATIENT
Start: 2020-08-03 | End: 2020-08-11 | Stop reason: SDUPTHER

## 2020-08-03 RX ORDER — AMLODIPINE BESYLATE 10 MG/1
TABLET ORAL
Qty: 90 TABLET | Refills: 0 | Status: SHIPPED | OUTPATIENT
Start: 2020-08-03 | End: 2020-11-02

## 2020-08-03 RX ORDER — METOPROLOL SUCCINATE 50 MG/1
50 TABLET, EXTENDED RELEASE ORAL DAILY
Qty: 90 TABLET | Refills: 0 | Status: SHIPPED | OUTPATIENT
Start: 2020-08-03 | End: 2020-08-11 | Stop reason: SDUPTHER

## 2020-08-03 RX ORDER — MELOXICAM 15 MG/1
15 TABLET ORAL DAILY
Qty: 90 TABLET | Refills: 0 | Status: SHIPPED | OUTPATIENT
Start: 2020-08-03 | End: 2020-08-11 | Stop reason: SDUPTHER

## 2020-08-03 RX ORDER — CALCIUM CARBONATE 600 MG
600 TABLET ORAL 2 TIMES DAILY WITH MEALS
COMMUNITY
Start: 2020-08-03 | End: 2022-01-06 | Stop reason: SDUPTHER

## 2020-08-03 RX ORDER — SERTRALINE HYDROCHLORIDE 100 MG/1
100 TABLET, FILM COATED ORAL DAILY
Qty: 90 TABLET | Refills: 0 | Status: SHIPPED | OUTPATIENT
Start: 2020-08-03 | End: 2020-08-11 | Stop reason: SDUPTHER

## 2020-08-03 NOTE — TELEPHONE ENCOUNTER
Advise labs        ----- Message from Jes Amato MA sent at 8/3/2020  8:57 AM CDT -----  Regarding: FW: labs    ----- Message -----  From: Alexa Sewell  Sent: 8/3/2020   8:21 AM CDT  To: Ruth Xiao Staff  Subject: labs                                             Type: Patient Call Back    Who called:Self    What is the request in detail:Patient would like to get labs done before appt tomorrow, Please call to advise.    Can the clinic reply by [facility]? no    Would the patient rather a call back or a response via [facility]? Callback    Best call back number:[phone]

## 2020-08-11 ENCOUNTER — LAB VISIT (OUTPATIENT)
Dept: LAB | Facility: HOSPITAL | Age: 75
End: 2020-08-11
Attending: FAMILY MEDICINE
Payer: MEDICARE

## 2020-08-11 ENCOUNTER — OFFICE VISIT (OUTPATIENT)
Dept: FAMILY MEDICINE | Facility: CLINIC | Age: 75
End: 2020-08-11
Payer: MEDICARE

## 2020-08-11 VITALS
OXYGEN SATURATION: 96 % | BODY MASS INDEX: 28 KG/M2 | SYSTOLIC BLOOD PRESSURE: 132 MMHG | HEIGHT: 59 IN | DIASTOLIC BLOOD PRESSURE: 64 MMHG | WEIGHT: 138.88 LBS | HEART RATE: 79 BPM | TEMPERATURE: 99 F

## 2020-08-11 DIAGNOSIS — F33.0 MILD RECURRENT MAJOR DEPRESSION: Chronic | ICD-10-CM

## 2020-08-11 DIAGNOSIS — I10 ESSENTIAL HYPERTENSION: Chronic | ICD-10-CM

## 2020-08-11 DIAGNOSIS — M54.50 CHRONIC LEFT-SIDED LOW BACK PAIN WITHOUT SCIATICA: ICD-10-CM

## 2020-08-11 DIAGNOSIS — I73.9 PAD (PERIPHERAL ARTERY DISEASE): ICD-10-CM

## 2020-08-11 DIAGNOSIS — I77.811 ECTATIC ABDOMINAL AORTA: ICD-10-CM

## 2020-08-11 DIAGNOSIS — G89.29 CHRONIC LEFT-SIDED LOW BACK PAIN WITHOUT SCIATICA: ICD-10-CM

## 2020-08-11 DIAGNOSIS — E78.5 HYPERLIPIDEMIA, UNSPECIFIED HYPERLIPIDEMIA TYPE: Chronic | ICD-10-CM

## 2020-08-11 DIAGNOSIS — N30.00 ACUTE CYSTITIS WITHOUT HEMATURIA: ICD-10-CM

## 2020-08-11 DIAGNOSIS — Z00.00 ANNUAL PHYSICAL EXAM: Primary | ICD-10-CM

## 2020-08-11 DIAGNOSIS — K21.9 GASTROESOPHAGEAL REFLUX DISEASE, ESOPHAGITIS PRESENCE NOT SPECIFIED: ICD-10-CM

## 2020-08-11 LAB
ALBUMIN SERPL BCP-MCNC: 3.7 G/DL (ref 3.5–5.2)
ALP SERPL-CCNC: 94 U/L (ref 55–135)
ALT SERPL W/O P-5'-P-CCNC: 37 U/L (ref 10–44)
ANION GAP SERPL CALC-SCNC: 8 MMOL/L (ref 8–16)
AST SERPL-CCNC: 29 U/L (ref 10–40)
BASOPHILS # BLD AUTO: 0.03 K/UL (ref 0–0.2)
BASOPHILS NFR BLD: 0.4 % (ref 0–1.9)
BILIRUB SERPL-MCNC: 0.4 MG/DL (ref 0.1–1)
BUN SERPL-MCNC: 11 MG/DL (ref 8–23)
CALCIUM SERPL-MCNC: 9.6 MG/DL (ref 8.7–10.5)
CHLORIDE SERPL-SCNC: 113 MMOL/L (ref 95–110)
CHOLEST SERPL-MCNC: 179 MG/DL (ref 120–199)
CHOLEST/HDLC SERPL: 3.2 {RATIO} (ref 2–5)
CO2 SERPL-SCNC: 22 MMOL/L (ref 23–29)
CREAT SERPL-MCNC: 0.8 MG/DL (ref 0.5–1.4)
DIFFERENTIAL METHOD: ABNORMAL
EOSINOPHIL # BLD AUTO: 0.2 K/UL (ref 0–0.5)
EOSINOPHIL NFR BLD: 2.5 % (ref 0–8)
ERYTHROCYTE [DISTWIDTH] IN BLOOD BY AUTOMATED COUNT: 15.8 % (ref 11.5–14.5)
EST. GFR  (AFRICAN AMERICAN): >60 ML/MIN/1.73 M^2
EST. GFR  (NON AFRICAN AMERICAN): >60 ML/MIN/1.73 M^2
GLUCOSE SERPL-MCNC: 81 MG/DL (ref 70–110)
HCT VFR BLD AUTO: 39.5 % (ref 37–48.5)
HDLC SERPL-MCNC: 56 MG/DL (ref 40–75)
HDLC SERPL: 31.3 % (ref 20–50)
HGB BLD-MCNC: 12.7 G/DL (ref 12–16)
IMM GRANULOCYTES # BLD AUTO: 0.05 K/UL (ref 0–0.04)
IMM GRANULOCYTES NFR BLD AUTO: 0.6 % (ref 0–0.5)
LDLC SERPL CALC-MCNC: 90.6 MG/DL (ref 63–159)
LYMPHOCYTES # BLD AUTO: 1.8 K/UL (ref 1–4.8)
LYMPHOCYTES NFR BLD: 22.2 % (ref 18–48)
MCH RBC QN AUTO: 28.6 PG (ref 27–31)
MCHC RBC AUTO-ENTMCNC: 32.2 G/DL (ref 32–36)
MCV RBC AUTO: 89 FL (ref 82–98)
MONOCYTES # BLD AUTO: 0.6 K/UL (ref 0.3–1)
MONOCYTES NFR BLD: 7.6 % (ref 4–15)
NEUTROPHILS # BLD AUTO: 5.5 K/UL (ref 1.8–7.7)
NEUTROPHILS NFR BLD: 66.7 % (ref 38–73)
NONHDLC SERPL-MCNC: 123 MG/DL
NRBC BLD-RTO: 0 /100 WBC
PLATELET # BLD AUTO: 267 K/UL (ref 150–350)
PMV BLD AUTO: 10.2 FL (ref 9.2–12.9)
POTASSIUM SERPL-SCNC: 4.3 MMOL/L (ref 3.5–5.1)
PROT SERPL-MCNC: 7.2 G/DL (ref 6–8.4)
RBC # BLD AUTO: 4.44 M/UL (ref 4–5.4)
SODIUM SERPL-SCNC: 143 MMOL/L (ref 136–145)
TRIGL SERPL-MCNC: 162 MG/DL (ref 30–150)
TSH SERPL DL<=0.005 MIU/L-ACNC: 1.79 UIU/ML (ref 0.4–4)
WBC # BLD AUTO: 8.3 K/UL (ref 3.9–12.7)

## 2020-08-11 PROCEDURE — 36415 COLL VENOUS BLD VENIPUNCTURE: CPT | Mod: HCNC,PO

## 2020-08-11 PROCEDURE — 3078F DIAST BP <80 MM HG: CPT | Mod: HCNC,CPTII,S$GLB, | Performed by: FAMILY MEDICINE

## 2020-08-11 PROCEDURE — 85025 COMPLETE CBC W/AUTO DIFF WBC: CPT | Mod: HCNC

## 2020-08-11 PROCEDURE — 84443 ASSAY THYROID STIM HORMONE: CPT | Mod: HCNC

## 2020-08-11 PROCEDURE — 99499 RISK ADDL DX/OHS AUDIT: ICD-10-PCS | Mod: ,,, | Performed by: FAMILY MEDICINE

## 2020-08-11 PROCEDURE — 99499 UNLISTED E&M SERVICE: CPT | Mod: S$GLB,,, | Performed by: FAMILY MEDICINE

## 2020-08-11 PROCEDURE — 80053 COMPREHEN METABOLIC PANEL: CPT | Mod: HCNC

## 2020-08-11 PROCEDURE — 80061 LIPID PANEL: CPT | Mod: HCNC

## 2020-08-11 PROCEDURE — 3075F PR MOST RECENT SYSTOLIC BLOOD PRESS GE 130-139MM HG: ICD-10-PCS | Mod: HCNC,CPTII,S$GLB, | Performed by: FAMILY MEDICINE

## 2020-08-11 PROCEDURE — 99999 PR PBB SHADOW E&M-EST. PATIENT-LVL III: CPT | Mod: PBBFAC,HCNC,, | Performed by: FAMILY MEDICINE

## 2020-08-11 PROCEDURE — 99499 RISK ADDL DX/OHS AUDIT: ICD-10-PCS | Mod: S$GLB,,, | Performed by: FAMILY MEDICINE

## 2020-08-11 PROCEDURE — 3078F PR MOST RECENT DIASTOLIC BLOOD PRESSURE < 80 MM HG: ICD-10-PCS | Mod: HCNC,CPTII,S$GLB, | Performed by: FAMILY MEDICINE

## 2020-08-11 PROCEDURE — 99397 PR PREVENTIVE VISIT,EST,65 & OVER: ICD-10-PCS | Mod: HCNC,S$GLB,, | Performed by: FAMILY MEDICINE

## 2020-08-11 PROCEDURE — 99397 PER PM REEVAL EST PAT 65+ YR: CPT | Mod: HCNC,S$GLB,, | Performed by: FAMILY MEDICINE

## 2020-08-11 PROCEDURE — 99999 PR PBB SHADOW E&M-EST. PATIENT-LVL III: ICD-10-PCS | Mod: PBBFAC,HCNC,, | Performed by: FAMILY MEDICINE

## 2020-08-11 PROCEDURE — 99499 UNLISTED E&M SERVICE: CPT | Mod: ,,, | Performed by: FAMILY MEDICINE

## 2020-08-11 PROCEDURE — 3075F SYST BP GE 130 - 139MM HG: CPT | Mod: HCNC,CPTII,S$GLB, | Performed by: FAMILY MEDICINE

## 2020-08-11 RX ORDER — SULFAMETHOXAZOLE AND TRIMETHOPRIM 800; 160 MG/1; MG/1
1 TABLET ORAL 2 TIMES DAILY
Qty: 10 TABLET | Refills: 0 | Status: SHIPPED | OUTPATIENT
Start: 2020-08-11 | End: 2020-08-16

## 2020-08-11 RX ORDER — SERTRALINE HYDROCHLORIDE 100 MG/1
100 TABLET, FILM COATED ORAL DAILY
Qty: 90 TABLET | Refills: 3 | Status: SHIPPED | OUTPATIENT
Start: 2020-08-11 | End: 2022-01-06 | Stop reason: SDUPTHER

## 2020-08-11 RX ORDER — OMEPRAZOLE 20 MG/1
40 CAPSULE, DELAYED RELEASE ORAL DAILY
Qty: 180 CAPSULE | Refills: 3 | Status: SHIPPED | OUTPATIENT
Start: 2020-08-11 | End: 2022-01-06 | Stop reason: SDUPTHER

## 2020-08-11 RX ORDER — PRAVASTATIN SODIUM 80 MG/1
80 TABLET ORAL DAILY
Qty: 90 TABLET | Refills: 3 | Status: SHIPPED | OUTPATIENT
Start: 2020-08-11 | End: 2021-10-09 | Stop reason: SDUPTHER

## 2020-08-11 RX ORDER — CILOSTAZOL 50 MG/1
100 TABLET ORAL 2 TIMES DAILY
Qty: 360 TABLET | Refills: 3 | OUTPATIENT
Start: 2020-08-11 | End: 2021-10-09

## 2020-08-11 RX ORDER — CLOPIDOGREL BISULFATE 75 MG/1
75 TABLET ORAL DAILY
Qty: 90 TABLET | Refills: 3 | Status: SHIPPED | OUTPATIENT
Start: 2020-08-11 | End: 2021-10-09 | Stop reason: SDUPTHER

## 2020-08-11 RX ORDER — METOPROLOL SUCCINATE 50 MG/1
50 TABLET, EXTENDED RELEASE ORAL DAILY
Qty: 90 TABLET | Refills: 3 | OUTPATIENT
Start: 2020-08-11 | End: 2021-10-09

## 2020-08-11 RX ORDER — HYDROCHLOROTHIAZIDE 25 MG/1
25 TABLET ORAL DAILY
Qty: 90 TABLET | Refills: 3 | Status: SHIPPED | OUTPATIENT
Start: 2020-08-11 | End: 2021-10-09 | Stop reason: SDUPTHER

## 2020-08-11 RX ORDER — MELOXICAM 15 MG/1
15 TABLET ORAL DAILY
Qty: 90 TABLET | Refills: 3 | Status: ON HOLD | OUTPATIENT
Start: 2020-08-11 | End: 2021-12-15 | Stop reason: HOSPADM

## 2020-08-11 NOTE — PROGRESS NOTES
Routine Office Visit    Patient Name: Sophia Landis    : 1945  MRN: [id number]    Subjective:  Sophia is a 74 y.o. female who presents today for     1. Annual physical  2. UTI - started approximately 2 weeks ago - Patient had a canceled appointment. Patient has a severe cramp whenever she finishes urinating. No flank pain. No fever.     Review of Systems   Constitutional: Negative for chills and fever.   HENT: Negative for congestion.    Eyes: Negative for blurred vision.   Respiratory: Negative for cough.    Cardiovascular: Negative for chest pain.   Gastrointestinal: Negative for abdominal pain, constipation, diarrhea, heartburn, nausea and vomiting.   Genitourinary: Positive for dysuria.   Musculoskeletal: Negative for myalgias.   Skin: Negative for itching and rash.   Neurological: Negative for dizziness and headaches.   Psychiatric/Behavioral: Negative for depression.       Active Problem List  Patient Active Problem List   Diagnosis    Essential hypertension    Hyperlipidemia    Mild recurrent major depression    PAD (peripheral artery disease)    GERD (gastroesophageal reflux disease)    Menopausal syndrome    ANDRES on CPAP    Chest pain, atypical    Recurrent UTI    Ectatic abdominal aorta    Urinary incontinence, mixed    Vaginal atrophy    Irregular bowel habits    Chronic constipation    Rectal urgency    Stenosis of right carotid artery    History of colonic polyps    Chronic diarrhea    Colon cancer screening       Past Surgical History  Past Surgical History:   Procedure Laterality Date    APPENDECTOMY      COLONOSCOPY N/A 2019    Procedure: COLONOSCOPY;  Surgeon: Kenia Arias MD;  Location: [facility];  Service: Endoscopy;  Laterality: N/A;  RX PLETAL ok to hold    HYSTERECTOMY      MONTY with BSO    ILIAC ARTERY STENT Bilateral        Family History  Family History   Problem Relation Age of Onset    Amblyopia Mother     Cataracts Mother     Hypertension Mother      Thyroid disease Mother     Cancer Father     Strabismus Sister     Cancer Brother     Diabetes Maternal Aunt     Cancer Maternal Uncle     Diabetes Maternal Uncle     Cancer Maternal Uncle     Cancer Maternal Uncle     Cancer Maternal Uncle     Blindness Neg Hx     Glaucoma Neg Hx     Macular degeneration Neg Hx     Retinal detachment Neg Hx     Stroke Neg Hx        Social History  Social History     Socioeconomic History    Marital status:      Spouse name: Not on file    Number of children: Not on file    Years of education: Not on file    Highest education level: Not on file   Occupational History    Not on file   Social Needs    Financial resource strain: Not on file    Food insecurity     Worry: Not on file     Inability: Not on file    Transportation needs     Medical: Not on file     Non-medical: Not on file   Tobacco Use    Smoking status: Former Smoker    Smokeless tobacco: Former User    Tobacco comment: .  Retired  at [employer].   Substance and Sexual Activity    Alcohol use: No    Drug use: No    Sexual activity: Yes     Partners: Male     Comment:    Lifestyle    Physical activity     Days per week: Not on file     Minutes per session: Not on file    Stress: Not on file   Relationships    Social connections     Talks on phone: Not on file     Gets together: Not on file     Attends [employer] witness service: Not on file     Active member of club or organization: Not on file     Attends meetings of clubs or organizations: Not on file     Relationship status: Not on file   Other Topics Concern    Not on file   Social History Narrative    Not on file       Medications and Allergies  Reviewed and updated.   Current Outpatient Medications   Medication Sig    aspirin (ECOTRIN) 81 MG EC tablet Take 81 mg by mouth once daily.    calcium carbonate (OS-PAOLO) 600 mg calcium (1,500 mg) Tab Take 1 tablet (600 mg total) by mouth 2 (two) times daily with  "meals.    cilostazoL (PLETAL) 50 MG Tab Take 2 tablets (100 mg total) by mouth 2 (two) times daily.    clopidogreL (PLAVIX) 75 mg tablet Take 1 tablet (75 mg total) by mouth once daily.    hydroCHLOROthiazide (HYDRODIURIL) 25 MG tablet Take 1 tablet (25 mg total) by mouth once daily.    meloxicam (MOBIC) 15 MG tablet Take 1 tablet (15 mg total) by mouth once daily. Change to daily as needed for pain if possible    metoprolol succinate (TOPROL-XL) 50 MG 24 hr tablet Take 1 tablet (50 mg total) by mouth once daily.    omeprazole (PRILOSEC) 20 MG capsule Take 2 capsules (40 mg total) by mouth once daily.    pravastatin (PRAVACHOL) 80 MG tablet Take 1 tablet (80 mg total) by mouth once daily.    sertraline (ZOLOFT) 100 MG tablet Take 1 tablet (100 mg total) by mouth once daily.    albuterol 90 mcg/actuation inhaler Inhale 1-2 puffs into the lungs every 4 to 6 hours as needed for Wheezing or Shortness of Breath (chest tightness). (Patient not taking: Reported on 8/11/2020)    amLODIPine (NORVASC) 10 MG tablet TAKE 1 TABLET ONE TIME DAILY (Patient not taking: Reported on 8/11/2020)    estrogens, conjugated, (PREMARIN) 0.3 MG tablet Take 1 tablet (0.3 mg total) by mouth once daily. (Patient not taking: Reported on 8/11/2020)    nitroGLYCERIN (NITROSTAT) 0.4 MG SL tablet Place 1 tablet (0.4 mg total) under the tongue every 5 (five) minutes as needed for Chest pain. (Patient not taking: Reported on 8/11/2020)    sulfamethoxazole-trimethoprim 800-160mg (BACTRIM DS) 800-160 mg Tab Take 1 tablet by mouth 2 (two) times daily. for 5 days     No current facility-administered medications for this visit.        Physical Exam  /64 (BP Location: Left arm, Patient Position: Sitting, BP Method: Medium (Manual))   Pulse 79   Temp 98.6 °F (37 °C) (Temporal)   Ht 4' 11" (1.499 m)   Wt 63 kg (138 lb 14.2 oz)   SpO2 96%   BMI 28.05 kg/m²   Physical Exam  Constitutional:       Appearance: She is well-developed. "   HENT:      Head: Normocephalic and atraumatic.   Eyes:      Conjunctiva/sclera: Conjunctivae normal.      Pupils: Pupils are equal, round, and reactive to light.   Neck:      Musculoskeletal: Normal range of motion and neck supple.   Cardiovascular:      Rate and Rhythm: Normal rate and regular rhythm.      Heart sounds: Normal heart sounds. No murmur. No friction rub. No gallop.    Pulmonary:      Effort: No respiratory distress.      Breath sounds: Normal breath sounds.   Abdominal:      General: Bowel sounds are normal. There is no distension.      Palpations: Abdomen is soft.      Tenderness: There is no abdominal tenderness.   Musculoskeletal: Normal range of motion.   Lymphadenopathy:      Cervical: No cervical adenopathy.   Skin:     General: Skin is warm.   Neurological:      Mental Status: She is alert and oriented to person, place, and time.           Assessment/Plan:  Sophia Landis is a 74 y.o. female who presents today for :    Problem List Items Addressed This Visit        Psychiatric    Mild recurrent major depression (Chronic)    Relevant Medications    sertraline (ZOLOFT) 100 MG tablet  The current medical regimen is effective;  continue present plan and medications.         Cardiac/Vascular    Ectatic abdominal aorta (Chronic)    Overview     US 2012.  Stable, asymptomatic chronic condition.  Will continue to maximize risk factor reduction and adjust medication as needed.   Continue statin and bp monitoring            Essential hypertension (Chronic)    Relevant Medications    metoprolol succinate (TOPROL-XL) 50 MG 24 hr tablet  The current medical regimen is effective;  continue present plan and medications.      Hyperlipidemia (Chronic)    Relevant Medications    pravastatin (PRAVACHOL) 80 MG tablet  The current medical regimen is effective;  continue present plan and medications.      Other Relevant Orders    CBC auto differential    Comprehensive metabolic panel    Lipid Panel    TSH    PAD  (peripheral artery disease) (Chronic)  Noted in chart  -     cilostazoL (PLETAL) 50 MG Tab; Take 2 tablets (100 mg total) by mouth 2 (two) times daily.  Dispense: 360 tablet; Refill: 3  -     clopidogreL (PLAVIX) 75 mg tablet; Take 1 tablet (75 mg total) by mouth once daily.  Dispense: 90 tablet; Refill: 3       GI    GERD (gastroesophageal reflux disease)    Relevant Medications    omeprazole (PRILOSEC) 20 MG capsule  The current medical regimen is effective;  continue present plan and medications.        Other Visit Diagnoses     Annual physical exam    -  Primary  I addressed all major concerns as it related to health maintenance.  All were ordered and scheduled based on the patients wishes.  Any additional health maintenance will be readdressed at the next physical if declined or deferred by the patient.  Health Maintenance       Date Due Completion Date    Shingles Vaccine (1 of 2) 09/09/1995 ---    Lipid Panel 04/15/2020 4/15/2019    Mammogram 05/14/2020 5/14/2018    DEXA SCAN 07/18/2020 7/18/2017    Influenza Vaccine (1) 09/01/2020 10/21/2019 (Done)    Override on 10/21/2019: Done    TETANUS VACCINE 05/05/2021 5/5/2011 (Done)    Override on 5/5/2011: Done    High Dose Statin 08/11/2021 8/11/2020    Aspirin/Antiplatelet Therapy 08/11/2021 8/11/2020    Colorectal Cancer Screening 05/20/2024 5/20/2019            Chronic left-sided low back pain without sciatica        Relevant Medications    meloxicam (MOBIC) 15 MG tablet  The current medical regimen is effective;  continue present plan and medications.      Acute cystitis without hematuria        Relevant Orders    Urinalysis    Urine culture  -     sulfamethoxazole-trimethoprim 800-160mg (BACTRIM DS) 800-160 mg Tab; Take 1 tablet by mouth 2 (two) times daily. for 5 days  Dispense: 10 tablet; Refill: 0            Follow up in about 1 year (around 8/11/2021), or if symptoms worsen or fail to improve, for yearly exam.

## 2020-08-13 ENCOUNTER — TELEPHONE (OUTPATIENT)
Dept: FAMILY MEDICINE | Facility: CLINIC | Age: 75
End: 2020-08-13

## 2020-08-13 NOTE — PROGRESS NOTES
Your triglycerides (a type of fat) were elevated. I recommend a low fat diet and regular exercise. I also recommend over the counter omega 3 fish oil daily (~1gm/day)    Otherwise labs within normal limits

## 2020-08-13 NOTE — TELEPHONE ENCOUNTER
Called patient and informed.       ----- Message from Marina Moy MD sent at 8/13/2020  2:02 PM CDT -----  Your triglycerides (a type of fat) were elevated. I recommend a low fat diet and regular exercise. I also recommend over the counter omega 3 fish oil daily (~1gm/day)    Otherwise labs within normal limits

## 2020-08-13 NOTE — TELEPHONE ENCOUNTER
Called patient and informed.     ----- Message from Marina Moy MD sent at 8/13/2020  2:01 PM CDT -----  Bactrim is correct antibiotic for UTI   Please complete pharmacy

## 2020-08-21 ENCOUNTER — TELEPHONE (OUTPATIENT)
Dept: FAMILY MEDICINE | Facility: CLINIC | Age: 75
End: 2020-08-21

## 2020-08-21 NOTE — TELEPHONE ENCOUNTER
Spoke to celeste with [facility] and per Dr. Johnson I clarified that it is ok for the patient to take the Pletal and the Plavix together. Celeste stated good understanding.

## 2020-08-21 NOTE — TELEPHONE ENCOUNTER
----- Message from Marjorie Delcid sent at 8/21/2020 11:38 AM CDT -----  Contact: [facility]  [phone]  Type:  Pharmacy Calling to Clarify an RX    Name of Caller: [facility]    Pharmacy Name:   [facility] - [facility] [address]  [address] [address]  Phone: [phone] Fax: [phone]    Prescription Name: clopidogreL (PLAVIX) 75 mg tablet    What do they need to clarify? Duplicate therapy    Can you be contacted via [facility]? Call back    Best Call Back Number: [phone]

## 2020-09-15 ENCOUNTER — PATIENT OUTREACH (OUTPATIENT)
Dept: ADMINISTRATIVE | Facility: OTHER | Age: 75
End: 2020-09-15

## 2021-02-18 ENCOUNTER — PES CALL (OUTPATIENT)
Dept: ADMINISTRATIVE | Facility: CLINIC | Age: 76
End: 2021-02-18

## 2021-10-08 ENCOUNTER — HOSPITAL ENCOUNTER (EMERGENCY)
Facility: HOSPITAL | Age: 76
Discharge: HOME | End: 2021-10-09
Attending: EMERGENCY MEDICINE
Payer: MEDICARE

## 2021-10-08 DIAGNOSIS — E78.5 HYPERLIPIDEMIA, UNSPECIFIED HYPERLIPIDEMIA TYPE: Chronic | ICD-10-CM

## 2021-10-08 DIAGNOSIS — I10 HYPERTENSION, UNSPECIFIED TYPE: Primary | ICD-10-CM

## 2021-10-08 DIAGNOSIS — R53.1 GENERALIZED WEAKNESS: ICD-10-CM

## 2021-10-08 DIAGNOSIS — R42 DIZZINESS: ICD-10-CM

## 2021-10-08 DIAGNOSIS — I10 ESSENTIAL HYPERTENSION: Chronic | ICD-10-CM

## 2021-10-08 DIAGNOSIS — I73.9 PAD (PERIPHERAL ARTERY DISEASE): ICD-10-CM

## 2021-10-08 PROCEDURE — 81000 URINALYSIS NONAUTO W/SCOPE: CPT | Mod: HCNC | Performed by: EMERGENCY MEDICINE

## 2021-10-08 PROCEDURE — 93010 EKG 12-LEAD: ICD-10-PCS | Mod: HCNC,,, | Performed by: INTERNAL MEDICINE

## 2021-10-08 PROCEDURE — 99285 EMERGENCY DEPT VISIT HI MDM: CPT | Mod: 25,HCNC

## 2021-10-08 PROCEDURE — 93005 ELECTROCARDIOGRAM TRACING: CPT | Mod: HCNC

## 2021-10-08 PROCEDURE — 93010 ELECTROCARDIOGRAM REPORT: CPT | Mod: HCNC,,, | Performed by: INTERNAL MEDICINE

## 2021-10-09 VITALS
HEIGHT: 59 IN | HEART RATE: 75 BPM | DIASTOLIC BLOOD PRESSURE: 76 MMHG | TEMPERATURE: 99 F | WEIGHT: 139 LBS | BODY MASS INDEX: 28.02 KG/M2 | OXYGEN SATURATION: 96 % | RESPIRATION RATE: 20 BRPM | SYSTOLIC BLOOD PRESSURE: 183 MMHG

## 2021-10-09 LAB
ALBUMIN SERPL BCP-MCNC: 3.4 G/DL (ref 3.5–5.2)
ALP SERPL-CCNC: 98 U/L (ref 55–135)
ALT SERPL W/O P-5'-P-CCNC: 31 U/L (ref 10–44)
ANION GAP SERPL CALC-SCNC: 13 MMOL/L (ref 8–16)
AST SERPL-CCNC: 28 U/L (ref 10–40)
BACTERIA #/AREA URNS HPF: NORMAL /HPF
BASOPHILS # BLD AUTO: 0.04 K/UL (ref 0–0.2)
BASOPHILS NFR BLD: 0.6 % (ref 0–1.9)
BILIRUB SERPL-MCNC: 0.3 MG/DL (ref 0.1–1)
BILIRUB UR QL STRIP: NEGATIVE
BNP SERPL-MCNC: 126 PG/ML (ref 0–99)
BUN SERPL-MCNC: 15 MG/DL (ref 8–23)
CALCIUM SERPL-MCNC: 9.9 MG/DL (ref 8.7–10.5)
CHLORIDE SERPL-SCNC: 113 MMOL/L (ref 95–110)
CLARITY UR: CLEAR
CO2 SERPL-SCNC: 18 MMOL/L (ref 23–29)
COLOR UR: YELLOW
CREAT SERPL-MCNC: 1 MG/DL (ref 0.5–1.4)
DIFFERENTIAL METHOD: NORMAL
EOSINOPHIL # BLD AUTO: 0.1 K/UL (ref 0–0.5)
EOSINOPHIL NFR BLD: 1.8 % (ref 0–8)
ERYTHROCYTE [DISTWIDTH] IN BLOOD BY AUTOMATED COUNT: 14.3 % (ref 11.5–14.5)
EST. GFR  (AFRICAN AMERICAN): >60 ML/MIN/1.73 M^2
EST. GFR  (NON AFRICAN AMERICAN): 55 ML/MIN/1.73 M^2
GLUCOSE SERPL-MCNC: 121 MG/DL (ref 70–110)
GLUCOSE UR QL STRIP: NEGATIVE
HCT VFR BLD AUTO: 41.1 % (ref 37–48.5)
HGB BLD-MCNC: 13.5 G/DL (ref 12–16)
HGB UR QL STRIP: NEGATIVE
HYALINE CASTS #/AREA URNS LPF: 0 /LPF
IMM GRANULOCYTES # BLD AUTO: 0.03 K/UL (ref 0–0.04)
IMM GRANULOCYTES NFR BLD AUTO: 0.4 % (ref 0–0.5)
KETONES UR QL STRIP: NEGATIVE
LEUKOCYTE ESTERASE UR QL STRIP: ABNORMAL
LYMPHOCYTES # BLD AUTO: 1.7 K/UL (ref 1–4.8)
LYMPHOCYTES NFR BLD: 25.6 % (ref 18–48)
MAGNESIUM SERPL-MCNC: 1.9 MG/DL (ref 1.6–2.6)
MCH RBC QN AUTO: 29.5 PG (ref 27–31)
MCHC RBC AUTO-ENTMCNC: 32.8 G/DL (ref 32–36)
MCV RBC AUTO: 90 FL (ref 82–98)
MICROSCOPIC COMMENT: NORMAL
MONOCYTES # BLD AUTO: 0.7 K/UL (ref 0.3–1)
MONOCYTES NFR BLD: 9.6 % (ref 4–15)
NEUTROPHILS # BLD AUTO: 4.2 K/UL (ref 1.8–7.7)
NEUTROPHILS NFR BLD: 62 % (ref 38–73)
NITRITE UR QL STRIP: NEGATIVE
NRBC BLD-RTO: 0 /100 WBC
PH UR STRIP: 6 [PH] (ref 5–8)
PLATELET # BLD AUTO: 262 K/UL (ref 150–450)
PMV BLD AUTO: 10.2 FL (ref 9.2–12.9)
POTASSIUM SERPL-SCNC: 4.3 MMOL/L (ref 3.5–5.1)
PROT SERPL-MCNC: 7.5 G/DL (ref 6–8.4)
PROT UR QL STRIP: ABNORMAL
RBC # BLD AUTO: 4.58 M/UL (ref 4–5.4)
RBC #/AREA URNS HPF: 0 /HPF (ref 0–4)
SODIUM SERPL-SCNC: 144 MMOL/L (ref 136–145)
SP GR UR STRIP: 1.01 (ref 1–1.03)
TROPONIN I SERPL DL<=0.01 NG/ML-MCNC: <0.006 NG/ML (ref 0–0.03)
TSH SERPL DL<=0.005 MIU/L-ACNC: 3.61 UIU/ML (ref 0.4–4)
URN SPEC COLLECT METH UR: ABNORMAL
UROBILINOGEN UR STRIP-ACNC: NEGATIVE EU/DL
WBC # BLD AUTO: 6.75 K/UL (ref 3.9–12.7)
WBC #/AREA URNS HPF: 4 /HPF (ref 0–5)
WBC CLUMPS URNS QL MICRO: NORMAL

## 2021-10-09 PROCEDURE — 83735 ASSAY OF MAGNESIUM: CPT | Mod: HCNC | Performed by: EMERGENCY MEDICINE

## 2021-10-09 PROCEDURE — 84443 ASSAY THYROID STIM HORMONE: CPT | Mod: HCNC | Performed by: EMERGENCY MEDICINE

## 2021-10-09 PROCEDURE — 25000003 PHARM REV CODE 250: Mod: HCNC | Performed by: EMERGENCY MEDICINE

## 2021-10-09 PROCEDURE — 83880 ASSAY OF NATRIURETIC PEPTIDE: CPT | Mod: HCNC | Performed by: EMERGENCY MEDICINE

## 2021-10-09 PROCEDURE — 84484 ASSAY OF TROPONIN QUANT: CPT | Mod: HCNC | Performed by: EMERGENCY MEDICINE

## 2021-10-09 PROCEDURE — 85025 COMPLETE CBC W/AUTO DIFF WBC: CPT | Mod: HCNC | Performed by: EMERGENCY MEDICINE

## 2021-10-09 PROCEDURE — 80053 COMPREHEN METABOLIC PANEL: CPT | Mod: HCNC | Performed by: EMERGENCY MEDICINE

## 2021-10-09 RX ORDER — METOPROLOL SUCCINATE 50 MG/1
50 TABLET, EXTENDED RELEASE ORAL DAILY
Qty: 30 TABLET | Refills: 11 | Status: SHIPPED | OUTPATIENT
Start: 2021-10-09 | End: 2022-01-06 | Stop reason: SDUPTHER

## 2021-10-09 RX ORDER — CLOPIDOGREL BISULFATE 75 MG/1
75 TABLET ORAL ONCE
Status: COMPLETED | OUTPATIENT
Start: 2021-10-09 | End: 2021-10-09

## 2021-10-09 RX ORDER — CILOSTAZOL 50 MG/1
100 TABLET ORAL 2 TIMES DAILY
Qty: 120 TABLET | Refills: 11 | Status: SHIPPED | OUTPATIENT
Start: 2021-10-09 | End: 2021-10-09 | Stop reason: SDUPTHER

## 2021-10-09 RX ORDER — ASPIRIN 81 MG/1
81 TABLET ORAL DAILY
Qty: 30 TABLET | Refills: 11 | Status: SHIPPED | OUTPATIENT
Start: 2021-10-09 | End: 2022-01-06 | Stop reason: SDUPTHER

## 2021-10-09 RX ORDER — AMLODIPINE BESYLATE 5 MG/1
10 TABLET ORAL
Status: COMPLETED | OUTPATIENT
Start: 2021-10-09 | End: 2021-10-09

## 2021-10-09 RX ORDER — CLOPIDOGREL BISULFATE 75 MG/1
75 TABLET ORAL DAILY
Qty: 30 TABLET | Refills: 11 | Status: SHIPPED | OUTPATIENT
Start: 2021-10-09 | End: 2022-01-06 | Stop reason: SDUPTHER

## 2021-10-09 RX ORDER — AMLODIPINE BESYLATE 10 MG/1
10 TABLET ORAL DAILY
Qty: 30 TABLET | Refills: 11 | Status: SHIPPED | OUTPATIENT
Start: 2021-10-09 | End: 2021-10-09 | Stop reason: SDUPTHER

## 2021-10-09 RX ORDER — METOPROLOL SUCCINATE 25 MG/1
25 TABLET, EXTENDED RELEASE ORAL ONCE
Status: COMPLETED | OUTPATIENT
Start: 2021-10-09 | End: 2021-10-09

## 2021-10-09 RX ORDER — CILOSTAZOL 100 MG/1
100 TABLET ORAL 2 TIMES DAILY
Qty: 60 TABLET | Refills: 11 | Status: SHIPPED | OUTPATIENT
Start: 2021-10-09 | End: 2023-05-25 | Stop reason: CLARIF

## 2021-10-09 RX ORDER — HYDROCHLOROTHIAZIDE 25 MG/1
25 TABLET ORAL DAILY
Qty: 30 TABLET | Refills: 11 | Status: SHIPPED | OUTPATIENT
Start: 2021-10-09 | End: 2022-01-06 | Stop reason: SDUPTHER

## 2021-10-09 RX ORDER — AMLODIPINE BESYLATE 10 MG/1
10 TABLET ORAL DAILY
Qty: 30 TABLET | Refills: 11 | Status: SHIPPED | OUTPATIENT
Start: 2021-10-09 | End: 2022-01-05 | Stop reason: SDUPTHER

## 2021-10-09 RX ORDER — PRAVASTATIN SODIUM 80 MG/1
80 TABLET ORAL DAILY
Qty: 30 TABLET | Refills: 11 | Status: SHIPPED | OUTPATIENT
Start: 2021-10-09 | End: 2022-01-06 | Stop reason: SDUPTHER

## 2021-10-09 RX ORDER — METOPROLOL SUCCINATE 50 MG/1
50 TABLET, EXTENDED RELEASE ORAL DAILY
Qty: 90 TABLET | Refills: 3 | Status: SHIPPED | OUTPATIENT
Start: 2021-10-09 | End: 2021-10-09 | Stop reason: SDUPTHER

## 2021-10-09 RX ORDER — HYDROCHLOROTHIAZIDE 25 MG/1
25 TABLET ORAL
Status: COMPLETED | OUTPATIENT
Start: 2021-10-09 | End: 2021-10-09

## 2021-10-09 RX ADMIN — AMLODIPINE BESYLATE 10 MG: 5 TABLET ORAL at 01:10

## 2021-10-09 RX ADMIN — HYDROCHLOROTHIAZIDE 25 MG: 25 TABLET ORAL at 01:10

## 2021-10-09 RX ADMIN — METOPROLOL SUCCINATE 25 MG: 25 TABLET, EXTENDED RELEASE ORAL at 02:10

## 2021-10-09 RX ADMIN — CLOPIDOGREL 75 MG: 75 TABLET, FILM COATED ORAL at 01:10

## 2021-12-10 ENCOUNTER — NURSE TRIAGE (OUTPATIENT)
Dept: ADMINISTRATIVE | Facility: CLINIC | Age: 76
End: 2021-12-10
Payer: MEDICARE

## 2021-12-10 ENCOUNTER — HOSPITAL ENCOUNTER (INPATIENT)
Facility: HOSPITAL | Age: 76
LOS: 7 days | Discharge: NURSING, REHAB OR LONG-TERM CARE | DRG: 065 | End: 2021-12-17
Attending: EMERGENCY MEDICINE | Admitting: INTERNAL MEDICINE
Payer: MEDICARE

## 2021-12-10 DIAGNOSIS — R26.2 DIFFICULTY WALKING: ICD-10-CM

## 2021-12-10 DIAGNOSIS — G93.40 ENCEPHALOPATHY: Primary | ICD-10-CM

## 2021-12-10 DIAGNOSIS — G91.2 NPH (NORMAL PRESSURE HYDROCEPHALUS): ICD-10-CM

## 2021-12-10 DIAGNOSIS — R07.9 CHEST PAIN: ICD-10-CM

## 2021-12-10 DIAGNOSIS — R93.0 ABNORMAL CT OF THE HEAD: ICD-10-CM

## 2021-12-10 DIAGNOSIS — I63.9 STROKE: ICD-10-CM

## 2021-12-10 LAB
ALBUMIN SERPL BCP-MCNC: 3.9 G/DL (ref 3.5–5.2)
ALP SERPL-CCNC: 102 U/L (ref 55–135)
ALT SERPL W/O P-5'-P-CCNC: 30 U/L (ref 10–44)
AMMONIA PLAS-SCNC: 39 UMOL/L (ref 10–50)
AMPHET+METHAMPHET UR QL: NEGATIVE
ANION GAP SERPL CALC-SCNC: 14 MMOL/L (ref 8–16)
AST SERPL-CCNC: 28 U/L (ref 10–40)
BARBITURATES UR QL SCN>200 NG/ML: NEGATIVE
BASOPHILS # BLD AUTO: 0.04 K/UL (ref 0–0.2)
BASOPHILS NFR BLD: 0.5 % (ref 0–1.9)
BENZODIAZ UR QL SCN>200 NG/ML: NEGATIVE
BILIRUB SERPL-MCNC: 0.6 MG/DL (ref 0.1–1)
BUN SERPL-MCNC: 23 MG/DL (ref 8–23)
BZE UR QL SCN: NEGATIVE
CALCIUM SERPL-MCNC: 9.8 MG/DL (ref 8.7–10.5)
CANNABINOIDS UR QL SCN: NEGATIVE
CHLORIDE SERPL-SCNC: 106 MMOL/L (ref 95–110)
CO2 SERPL-SCNC: 21 MMOL/L (ref 23–29)
CREAT SERPL-MCNC: 1 MG/DL (ref 0.5–1.4)
CREAT UR-MCNC: 76.8 MG/DL (ref 15–325)
CTP QC/QA: YES
DIFFERENTIAL METHOD: NORMAL
EOSINOPHIL # BLD AUTO: 0.2 K/UL (ref 0–0.5)
EOSINOPHIL NFR BLD: 3.1 % (ref 0–8)
ERYTHROCYTE [DISTWIDTH] IN BLOOD BY AUTOMATED COUNT: 14.2 % (ref 11.5–14.5)
EST. GFR  (AFRICAN AMERICAN): >60 ML/MIN/1.73 M^2
EST. GFR  (NON AFRICAN AMERICAN): 55 ML/MIN/1.73 M^2
ETHANOL SERPL-MCNC: <10 MG/DL
GLUCOSE SERPL-MCNC: 97 MG/DL (ref 70–110)
HCT VFR BLD AUTO: 39.2 % (ref 37–48.5)
HGB BLD-MCNC: 13.2 G/DL (ref 12–16)
IMM GRANULOCYTES # BLD AUTO: 0.03 K/UL (ref 0–0.04)
IMM GRANULOCYTES NFR BLD AUTO: 0.4 % (ref 0–0.5)
LYMPHOCYTES # BLD AUTO: 2.4 K/UL (ref 1–4.8)
LYMPHOCYTES NFR BLD: 31.9 % (ref 18–48)
MAGNESIUM SERPL-MCNC: 1.9 MG/DL (ref 1.6–2.6)
MCH RBC QN AUTO: 29.5 PG (ref 27–31)
MCHC RBC AUTO-ENTMCNC: 33.7 G/DL (ref 32–36)
MCV RBC AUTO: 88 FL (ref 82–98)
METHADONE UR QL SCN>300 NG/ML: NEGATIVE
MONOCYTES # BLD AUTO: 0.6 K/UL (ref 0.3–1)
MONOCYTES NFR BLD: 8.4 % (ref 4–15)
NEUTROPHILS # BLD AUTO: 4.2 K/UL (ref 1.8–7.7)
NEUTROPHILS NFR BLD: 55.7 % (ref 38–73)
NRBC BLD-RTO: 0 /100 WBC
OPIATES UR QL SCN: NEGATIVE
PCP UR QL SCN>25 NG/ML: NEGATIVE
PLATELET # BLD AUTO: 274 K/UL (ref 150–450)
PMV BLD AUTO: 9.6 FL (ref 9.2–12.9)
POCT GLUCOSE: 103 MG/DL (ref 70–110)
POTASSIUM SERPL-SCNC: 3.9 MMOL/L (ref 3.5–5.1)
PROT SERPL-MCNC: 7.5 G/DL (ref 6–8.4)
RBC # BLD AUTO: 4.48 M/UL (ref 4–5.4)
SARS-COV-2 RDRP RESP QL NAA+PROBE: NEGATIVE
SODIUM SERPL-SCNC: 141 MMOL/L (ref 136–145)
TOXICOLOGY INFORMATION: NORMAL
TROPONIN I SERPL DL<=0.01 NG/ML-MCNC: <0.006 NG/ML (ref 0–0.03)
TSH SERPL DL<=0.005 MIU/L-ACNC: 1.97 UIU/ML (ref 0.4–4)
WBC # BLD AUTO: 7.52 K/UL (ref 3.9–12.7)

## 2021-12-10 PROCEDURE — 87086 URINE CULTURE/COLONY COUNT: CPT | Mod: HCNC | Performed by: NURSE PRACTITIONER

## 2021-12-10 PROCEDURE — 21400001 HC TELEMETRY ROOM: Mod: HCNC

## 2021-12-10 PROCEDURE — 25000003 PHARM REV CODE 250: Mod: HCNC | Performed by: EMERGENCY MEDICINE

## 2021-12-10 PROCEDURE — 82077 ASSAY SPEC XCP UR&BREATH IA: CPT | Mod: HCNC | Performed by: EMERGENCY MEDICINE

## 2021-12-10 PROCEDURE — 87186 SC STD MICRODIL/AGAR DIL: CPT | Mod: HCNC | Performed by: NURSE PRACTITIONER

## 2021-12-10 PROCEDURE — 93010 EKG 12-LEAD: ICD-10-PCS | Mod: HCNC,,, | Performed by: INTERNAL MEDICINE

## 2021-12-10 PROCEDURE — U0002 COVID-19 LAB TEST NON-CDC: HCPCS | Mod: HCNC | Performed by: EMERGENCY MEDICINE

## 2021-12-10 PROCEDURE — 25000003 PHARM REV CODE 250: Mod: HCNC | Performed by: INTERNAL MEDICINE

## 2021-12-10 PROCEDURE — 99285 EMERGENCY DEPT VISIT HI MDM: CPT | Mod: 25,HCNC

## 2021-12-10 PROCEDURE — 87088 URINE BACTERIA CULTURE: CPT | Mod: HCNC | Performed by: NURSE PRACTITIONER

## 2021-12-10 PROCEDURE — 85025 COMPLETE CBC W/AUTO DIFF WBC: CPT | Mod: HCNC | Performed by: NURSE PRACTITIONER

## 2021-12-10 PROCEDURE — 80307 DRUG TEST PRSMV CHEM ANLYZR: CPT | Mod: HCNC | Performed by: EMERGENCY MEDICINE

## 2021-12-10 PROCEDURE — 81000 URINALYSIS NONAUTO W/SCOPE: CPT | Mod: HCNC,59 | Performed by: NURSE PRACTITIONER

## 2021-12-10 PROCEDURE — 82140 ASSAY OF AMMONIA: CPT | Mod: HCNC | Performed by: EMERGENCY MEDICINE

## 2021-12-10 PROCEDURE — 87077 CULTURE AEROBIC IDENTIFY: CPT | Mod: HCNC | Performed by: NURSE PRACTITIONER

## 2021-12-10 PROCEDURE — 84443 ASSAY THYROID STIM HORMONE: CPT | Mod: HCNC | Performed by: EMERGENCY MEDICINE

## 2021-12-10 PROCEDURE — 96360 HYDRATION IV INFUSION INIT: CPT | Mod: HCNC

## 2021-12-10 PROCEDURE — 93010 ELECTROCARDIOGRAM REPORT: CPT | Mod: HCNC,,, | Performed by: INTERNAL MEDICINE

## 2021-12-10 PROCEDURE — 82962 GLUCOSE BLOOD TEST: CPT | Mod: HCNC

## 2021-12-10 PROCEDURE — 83735 ASSAY OF MAGNESIUM: CPT | Mod: HCNC | Performed by: EMERGENCY MEDICINE

## 2021-12-10 PROCEDURE — 93005 ELECTROCARDIOGRAM TRACING: CPT | Mod: HCNC

## 2021-12-10 PROCEDURE — 80053 COMPREHEN METABOLIC PANEL: CPT | Mod: HCNC | Performed by: NURSE PRACTITIONER

## 2021-12-10 PROCEDURE — 84484 ASSAY OF TROPONIN QUANT: CPT | Mod: HCNC | Performed by: NURSE PRACTITIONER

## 2021-12-10 RX ORDER — AMOXICILLIN 250 MG
1 CAPSULE ORAL DAILY PRN
Status: DISCONTINUED | OUTPATIENT
Start: 2021-12-10 | End: 2021-12-18 | Stop reason: HOSPADM

## 2021-12-10 RX ORDER — AMLODIPINE BESYLATE 5 MG/1
10 TABLET ORAL DAILY
Status: DISCONTINUED | OUTPATIENT
Start: 2021-12-11 | End: 2021-12-11

## 2021-12-10 RX ORDER — NALOXONE HCL 0.4 MG/ML
0.02 VIAL (ML) INJECTION
Status: DISCONTINUED | OUTPATIENT
Start: 2021-12-10 | End: 2021-12-18 | Stop reason: HOSPADM

## 2021-12-10 RX ORDER — ACETAMINOPHEN 500 MG
500 TABLET ORAL EVERY 6 HOURS PRN
Status: DISCONTINUED | OUTPATIENT
Start: 2021-12-10 | End: 2021-12-18 | Stop reason: HOSPADM

## 2021-12-10 RX ORDER — SODIUM CHLORIDE 0.9 % (FLUSH) 0.9 %
10 SYRINGE (ML) INJECTION EVERY 8 HOURS
Status: DISCONTINUED | OUTPATIENT
Start: 2021-12-10 | End: 2021-12-10

## 2021-12-10 RX ORDER — SODIUM CHLORIDE 0.9 % (FLUSH) 0.9 %
10 SYRINGE (ML) INJECTION
Status: DISCONTINUED | OUTPATIENT
Start: 2021-12-10 | End: 2021-12-18 | Stop reason: HOSPADM

## 2021-12-10 RX ORDER — PRAVASTATIN SODIUM 40 MG/1
80 TABLET ORAL DAILY
Status: DISCONTINUED | OUTPATIENT
Start: 2021-12-11 | End: 2021-12-18 | Stop reason: HOSPADM

## 2021-12-10 RX ORDER — SODIUM CHLORIDE 9 MG/ML
INJECTION, SOLUTION INTRAVENOUS CONTINUOUS
Status: DISCONTINUED | OUTPATIENT
Start: 2021-12-10 | End: 2021-12-11

## 2021-12-10 RX ORDER — ONDANSETRON 2 MG/ML
4 INJECTION INTRAMUSCULAR; INTRAVENOUS EVERY 6 HOURS PRN
Status: DISCONTINUED | OUTPATIENT
Start: 2021-12-10 | End: 2021-12-18 | Stop reason: HOSPADM

## 2021-12-10 RX ORDER — TALC
6 POWDER (GRAM) TOPICAL NIGHTLY PRN
Status: DISCONTINUED | OUTPATIENT
Start: 2021-12-10 | End: 2021-12-18 | Stop reason: HOSPADM

## 2021-12-10 RX ADMIN — SODIUM CHLORIDE 1000 ML: 0.9 INJECTION, SOLUTION INTRAVENOUS at 03:12

## 2021-12-10 RX ADMIN — SODIUM CHLORIDE: 0.9 INJECTION, SOLUTION INTRAVENOUS at 07:12

## 2021-12-11 LAB
BACTERIA #/AREA URNS HPF: ABNORMAL /HPF
BILIRUB UR QL STRIP: NEGATIVE
CLARITY UR: ABNORMAL
COLOR UR: YELLOW
GLUCOSE UR QL STRIP: NEGATIVE
HGB UR QL STRIP: ABNORMAL
HYALINE CASTS #/AREA URNS LPF: 10 /LPF
KETONES UR QL STRIP: NEGATIVE
LEUKOCYTE ESTERASE UR QL STRIP: ABNORMAL
MICROSCOPIC COMMENT: ABNORMAL
NITRITE UR QL STRIP: POSITIVE
PH UR STRIP: 6 [PH] (ref 5–8)
PROT UR QL STRIP: ABNORMAL
RBC #/AREA URNS HPF: 25 /HPF (ref 0–4)
SP GR UR STRIP: 1.01 (ref 1–1.03)
SQUAMOUS #/AREA URNS HPF: 2 /HPF
URN SPEC COLLECT METH UR: ABNORMAL
UROBILINOGEN UR STRIP-ACNC: NEGATIVE EU/DL
WBC #/AREA URNS HPF: >100 /HPF (ref 0–5)
WBC CLUMPS URNS QL MICRO: ABNORMAL

## 2021-12-11 PROCEDURE — 97166 OT EVAL MOD COMPLEX 45 MIN: CPT | Mod: HCNC

## 2021-12-11 PROCEDURE — 63600175 PHARM REV CODE 636 W HCPCS: Mod: HCNC | Performed by: PHYSICIAN ASSISTANT

## 2021-12-11 PROCEDURE — 25000003 PHARM REV CODE 250: Mod: HCNC | Performed by: PHYSICIAN ASSISTANT

## 2021-12-11 PROCEDURE — 25500020 PHARM REV CODE 255: Mod: HCNC | Performed by: INTERNAL MEDICINE

## 2021-12-11 PROCEDURE — 25000003 PHARM REV CODE 250: Mod: HCNC | Performed by: INTERNAL MEDICINE

## 2021-12-11 PROCEDURE — 97530 THERAPEUTIC ACTIVITIES: CPT | Mod: HCNC

## 2021-12-11 PROCEDURE — 97161 PT EVAL LOW COMPLEX 20 MIN: CPT | Mod: HCNC

## 2021-12-11 PROCEDURE — A9585 GADOBUTROL INJECTION: HCPCS | Mod: HCNC | Performed by: INTERNAL MEDICINE

## 2021-12-11 PROCEDURE — 63600175 PHARM REV CODE 636 W HCPCS: Mod: HCNC | Performed by: INTERNAL MEDICINE

## 2021-12-11 PROCEDURE — 21400001 HC TELEMETRY ROOM: Mod: HCNC

## 2021-12-11 PROCEDURE — 92610 EVALUATE SWALLOWING FUNCTION: CPT | Mod: HCNC

## 2021-12-11 RX ORDER — GADOBUTROL 604.72 MG/ML
8 INJECTION INTRAVENOUS
Status: COMPLETED | OUTPATIENT
Start: 2021-12-11 | End: 2021-12-11

## 2021-12-11 RX ORDER — AMLODIPINE BESYLATE 2.5 MG/1
2.5 TABLET ORAL DAILY
Status: DISCONTINUED | OUTPATIENT
Start: 2021-12-12 | End: 2021-12-11

## 2021-12-11 RX ORDER — HEPARIN SODIUM 5000 [USP'U]/ML
5000 INJECTION, SOLUTION INTRAVENOUS; SUBCUTANEOUS EVERY 12 HOURS
Status: DISCONTINUED | OUTPATIENT
Start: 2021-12-11 | End: 2021-12-18 | Stop reason: HOSPADM

## 2021-12-11 RX ORDER — CLOPIDOGREL BISULFATE 75 MG/1
75 TABLET ORAL DAILY
Status: DISCONTINUED | OUTPATIENT
Start: 2021-12-11 | End: 2021-12-18 | Stop reason: HOSPADM

## 2021-12-11 RX ORDER — NAPROXEN SODIUM 220 MG/1
81 TABLET, FILM COATED ORAL DAILY
Status: DISCONTINUED | OUTPATIENT
Start: 2021-12-11 | End: 2021-12-18 | Stop reason: HOSPADM

## 2021-12-11 RX ORDER — SODIUM CHLORIDE 9 MG/ML
INJECTION, SOLUTION INTRAVENOUS CONTINUOUS
Status: DISCONTINUED | OUTPATIENT
Start: 2021-12-11 | End: 2021-12-13

## 2021-12-11 RX ADMIN — HEPARIN SODIUM 5000 UNITS: 5000 INJECTION INTRAVENOUS; SUBCUTANEOUS at 09:12

## 2021-12-11 RX ADMIN — CLOPIDOGREL 75 MG: 75 TABLET, FILM COATED ORAL at 03:12

## 2021-12-11 RX ADMIN — SODIUM CHLORIDE: 0.9 INJECTION, SOLUTION INTRAVENOUS at 04:12

## 2021-12-11 RX ADMIN — ASPIRIN 81 MG CHEWABLE TABLET 81 MG: 81 TABLET CHEWABLE at 04:12

## 2021-12-11 RX ADMIN — PRAVASTATIN SODIUM 80 MG: 40 TABLET ORAL at 10:12

## 2021-12-11 RX ADMIN — ONDANSETRON 4 MG: 2 INJECTION INTRAMUSCULAR; INTRAVENOUS at 11:12

## 2021-12-11 RX ADMIN — SODIUM CHLORIDE: 0.9 INJECTION, SOLUTION INTRAVENOUS at 03:12

## 2021-12-11 RX ADMIN — GADOBUTROL 8 ML: 604.72 INJECTION INTRAVENOUS at 03:12

## 2021-12-11 RX ADMIN — SODIUM CHLORIDE: 0.9 INJECTION, SOLUTION INTRAVENOUS at 10:12

## 2021-12-11 RX ADMIN — ACETAMINOPHEN 500 MG: 500 TABLET ORAL at 04:12

## 2021-12-11 RX ADMIN — AMLODIPINE BESYLATE 10 MG: 5 TABLET ORAL at 10:12

## 2021-12-11 RX ADMIN — SODIUM CHLORIDE: 0.9 INJECTION, SOLUTION INTRAVENOUS at 11:12

## 2021-12-12 PROBLEM — I63.9 STROKE: Status: ACTIVE | Noted: 2021-12-12

## 2021-12-12 PROCEDURE — 25000003 PHARM REV CODE 250: Mod: HCNC | Performed by: INTERNAL MEDICINE

## 2021-12-12 PROCEDURE — 21400001 HC TELEMETRY ROOM: Mod: HCNC

## 2021-12-12 PROCEDURE — 25000003 PHARM REV CODE 250: Mod: HCNC | Performed by: PHYSICIAN ASSISTANT

## 2021-12-12 PROCEDURE — 63600175 PHARM REV CODE 636 W HCPCS: Mod: HCNC | Performed by: INTERNAL MEDICINE

## 2021-12-12 RX ADMIN — PRAVASTATIN SODIUM 80 MG: 40 TABLET ORAL at 10:12

## 2021-12-12 RX ADMIN — HEPARIN SODIUM 5000 UNITS: 5000 INJECTION INTRAVENOUS; SUBCUTANEOUS at 08:12

## 2021-12-12 RX ADMIN — ASPIRIN 81 MG CHEWABLE TABLET 81 MG: 81 TABLET CHEWABLE at 10:12

## 2021-12-12 RX ADMIN — SODIUM CHLORIDE: 0.9 INJECTION, SOLUTION INTRAVENOUS at 10:12

## 2021-12-12 RX ADMIN — SODIUM CHLORIDE: 0.9 INJECTION, SOLUTION INTRAVENOUS at 03:12

## 2021-12-12 RX ADMIN — Medication 6 MG: at 08:12

## 2021-12-12 RX ADMIN — SODIUM CHLORIDE: 0.9 INJECTION, SOLUTION INTRAVENOUS at 06:12

## 2021-12-12 RX ADMIN — HEPARIN SODIUM 5000 UNITS: 5000 INJECTION INTRAVENOUS; SUBCUTANEOUS at 10:12

## 2021-12-12 RX ADMIN — CLOPIDOGREL 75 MG: 75 TABLET, FILM COATED ORAL at 10:12

## 2021-12-13 LAB — BACTERIA UR CULT: ABNORMAL

## 2021-12-13 PROCEDURE — 25000003 PHARM REV CODE 250: Mod: HCNC | Performed by: INTERNAL MEDICINE

## 2021-12-13 PROCEDURE — 63600175 PHARM REV CODE 636 W HCPCS: Mod: HCNC | Performed by: INTERNAL MEDICINE

## 2021-12-13 PROCEDURE — 97110 THERAPEUTIC EXERCISES: CPT | Mod: HCNC,CQ

## 2021-12-13 PROCEDURE — 21400001 HC TELEMETRY ROOM: Mod: HCNC

## 2021-12-13 PROCEDURE — 97530 THERAPEUTIC ACTIVITIES: CPT | Mod: HCNC,CQ

## 2021-12-13 PROCEDURE — 97530 THERAPEUTIC ACTIVITIES: CPT | Mod: HCNC

## 2021-12-13 PROCEDURE — 25000003 PHARM REV CODE 250: Mod: HCNC | Performed by: PHYSICIAN ASSISTANT

## 2021-12-13 PROCEDURE — 97535 SELF CARE MNGMENT TRAINING: CPT | Mod: HCNC

## 2021-12-13 PROCEDURE — 97116 GAIT TRAINING THERAPY: CPT | Mod: HCNC,CQ

## 2021-12-13 PROCEDURE — 92523 SPEECH SOUND LANG COMPREHEN: CPT | Mod: HCNC

## 2021-12-13 RX ADMIN — SODIUM CHLORIDE: 0.9 INJECTION, SOLUTION INTRAVENOUS at 06:12

## 2021-12-13 RX ADMIN — CLOPIDOGREL 75 MG: 75 TABLET, FILM COATED ORAL at 09:12

## 2021-12-13 RX ADMIN — PRAVASTATIN SODIUM 80 MG: 40 TABLET ORAL at 09:12

## 2021-12-13 RX ADMIN — HEPARIN SODIUM 5000 UNITS: 5000 INJECTION INTRAVENOUS; SUBCUTANEOUS at 09:12

## 2021-12-13 RX ADMIN — HEPARIN SODIUM 5000 UNITS: 5000 INJECTION INTRAVENOUS; SUBCUTANEOUS at 08:12

## 2021-12-13 RX ADMIN — ASPIRIN 81 MG CHEWABLE TABLET 81 MG: 81 TABLET CHEWABLE at 09:12

## 2021-12-14 LAB
AORTIC ROOT ANNULUS: 2.52 CM
AORTIC VALVE CUSP SEPERATION: 1.82 CM
ASCENDING AORTA: 2.94 CM
AV INDEX (PROSTH): 0.7
AV MEAN GRADIENT: 4 MMHG
AV PEAK GRADIENT: 6 MMHG
AV VALVE AREA: 1.86 CM2
AV VELOCITY RATIO: 0.79
BSA FOR ECHO PROCEDURE: 1.61 M2
CV ECHO LV RWT: 0.5 CM
DOP CALC AO PEAK VEL: 1.21 M/S
DOP CALC AO VTI: 31.28 CM
DOP CALC LVOT AREA: 2.7 CM2
DOP CALC LVOT DIAMETER: 1.84 CM
DOP CALC LVOT PEAK VEL: 0.96 M/S
DOP CALC LVOT STROKE VOLUME: 58.23 CM3
DOP CALCLVOT PEAK VEL VTI: 21.91 CM
E WAVE DECELERATION TIME: 254.35 MSEC
E/A RATIO: 0.61
E/E' RATIO: 18.5 M/S
ECHO LV POSTERIOR WALL: 1.08 CM (ref 0.6–1.1)
EJECTION FRACTION: 65 %
FRACTIONAL SHORTENING: 26 % (ref 28–44)
INTERVENTRICULAR SEPTUM: 1.06 CM (ref 0.6–1.1)
IVRT: 141.87 MSEC
LA MAJOR: 4.6 CM
LA MINOR: 4.43 CM
LA WIDTH: 3.52 CM
LEFT ATRIUM SIZE: 2.95 CM
LEFT ATRIUM VOLUME INDEX: 25.4 ML/M2
LEFT ATRIUM VOLUME: 39.84 CM3
LEFT INTERNAL DIMENSION IN SYSTOLE: 3.23 CM (ref 2.1–4)
LEFT VENTRICLE DIASTOLIC VOLUME INDEX: 54.6 ML/M2
LEFT VENTRICLE DIASTOLIC VOLUME: 85.72 ML
LEFT VENTRICLE MASS INDEX: 102 G/M2
LEFT VENTRICLE SYSTOLIC VOLUME INDEX: 26.6 ML/M2
LEFT VENTRICLE SYSTOLIC VOLUME: 41.77 ML
LEFT VENTRICULAR INTERNAL DIMENSION IN DIASTOLE: 4.36 CM (ref 3.5–6)
LEFT VENTRICULAR MASS: 160.13 G
LV LATERAL E/E' RATIO: 18.5 M/S
LV SEPTAL E/E' RATIO: 18.5 M/S
MV PEAK A VEL: 1.22 M/S
MV PEAK E VEL: 0.74 M/S
MV STENOSIS PRESSURE HALF TIME: 83.45 MS
MV VALVE AREA P 1/2 METHOD: 2.64 CM2
PISA TR MAX VEL: 2.21 M/S
PV PEAK VELOCITY: 0.96 CM/S
RA MAJOR: 3.87 CM
RA PRESSURE: 3 MMHG
RA WIDTH: 2.53 CM
RIGHT VENTRICULAR END-DIASTOLIC DIMENSION: 3.05 CM
RV TISSUE DOPPLER FREE WALL SYSTOLIC VELOCITY 1 (APICAL 4 CHAMBER VIEW): 8.49 CM/S
SINUS: 2.97 CM
STJ: 2.24 CM
TDI LATERAL: 0.04 M/S
TDI SEPTAL: 0.04 M/S
TDI: 0.04 M/S
TR MAX PG: 20 MMHG
TRICUSPID ANNULAR PLANE SYSTOLIC EXCURSION: 1.7 CM
TV REST PULMONARY ARTERY PRESSURE: 23 MMHG

## 2021-12-14 PROCEDURE — 25000003 PHARM REV CODE 250: Mod: HCNC | Performed by: PHYSICIAN ASSISTANT

## 2021-12-14 PROCEDURE — 97535 SELF CARE MNGMENT TRAINING: CPT | Mod: HCNC,CO

## 2021-12-14 PROCEDURE — 21400001 HC TELEMETRY ROOM: Mod: HCNC

## 2021-12-14 PROCEDURE — 25000003 PHARM REV CODE 250: Mod: HCNC | Performed by: INTERNAL MEDICINE

## 2021-12-14 PROCEDURE — 97530 THERAPEUTIC ACTIVITIES: CPT | Mod: HCNC,CO

## 2021-12-14 PROCEDURE — 63600175 PHARM REV CODE 636 W HCPCS: Mod: HCNC | Performed by: INTERNAL MEDICINE

## 2021-12-14 RX ADMIN — ACETAMINOPHEN 500 MG: 500 TABLET ORAL at 01:12

## 2021-12-14 RX ADMIN — ACETAMINOPHEN 500 MG: 500 TABLET ORAL at 08:12

## 2021-12-14 RX ADMIN — CLOPIDOGREL 75 MG: 75 TABLET, FILM COATED ORAL at 08:12

## 2021-12-14 RX ADMIN — Medication 6 MG: at 08:12

## 2021-12-14 RX ADMIN — PRAVASTATIN SODIUM 80 MG: 40 TABLET ORAL at 08:12

## 2021-12-14 RX ADMIN — HEPARIN SODIUM 5000 UNITS: 5000 INJECTION INTRAVENOUS; SUBCUTANEOUS at 08:12

## 2021-12-14 RX ADMIN — ASPIRIN 81 MG CHEWABLE TABLET 81 MG: 81 TABLET CHEWABLE at 08:12

## 2021-12-14 RX ADMIN — ACETAMINOPHEN 500 MG: 500 TABLET ORAL at 03:12

## 2021-12-14 RX ADMIN — Medication 6 MG: at 01:12

## 2021-12-15 PROBLEM — N39.0 UTI (URINARY TRACT INFECTION): Status: ACTIVE | Noted: 2021-12-15

## 2021-12-15 PROCEDURE — 63600175 PHARM REV CODE 636 W HCPCS: Mod: HCNC | Performed by: INTERNAL MEDICINE

## 2021-12-15 PROCEDURE — 97110 THERAPEUTIC EXERCISES: CPT | Mod: HCNC,CQ

## 2021-12-15 PROCEDURE — 92507 TX SP LANG VOICE COMM INDIV: CPT | Mod: HCNC

## 2021-12-15 PROCEDURE — 97535 SELF CARE MNGMENT TRAINING: CPT | Mod: HCNC

## 2021-12-15 PROCEDURE — 25000003 PHARM REV CODE 250: Mod: HCNC | Performed by: INTERNAL MEDICINE

## 2021-12-15 PROCEDURE — 63600175 PHARM REV CODE 636 W HCPCS: Mod: HCNC | Performed by: STUDENT IN AN ORGANIZED HEALTH CARE EDUCATION/TRAINING PROGRAM

## 2021-12-15 PROCEDURE — 25000003 PHARM REV CODE 250: Mod: HCNC | Performed by: PHYSICIAN ASSISTANT

## 2021-12-15 PROCEDURE — 97116 GAIT TRAINING THERAPY: CPT | Mod: HCNC,CQ

## 2021-12-15 PROCEDURE — 97530 THERAPEUTIC ACTIVITIES: CPT | Mod: HCNC,CQ

## 2021-12-15 PROCEDURE — 21400001 HC TELEMETRY ROOM: Mod: HCNC

## 2021-12-15 RX ADMIN — ASPIRIN 81 MG CHEWABLE TABLET 81 MG: 81 TABLET CHEWABLE at 10:12

## 2021-12-15 RX ADMIN — PRAVASTATIN SODIUM 80 MG: 40 TABLET ORAL at 10:12

## 2021-12-15 RX ADMIN — CEFTRIAXONE 2 G: 2 INJECTION, SOLUTION INTRAVENOUS at 10:12

## 2021-12-15 RX ADMIN — Medication 6 MG: at 09:12

## 2021-12-15 RX ADMIN — HEPARIN SODIUM 5000 UNITS: 5000 INJECTION INTRAVENOUS; SUBCUTANEOUS at 09:12

## 2021-12-15 RX ADMIN — HEPARIN SODIUM 5000 UNITS: 5000 INJECTION INTRAVENOUS; SUBCUTANEOUS at 10:12

## 2021-12-15 RX ADMIN — CLOPIDOGREL 75 MG: 75 TABLET, FILM COATED ORAL at 10:12

## 2021-12-16 PROCEDURE — 92526 ORAL FUNCTION THERAPY: CPT | Mod: HCNC

## 2021-12-16 PROCEDURE — 97535 SELF CARE MNGMENT TRAINING: CPT | Mod: HCNC,CO

## 2021-12-16 PROCEDURE — 97530 THERAPEUTIC ACTIVITIES: CPT | Mod: HCNC,CO

## 2021-12-16 PROCEDURE — 25000003 PHARM REV CODE 250: Mod: HCNC | Performed by: PHYSICIAN ASSISTANT

## 2021-12-16 PROCEDURE — 97530 THERAPEUTIC ACTIVITIES: CPT | Mod: HCNC,CQ

## 2021-12-16 PROCEDURE — 97116 GAIT TRAINING THERAPY: CPT | Mod: HCNC,CQ

## 2021-12-16 PROCEDURE — 25000003 PHARM REV CODE 250: Mod: HCNC | Performed by: INTERNAL MEDICINE

## 2021-12-16 PROCEDURE — 21400001 HC TELEMETRY ROOM: Mod: HCNC

## 2021-12-16 PROCEDURE — 63600175 PHARM REV CODE 636 W HCPCS: Mod: HCNC | Performed by: INTERNAL MEDICINE

## 2021-12-16 PROCEDURE — 63600175 PHARM REV CODE 636 W HCPCS: Mod: HCNC | Performed by: STUDENT IN AN ORGANIZED HEALTH CARE EDUCATION/TRAINING PROGRAM

## 2021-12-16 RX ADMIN — ASPIRIN 81 MG CHEWABLE TABLET 81 MG: 81 TABLET CHEWABLE at 10:12

## 2021-12-16 RX ADMIN — HEPARIN SODIUM 5000 UNITS: 5000 INJECTION INTRAVENOUS; SUBCUTANEOUS at 08:12

## 2021-12-16 RX ADMIN — Medication 6 MG: at 08:12

## 2021-12-16 RX ADMIN — PRAVASTATIN SODIUM 80 MG: 40 TABLET ORAL at 10:12

## 2021-12-16 RX ADMIN — CLOPIDOGREL 75 MG: 75 TABLET, FILM COATED ORAL at 10:12

## 2021-12-16 RX ADMIN — HEPARIN SODIUM 5000 UNITS: 5000 INJECTION INTRAVENOUS; SUBCUTANEOUS at 10:12

## 2021-12-16 RX ADMIN — CEFTRIAXONE 2 G: 2 INJECTION, SOLUTION INTRAVENOUS at 08:12

## 2021-12-17 VITALS
TEMPERATURE: 98 F | SYSTOLIC BLOOD PRESSURE: 137 MMHG | WEIGHT: 130.06 LBS | HEART RATE: 79 BPM | OXYGEN SATURATION: 96 % | DIASTOLIC BLOOD PRESSURE: 67 MMHG | RESPIRATION RATE: 14 BRPM | BODY MASS INDEX: 26.22 KG/M2 | HEIGHT: 59 IN

## 2021-12-17 LAB — SARS-COV-2 RDRP RESP QL NAA+PROBE: NEGATIVE

## 2021-12-17 PROCEDURE — 63600175 PHARM REV CODE 636 W HCPCS: Mod: HCNC | Performed by: INTERNAL MEDICINE

## 2021-12-17 PROCEDURE — 86580 TB INTRADERMAL TEST: CPT | Mod: HCNC | Performed by: STUDENT IN AN ORGANIZED HEALTH CARE EDUCATION/TRAINING PROGRAM

## 2021-12-17 PROCEDURE — U0002 COVID-19 LAB TEST NON-CDC: HCPCS | Mod: HCNC | Performed by: STUDENT IN AN ORGANIZED HEALTH CARE EDUCATION/TRAINING PROGRAM

## 2021-12-17 PROCEDURE — 63600175 PHARM REV CODE 636 W HCPCS: Mod: HCNC | Performed by: STUDENT IN AN ORGANIZED HEALTH CARE EDUCATION/TRAINING PROGRAM

## 2021-12-17 PROCEDURE — 30200315 PPD INTRADERMAL TEST REV CODE 302: Mod: HCNC | Performed by: STUDENT IN AN ORGANIZED HEALTH CARE EDUCATION/TRAINING PROGRAM

## 2021-12-17 PROCEDURE — 25000003 PHARM REV CODE 250: Mod: HCNC | Performed by: INTERNAL MEDICINE

## 2021-12-17 PROCEDURE — 25000003 PHARM REV CODE 250: Mod: HCNC | Performed by: PHYSICIAN ASSISTANT

## 2021-12-17 PROCEDURE — 97129 THER IVNTJ 1ST 15 MIN: CPT | Mod: HCNC

## 2021-12-17 RX ADMIN — PRAVASTATIN SODIUM 80 MG: 40 TABLET ORAL at 08:12

## 2021-12-17 RX ADMIN — HEPARIN SODIUM 5000 UNITS: 5000 INJECTION INTRAVENOUS; SUBCUTANEOUS at 09:12

## 2021-12-17 RX ADMIN — CEFTRIAXONE 2 G: 2 INJECTION, SOLUTION INTRAVENOUS at 09:12

## 2021-12-17 RX ADMIN — CLOPIDOGREL 75 MG: 75 TABLET, FILM COATED ORAL at 08:12

## 2021-12-17 RX ADMIN — TUBERCULIN PURIFIED PROTEIN DERIVATIVE 5 UNITS: 5 INJECTION, SOLUTION INTRADERMAL at 11:12

## 2021-12-17 RX ADMIN — ASPIRIN 81 MG CHEWABLE TABLET 81 MG: 81 TABLET CHEWABLE at 08:12

## 2021-12-17 RX ADMIN — SENNOSIDES AND DOCUSATE SODIUM 1 TABLET: 50; 8.6 TABLET ORAL at 09:12

## 2021-12-17 RX ADMIN — HEPARIN SODIUM 5000 UNITS: 5000 INJECTION INTRAVENOUS; SUBCUTANEOUS at 08:12

## 2022-01-04 ENCOUNTER — TELEPHONE (OUTPATIENT)
Dept: FAMILY MEDICINE | Facility: CLINIC | Age: 77
End: 2022-01-04
Payer: MEDICARE

## 2022-01-05 ENCOUNTER — OFFICE VISIT (OUTPATIENT)
Dept: FAMILY MEDICINE | Facility: CLINIC | Age: 77
End: 2022-01-05
Payer: MEDICARE

## 2022-01-05 VITALS
HEIGHT: 59 IN | TEMPERATURE: 98 F | WEIGHT: 130.06 LBS | SYSTOLIC BLOOD PRESSURE: 150 MMHG | OXYGEN SATURATION: 95 % | BODY MASS INDEX: 26.22 KG/M2 | HEART RATE: 100 BPM | DIASTOLIC BLOOD PRESSURE: 66 MMHG

## 2022-01-05 DIAGNOSIS — G93.40 ENCEPHALOPATHY: ICD-10-CM

## 2022-01-05 DIAGNOSIS — G91.2 NPH (NORMAL PRESSURE HYDROCEPHALUS): ICD-10-CM

## 2022-01-05 DIAGNOSIS — R93.0 ABNORMAL CT SCAN OF HEAD: ICD-10-CM

## 2022-01-05 DIAGNOSIS — I63.9 CEREBROVASCULAR ACCIDENT (CVA), UNSPECIFIED MECHANISM: ICD-10-CM

## 2022-01-05 DIAGNOSIS — R26.2 DIFFICULTY WALKING: ICD-10-CM

## 2022-01-05 DIAGNOSIS — I10 ESSENTIAL HYPERTENSION: ICD-10-CM

## 2022-01-05 DIAGNOSIS — Z09 HOSPITAL DISCHARGE FOLLOW-UP: Primary | ICD-10-CM

## 2022-01-05 PROCEDURE — 1159F MED LIST DOCD IN RCRD: CPT | Mod: HCNC,CPTII,S$GLB, | Performed by: NURSE PRACTITIONER

## 2022-01-05 PROCEDURE — 1100F PTFALLS ASSESS-DOCD GE2>/YR: CPT | Mod: HCNC,CPTII,S$GLB, | Performed by: NURSE PRACTITIONER

## 2022-01-05 PROCEDURE — 3078F DIAST BP <80 MM HG: CPT | Mod: HCNC,CPTII,S$GLB, | Performed by: NURSE PRACTITIONER

## 2022-01-05 PROCEDURE — 3077F PR MOST RECENT SYSTOLIC BLOOD PRESSURE >= 140 MM HG: ICD-10-PCS | Mod: HCNC,CPTII,S$GLB, | Performed by: NURSE PRACTITIONER

## 2022-01-05 PROCEDURE — 99214 PR OFFICE/OUTPT VISIT, EST, LEVL IV, 30-39 MIN: ICD-10-PCS | Mod: HCNC,S$GLB,, | Performed by: NURSE PRACTITIONER

## 2022-01-05 PROCEDURE — 1126F PR PAIN SEVERITY QUANTIFIED, NO PAIN PRESENT: ICD-10-PCS | Mod: HCNC,CPTII,S$GLB, | Performed by: NURSE PRACTITIONER

## 2022-01-05 PROCEDURE — 99214 OFFICE O/P EST MOD 30 MIN: CPT | Mod: HCNC,S$GLB,, | Performed by: NURSE PRACTITIONER

## 2022-01-05 PROCEDURE — 3078F PR MOST RECENT DIASTOLIC BLOOD PRESSURE < 80 MM HG: ICD-10-PCS | Mod: HCNC,CPTII,S$GLB, | Performed by: NURSE PRACTITIONER

## 2022-01-05 PROCEDURE — 1100F PR PT FALLS ASSESS DOC 2+ FALLS/FALL W/INJURY/YR: ICD-10-PCS | Mod: HCNC,CPTII,S$GLB, | Performed by: NURSE PRACTITIONER

## 2022-01-05 PROCEDURE — 1160F PR REVIEW ALL MEDS BY PRESCRIBER/CLIN PHARMACIST DOCUMENTED: ICD-10-PCS | Mod: HCNC,CPTII,S$GLB, | Performed by: NURSE PRACTITIONER

## 2022-01-05 PROCEDURE — 3288F PR FALLS RISK ASSESSMENT DOCUMENTED: ICD-10-PCS | Mod: HCNC,CPTII,S$GLB, | Performed by: NURSE PRACTITIONER

## 2022-01-05 PROCEDURE — 99499 UNLISTED E&M SERVICE: CPT | Mod: S$GLB,,, | Performed by: NURSE PRACTITIONER

## 2022-01-05 PROCEDURE — 1111F DSCHRG MED/CURRENT MED MERGE: CPT | Mod: HCNC,CPTII,S$GLB, | Performed by: NURSE PRACTITIONER

## 2022-01-05 PROCEDURE — 1160F RVW MEDS BY RX/DR IN RCRD: CPT | Mod: HCNC,CPTII,S$GLB, | Performed by: NURSE PRACTITIONER

## 2022-01-05 PROCEDURE — 1126F AMNT PAIN NOTED NONE PRSNT: CPT | Mod: HCNC,CPTII,S$GLB, | Performed by: NURSE PRACTITIONER

## 2022-01-05 PROCEDURE — 3077F SYST BP >= 140 MM HG: CPT | Mod: HCNC,CPTII,S$GLB, | Performed by: NURSE PRACTITIONER

## 2022-01-05 PROCEDURE — 3288F FALL RISK ASSESSMENT DOCD: CPT | Mod: HCNC,CPTII,S$GLB, | Performed by: NURSE PRACTITIONER

## 2022-01-05 PROCEDURE — 99999 PR PBB SHADOW E&M-EST. PATIENT-LVL V: CPT | Mod: PBBFAC,HCNC,, | Performed by: NURSE PRACTITIONER

## 2022-01-05 PROCEDURE — 1111F PR DISCHARGE MEDS RECONCILED W/ CURRENT OUTPATIENT MED LIST: ICD-10-PCS | Mod: HCNC,CPTII,S$GLB, | Performed by: NURSE PRACTITIONER

## 2022-01-05 PROCEDURE — 99999 PR PBB SHADOW E&M-EST. PATIENT-LVL V: ICD-10-PCS | Mod: PBBFAC,HCNC,, | Performed by: NURSE PRACTITIONER

## 2022-01-05 PROCEDURE — 1159F PR MEDICATION LIST DOCUMENTED IN MEDICAL RECORD: ICD-10-PCS | Mod: HCNC,CPTII,S$GLB, | Performed by: NURSE PRACTITIONER

## 2022-01-05 PROCEDURE — 99499 RISK ADDL DX/OHS AUDIT: ICD-10-PCS | Mod: S$GLB,,, | Performed by: NURSE PRACTITIONER

## 2022-01-05 RX ORDER — AMLODIPINE BESYLATE 10 MG/1
10 TABLET ORAL DAILY
Qty: 30 TABLET | Refills: 11 | Status: SHIPPED | OUTPATIENT
Start: 2022-01-05 | End: 2023-05-25 | Stop reason: CLARIF

## 2022-01-05 NOTE — PROGRESS NOTES
"Chief Complaint  Chief Complaint   Patient presents with    Hospital Follow Up       HPI    HPI   Sophia Landis is a 76 y.o female with multiple medical problems as listed below. The patient presents to clinic today for hospital follow-up. The patient was admitted to the hospital on 12/10/2021 for a stroke. The patient was discharged on 12/17/2021.    Hospital course as follows:  HPI:   Sophia Landis 76 y.o. female with HTN, HLD, PAD, GERD presents to the hospital with a chief complaint of confusion. Her  reports for the last week she has had increasing difficulty ambulating and problems with her memory. Beginning 5 days ago she has become unable to ambulate. She has baseline problems with incontinence and he reports mild memory impairments, but over the last week the memory impairments have worsened. She does not smoke or drink. She denies fever, chest pain, SOB, nausea, vomiting, abdominal pain, leg swelling, facial droop, slurred speech, difficulty swallowing. The  notes decreased appetite for the last week.      In the ED, UDS negative, troponin negative, TSH within normal limits, CT head with enlargements of the ventricles concerning for NPH.         * No surgery found *       Hospital Course:   Ms Landis presented with urinary incontinence, memory loss and unstable gait for months. CT brain suggests normal pressure hydrocephalus. Neurology consulted and home aspirin/clopidogrel held in anticipation for lumbar puncture. An MRI with IV contrast obtained to rule out mass. This was negative for a mass but positive for "acute infarcts the left posterolateral caudate and right periventricular white matter." Resumed aspirin and clopidogrel, continued statin and started intrvenous fluids. PT/OT consulted. Recommend SNF. Family will discuss this to see if they prefer home health vs SNF. Workup negative for illicit drugs, troponin negative. TSH WNL. Negative COVID. Abnormal urinalysis however patient is " AAO x 3, afebrile, non toxic appearing and with no leukocytosis. Would start antibioitcs if signs of sepsis. Carotid ultrasound and Echo ordered. No arrhythmias thus far. Will treat UTI given AMS as it may be contributing, Pan-sen E.coli. Working on SNF placement- accepted on Friday, will dispo then unless one accepted prior to then.      ROS:  General: Negative for syncope, fatigue, fevers or chills.  HEENT: Negative for headaches, change in vision, difficulty swallowing.  Cardiac: Negative for chest pain, palpitations, orthopnea, or PND.  Pulmonary: Negative for dyspnea, cough, hemoptysis, or wheezing.  GI: Negative for abdominal distention, or pain. No N/V/C/D.  Heme/Lymphatic: Negative for night sweats, easy bruising, or LAD.  Endocrine: Negative for polyuria or Polydipsia.   MSK: Negative for claudication, arthralgias, or myalgias.  Vascular: Negative for claudication, calf pain with walking, or leg cramping.  Skin: Negative for cyanosis or rash.  Neuro: Negative for focal weakness or numbness.  Psych: Negative for depression, no abnormal thinking.               Vitals:     12/16/21 2029 12/16/21 2316 12/17/21 0445 12/17/21 0745   BP: 138/63 137/65 129/60 (!) 145/65   BP Location: Left arm Right arm Left arm Right leg   Patient Position: Lying Lying Lying Lying   Pulse: 80 76 77 76   Resp: 19 18 19 18   Temp: 98.6 °F (37 °C) 98 °F (36.7 °C) 97.9 °F (36.6 °C) 97.9 °F (36.6 °C)   TempSrc: Oral Oral Oral Oral   SpO2: 95% 95% 96% 95%   Weight:           Height:                 Body mass index is 26.27 kg/m².           PHYSICAL EXAM:  GENERAL APPEARANCE: Well developed, well nourished, alert and cooperative, and appears to be in no acute distress.  HEENT:     HEAD: NC/AT     EYES: PERRL, EOMI.  Vision is grossly intact.  NECK: Neck supple, non-tender without LAD, masses or thyromegaly.  CARDIAC: Normal S1 and S2. No S3, S4 or murmurs. Rhythm is regular. There is no peripheral edema, cyanosis or pallor. Extremities  are warm and well perfused. Capillary refill is less than 2 seconds. No carotid bruits.  LUNGS: Clear to auscultation and percussion without rales, rhonchi, wheezing or diminished breath sounds.  ABDOMEN: Positive bowel sounds. Soft, nondistended, nontender. No guarding or rebound. No masses.  MSK: No joint erythema or tenderness. Normal muscular development. Normal gait.  BACK: Examination of the spine reveals normal gait and posture, no spinal deformity  EXTREMITIES: No significant deformity or joint abnormality. No edema. Peripheral pulses intact. No varicosities.  LOWER EXTREMITY: Examination of both feet reveals all toes to be normal in size and symmetry, normal range of motion, normal sensation with distal capillary filling of less than 2 seconds  swelling, discoloration, crepitus, weakness or deformity.  NEUROLOGICAL: CN II-XII intact. Strength and sensation symmetric and intact throughout. Reflexes 2+ throughout. Cerebellar testing normal.  SKIN: Skin normal color, texture and turgor with no lesions or eruptions.  PSYCHIATRIC: The mental examination revealed the patient was oriented to person, place, and time.         Goals of Care Treatment Preferences:  Code Status: Full Code        Consults:           Consults (From admission, onward)         Status Ordering Provider       Inpatient consult to Neurology  Once        Provider:  Adebayo Powers MD    Completed NE VELASQUEZ       Inpatient consult to Social Work  Once        Provider:  (Not yet assigned)    NE Camarena             No new Assessment & Plan notes have been filed under this hospital service since the last note was generated.  Service: Hospital Medicine            Final Active Diagnoses:     Diagnosis Date Noted POA    PRINCIPAL PROBLEM:  Stroke [I63.9] 12/12/2021 Yes    UTI (urinary tract infection) [N39.0] 12/15/2021 Yes    NPH (normal pressure hydrocephalus) [G91.2] 12/10/2021 Yes    PAD (peripheral artery disease)  [I73.9] 05/31/2013 Yes       Chronic    Hyperlipidemia [E78.5] 07/14/2012 Yes       Chronic    Essential hypertension [I10] 07/14/2012 Yes       Chronic       Problems Resolved During this Admission:         Discharged Condition: good     Disposition: Skilled Nursing Facility     Follow Up:  Patient Instructions:             Ambulatory referral/consult to Internal Medicine    Standing Status: Future   Referral Priority: Routine Referral Type: Consultation    Referral Reason: Specialty Services Required    Requested Specialty: Internal Medicine    Number of Visits Requested: 1            Ambulatory referral/consult to Neurology    Standing Status: Future   Referral Priority: Routine Referral Type: Consultation    Referral Reason: Specialty Services Required    Requested Specialty: Neurology    Number of Visits Requested: 1                Recent Results (from the past 100 hour(s))   Echo     Collection Time: 12/14/21  9:06 AM   Result Value Ref Range     BSA 1.61 m2     TDI SEPTAL 0.04 m/s     LV LATERAL E/E' RATIO 18.50 m/s     LV SEPTAL E/E' RATIO 18.50 m/s     LA WIDTH 3.52 cm     AORTIC VALVE CUSP SEPERATION 1.82 cm     TDI LATERAL 0.04 m/s     PV PEAK VELOCITY 0.96 cm/s     LVIDd 4.36 3.5 - 6.0 cm     IVS 1.06 0.6 - 1.1 cm     Posterior Wall 1.08 0.6 - 1.1 cm     Ao root annulus 2.52 cm     LVIDs 3.23 2.1 - 4.0 cm     FS 26 28 - 44 %     LA volume 39.84 cm3     Sinus 2.97 cm     STJ 2.24 cm     Ascending aorta 2.94 cm     LV mass 160.13 g     LA size 2.95 cm     RVDD 3.05 cm     TAPSE 1.70 cm     RV S' 8.49 cm/s     Left Ventricle Relative Wall Thickness 0.50 cm     AV mean gradient 4 mmHg     AV valve area 1.86 cm2     AV Velocity Ratio 0.79       AV index (prosthetic) 0.70       MV valve area p 1/2 method 2.64 cm2     E/A ratio 0.61       Mean e' 0.04 m/s     E wave deceleration time 254.35 msec     IVRT 141.87 msec     LVOT diameter 1.84 cm     LVOT area 2.7 cm2     LVOT peak tia 0.96 m/s     LVOT peak VTI  21.91 cm     Ao peak manoj 1.21 m/s     Ao VTI 31.28 cm     LVOT stroke volume 58.23 cm3     AV peak gradient 6 mmHg     E/E' ratio 18.50 m/s     MV Peak E Manoj 0.74 m/s     TR Max Manoj 2.21 m/s     MV stenosis pressure 1/2 time 83.45 ms     MV Peak A Manoj 1.22 m/s     LV Systolic Volume 41.77 mL     LV Systolic Volume Index 26.6 mL/m2     LV Diastolic Volume 85.72 mL     LV Diastolic Volume Index 54.60 mL/m2     LA Volume Index 25.4 mL/m2     LV Mass Index 102 g/m2     RA Major Axis 3.87 cm     Left Atrium Minor Axis 4.43 cm     Left Atrium Major Axis 4.60 cm     Triscuspid Valve Regurgitation Peak Gradient 20 mmHg     RA Width 2.53 cm     Right Atrial Pressure (from IVC) 3 mmHg     EF 65 %     TV rest pulmonary artery pressure 23 mmHg                  Microbiology Results (last 7 days)      Procedure Component Value Units Date/Time     Urine culture [[id number]]  (Abnormal)  (Susceptibility) Collected: 12/10/21 1655     Order Status: Completed Specimen: Urine Updated: 12/13/21 0831       Urine Culture, Routine ESCHERICHIA COLI  >100,000 cfu/ml       Narrative:       Specimen Source->Urine                 Imaging Results                   X-Ray Chest AP Portable (Final result)  Result time 12/10/21 15:12:09                Final result by Kenia Atkinson MD (12/10/21 15:12:09)                               Impression:        As above        Electronically signed by:     Kenia Atkinson MD  Date:                                            12/10/2021  Time:                                            15:12                         Narrative:     EXAMINATION:  XR CHEST AP PORTABLE     CLINICAL HISTORY:  confusion, weakness;     TECHNIQUE:  Single frontal view of the chest was performed.     COMPARISON:  October 9, 2021     FINDINGS:  Patient is slightly rotated.  Cardiac size is normal.  The osseous structures demonstrate no significant abnormality.  There is mild calcification along the wall of the aorta.  Right lung is  well aerated and appears clear.  The left costophrenic angle laterally is obscured.  This is favored to relate to overlying soft tissues and the patient positioning rather than a combination of pleural fluid and atelectasis/consolidation as the appearance is fairly similar to prior study.  This can be correlated with any relevant clinical symptomatology and repeat imaging with better position as warranted.                                                  CT Head Without Contrast (Final result)  Result time 12/10/21 15:15:09                Final result by Curt Stock MD (12/10/21 15:15:09)                               Impression:        Disproportionate dilatation of the ventricular system compared to degree of sulcal prominence.  The findings may be seen with normal pressure hydrocephalus.  Suggest correlation with additional clinical findings.     Old infarctions in the right thalamus and left internal capsule.        Electronically signed by:     Curt Stock MD  Date:                                            12/10/2021  Time:                                            15:15                         Narrative:     EXAMINATION:  CT HEAD WITHOUT CONTRAST     CLINICAL HISTORY:  Memory loss;Mental status change, unknown cause;     TECHNIQUE:  Low dose axial images were obtained through the head.  Coronal and sagittal reformations were also performed. Contrast was not administered.     COMPARISON:  CT dated 11/03/2004.     FINDINGS:  The subcutaneous tissues are unremarkable.  The bony calvarium is intact.  The paranasal sinuses are unremarkable.  The mastoid air cells are clear.  The orbits and intraorbital contents are within normal limits.     The craniocervical junction is intact.  There are no extra-axial fluid collections.  There is no evidence of intracranial hemorrhage.  There is disproportionate dilatation of the ventricular system compared to the degree of sulcal prominence.  There are extensive  hypoattenuation within the periventricular and subcortical white matter.  There is an old infarction in the right thalamus.  There is also an old infarction involving the posterior limb of the left internal capsule.  There is no dense vessel sign.  There is no evidence of mass effect.                                                       Pending Diagnostic Studies:      None          Medications:  Reconciled Home Medications:       Medication List       START taking these medications    cefTRIAXone 2 g/50 mL Pgbk IVPB  Commonly known as: ROCEPHIN  Inject 50 mLs (2 g total) into the vein once daily. for 2 days          CONTINUE taking these medications    albuterol 90 mcg/actuation inhaler  Commonly known as: PROVENTIL/VENTOLIN HFA  Inhale 1-2 puffs into the lungs every 4 to 6 hours as needed for Wheezing or Shortness of Breath (chest tightness).      amLODIPine 10 MG tablet  Commonly known as: NORVASC  Take 1 tablet (10 mg total) by mouth once daily.      aspirin 81 MG EC tablet  Commonly known as: ECOTRIN  Take 1 tablet (81 mg total) by mouth once daily.      calcium carbonate 600 mg calcium (1,500 mg) Tab  Commonly known as: OS-PAOLO  Take 1 tablet (600 mg total) by mouth 2 (two) times daily with meals.      cilostazoL 100 MG Tab  Commonly known as: PLETAL  Take 1 tablet (100 mg total) by mouth 2 (two) times daily.      clopidogreL 75 mg tablet  Commonly known as: PLAVIX  Take 1 tablet (75 mg total) by mouth once daily.      hydroCHLOROthiazide 25 MG tablet  Commonly known as: HYDRODIURIL  Take 1 tablet (25 mg total) by mouth once daily.      metoprolol succinate 50 MG 24 hr tablet  Commonly known as: TOPROL-XL  Take 1 tablet (50 mg total) by mouth once daily.      nitroGLYCERIN 0.4 MG SL tablet  Commonly known as: NITROSTAT  Place 1 tablet (0.4 mg total) under the tongue every 5 (five) minutes as needed for Chest pain.      omeprazole 20 MG capsule  Commonly known as: PRILOSEC  Take 2 capsules (40 mg total) by  mouth once daily.      pravastatin 80 MG tablet  Commonly known as: PRAVACHOL  Take 1 tablet (80 mg total) by mouth once daily.      sertraline 100 MG tablet  Commonly known as: ZOLOFT  Take 1 tablet (100 mg total) by mouth once daily.          STOP taking these medications    estrogens (conjugated) 0.3 MG tablet  Commonly known as: PREMARIN      meloxicam 15 MG tablet  Commonly known as: MOBIC                Indwelling Lines/Drains at time of discharge:       Lines/Drains/Airways            Drain                     Female External Urinary Catheter 12/11/21 2030 5 days                     Time spent on the discharge of patient: 30 minutes           Shyam Lockwood MD  Department of Hospital Medicine  [facility]    Patient since discharge:  The patient was discharged from the [facility] and went to [facility] The patient was there for about 9 day. Per patient and family, the patient had about 3 days of PT/OT. The patient has been doing ok at home but has been having difficulty ambulating and has been weak and tired. The patient and family has also been having difficulty with knowing which medication she is supposed to be taking. The patients  states that it has been difficult helping the patient at home and that he is interested in home health to help with activities at home. The patient has short-term memory loss but is otherwise ok mentally.      PAST MEDICAL HISTORY:  Past Medical History:   Diagnosis Date    Cataract     Chronic diarrhea 11/26/2018    Depression     GERD (gastroesophageal reflux disease)     History of colonic polyps 11/26/2018    Hyperlipidemia     Hypertension     Menopausal syndrome     PAD (peripheral artery disease)     s/p stent       PAST SURGICAL HISTORY:  Past Surgical History:   Procedure Laterality Date    APPENDECTOMY      COLONOSCOPY N/A 5/20/2019    Procedure: COLONOSCOPY;  Surgeon: Kenia Arias MD;  Location: [facility];  Service:  Endoscopy;  Laterality: N/A;  RX PLETAL ok to hold    HYSTERECTOMY      MONTY with BSO    ILIAC ARTERY STENT Bilateral        SOCIAL HISTORY:  Social History     Socioeconomic History    Marital status:    Tobacco Use    Smoking status: Former Smoker    Smokeless tobacco: Former User    Tobacco comment: .  Retired  at [employer].   Substance and Sexual Activity    Alcohol use: No    Drug use: No    Sexual activity: Yes     Partners: Male     Comment:        FAMILY HISTORY:  Family History   Problem Relation Age of Onset    Amblyopia Mother     Cataracts Mother     Hypertension Mother     Thyroid disease Mother     Cancer Father     Strabismus Sister     Cancer Brother     Diabetes Maternal Aunt     Cancer Maternal Uncle     Diabetes Maternal Uncle     Cancer Maternal Uncle     Cancer Maternal Uncle     Cancer Maternal Uncle     Blindness Neg Hx     Glaucoma Neg Hx     Macular degeneration Neg Hx     Retinal detachment Neg Hx     Stroke Neg Hx        ALLERGIES AND MEDICATIONS: updated and reviewed.  Review of patient's allergies indicates:   Allergen Reactions    Antihistamines - alkylamine Nausea Only    Ciprofloxacin Nausea Only    Penicillins Hives     Current Outpatient Medications   Medication Sig Dispense Refill    aspirin (ECOTRIN) 81 MG EC tablet Take 1 tablet (81 mg total) by mouth once daily. 30 tablet 11    clopidogreL (PLAVIX) 75 mg tablet Take 1 tablet (75 mg total) by mouth once daily. 30 tablet 11    hydroCHLOROthiazide (HYDRODIURIL) 25 MG tablet Take 1 tablet (25 mg total) by mouth once daily. 30 tablet 11    omeprazole (PRILOSEC) 20 MG capsule Take 2 capsules (40 mg total) by mouth once daily. 180 capsule 3    pravastatin (PRAVACHOL) 80 MG tablet Take 1 tablet (80 mg total) by mouth once daily. 30 tablet 11    sertraline (ZOLOFT) 100 MG tablet Take 1 tablet (100 mg total) by mouth once daily. 90 tablet 3    albuterol 90  mcg/actuation inhaler Inhale 1-2 puffs into the lungs every 4 to 6 hours as needed for Wheezing or Shortness of Breath (chest tightness). (Patient not taking: No sig reported) 1 Inhaler 6    amLODIPine (NORVASC) 10 MG tablet Take 1 tablet (10 mg total) by mouth once daily. 30 tablet 11    calcium carbonate (OS-PAOLO) 600 mg calcium (1,500 mg) Tab Take 1 tablet (600 mg total) by mouth 2 (two) times daily with meals.      cilostazoL (PLETAL) 100 MG Tab Take 1 tablet (100 mg total) by mouth 2 (two) times daily. 60 tablet 11    metoprolol succinate (TOPROL-XL) 50 MG 24 hr tablet Take 1 tablet (50 mg total) by mouth once daily. 30 tablet 11    nitroGLYCERIN (NITROSTAT) 0.4 MG SL tablet Place 1 tablet (0.4 mg total) under the tongue every 5 (five) minutes as needed for Chest pain. (Patient not taking: Reported on 8/11/2020) 25 tablet 0     No current facility-administered medications for this visit.       Patient Care Team:  Sarthak Johnson MD as PCP - General (Internal Medicine)  Ev Robledo MA as Care Coordinator    ROS  Review of Systems   Constitutional: Negative for chills, fatigue, fever and unexpected weight change.   HENT: Negative for congestion, ear discharge, ear pain and postnasal drip.    Eyes: Negative for photophobia, pain and visual disturbance.   Respiratory: Negative for cough, shortness of breath and wheezing.    Cardiovascular: Negative for chest pain, palpitations and leg swelling.   Gastrointestinal: Negative for abdominal pain, constipation, diarrhea, nausea and vomiting.   Genitourinary: Negative for dysuria, frequency, urgency and vaginal discharge.   Musculoskeletal: Positive for gait problem (patient is in wheelchair). Negative for back pain, joint swelling and neck stiffness.   Skin: Negative for rash.   Neurological: Positive for weakness. Negative for headaches.   Psychiatric/Behavioral: Negative for dysphoric mood and sleep disturbance. The patient is not nervous/anxious.   "      Physical Exam  Vitals:    01/05/22 1404   BP: (!) 150/66   Pulse: 100   Temp: 97.8 °F (36.6 °C)   TempSrc: Oral   SpO2: 95%   Weight: 59 kg (130 lb 1.1 oz)   Height: 4' 11" (1.499 m)    Body mass index is 26.27 kg/m².  Weight: 59 kg (130 lb 1.1 oz)   Height: 4' 11" (149.9 cm)     Physical Exam  Constitutional:       General: Vital signs are normal.      Appearance: She is well-developed and well-nourished.   HENT:      Head: Normocephalic and atraumatic.      Right Ear: External ear normal.      Left Ear: External ear normal.      Nose: Nose normal.      Mouth/Throat:      Mouth: Oropharynx is clear and moist.   Eyes:      Extraocular Movements: EOM normal.      Conjunctiva/sclera: Conjunctivae normal.      Pupils: Pupils are equal, round, and reactive to light.   Neck:      Thyroid: No thyromegaly.   Cardiovascular:      Rate and Rhythm: Normal rate and regular rhythm.      Pulses: Intact distal pulses.   Pulmonary:      Effort: Pulmonary effort is normal.      Breath sounds: Normal breath sounds. No wheezing.   Abdominal:      General: Bowel sounds are normal.      Palpations: Abdomen is soft. There is no hepatomegaly or splenomegaly.   Genitourinary:     Vagina: Normal. No vaginal discharge.      Uterus: Normal.    Musculoskeletal:         General: Normal range of motion.      Cervical back: Normal range of motion and neck supple.   Lymphadenopathy:      Cervical: No cervical adenopathy.   Skin:     General: Skin is warm and dry.      Findings: No rash.   Neurological:      Mental Status: She is alert. Mental status is at baseline.      Cranial Nerves: No cranial nerve deficit.      Motor: Weakness present. No tremor or abnormal muscle tone.      Gait: Gait abnormal.      Deep Tendon Reflexes: Reflexes normal.   Psychiatric:         Mood and Affect: Mood and affect normal.         Behavior: Behavior normal.         Cognition and Memory: Cognition and memory normal.         Health Maintenance       Date Due " Completion Date    Shingles Vaccine (1 of 2) Never done ---    DEXA SCAN 07/18/2019 7/18/2017    TETANUS VACCINE 05/05/2021 5/5/2011 (Done)    Override on 5/5/2011: Done    Influenza Vaccine (1) 09/01/2021 10/21/2019    Override on 10/21/2019: Done    Colonoscopy 05/20/2024 5/20/2019    Lipid Panel 08/11/2025 8/11/2020      Health maintenance reviewed at this time. Patient did not want to address at this time.     Assessment & Plan  Hospital discharge follow-up/Encephalopathy/Cerebrovascular accident (CVA), unspecified mechanism/Abnormal CT scan of head/Difficulty walking/NPH (normal pressure hydrocephalus)    -     Ambulatory referral/consult to Home Health; Future; Expected date: 01/06/2022    Home health ordered at this visit.   Attempted to facilitate an appointment with neurology. Earliest appointment available in May. The patient is on the wait list and I will continue to try and get the patient evaluated sooner.    Scheduled the patient a f/u with PCP in february for further evaluation.      Essential hypertension  -     amLODIPine (NORVASC) 10 MG tablet; Take 1 tablet (10 mg total) by mouth once daily.  Dispense: 30 tablet; Refill: 11     Patient was not taking Norvasc. Medication reordered at this time. The patients BP was elevated at this visit. Due to difficulty with transportation and ambulation will not have patient return for nurse BP visit. BP can be checked at next visit with PCP in February.     Follow-up: Follow up in about 1 month (around 2/5/2022) for visit with PCP.

## 2022-01-06 DIAGNOSIS — F33.0 MILD RECURRENT MAJOR DEPRESSION: Chronic | ICD-10-CM

## 2022-01-06 DIAGNOSIS — E78.5 HYPERLIPIDEMIA, UNSPECIFIED HYPERLIPIDEMIA TYPE: Chronic | ICD-10-CM

## 2022-01-06 DIAGNOSIS — I73.9 PAD (PERIPHERAL ARTERY DISEASE): ICD-10-CM

## 2022-01-06 DIAGNOSIS — K21.9 GASTROESOPHAGEAL REFLUX DISEASE: ICD-10-CM

## 2022-01-06 DIAGNOSIS — I10 ESSENTIAL HYPERTENSION: Chronic | ICD-10-CM

## 2022-01-06 PROCEDURE — G0180 MD CERTIFICATION HHA PATIENT: HCPCS | Mod: ,,, | Performed by: INTERNAL MEDICINE

## 2022-01-06 PROCEDURE — G0180 PR HOME HEALTH MD CERTIFICATION: ICD-10-PCS | Mod: ,,, | Performed by: INTERNAL MEDICINE

## 2022-01-06 NOTE — TELEPHONE ENCOUNTER
No new care gaps identified.  Powered by [employer]. Reference number: [id number].   1/06/2022 10:45:18 AM CST

## 2022-01-06 NOTE — TELEPHONE ENCOUNTER
----- Message from King Dumont MA sent at 1/6/2022 10:18 AM CST -----  Type: Patient Call Back    Who called:[facility]    What is the request in detail:needs a nurse to call him in reference to the pt.'s medication..     Can the clinic reply by [facility]?no    Would the patient rather a call back or a response via [facility]? yes    Best call back number:[phone]

## 2022-01-07 RX ORDER — OMEPRAZOLE 20 MG/1
40 CAPSULE, DELAYED RELEASE ORAL DAILY
Qty: 180 CAPSULE | Refills: 3 | Status: SHIPPED | OUTPATIENT
Start: 2022-01-07 | End: 2023-01-04

## 2022-01-07 RX ORDER — CLOPIDOGREL BISULFATE 75 MG/1
75 TABLET ORAL DAILY
Qty: 90 TABLET | Refills: 3 | Status: SHIPPED | OUTPATIENT
Start: 2022-01-07 | End: 2023-01-04

## 2022-01-07 RX ORDER — CALCIUM CARBONATE 600 MG
600 TABLET ORAL 2 TIMES DAILY WITH MEALS
Qty: 180 TABLET | Refills: 1 | Status: SHIPPED | OUTPATIENT
Start: 2022-01-07 | End: 2023-05-25 | Stop reason: CLARIF

## 2022-01-07 RX ORDER — ASPIRIN 81 MG/1
81 TABLET ORAL DAILY
Qty: 90 TABLET | Refills: 1 | Status: SHIPPED | OUTPATIENT
Start: 2022-01-07 | End: 2022-06-16

## 2022-01-07 RX ORDER — HYDROCHLOROTHIAZIDE 25 MG/1
25 TABLET ORAL DAILY
Qty: 90 TABLET | Refills: 1 | Status: SHIPPED | OUTPATIENT
Start: 2022-01-07 | End: 2022-05-31 | Stop reason: SDUPTHER

## 2022-01-07 RX ORDER — SERTRALINE HYDROCHLORIDE 100 MG/1
100 TABLET, FILM COATED ORAL DAILY
Qty: 90 TABLET | Refills: 3 | Status: SHIPPED | OUTPATIENT
Start: 2022-01-07 | End: 2022-10-21

## 2022-01-07 RX ORDER — METOPROLOL SUCCINATE 50 MG/1
50 TABLET, EXTENDED RELEASE ORAL DAILY
Qty: 90 TABLET | Refills: 1 | Status: SHIPPED | OUTPATIENT
Start: 2022-01-07 | End: 2022-08-16

## 2022-01-07 RX ORDER — PRAVASTATIN SODIUM 80 MG/1
80 TABLET ORAL DAILY
Qty: 90 TABLET | Refills: 1 | Status: SHIPPED | OUTPATIENT
Start: 2022-01-07 | End: 2022-05-31

## 2022-01-19 ENCOUNTER — DOCUMENT SCAN (OUTPATIENT)
Dept: HOME HEALTH SERVICES | Facility: HOSPITAL | Age: 77
End: 2022-01-19
Payer: MEDICARE

## 2022-01-21 ENCOUNTER — EXTERNAL HOME HEALTH (OUTPATIENT)
Dept: HOME HEALTH SERVICES | Facility: HOSPITAL | Age: 77
End: 2022-01-21
Payer: MEDICARE

## 2022-02-01 NOTE — PROGRESS NOTES
"Chief complaint:  Follow-up from the hospital, last seen May 2019    76-year-old white female last seen a couple of years ago.  mammo 2018?   colonoscopy w 1 Tubular adenoma 5/19 -5 yrs.  We discussed mammogram.  She did not like the procedure and she declines.  Perhaps in the future when she recovers more so from her stroke.  Similarly with bone density.      She is here with her  who is her primary caregiver right now        ROS:   CONST: weight stable. EYES: no vision change. ENT: no sore throat. CV: no chest pain w/ exertion. RESP: no shortness of breath. GI: no nausea, vomiting, diarrhea. No dysphagia. : no urinary issues. MUSCULOSKELETAL: no new myalgias or arthralgias. SKIN: no new changes. NEURO: no focal deficits. PSYCH: no new issues. ENDOCRINE: no polyuria. HEME: no lymph nodes. ALLERGY: no general pruritis.om          Past Medical History:   Diagnosis Date    Cataract     Chronic diarrhea 11/26/2018    Depression     GERD (gastroesophageal reflux disease)     History of colonic polyps - colonoscopy w 1 Tubular adenoma 5/19 -5 yrs 11/26/2018    Hyperlipidemia     Hypertension     Menopausal syndrome     PAD (peripheral artery disease)     s/p stent   Acute strokes December 2021-acute infarcts the left posterolateral caudate and right periventricular white matter."  Osteopenia 2017    Past Surgical History:   Procedure Laterality Date    APPENDECTOMY      COLONOSCOPY N/A 5/20/2019    Procedure: COLONOSCOPY;  Surgeon: Kenia Arias MD;  Location: [facility];  Service: Endoscopy;  Laterality: N/A;  RX PLETAL ok to hold    HYSTERECTOMY      MONTY with BSO    ILIAC ARTERY STENT Bilateral      Social History     Socioeconomic History    Marital status:    Tobacco Use    Smoking status: Former Smoker    Smokeless tobacco: Former User    Tobacco comment: .  Retired  at [employer].   Substance and Sexual Activity    Alcohol use: No    Drug use: No    Sexual " activity: Yes     Partners: Male     Comment:      family history includes Amblyopia in her mother; Cancer in her brother, father, maternal uncle, maternal uncle, maternal uncle, and maternal uncle; Cataracts in her mother; Diabetes in her maternal aunt and maternal uncle; Hypertension in her mother; Strabismus in her sister; Thyroid disease in her mother.      Labs, x-ray, ultrasound, CT scan, colonoscopy reports and prior clinical notes reviewed and summarized time patient counseled.    Gen: no distress  EYES: conjunctiva clear, non-icteric, PERRL  ENT: nose clear, nasal mucosa normal, oropharynx clear and moist, teeth good  NECK:supple, thyroid non-palpable  RESP: effort is good, lungs clear  CV: heart RRR w/o murmur, gallops or rubs; no carotid bruits, no edema  GI: abdomen soft, non-distended, non-tender, no hepatosplenomegaly  MS:  In wheelchair no clubbing or cyanosis of the digits, she can raise both legs.  Dorsiflexion and plantar flexion is 3/5 with poor effort.  No clonus.  No pronator drift.  No tremor.  Smiling but affect is somewhat slow  SKIN: no rashes, warm to touch        Assessment and plan:    Sophia was seen today for follow-up.    Diagnoses and all orders for this visit:    Routine medical exam, recent labs reviewed    Encounter for screening mammogram for breast cancer, overdue for mammogram but she declines setting it up at this time will push the issue later when she recovers from her strokes.    History of colonic polyps, actually up-to-date                                                    Additional evaluation and management issues:    Additionally patient has numerous other medical issues to address separate from her physical.  Reviewed the recent events.  Her  says that she was getting more incontinent and more off balance and had a few more falls.  She did have her 1st vaccine and definitely had decreased ability to do walking after that.  After the 2nd vaccine two days  later they went to the hospital because she was falling more incontinent and confused.  Her memory is definitely been going often was worse after the 1st vaccine.  The  verbalizes is not trying to blame the vaccine but clearly from these time point she worsened but there was some developing walking issues, balance issues.  She currently still remains off balance more so.  She always wants to fall backwards.  She has had incontinence but does definitely worse and now she has pretty much incontinence of urine and feces.    Which went to the ER they originally were entertaining normal pressure hydrocephalus in the routine brain MRI however showed two acute strokes.  She has a neurology appointment but is not until May.  I will try put in a referral to stroke Neurology as she does need to get the etiology of her two separate strokes ruled out and make sure this was not embolic strokes and so forth.  Her carotid ultrasound was unremarkable.  She is on Plavix now.  No new neurological deficits.  Her symptoms of NPH still persist and I discussed with the  should she acutely worsen in any way from a neurological standpoint he should bring her to the emergency room and Neurology may need to come see her there.  Apparently she did not have a trial of a therapeutic spinal tap in the hospital after the acute strokes were found.    Recently in the hospital as below:  HPI:   Sophia Landis 76 y.o. female with HTN, HLD, PAD, GERD presents to the hospital with a chief complaint of confusion. Her  reports for the last week she has had increasing difficulty ambulating and problems with her memory. Beginning 5 days ago she has become unable to ambulate. She has baseline problems with incontinence and he reports mild memory impairments, but over the last week the memory impairments have worsened. She does not smoke or drink. She denies fever, chest pain, SOB, nausea, vomiting, abdominal pain, leg swelling, facial  "droop, slurred speech, difficulty swallowing. The  notes decreased appetite for the last week.    In the ED, UDS negative, troponin negative, TSH within normal limits, CT head with enlargements of the ventricles concerning for NPH.     Hospital Course:   Ms Landis presented with urinary incontinence, memory loss and unstable gait for months. CT brain suggests normal pressure hydrocephalus. Neurology consulted and home aspirin/clopidogrel held in anticipation for lumbar puncture. An MRI with IV contrast obtained to rule out mass. This was negative for a mass but positive for "acute infarcts the left posterolateral caudate and right periventricular white matter." Resumed aspirin and clopidogrel, continued statin and started intrvenous fluids. PT/OT consulted. Recommend SNF. Family will discuss this to see if they prefer home health vs SNF. Workup negative for illicit drugs, troponin negative. TSH WNL. Negative COVID. Abnormal urinalysis however patient is AAO x 3, afebrile, non toxic appearing and with no leukocytosis. Would start antibioitcs if signs of sepsis. Carotid ultrasound and Echo ordered. No arrhythmias thus far. Will treat UTI given AMS as it may be contributing, Pan-sen E.coli. Working on SNF placement- accepted on Friday, will dispo then unless one accepted prior to then.           Patient has hypertension which appears to be under good control and was up when she presented for the stroke and reassured  that is normal.  Apparently her mood disorder and depression been under control.  She has some history of some right carotid disease but most recent ultrasounds do not look impressive.  Constipation not a problem is now she is having bowel movements and some incontinence.    she does have osteopenia and is postmenopausal.  Her last bone density was 2017.  Will update that when she is in a better physical state.    Evaluation and management all the separate issues will be based on medical " decision making as below.  Labs and x-ray reports reviewed and summarized.          Assessment and plan:              Cerebrovascular accident (CVA), unspecified mechanism , still needs assessment for normal pressure hydrocephalus which seems to have been suspicious prior to the diagnosis of the strokes and I do not see how her two strokes and that particular location would explain all of her symptoms.  She also needs to see stroke Neurology to assess etiology however stroke in two different locations.  -     Ambulatory referral/consult to Neurology; Future    Encephalopathy, possible normal pressure hydrocephalus, unsure if there have been any particular encephalopathic issues occurring with the COVID vaccines.  Neurology can have input    NPH (normal pressure hydrocephalus)    Essential hypertension, better now    Mild recurrent major depression    Hyperlipidemia, unspecified hyperlipidemia type    Ectatic abdominal aorta, noted    PAD (peripheral artery disease), previously noted    Stenosis of right carotid artery, recent ultrasound reviewed and not that impressive    Chronic constipation    ANDRES on CPAP    Gastroesophageal reflux disease, unspecified whether esophagitis present    Mood disorder, chronic and stable    Osteopenia, unspecified location, eventual reassessment    Postmenopausal state

## 2022-02-03 ENCOUNTER — OFFICE VISIT (OUTPATIENT)
Dept: FAMILY MEDICINE | Facility: CLINIC | Age: 77
End: 2022-02-03
Payer: MEDICARE

## 2022-02-03 VITALS
HEART RATE: 68 BPM | BODY MASS INDEX: 25.38 KG/M2 | WEIGHT: 125.88 LBS | OXYGEN SATURATION: 95 % | HEIGHT: 59 IN | TEMPERATURE: 98 F | RESPIRATION RATE: 16 BRPM | DIASTOLIC BLOOD PRESSURE: 60 MMHG | SYSTOLIC BLOOD PRESSURE: 130 MMHG

## 2022-02-03 DIAGNOSIS — G93.40 ENCEPHALOPATHY: ICD-10-CM

## 2022-02-03 DIAGNOSIS — M85.80 OSTEOPENIA, UNSPECIFIED LOCATION: ICD-10-CM

## 2022-02-03 DIAGNOSIS — Z00.00 ROUTINE MEDICAL EXAM: Primary | ICD-10-CM

## 2022-02-03 DIAGNOSIS — Z12.31 ENCOUNTER FOR SCREENING MAMMOGRAM FOR BREAST CANCER: ICD-10-CM

## 2022-02-03 DIAGNOSIS — G91.2 NPH (NORMAL PRESSURE HYDROCEPHALUS): ICD-10-CM

## 2022-02-03 DIAGNOSIS — Z78.0 POSTMENOPAUSAL STATE: ICD-10-CM

## 2022-02-03 DIAGNOSIS — K21.9 GASTROESOPHAGEAL REFLUX DISEASE, UNSPECIFIED WHETHER ESOPHAGITIS PRESENT: ICD-10-CM

## 2022-02-03 DIAGNOSIS — I73.9 PAD (PERIPHERAL ARTERY DISEASE): ICD-10-CM

## 2022-02-03 DIAGNOSIS — F39 MOOD DISORDER: ICD-10-CM

## 2022-02-03 DIAGNOSIS — K59.09 CHRONIC CONSTIPATION: ICD-10-CM

## 2022-02-03 DIAGNOSIS — Z86.010 HISTORY OF COLONIC POLYPS: ICD-10-CM

## 2022-02-03 DIAGNOSIS — F33.0 MILD RECURRENT MAJOR DEPRESSION: ICD-10-CM

## 2022-02-03 DIAGNOSIS — I65.21 STENOSIS OF RIGHT CAROTID ARTERY: ICD-10-CM

## 2022-02-03 DIAGNOSIS — I10 ESSENTIAL HYPERTENSION: ICD-10-CM

## 2022-02-03 DIAGNOSIS — I63.9 CEREBROVASCULAR ACCIDENT (CVA), UNSPECIFIED MECHANISM: ICD-10-CM

## 2022-02-03 DIAGNOSIS — G47.33 OSA ON CPAP: ICD-10-CM

## 2022-02-03 DIAGNOSIS — E78.5 HYPERLIPIDEMIA, UNSPECIFIED HYPERLIPIDEMIA TYPE: ICD-10-CM

## 2022-02-03 DIAGNOSIS — I77.811 ECTATIC ABDOMINAL AORTA: ICD-10-CM

## 2022-02-03 PROCEDURE — 3078F DIAST BP <80 MM HG: CPT | Mod: HCNC,CPTII,S$GLB, | Performed by: INTERNAL MEDICINE

## 2022-02-03 PROCEDURE — 99214 OFFICE O/P EST MOD 30 MIN: CPT | Mod: 25,HCNC,S$GLB, | Performed by: INTERNAL MEDICINE

## 2022-02-03 PROCEDURE — 99397 PR PREVENTIVE VISIT,EST,65 & OVER: ICD-10-PCS | Mod: 25,HCNC,S$GLB, | Performed by: INTERNAL MEDICINE

## 2022-02-03 PROCEDURE — 3078F PR MOST RECENT DIASTOLIC BLOOD PRESSURE < 80 MM HG: ICD-10-PCS | Mod: HCNC,CPTII,S$GLB, | Performed by: INTERNAL MEDICINE

## 2022-02-03 PROCEDURE — 1126F PR PAIN SEVERITY QUANTIFIED, NO PAIN PRESENT: ICD-10-PCS | Mod: HCNC,CPTII,S$GLB, | Performed by: INTERNAL MEDICINE

## 2022-02-03 PROCEDURE — 3288F FALL RISK ASSESSMENT DOCD: CPT | Mod: HCNC,CPTII,S$GLB, | Performed by: INTERNAL MEDICINE

## 2022-02-03 PROCEDURE — 99214 PR OFFICE/OUTPT VISIT, EST, LEVL IV, 30-39 MIN: ICD-10-PCS | Mod: 25,HCNC,S$GLB, | Performed by: INTERNAL MEDICINE

## 2022-02-03 PROCEDURE — 99999 PR PBB SHADOW E&M-EST. PATIENT-LVL IV: CPT | Mod: PBBFAC,HCNC,, | Performed by: INTERNAL MEDICINE

## 2022-02-03 PROCEDURE — 99397 PER PM REEVAL EST PAT 65+ YR: CPT | Mod: 25,HCNC,S$GLB, | Performed by: INTERNAL MEDICINE

## 2022-02-03 PROCEDURE — 1101F PT FALLS ASSESS-DOCD LE1/YR: CPT | Mod: HCNC,CPTII,S$GLB, | Performed by: INTERNAL MEDICINE

## 2022-02-03 PROCEDURE — 1159F MED LIST DOCD IN RCRD: CPT | Mod: HCNC,CPTII,S$GLB, | Performed by: INTERNAL MEDICINE

## 2022-02-03 PROCEDURE — 99999 PR PBB SHADOW E&M-EST. PATIENT-LVL IV: ICD-10-PCS | Mod: PBBFAC,HCNC,, | Performed by: INTERNAL MEDICINE

## 2022-02-03 PROCEDURE — 3075F PR MOST RECENT SYSTOLIC BLOOD PRESS GE 130-139MM HG: ICD-10-PCS | Mod: HCNC,CPTII,S$GLB, | Performed by: INTERNAL MEDICINE

## 2022-02-03 PROCEDURE — 1159F PR MEDICATION LIST DOCUMENTED IN MEDICAL RECORD: ICD-10-PCS | Mod: HCNC,CPTII,S$GLB, | Performed by: INTERNAL MEDICINE

## 2022-02-03 PROCEDURE — 1126F AMNT PAIN NOTED NONE PRSNT: CPT | Mod: HCNC,CPTII,S$GLB, | Performed by: INTERNAL MEDICINE

## 2022-02-03 PROCEDURE — 3075F SYST BP GE 130 - 139MM HG: CPT | Mod: HCNC,CPTII,S$GLB, | Performed by: INTERNAL MEDICINE

## 2022-02-03 PROCEDURE — 3288F PR FALLS RISK ASSESSMENT DOCUMENTED: ICD-10-PCS | Mod: HCNC,CPTII,S$GLB, | Performed by: INTERNAL MEDICINE

## 2022-02-03 PROCEDURE — 1101F PR PT FALLS ASSESS DOC 0-1 FALLS W/OUT INJ PAST YR: ICD-10-PCS | Mod: HCNC,CPTII,S$GLB, | Performed by: INTERNAL MEDICINE

## 2022-05-06 ENCOUNTER — OFFICE VISIT (OUTPATIENT)
Dept: NEUROLOGY | Facility: CLINIC | Age: 77
End: 2022-05-06
Payer: MEDICARE

## 2022-05-06 VITALS
DIASTOLIC BLOOD PRESSURE: 91 MMHG | BODY MASS INDEX: 25.38 KG/M2 | HEART RATE: 67 BPM | WEIGHT: 125.88 LBS | SYSTOLIC BLOOD PRESSURE: 150 MMHG | HEIGHT: 59 IN

## 2022-05-06 DIAGNOSIS — G91.2 NPH (NORMAL PRESSURE HYDROCEPHALUS): ICD-10-CM

## 2022-05-06 DIAGNOSIS — I63.9 STROKE: ICD-10-CM

## 2022-05-06 DIAGNOSIS — G93.40 ENCEPHALOPATHY: ICD-10-CM

## 2022-05-06 DIAGNOSIS — I63.9 CEREBROVASCULAR ACCIDENT (CVA), UNSPECIFIED MECHANISM: ICD-10-CM

## 2022-05-06 PROCEDURE — 1101F PT FALLS ASSESS-DOCD LE1/YR: CPT | Mod: CPTII,S$GLB,, | Performed by: NEUROLOGICAL SURGERY

## 2022-05-06 PROCEDURE — 3288F FALL RISK ASSESSMENT DOCD: CPT | Mod: CPTII,S$GLB,, | Performed by: NEUROLOGICAL SURGERY

## 2022-05-06 PROCEDURE — 3080F PR MOST RECENT DIASTOLIC BLOOD PRESSURE >= 90 MM HG: ICD-10-PCS | Mod: CPTII,S$GLB,, | Performed by: NEUROLOGICAL SURGERY

## 2022-05-06 PROCEDURE — 1101F PR PT FALLS ASSESS DOC 0-1 FALLS W/OUT INJ PAST YR: ICD-10-PCS | Mod: CPTII,S$GLB,, | Performed by: NEUROLOGICAL SURGERY

## 2022-05-06 PROCEDURE — 3288F PR FALLS RISK ASSESSMENT DOCUMENTED: ICD-10-PCS | Mod: CPTII,S$GLB,, | Performed by: NEUROLOGICAL SURGERY

## 2022-05-06 PROCEDURE — 3077F SYST BP >= 140 MM HG: CPT | Mod: CPTII,S$GLB,, | Performed by: NEUROLOGICAL SURGERY

## 2022-05-06 PROCEDURE — 99214 PR OFFICE/OUTPT VISIT, EST, LEVL IV, 30-39 MIN: ICD-10-PCS | Mod: S$GLB,,, | Performed by: NEUROLOGICAL SURGERY

## 2022-05-06 PROCEDURE — 1159F MED LIST DOCD IN RCRD: CPT | Mod: CPTII,S$GLB,, | Performed by: NEUROLOGICAL SURGERY

## 2022-05-06 PROCEDURE — 3080F DIAST BP >= 90 MM HG: CPT | Mod: CPTII,S$GLB,, | Performed by: NEUROLOGICAL SURGERY

## 2022-05-06 PROCEDURE — 99214 OFFICE O/P EST MOD 30 MIN: CPT | Mod: S$GLB,,, | Performed by: NEUROLOGICAL SURGERY

## 2022-05-06 PROCEDURE — 1126F AMNT PAIN NOTED NONE PRSNT: CPT | Mod: CPTII,S$GLB,, | Performed by: NEUROLOGICAL SURGERY

## 2022-05-06 PROCEDURE — 3077F PR MOST RECENT SYSTOLIC BLOOD PRESSURE >= 140 MM HG: ICD-10-PCS | Mod: CPTII,S$GLB,, | Performed by: NEUROLOGICAL SURGERY

## 2022-05-06 PROCEDURE — 1159F PR MEDICATION LIST DOCUMENTED IN MEDICAL RECORD: ICD-10-PCS | Mod: CPTII,S$GLB,, | Performed by: NEUROLOGICAL SURGERY

## 2022-05-06 PROCEDURE — 99999 PR PBB SHADOW E&M-EST. PATIENT-LVL IV: ICD-10-PCS | Mod: PBBFAC,,, | Performed by: NEUROLOGICAL SURGERY

## 2022-05-06 PROCEDURE — 1126F PR PAIN SEVERITY QUANTIFIED, NO PAIN PRESENT: ICD-10-PCS | Mod: CPTII,S$GLB,, | Performed by: NEUROLOGICAL SURGERY

## 2022-05-06 PROCEDURE — 99999 PR PBB SHADOW E&M-EST. PATIENT-LVL IV: CPT | Mod: PBBFAC,,, | Performed by: NEUROLOGICAL SURGERY

## 2022-05-06 NOTE — PROGRESS NOTES
Chief Complaint   Patient presents with    Stroke        Sophia Landis is a 76 y.o. female with a history of multiple medical diagnoses as listed below that presents for evaluation and management following stroke.  She was hospitalized in December 2021 after having weakness and difficulty with her walking in addition to having some confusion.  MRI was significant for bilateral strokes at which point she was started on dual antiplatelets for 21 days with plans to continue aspirin alone, statin as her secondary stroke prevention plan.  Workup done was essentially unremarkable.  Physical therapy and occupational therapy were recommended..     PAST MEDICAL HISTORY:  Past Medical History:   Diagnosis Date    Cataract     Chronic diarrhea 11/26/2018    Depression     GERD (gastroesophageal reflux disease)     History of colonic polyps 11/26/2018    Hyperlipidemia     Hypertension     Menopausal syndrome     PAD (peripheral artery disease)     s/p stent       PAST SURGICAL HISTORY:  Past Surgical History:   Procedure Laterality Date    APPENDECTOMY      COLONOSCOPY N/A 5/20/2019    Procedure: COLONOSCOPY;  Surgeon: Kenia Arias MD;  Location: [facility];  Service: Endoscopy;  Laterality: N/A;  RX PLETAL ok to hold    HYSTERECTOMY      MONTY with BSO    ILIAC ARTERY STENT Bilateral        SOCIAL HISTORY:  Social History     Socioeconomic History    Marital status:    Tobacco Use    Smoking status: Former Smoker    Smokeless tobacco: Former User    Tobacco comment: .  Retired  at [employer].   Substance and Sexual Activity    Alcohol use: No    Drug use: No    Sexual activity: Yes     Partners: Male     Comment:        FAMILY HISTORY:  Family History   Problem Relation Age of Onset    Amblyopia Mother     Cataracts Mother     Hypertension Mother     Thyroid disease Mother     Cancer Father     Strabismus Sister     Cancer Brother     Diabetes Maternal Aunt      Cancer Maternal Uncle     Diabetes Maternal Uncle     Cancer Maternal Uncle     Cancer Maternal Uncle     Cancer Maternal Uncle     Blindness Neg Hx     Glaucoma Neg Hx     Macular degeneration Neg Hx     Retinal detachment Neg Hx     Stroke Neg Hx        ALLERGIES AND MEDICATIONS: updated and reviewed.  Review of patient's allergies indicates:   Allergen Reactions    Antihistamines - alkylamine Nausea Only    Ciprofloxacin Nausea Only    Penicillins Hives     Current Outpatient Medications   Medication Sig Dispense Refill    albuterol 90 mcg/actuation inhaler Inhale 1-2 puffs into the lungs every 4 to 6 hours as needed for Wheezing or Shortness of Breath (chest tightness). 1 Inhaler 6    amLODIPine (NORVASC) 10 MG tablet Take 1 tablet (10 mg total) by mouth once daily. 30 tablet 11    aspirin (ECOTRIN) 81 MG EC tablet Take 1 tablet (81 mg total) by mouth once daily. 90 tablet 1    calcium carbonate (OS-PAOLO) 600 mg calcium (1,500 mg) Tab Take 1 tablet (600 mg total) by mouth 2 (two) times daily with meals. 180 tablet 1    cilostazoL (PLETAL) 100 MG Tab Take 1 tablet (100 mg total) by mouth 2 (two) times daily. 60 tablet 11    clopidogreL (PLAVIX) 75 mg tablet Take 1 tablet (75 mg total) by mouth once daily. 90 tablet 3    hydroCHLOROthiazide (HYDRODIURIL) 25 MG tablet Take 1 tablet (25 mg total) by mouth once daily. 90 tablet 1    metoprolol succinate (TOPROL-XL) 50 MG 24 hr tablet Take 1 tablet (50 mg total) by mouth once daily. (Patient not taking: Reported on 2/3/2022) 90 tablet 1    nitroGLYCERIN (NITROSTAT) 0.4 MG SL tablet Place 1 tablet (0.4 mg total) under the tongue every 5 (five) minutes as needed for Chest pain. (Patient not taking: Reported on 8/11/2020) 25 tablet 0    omeprazole (PRILOSEC) 20 MG capsule Take 2 capsules (40 mg total) by mouth once daily. 180 capsule 3    pravastatin (PRAVACHOL) 80 MG tablet Take 1 tablet (80 mg total) by mouth once daily. 90 tablet 1    sertraline  (ZOLOFT) 100 MG tablet Take 1 tablet (100 mg total) by mouth once daily. 90 tablet 3     No current facility-administered medications for this visit.       Review of Systems   Constitutional: Negative for activity change, appetite change, fever and unexpected weight change.   HENT: Negative for trouble swallowing and voice change.    Eyes: Negative for photophobia and visual disturbance.   Respiratory: Negative for apnea and shortness of breath.    Cardiovascular: Negative for chest pain and leg swelling.   Gastrointestinal: Negative for constipation and nausea.   Genitourinary: Negative for difficulty urinating.   Musculoskeletal: Positive for gait problem. Negative for back pain and neck pain.   Skin: Negative for color change and pallor.   Neurological: Negative for dizziness, seizures, syncope, weakness and numbness.   Hematological: Negative for adenopathy.   Psychiatric/Behavioral: Positive for confusion and decreased concentration. Negative for agitation.       Neurologic Exam     Mental Status   Oriented to person, place, and time.   Registration: recalls 3 of 3 objects.   Attention: normal. Concentration: normal.   Speech: speech is normal   Level of consciousness: alert  Knowledge: good.     Cranial Nerves     CN II   Right visual field deficit: none  Left visual field deficit: none     CN III, IV, VI   Extraocular motions are normal.   Right pupil: Size: 3 mm. Shape: regular.   Left pupil: Size: 3 mm. Shape: regular.   CN III: no CN III palsy  CN VI: no CN VI palsy  Nystagmus: none   Diplopia: none  Ophthalmoparesis: none  Upgaze: normal  Downgaze: normal  Conjugate gaze: present    CN VII   Facial expression full, symmetric.   Right facial weakness: none  Left facial weakness: none    CN VIII   CN VIII normal.     CN XI   CN XI normal.     CN XII   CN XII normal.   Tongue deviation: none    Motor Exam   Muscle bulk: normal  Overall muscle tone: normal  Right arm tone: normal  Left arm tone: normal  Right  "leg tone: normal  Left leg tone: normal    Gait, Coordination, and Reflexes     Gait  Gait: normal    Coordination   Finger to nose coordination: normal    Tremor   Resting tremor: absent      Physical Exam  Vitals reviewed.   Constitutional:       Appearance: She is well-developed.   HENT:      Head: Normocephalic and atraumatic.   Eyes:      Extraocular Movements: EOM normal.   Pulmonary:      Effort: Pulmonary effort is normal. No respiratory distress.   Musculoskeletal:         General: Normal range of motion.      Cervical back: Normal range of motion.   Neurological:      Mental Status: She is alert and oriented to person, place, and time.      Coordination: Finger-Nose-Finger Test normal.      Gait: Gait is intact.   Psychiatric:         Speech: Speech normal.         Behavior: Behavior normal.         Thought Content: Thought content normal.         Vitals:    05/06/22 1342   BP: (!) 150/91   Pulse: 67   Weight: 57.1 kg (125 lb 14.1 oz)   Height: 4' 11" (1.499 m)       Assessment & Plan:    Problem List Items Addressed This Visit     NPH (normal pressure hydrocephalus)    Relevant Orders    CT Head Without Contrast (Completed)    Stroke    Relevant Orders    CT Head Without Contrast (Completed)      Other Visit Diagnoses     Encephalopathy        Relevant Orders    CT Head Without Contrast (Completed)          Follow-up: No follow-ups on file.    This note was done with the assistance of voice recognition software. Some errors may be present after proofreading.        "

## 2022-05-09 ENCOUNTER — HOSPITAL ENCOUNTER (OUTPATIENT)
Dept: RADIOLOGY | Facility: HOSPITAL | Age: 77
Discharge: HOME | End: 2022-05-09
Attending: NEUROLOGICAL SURGERY
Payer: MEDICARE

## 2022-05-09 DIAGNOSIS — G93.40 ENCEPHALOPATHY: ICD-10-CM

## 2022-05-09 DIAGNOSIS — G91.2 NPH (NORMAL PRESSURE HYDROCEPHALUS): ICD-10-CM

## 2022-05-09 DIAGNOSIS — I63.9 STROKE: ICD-10-CM

## 2022-05-09 PROCEDURE — 70450 CT HEAD/BRAIN W/O DYE: CPT | Mod: TC

## 2022-05-09 PROCEDURE — 70450 CT HEAD WITHOUT CONTRAST: ICD-10-PCS | Mod: 26,,, | Performed by: RADIOLOGY

## 2022-05-09 PROCEDURE — 70450 CT HEAD/BRAIN W/O DYE: CPT | Mod: 26,,, | Performed by: RADIOLOGY

## 2022-05-28 DIAGNOSIS — E78.5 HYPERLIPIDEMIA, UNSPECIFIED HYPERLIPIDEMIA TYPE: Chronic | ICD-10-CM

## 2022-05-28 DIAGNOSIS — I10 ESSENTIAL HYPERTENSION: Chronic | ICD-10-CM

## 2022-05-28 NOTE — TELEPHONE ENCOUNTER
Refill Routing Note   Medication(s) are not appropriate for processing by [facility] for the following reason(s):      - Required vitals are abnormal    [facility] action(s):  Defer       Medication Therapy Plan: Last BP 05/06/22 (!) 150/91  Medication reconciliation completed: No     Appointments  past 12m or future 3m with PCP    Date Provider   Last Visit   Visit date not found Rossi Latif MD   Next Visit   Visit date not found Rossi Latif MD   ED visits in past 90 days: 0        Note composed:12:42 PM 05/28/2022

## 2022-05-28 NOTE — TELEPHONE ENCOUNTER
No new care gaps identified.  [facility] Embedded Care Gaps. Reference number: [id number]. 5/28/2022   2:12:14 AM CDT

## 2022-05-31 DIAGNOSIS — I10 ESSENTIAL HYPERTENSION: Chronic | ICD-10-CM

## 2022-05-31 RX ORDER — HYDROCHLOROTHIAZIDE 25 MG/1
TABLET ORAL
Qty: 90 TABLET | Refills: 1 | Status: SHIPPED | OUTPATIENT
Start: 2022-05-31 | End: 2022-10-28

## 2022-05-31 RX ORDER — HYDROCHLOROTHIAZIDE 25 MG/1
25 TABLET ORAL DAILY
Qty: 90 TABLET | Refills: 1 | Status: SHIPPED | OUTPATIENT
Start: 2022-05-31 | End: 2022-08-29

## 2022-05-31 RX ORDER — PRAVASTATIN SODIUM 80 MG/1
TABLET ORAL
Qty: 90 TABLET | Refills: 1 | Status: SHIPPED | OUTPATIENT
Start: 2022-05-31 | End: 2023-01-08

## 2022-05-31 NOTE — TELEPHONE ENCOUNTER
No new care gaps identified.  [facility] Embedded Care Gaps. Reference number: [id number]. 5/31/2022   1:08:07 PM CDT

## 2022-05-31 NOTE — TELEPHONE ENCOUNTER
----- Message from [facility] sent at 5/31/2022 12:37 PM CDT -----  Who Called: Clark ()    Refill or New Rx: Refill    RX Name and Strength: hydroCHLOROthiazide (HYDRODIURIL) 25 MG tablet    How is the patient currently taking it? (ex. 1XDay): 1xday    Is this a 30 day or 90 day RX: 90 day    Preferred Pharmacy with phone number: [facility] - [facility]  [address]    Local or Mail Order: Mail Order    Ordering Provider: Sarthak Johnson MD    Best Call Back Number: [phone]    Additional Information:

## 2022-06-22 ENCOUNTER — HOSPITAL ENCOUNTER (EMERGENCY)
Facility: HOSPITAL | Age: 77
Discharge: HOME | End: 2022-06-22
Attending: EMERGENCY MEDICINE
Payer: MEDICARE

## 2022-06-22 VITALS
WEIGHT: 125 LBS | RESPIRATION RATE: 16 BRPM | HEART RATE: 64 BPM | DIASTOLIC BLOOD PRESSURE: 77 MMHG | TEMPERATURE: 98 F | OXYGEN SATURATION: 95 % | SYSTOLIC BLOOD PRESSURE: 154 MMHG | BODY MASS INDEX: 25.25 KG/M2

## 2022-06-22 DIAGNOSIS — M25.552 LEFT HIP PAIN: ICD-10-CM
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
## 2022-06-22 DIAGNOSIS — T07.XXXA MULTIPLE CONTUSIONS: ICD-10-CM

## 2022-06-22 DIAGNOSIS — S40.812A ABRASION OF LEFT UPPER ARM, INITIAL ENCOUNTER: Primary | ICD-10-CM

## 2022-06-22 PROCEDURE — 99284 EMERGENCY DEPT VISIT MOD MDM: CPT | Mod: ER

## 2022-06-22 RX ORDER — LIDOCAINE HYDROCHLORIDE 10 MG/ML
10 INJECTION INFILTRATION; PERINEURAL
Status: DISCONTINUED | OUTPATIENT
Start: 2022-06-22 | End: 2022-06-23 | Stop reason: HOSPADM

## 2022-06-22 RX ORDER — METHOCARBAMOL 750 MG/1
750 TABLET, FILM COATED ORAL EVERY 6 HOURS
Qty: 12 TABLET | Refills: 0 | Status: SHIPPED | OUTPATIENT
Start: 2022-06-22 | End: 2022-06-25

## 2022-06-22 RX ORDER — MUPIROCIN 20 MG/G
OINTMENT TOPICAL 2 TIMES DAILY
Qty: 22 G | Refills: 0 | Status: SHIPPED | OUTPATIENT
Start: 2022-06-22 | End: 2022-07-02

## 2022-06-22 NOTE — FIRST PROVIDER EVALUATION
" Emergency Department TeleTriage Encounter Note      CHIEF COMPLAINT    Chief Complaint   Patient presents with    Laceration     Family states," She got up from the bed and had a fall. She has cuts on her left arm."       VITAL SIGNS   Initial Vitals [06/22/22 1806]   BP Pulse Resp Temp SpO2   (!) 154/76 76 16 98 °F (36.7 °C) 95 %      MAP       --            ALLERGIES    Review of patient's allergies indicates:   Allergen Reactions    Antihistamines - alkylamine Nausea Only    Ciprofloxacin Nausea Only    Penicillins Hives       PROVIDER TRIAGE NOTE      76-year-old female presenting with fall.  Family member states the patient fell and hit her left elbow and knee.  Was not witnessed but family member was nearby.  Reports cut to the left elbow.    PE:  Patient alert and oriented. No acute distress  Dressing to the left elbow    Initial orders will be placed and care will be transferred to an alternate provider when patient is roomed for a full evaluation. Any additional orders and the final disposition will be determined by that provider.  Disclaimer: This note has been generated using voice-recognition software. There may be typographical errors that have been missed during proof-reading.        ORDERS  Labs Reviewed - No data to display    ED Orders (720h ago, onward)    Start Ordered     Status Ordering Provider    06/23/22 0600 06/22/22 1852  Wound care routine - Clean Wound  Daily        Comments: Clean Wound    Ordered RIP DOUGLAS    06/23/22 0600 06/22/22 1852  Wound care routine - Irrigate Wound  Daily        Comments: Irrigate Wound    Ordered RIP DOUGLAS    06/23/22 0600 06/22/22 1852  Wound care routine - Sterile Gloves to Bedside  Daily        Comments: Provide Sterile Gloves to Bedside    Ordered RIP DOUGLAS    06/22/22 1900 06/22/22 1852  LIDOcaine HCL 10 mg/ml (1%) injection 10 mL  ED 1 Time         Ordered RIP DOUGLAS    06/22/22 1853 06/22/22 1852  Provide suture tray " to patient bedside  Once         Ordered RIP DOUGLAS    06/22/22 1853 06/22/22 1852  Change dressing - apply dry sterile dressing  Once        Comments: Apply dry sterile dressing.    Ordered RIP DOUGLAS    06/22/22 1852 06/22/22 1851  X-Ray Elbow Complete Left  1 time imaging         Ordered RIP DOUGLAS    06/22/22 1852 06/22/22 1851  X-Ray Knee 3 View Left  1 time imaging         Ordered NICHOLASRENEE RIP            Virtual Visit Note: The provider triage portion of this emergency department evaluation and documentation was performed via [employer], a HIPAA-compliant telemedicine application, in concert with a tele-presenter in the room. A face to face patient evaluation with one of my colleagues will occur once the patient is placed in an emergency department room.      DISCLAIMER: This note was prepared with [employer] voice recognition transcription software. Garbled syntax, mangled pronouns, and other bizarre constructions may be attributed to that software system.

## 2022-06-23 NOTE — ED PROVIDER NOTES
"Encounter Date: 6/22/2022    SCRIBE #1 NOTE: I, Christopher Meyer, am scribing for, and in the presence of,  Dr. Pike. I have scribed the following portions of the note - Other sections scribed: HPI, ROS, PE.       History     Chief Complaint   Patient presents with    Laceration     Family states," She got up from the bed and had a fall. She has cuts on her left arm."     Sophia Landis is a 76 y.o. female with HLD, HTN, PAD, and other medical problems who presents to the ED with her spouse for evaluation of acute traumatic wound and color change to left arm, wound to left knee, and pain to left hip after falling off her bed at 1600 hours. Patient states she fell trying to get out of bed by herself. She denies head injury or LOC. Patient has Hx of strokes. Patient is on Plavix. Last tetanus shot unknown. She denies neck pain, back pain, fever, SOB, chest pain, or all other complaints. Patient is able to ambulate with assistance on baseline.    The history is provided by the patient and the spouse. No  was used.     Review of patient's allergies indicates:   Allergen Reactions    Antihistamines - alkylamine Nausea Only    Ciprofloxacin Nausea Only    Penicillins Hives     Past Medical History:   Diagnosis Date    Cataract     Chronic diarrhea 11/26/2018    Depression     GERD (gastroesophageal reflux disease)     History of colonic polyps 11/26/2018    Hyperlipidemia     Hypertension     Menopausal syndrome     PAD (peripheral artery disease)     s/p stent     Past Surgical History:   Procedure Laterality Date    APPENDECTOMY      COLONOSCOPY N/A 5/20/2019    Procedure: COLONOSCOPY;  Surgeon: Kenia Arias MD;  Location: [facility];  Service: Endoscopy;  Laterality: N/A;  RX PLETAL ok to hold    HYSTERECTOMY      MONTY with BSO    ILIAC ARTERY STENT Bilateral      Family History   Problem Relation Age of Onset    Amblyopia Mother     Cataracts Mother     Hypertension Mother     " Thyroid disease Mother     Cancer Father     Strabismus Sister     Cancer Brother     Diabetes Maternal Aunt     Cancer Maternal Uncle     Diabetes Maternal Uncle     Cancer Maternal Uncle     Cancer Maternal Uncle     Cancer Maternal Uncle     Blindness Neg Hx     Glaucoma Neg Hx     Macular degeneration Neg Hx     Retinal detachment Neg Hx     Stroke Neg Hx      Social History     Tobacco Use    Smoking status: Former Smoker    Smokeless tobacco: Former User    Tobacco comment: .  Retired  at [employer].   Substance Use Topics    Alcohol use: No    Drug use: No     Review of Systems   Constitutional: Negative for fever.   Respiratory: Negative for shortness of breath.    Cardiovascular: Negative for chest pain.   Musculoskeletal: Positive for arthralgias. Negative for back pain, gait problem, joint swelling and neck pain.   Skin: Positive for color change and wound.   Neurological: Negative for dizziness, weakness, numbness and headaches.   All other systems reviewed and are negative.      Physical Exam     Initial Vitals [06/22/22 1806]   BP Pulse Resp Temp SpO2   (!) 154/76 76 16 98 °F (36.7 °C) 95 %      MAP       --         Physical Exam    Nursing note and vitals reviewed.  Constitutional: She appears well-developed and well-nourished.   HENT:   Head: Normocephalic and atraumatic.   Right Ear: External ear normal.   Left Ear: External ear normal.   Eyes: Conjunctivae are normal.   Neck: Phonation normal. Neck supple.   Normal range of motion.  Cardiovascular: Normal rate and intact distal pulses.   Pulses:       Dorsalis pedis pulses are 2+ on the right side and 2+ on the left side.   Pulmonary/Chest: Effort normal. No stridor. No respiratory distress.   Abdominal: Abdomen is soft. There is no abdominal tenderness.   Musculoskeletal:         General: No tenderness or edema.      Cervical back: Normal, normal range of motion and neck supple. No tenderness or bony  tenderness. No spinous process tenderness or muscular tenderness.      Thoracic back: Normal. No tenderness or bony tenderness.      Lumbar back: Normal. No tenderness or bony tenderness.      Comments: Patient has ecchymosis on left dorsal 4th digit but no bony tenderness or swelling. No deformity.    Ecchymosis and tenderness to left hip but ROM is normal. Not shortened or rotated.     Neurological: She is alert and oriented to person, place, and time. She has normal strength. GCS score is 15. GCS eye subscore is 4. GCS verbal subscore is 5. GCS motor subscore is 6.   .   Skin: Skin is warm and dry. Abrasion, bruising and ecchymosis noted. No rash noted.   Multiple bruises to left forearm with various stages of healing    Patient has 11 by 3 cm area of ecchymosis to dorsal left upper arm with overlying superficial abrasions that are oozing dark red blood.    Small superficial abrasion to left knee. No deformity. ROM is normal.   Psychiatric: She has a normal mood and affect. Her behavior is normal.         ED Course   Procedures  Labs Reviewed - No data to display       Imaging Results          X-Ray Femur Ap/Lat Left (Final result)  Result time 06/22/22 22:31:09    Final result by Andrea Coker MD (06/22/22 22:31:09)                 Impression:      No radiographic evidence of acute displaced fracture or dislocation.      Electronically signed by: Andrea Coker MD  Date:    06/22/2022  Time:    22:31             Narrative:    EXAMINATION:  XR HIP WITH PELVIS WHEN PERFORMED, 2 OR 3 VIEWS LEFT; XR FEMUR 2 VIEW LEFT    CLINICAL HISTORY:  Pain in left hip    TECHNIQUE:  AP view of the pelvis and frog leg lateral view of the left hip were performed.  AP and lateral views of the left femur were performed.    COMPARISON:  None    FINDINGS:  There is no radiographic evidence of acute displaced fracture.  The left femoral head appears appropriately seated within the acetabulum.  The left femur appears intact.  The  ilioischial and iliopectineal lines appear maintained.  Aorta bi-iliac stent is present.                               X-Ray Hip 2 or 3 views Left (with Pelvis when performed) (Final result)  Result time 06/22/22 22:31:09    Final result by Andrea Coker MD (06/22/22 22:31:09)                 Impression:      No radiographic evidence of acute displaced fracture or dislocation.      Electronically signed by: Andrea Coker MD  Date:    06/22/2022  Time:    22:31             Narrative:    EXAMINATION:  XR HIP WITH PELVIS WHEN PERFORMED, 2 OR 3 VIEWS LEFT; XR FEMUR 2 VIEW LEFT    CLINICAL HISTORY:  Pain in left hip    TECHNIQUE:  AP view of the pelvis and frog leg lateral view of the left hip were performed.  AP and lateral views of the left femur were performed.    COMPARISON:  None    FINDINGS:  There is no radiographic evidence of acute displaced fracture.  The left femoral head appears appropriately seated within the acetabulum.  The left femur appears intact.  The ilioischial and iliopectineal lines appear maintained.  Aorta bi-iliac stent is present.                               X-Ray Knee 3 View Left (Final result)  Result time 06/22/22 19:26:48    Final result by Jason Valadez MD (06/22/22 19:26:48)                 Impression:      1. No acute displaced fracture or dislocation of the knee.      Electronically signed by: Jason Valadez MD  Date:    06/22/2022  Time:    19:26             Narrative:    EXAMINATION:  XR KNEE 3 VIEW LEFT    CLINICAL HISTORY:  Unspecified fall, initial encounter    TECHNIQUE:  AP, lateral, and Merchant views of the left knee were performed.    COMPARISON:  None    FINDINGS:  Three views left knee.    No acute displaced fracture or dislocation of the knee.  No radiopaque foreign body.  No large knee joint effusion.                               X-Ray Elbow Complete Left (Final result)  Result time 06/22/22 19:26:17    Final result by Jason Valadez MD (06/22/22  19:26:17)                 Impression:      1. No acute displaced fracture or dislocation of the elbow.      Electronically signed by: Jason Valadez MD  Date:    06/22/2022  Time:    19:26             Narrative:    EXAMINATION:  XR ELBOW COMPLETE 3 VIEW LEFT    CLINICAL HISTORY:  Unspecified fall, initial encounter    TECHNIQUE:  AP, lateral, and oblique views of the left elbow were performed.    COMPARISON:  None    FINDINGS:  Three views left elbow.    No significant displacement of the anterior or posterior elbow fat pads.  The anterior humeral line and radiocapitellar line are in appropriate orientation.  No acute displaced fracture or dislocation of the elbow.  No radiopaque foreign body.  No significant edema.                                 Medications - No data to display  Medical Decision Making:   History:   Old Medical Records: I decided to obtain old medical records.  Independently Interpreted Test(s):   I have ordered and independently interpreted X-rays - see prior notes.  Clinical Tests:   Radiological Study: Ordered and Reviewed    Labs Reviewed           Imaging Reviewed    Imaging Results          X-Ray Femur Ap/Lat Left (Final result)  Result time 06/22/22 22:31:09    Final result by Andrea Coker MD (06/22/22 22:31:09)                 Impression:      No radiographic evidence of acute displaced fracture or dislocation.      Electronically signed by: Andrea Coker MD  Date:    06/22/2022  Time:    22:31             Narrative:    EXAMINATION:  XR HIP WITH PELVIS WHEN PERFORMED, 2 OR 3 VIEWS LEFT; XR FEMUR 2 VIEW LEFT    CLINICAL HISTORY:  Pain in left hip    TECHNIQUE:  AP view of the pelvis and frog leg lateral view of the left hip were performed.  AP and lateral views of the left femur were performed.    COMPARISON:  None    FINDINGS:  There is no radiographic evidence of acute displaced fracture.  The left femoral head appears appropriately seated within the acetabulum.  The left femur  appears intact.  The ilioischial and iliopectineal lines appear maintained.  Aorta bi-iliac stent is present.                               X-Ray Hip 2 or 3 views Left (with Pelvis when performed) (Final result)  Result time 06/22/22 22:31:09    Final result by Andrea Coker MD (06/22/22 22:31:09)                 Impression:      No radiographic evidence of acute displaced fracture or dislocation.      Electronically signed by: Andrea Coker MD  Date:    06/22/2022  Time:    22:31             Narrative:    EXAMINATION:  XR HIP WITH PELVIS WHEN PERFORMED, 2 OR 3 VIEWS LEFT; XR FEMUR 2 VIEW LEFT    CLINICAL HISTORY:  Pain in left hip    TECHNIQUE:  AP view of the pelvis and frog leg lateral view of the left hip were performed.  AP and lateral views of the left femur were performed.    COMPARISON:  None    FINDINGS:  There is no radiographic evidence of acute displaced fracture.  The left femoral head appears appropriately seated within the acetabulum.  The left femur appears intact.  The ilioischial and iliopectineal lines appear maintained.  Aorta bi-iliac stent is present.                               X-Ray Knee 3 View Left (Final result)  Result time 06/22/22 19:26:48    Final result by Jason Valadez MD (06/22/22 19:26:48)                 Impression:      1. No acute displaced fracture or dislocation of the knee.      Electronically signed by: Jason Valadez MD  Date:    06/22/2022  Time:    19:26             Narrative:    EXAMINATION:  XR KNEE 3 VIEW LEFT    CLINICAL HISTORY:  Unspecified fall, initial encounter    TECHNIQUE:  AP, lateral, and Merchant views of the left knee were performed.    COMPARISON:  None    FINDINGS:  Three views left knee.    No acute displaced fracture or dislocation of the knee.  No radiopaque foreign body.  No large knee joint effusion.                               X-Ray Elbow Complete Left (Final result)  Result time 06/22/22 19:26:17    Final result by Jason SOTO  MD Rory (22 19:26:17)                 Impression:      1. No acute displaced fracture or dislocation of the elbow.      Electronically signed by: Jason Valadez MD  Date:    2022  Time:    19:26             Narrative:    EXAMINATION:  XR ELBOW COMPLETE 3 VIEW LEFT    CLINICAL HISTORY:  Unspecified fall, initial encounter    TECHNIQUE:  AP, lateral, and oblique views of the left elbow were performed.    COMPARISON:  None    FINDINGS:  Three views left elbow.    No significant displacement of the anterior or posterior elbow fat pads.  The anterior humeral line and radiocapitellar line are in appropriate orientation.  No acute displaced fracture or dislocation of the elbow.  No radiopaque foreign body.  No significant edema.                                Medications given in ED    Medications - No data to display      Note was created using voice recognition software. Note may have occasional typographical errors that may not have been identified and edited despite good esme initial review prior to signing.    I, Abigail Pike MD, personally performed the services described in this documentation. All medical record entries made by the scribe were at my direction and in my presence.  I have reviewed the chart and agree that the record reflects my personal performance and is accurate and complete.                        Clinical Impression:   Final diagnoses:  [W19.XXXA] Fall  [M25.552] Left hip pain  [T07.XXXA] Multiple contusions  [S40.812A] Abrasion of left upper arm, initial encounter (Primary)          ED Disposition Condition    Discharge Stable        ED Prescriptions     Medication Sig Dispense Start Date End Date Auth. Provider    mupirocin (BACTROBAN) 2 % ointment () Apply topically 2 (two) times daily. for 10 days 22 g 2022 Abigail Pkie MD    methocarbamoL (ROBAXIN) 750 MG Tab () Take 1 tablet (750 mg total) by mouth every 6 (six) hours. for 3 days 12 tablet  6/22/2022 6/25/2022 Abigail Pike MD        Follow-up Information     Follow up With Specialties Details Why Contact Info    Sarthak Johnson MD Internal Medicine Call  As needed, for ongoing care [address] [place] [phone]      The nearest emergency department.  Go to  As needed, If symptoms worsen            Abigail Pike MD  07/10/22 0128

## 2022-06-23 NOTE — ED NOTES
Assumed care of pt with c/o falling between bed and night stand  states she has had 2 strokes and got up but doesn't normally walk, 6 abrasions and lacs to left upper lateral arm, cleaned with peroxide and saline, bleeding controlled

## 2022-06-23 NOTE — ED NOTES
PTS  VERBALIZED UNDERSTANDING OF DISCHARGE INSTRUCTIONS, VIA W/C TO CAR AFTER NON ADHEREING DRESSING APPLIED TO LEFT UPPER ARM WITH COBAN, PT IN NO DISTRESS

## 2022-06-23 NOTE — DISCHARGE INSTRUCTIONS
Change bandage daily. Clean with soap and water only. May take Tylenol as directed on package as needed for pain.

## 2022-07-06 ENCOUNTER — TELEPHONE (OUTPATIENT)
Dept: FAMILY MEDICINE | Facility: CLINIC | Age: 77
End: 2022-07-06
Payer: MEDICARE

## 2022-07-06 VITALS — DIASTOLIC BLOOD PRESSURE: 89 MMHG | SYSTOLIC BLOOD PRESSURE: 174 MMHG

## 2022-07-06 NOTE — TELEPHONE ENCOUNTER
Chart reviewed, the only recent tests looks like a CT scan of the head back in May.  It did look like she had fluid billing up in the ventricles of the brain and I do agree that they do need to get in touch with the neurologist.     will also message back to Neurology staff

## 2022-07-06 NOTE — TELEPHONE ENCOUNTER
Pt reported /89 pulse 75 before BP meds were taken  Pt  stated that they have been trying to get a hold of Dr. Blank so that he can explain the results of her test she had done. Please advise and call pt if you can.

## 2022-07-07 ENCOUNTER — TELEPHONE (OUTPATIENT)
Dept: FAMILY MEDICINE | Facility: CLINIC | Age: 77
End: 2022-07-07
Payer: MEDICARE

## 2022-07-07 NOTE — TELEPHONE ENCOUNTER
----- Message from Zoraida Ramirez sent at 7/7/2022 10:26 AM CDT -----  Regarding:  [phone]  Type:  Patient Returning Call    Who Called:     Who Left Message for Patient: NA    Does the patient know what this is regarding?: NA    Would the patient rather a call back or a response via [facility]?  Call back    Best Call Back Number [phone]

## 2022-07-12 ENCOUNTER — OFFICE VISIT (OUTPATIENT)
Dept: NEUROLOGY | Facility: CLINIC | Age: 77
End: 2022-07-12
Payer: MEDICARE

## 2022-07-12 VITALS
WEIGHT: 125 LBS | HEIGHT: 59 IN | BODY MASS INDEX: 25.2 KG/M2 | HEART RATE: 67 BPM | DIASTOLIC BLOOD PRESSURE: 56 MMHG | SYSTOLIC BLOOD PRESSURE: 118 MMHG

## 2022-07-12 DIAGNOSIS — G91.2 NPH (NORMAL PRESSURE HYDROCEPHALUS): Primary | ICD-10-CM

## 2022-07-12 DIAGNOSIS — I63.9 CEREBROVASCULAR ACCIDENT (CVA), UNSPECIFIED MECHANISM: ICD-10-CM

## 2022-07-12 PROCEDURE — 99999 PR PBB SHADOW E&M-EST. PATIENT-LVL III: CPT | Mod: PBBFAC,,,

## 2022-07-12 PROCEDURE — 99214 PR OFFICE/OUTPT VISIT, EST, LEVL IV, 30-39 MIN: ICD-10-PCS | Mod: FS,S$GLB,,

## 2022-07-12 PROCEDURE — 1160F PR REVIEW ALL MEDS BY PRESCRIBER/CLIN PHARMACIST DOCUMENTED: ICD-10-PCS | Mod: CPTII,S$GLB,,

## 2022-07-12 PROCEDURE — 99999 PR PBB SHADOW E&M-EST. PATIENT-LVL III: ICD-10-PCS | Mod: PBBFAC,,,

## 2022-07-12 PROCEDURE — 99214 OFFICE O/P EST MOD 30 MIN: CPT | Mod: FS,S$GLB,,

## 2022-07-12 PROCEDURE — 1159F MED LIST DOCD IN RCRD: CPT | Mod: CPTII,S$GLB,,

## 2022-07-12 PROCEDURE — 3074F SYST BP LT 130 MM HG: CPT | Mod: CPTII,S$GLB,,

## 2022-07-12 PROCEDURE — 1126F AMNT PAIN NOTED NONE PRSNT: CPT | Mod: CPTII,S$GLB,,

## 2022-07-12 PROCEDURE — 3074F PR MOST RECENT SYSTOLIC BLOOD PRESSURE < 130 MM HG: ICD-10-PCS | Mod: CPTII,S$GLB,,

## 2022-07-12 PROCEDURE — 3078F PR MOST RECENT DIASTOLIC BLOOD PRESSURE < 80 MM HG: ICD-10-PCS | Mod: CPTII,S$GLB,,

## 2022-07-12 PROCEDURE — 1160F RVW MEDS BY RX/DR IN RCRD: CPT | Mod: CPTII,S$GLB,,

## 2022-07-12 PROCEDURE — 3078F DIAST BP <80 MM HG: CPT | Mod: CPTII,S$GLB,,

## 2022-07-12 PROCEDURE — 1126F PR PAIN SEVERITY QUANTIFIED, NO PAIN PRESENT: ICD-10-PCS | Mod: CPTII,S$GLB,,

## 2022-07-12 PROCEDURE — 1159F PR MEDICATION LIST DOCUMENTED IN MEDICAL RECORD: ICD-10-PCS | Mod: CPTII,S$GLB,,

## 2022-07-12 NOTE — PROGRESS NOTES
Subjective:       Patient ID: Sophia Landis is a 76 y.o. female.    Chief Complaint:  Follow-up (Follow up imaging)      History of Present Illness  76 year old female who presents today for follow up visit for imaging results. Past medical history below. Back in December she was found to have bilateral strokes after having weakness and difficulty with her walking in addition to having some confusion. Per the patient , who is present today at clinic, she is unable to ambulate at all despite going through PT. He also recent changes with memory, bladder/bowel incontinence since the stroke. He has been completely caring for her including ADLs. Her CT head results were given to the patient and . Her results revealed  normal pressure hydrocephalus.   reports compliance with all medications. He denies any sleep or mood changes    Past Medical History:   Diagnosis Date    Cataract     Chronic diarrhea 11/26/2018    Depression     GERD (gastroesophageal reflux disease)     History of colonic polyps 11/26/2018    Hyperlipidemia     Hypertension     Menopausal syndrome     PAD (peripheral artery disease)     s/p stent       Past Surgical History:   Procedure Laterality Date    APPENDECTOMY      COLONOSCOPY N/A 5/20/2019    Procedure: COLONOSCOPY;  Surgeon: Kenia Arias MD;  Location: [facility];  Service: Endoscopy;  Laterality: N/A;  RX PLETAL ok to hold    HYSTERECTOMY      MONTY with BSO    ILIAC ARTERY STENT Bilateral        Family History   Problem Relation Age of Onset    Amblyopia Mother     Cataracts Mother     Hypertension Mother     Thyroid disease Mother     Cancer Father     Strabismus Sister     Cancer Brother     Diabetes Maternal Aunt     Cancer Maternal Uncle     Diabetes Maternal Uncle     Cancer Maternal Uncle     Cancer Maternal Uncle     Cancer Maternal Uncle     Blindness Neg Hx     Glaucoma Neg Hx     Macular degeneration Neg Hx     Retinal detachment Neg  Hx     Stroke Neg Hx        Social History     Socioeconomic History    Marital status:    Tobacco Use    Smoking status: Former Smoker    Smokeless tobacco: Former User    Tobacco comment: .  Retired  at [employer].   Substance and Sexual Activity    Alcohol use: No    Drug use: No    Sexual activity: Yes     Partners: Male     Comment:        Current Outpatient Medications   Medication Sig Dispense Refill    albuterol 90 mcg/actuation inhaler Inhale 1-2 puffs into the lungs every 4 to 6 hours as needed for Wheezing or Shortness of Breath (chest tightness). 1 Inhaler 6    amLODIPine (NORVASC) 10 MG tablet Take 1 tablet (10 mg total) by mouth once daily. 30 tablet 11    aspirin (ECOTRIN) 81 MG EC tablet TAKE 1 TABLET EVERY DAY 90 tablet 1    cilostazoL (PLETAL) 100 MG Tab Take 1 tablet (100 mg total) by mouth 2 (two) times daily. 60 tablet 11    clopidogreL (PLAVIX) 75 mg tablet Take 1 tablet (75 mg total) by mouth once daily. 90 tablet 3    hydroCHLOROthiazide (HYDRODIURIL) 25 MG tablet TAKE 1 TABLET EVERY DAY 90 tablet 1    hydroCHLOROthiazide (HYDRODIURIL) 25 MG tablet Take 1 tablet (25 mg total) by mouth once daily. 90 tablet 1    omeprazole (PRILOSEC) 20 MG capsule Take 2 capsules (40 mg total) by mouth once daily. 180 capsule 3    pravastatin (PRAVACHOL) 80 MG tablet TAKE 1 TABLET EVERY DAY 90 tablet 1    sertraline (ZOLOFT) 100 MG tablet Take 1 tablet (100 mg total) by mouth once daily. 90 tablet 3    calcium carbonate (OS-PAOLO) 600 mg calcium (1,500 mg) Tab Take 1 tablet (600 mg total) by mouth 2 (two) times daily with meals. 180 tablet 1    metoprolol succinate (TOPROL-XL) 50 MG 24 hr tablet Take 1 tablet (50 mg total) by mouth once daily. (Patient not taking: Reported on 2/3/2022) 90 tablet 1    nitroGLYCERIN (NITROSTAT) 0.4 MG SL tablet Place 1 tablet (0.4 mg total) under the tongue every 5 (five) minutes as needed for Chest pain. (Patient not  taking: Reported on 8/11/2020) 25 tablet 0     No current facility-administered medications for this visit.       Review of patient's allergies indicates:   Allergen Reactions    Antihistamines - alkylamine Nausea Only    Ciprofloxacin Nausea Only    Penicillins Hives     Review of Systems  Review of Systems   Constitutional: Negative.    HENT: Negative.    Eyes: Negative.    Respiratory: Negative.    Cardiovascular: Negative.    Gastrointestinal: Negative.    Endocrine: Negative.    Musculoskeletal: Positive for gait problem.   Skin: Negative.    Allergic/Immunologic: Negative.    Neurological: Positive for weakness.   Hematological: Negative.    Psychiatric/Behavioral: Positive for confusion.       Objective:      Neurologic Exam     Mental Status   Oriented to person, place, and time.   Attention: normal. Concentration: normal.   Speech: speech is normal   Level of consciousness: alert    Cranial Nerves   Cranial nerves II through XII intact.     Motor Exam   Right arm pronator drift: absent  Left arm pronator drift: absent    Strength   Right biceps: 5/5  Left biceps: 4/5  Right triceps: 5/5  Left triceps: 4/5  Right quadriceps: 5/5  Left quadriceps: 4/5  Right anterior tibial: 5/5  Left anterior tibial: 4/5  Right posterior tibial: 5/5  Left posterior tibial: 4/5    Sensory Exam   Light touch normal.     Gait, Coordination, and Reflexes     Tremor   Resting tremor: absent  Intention tremor: absent  Action tremor: absent    Reflexes   Reflexes 2+ except as noted.   Right brachioradialis: 2+  Left brachioradialis: 2+  Right biceps: 2+  Left biceps: 2+  Right triceps: 2+  Left triceps: 2+  Right patellar: 3+  Left patellar: 3+  Right achilles: 3+  Left achilles: 3+  Right : 2+  Left : 1+  Unable to assess gait  Patient is in her personal wheelchair       Physical Exam  HENT:      Head: Normocephalic and atraumatic.   Cardiovascular:      Rate and Rhythm: Normal rate.   Pulmonary:      Effort: Pulmonary  effort is normal.   Skin:     General: Skin is warm and dry.   Neurological:      Mental Status: She is alert and oriented to person, place, and time.      Cranial Nerves: Cranial nerves 2-12 are intact.      Sensory: Sensation is intact.      Motor: Weakness present.      Gait: Gait abnormal.      Deep Tendon Reflexes:      Reflex Scores:       Tricep reflexes are 2+ on the right side and 2+ on the left side.       Bicep reflexes are 2+ on the right side and 2+ on the left side.       Brachioradialis reflexes are 2+ on the right side and 2+ on the left side.       Patellar reflexes are 3+ on the right side and 3+ on the left side.       Achilles reflexes are 3+ on the right side and 3+ on the left side.  Psychiatric:         Mood and Affect: Mood normal. Affect is flat.         Speech: Speech normal.         Behavior: Behavior is cooperative.           Assessment and Plan:     1. NPH (normal pressure hydrocephalus)  - spoke about the possibly of LP, pt declined at this time. She wishes to think it over do to her current condition    2. Cerebrovascular accident (CVA), unspecified mechanism  -Continue ASA, Plavix, and Atorvastatin   -follow up in one year

## 2022-09-07 DIAGNOSIS — Z78.0 MENOPAUSE: ICD-10-CM

## 2022-10-05 DIAGNOSIS — Z78.0 MENOPAUSE: ICD-10-CM

## 2022-10-26 ENCOUNTER — PES CALL (OUTPATIENT)
Dept: ADMINISTRATIVE | Facility: CLINIC | Age: 77
End: 2022-10-26
Payer: MEDICARE

## 2022-12-22 ENCOUNTER — PATIENT OUTREACH (OUTPATIENT)
Dept: ADMINISTRATIVE | Facility: HOSPITAL | Age: 77
End: 2022-12-22
Payer: MEDICARE

## 2022-12-22 ENCOUNTER — TELEPHONE (OUTPATIENT)
Dept: FAMILY MEDICINE | Facility: CLINIC | Age: 77
End: 2022-12-22
Payer: MEDICARE

## 2022-12-22 VITALS — SYSTOLIC BLOOD PRESSURE: 121 MMHG | DIASTOLIC BLOOD PRESSURE: 57 MMHG

## 2023-01-02 DIAGNOSIS — I73.9 PAD (PERIPHERAL ARTERY DISEASE): ICD-10-CM

## 2023-01-02 DIAGNOSIS — K21.9 GASTROESOPHAGEAL REFLUX DISEASE: ICD-10-CM

## 2023-01-02 NOTE — TELEPHONE ENCOUNTER
Care Due:                  Date            Visit Type   Department     Provider  --------------------------------------------------------------------------------                                [facility]/ INTERNAL  [facility]  Last Visit: 02-      CARE ([facility])   MED/ PEDS      Ehrensing                              [facility]/ INTERNAL  [facility]  Next Visit: 02-      CARE ([facility])   MED/ PEDS      Ehrensing                                                            Last  Test          Frequency    Reason                     Performed    Due Date  --------------------------------------------------------------------------------    CBC.........  12 months..  clopidogreL..............  12-   12-    CMP.........  12 months..  hydroCHLOROthiazide,       12-   12-                             pravastatin..............    Lipid Panel.  12 months..  pravastatin..............  Not Found    Overdue    [employer] Embedded Care Gaps. Reference number: [id number]. 1/02/2023   10:52:43 AM CST

## 2023-01-04 RX ORDER — OMEPRAZOLE 20 MG/1
CAPSULE, DELAYED RELEASE ORAL
Qty: 180 CAPSULE | Refills: 0 | Status: SHIPPED | OUTPATIENT
Start: 2023-01-04 | End: 2023-06-28

## 2023-01-04 RX ORDER — CLOPIDOGREL BISULFATE 75 MG/1
TABLET ORAL
Qty: 90 TABLET | Refills: 0 | Status: ON HOLD | OUTPATIENT
Start: 2023-01-04 | End: 2023-05-26 | Stop reason: HOSPADM

## 2023-02-07 DIAGNOSIS — Z00.00 ENCOUNTER FOR MEDICARE ANNUAL WELLNESS EXAM: ICD-10-CM

## 2023-02-09 DIAGNOSIS — Z00.00 ENCOUNTER FOR MEDICARE ANNUAL WELLNESS EXAM: ICD-10-CM

## 2023-02-15 ENCOUNTER — LAB VISIT (OUTPATIENT)
Dept: LAB | Facility: HOSPITAL | Age: 78
End: 2023-02-15
Attending: INTERNAL MEDICINE
Payer: MEDICARE

## 2023-02-15 ENCOUNTER — TELEPHONE (OUTPATIENT)
Dept: FAMILY MEDICINE | Facility: CLINIC | Age: 78
End: 2023-02-15
Payer: MEDICARE

## 2023-02-15 ENCOUNTER — OFFICE VISIT (OUTPATIENT)
Dept: FAMILY MEDICINE | Facility: CLINIC | Age: 78
End: 2023-02-15
Payer: MEDICARE

## 2023-02-15 VITALS
SYSTOLIC BLOOD PRESSURE: 126 MMHG | BODY MASS INDEX: 22.8 KG/M2 | HEIGHT: 59 IN | DIASTOLIC BLOOD PRESSURE: 68 MMHG | OXYGEN SATURATION: 95 % | HEART RATE: 72 BPM | TEMPERATURE: 99 F | WEIGHT: 113.13 LBS

## 2023-02-15 DIAGNOSIS — F39 MOOD DISORDER: ICD-10-CM

## 2023-02-15 DIAGNOSIS — E55.9 VITAMIN D DEFICIENCY DISEASE: ICD-10-CM

## 2023-02-15 DIAGNOSIS — Z00.00 ROUTINE MEDICAL EXAM: Primary | ICD-10-CM

## 2023-02-15 DIAGNOSIS — Z12.31 ENCOUNTER FOR SCREENING MAMMOGRAM FOR BREAST CANCER: ICD-10-CM

## 2023-02-15 DIAGNOSIS — I73.9 PAD (PERIPHERAL ARTERY DISEASE): ICD-10-CM

## 2023-02-15 DIAGNOSIS — I63.9 CEREBROVASCULAR ACCIDENT (CVA), UNSPECIFIED MECHANISM: ICD-10-CM

## 2023-02-15 DIAGNOSIS — I65.21 STENOSIS OF RIGHT CAROTID ARTERY: ICD-10-CM

## 2023-02-15 DIAGNOSIS — Z78.0 POSTMENOPAUSAL STATE: ICD-10-CM

## 2023-02-15 DIAGNOSIS — I10 ESSENTIAL HYPERTENSION: ICD-10-CM

## 2023-02-15 DIAGNOSIS — I77.811 ECTATIC ABDOMINAL AORTA: ICD-10-CM

## 2023-02-15 DIAGNOSIS — E78.5 HYPERLIPIDEMIA, UNSPECIFIED HYPERLIPIDEMIA TYPE: ICD-10-CM

## 2023-02-15 DIAGNOSIS — K21.9 GASTROESOPHAGEAL REFLUX DISEASE, UNSPECIFIED WHETHER ESOPHAGITIS PRESENT: ICD-10-CM

## 2023-02-15 DIAGNOSIS — Z86.010 HISTORY OF COLONIC POLYPS: ICD-10-CM

## 2023-02-15 DIAGNOSIS — K59.09 CHRONIC CONSTIPATION: ICD-10-CM

## 2023-02-15 DIAGNOSIS — G91.2 NPH (NORMAL PRESSURE HYDROCEPHALUS): ICD-10-CM

## 2023-02-15 DIAGNOSIS — G47.33 OSA ON CPAP: ICD-10-CM

## 2023-02-15 DIAGNOSIS — M85.80 OSTEOPENIA, UNSPECIFIED LOCATION: ICD-10-CM

## 2023-02-15 DIAGNOSIS — F33.0 MILD RECURRENT MAJOR DEPRESSION: ICD-10-CM

## 2023-02-15 DIAGNOSIS — Z00.00 ROUTINE MEDICAL EXAM: ICD-10-CM

## 2023-02-15 LAB
ALBUMIN SERPL BCP-MCNC: 3.9 G/DL (ref 3.5–5.2)
ALP SERPL-CCNC: 84 U/L (ref 55–135)
ALT SERPL W/O P-5'-P-CCNC: 22 U/L (ref 10–44)
ANION GAP SERPL CALC-SCNC: 8 MMOL/L (ref 8–16)
AST SERPL-CCNC: 26 U/L (ref 10–40)
BASOPHILS # BLD AUTO: 0.05 K/UL (ref 0–0.2)
BASOPHILS NFR BLD: 0.8 % (ref 0–1.9)
BILIRUB SERPL-MCNC: 0.4 MG/DL (ref 0.1–1)
BUN SERPL-MCNC: 17 MG/DL (ref 8–23)
CALCIUM SERPL-MCNC: 9.9 MG/DL (ref 8.7–10.5)
CHLORIDE SERPL-SCNC: 107 MMOL/L (ref 95–110)
CO2 SERPL-SCNC: 26 MMOL/L (ref 23–29)
CREAT SERPL-MCNC: 0.9 MG/DL (ref 0.5–1.4)
DIFFERENTIAL METHOD: ABNORMAL
EOSINOPHIL # BLD AUTO: 0.3 K/UL (ref 0–0.5)
EOSINOPHIL NFR BLD: 4.4 % (ref 0–8)
ERYTHROCYTE [DISTWIDTH] IN BLOOD BY AUTOMATED COUNT: 16.6 % (ref 11.5–14.5)
EST. GFR  (NO RACE VARIABLE): >60 ML/MIN/1.73 M^2
GLUCOSE SERPL-MCNC: 81 MG/DL (ref 70–110)
HCT VFR BLD AUTO: 42.2 % (ref 37–48.5)
HGB BLD-MCNC: 13.4 G/DL (ref 12–16)
IMM GRANULOCYTES # BLD AUTO: 0.05 K/UL (ref 0–0.04)
IMM GRANULOCYTES NFR BLD AUTO: 0.8 % (ref 0–0.5)
LYMPHOCYTES # BLD AUTO: 1.8 K/UL (ref 1–4.8)
LYMPHOCYTES NFR BLD: 27.5 % (ref 18–48)
MCH RBC QN AUTO: 27 PG (ref 27–31)
MCHC RBC AUTO-ENTMCNC: 31.8 G/DL (ref 32–36)
MCV RBC AUTO: 85 FL (ref 82–98)
MONOCYTES # BLD AUTO: 0.5 K/UL (ref 0.3–1)
MONOCYTES NFR BLD: 7 % (ref 4–15)
NEUTROPHILS # BLD AUTO: 3.9 K/UL (ref 1.8–7.7)
NEUTROPHILS NFR BLD: 59.5 % (ref 38–73)
NRBC BLD-RTO: 0 /100 WBC
PLATELET # BLD AUTO: 229 K/UL (ref 150–450)
PMV BLD AUTO: 10.2 FL (ref 9.2–12.9)
POTASSIUM SERPL-SCNC: 3.5 MMOL/L (ref 3.5–5.1)
PROT SERPL-MCNC: 7.4 G/DL (ref 6–8.4)
RBC # BLD AUTO: 4.96 M/UL (ref 4–5.4)
SODIUM SERPL-SCNC: 141 MMOL/L (ref 136–145)
TSH SERPL DL<=0.005 MIU/L-ACNC: 2.21 UIU/ML (ref 0.4–4)
WBC # BLD AUTO: 6.57 K/UL (ref 3.9–12.7)

## 2023-02-15 PROCEDURE — 3288F FALL RISK ASSESSMENT DOCD: CPT | Mod: HCNC,CPTII,S$GLB, | Performed by: INTERNAL MEDICINE

## 2023-02-15 PROCEDURE — 3078F PR MOST RECENT DIASTOLIC BLOOD PRESSURE < 80 MM HG: ICD-10-PCS | Mod: HCNC,CPTII,S$GLB, | Performed by: INTERNAL MEDICINE

## 2023-02-15 PROCEDURE — 99214 PR OFFICE/OUTPT VISIT, EST, LEVL IV, 30-39 MIN: ICD-10-PCS | Mod: 25,HCNC,S$GLB, | Performed by: INTERNAL MEDICINE

## 2023-02-15 PROCEDURE — 99999 PR PBB SHADOW E&M-EST. PATIENT-LVL III: CPT | Mod: PBBFAC,HCNC,, | Performed by: INTERNAL MEDICINE

## 2023-02-15 PROCEDURE — 3078F DIAST BP <80 MM HG: CPT | Mod: HCNC,CPTII,S$GLB, | Performed by: INTERNAL MEDICINE

## 2023-02-15 PROCEDURE — 99499 RISK ADDL DX/OHS AUDIT: ICD-10-PCS | Mod: HCNC,S$GLB,, | Performed by: INTERNAL MEDICINE

## 2023-02-15 PROCEDURE — 80053 COMPREHEN METABOLIC PANEL: CPT | Mod: HCNC | Performed by: INTERNAL MEDICINE

## 2023-02-15 PROCEDURE — 99397 PR PREVENTIVE VISIT,EST,65 & OVER: ICD-10-PCS | Mod: 25,HCNC,S$GLB, | Performed by: INTERNAL MEDICINE

## 2023-02-15 PROCEDURE — 3288F PR FALLS RISK ASSESSMENT DOCUMENTED: ICD-10-PCS | Mod: HCNC,CPTII,S$GLB, | Performed by: INTERNAL MEDICINE

## 2023-02-15 PROCEDURE — 85025 COMPLETE CBC W/AUTO DIFF WBC: CPT | Mod: HCNC | Performed by: INTERNAL MEDICINE

## 2023-02-15 PROCEDURE — 82306 VITAMIN D 25 HYDROXY: CPT | Mod: HCNC | Performed by: INTERNAL MEDICINE

## 2023-02-15 PROCEDURE — 1126F AMNT PAIN NOTED NONE PRSNT: CPT | Mod: HCNC,CPTII,S$GLB, | Performed by: INTERNAL MEDICINE

## 2023-02-15 PROCEDURE — 1159F MED LIST DOCD IN RCRD: CPT | Mod: HCNC,CPTII,S$GLB, | Performed by: INTERNAL MEDICINE

## 2023-02-15 PROCEDURE — 1159F PR MEDICATION LIST DOCUMENTED IN MEDICAL RECORD: ICD-10-PCS | Mod: HCNC,CPTII,S$GLB, | Performed by: INTERNAL MEDICINE

## 2023-02-15 PROCEDURE — 1100F PTFALLS ASSESS-DOCD GE2>/YR: CPT | Mod: HCNC,CPTII,S$GLB, | Performed by: INTERNAL MEDICINE

## 2023-02-15 PROCEDURE — 3074F SYST BP LT 130 MM HG: CPT | Mod: HCNC,CPTII,S$GLB, | Performed by: INTERNAL MEDICINE

## 2023-02-15 PROCEDURE — 99214 OFFICE O/P EST MOD 30 MIN: CPT | Mod: 25,HCNC,S$GLB, | Performed by: INTERNAL MEDICINE

## 2023-02-15 PROCEDURE — 1100F PR PT FALLS ASSESS DOC 2+ FALLS/FALL W/INJURY/YR: ICD-10-PCS | Mod: HCNC,CPTII,S$GLB, | Performed by: INTERNAL MEDICINE

## 2023-02-15 PROCEDURE — 99499 UNLISTED E&M SERVICE: CPT | Mod: HCNC,S$GLB,, | Performed by: INTERNAL MEDICINE

## 2023-02-15 PROCEDURE — 3074F PR MOST RECENT SYSTOLIC BLOOD PRESSURE < 130 MM HG: ICD-10-PCS | Mod: HCNC,CPTII,S$GLB, | Performed by: INTERNAL MEDICINE

## 2023-02-15 PROCEDURE — 36415 COLL VENOUS BLD VENIPUNCTURE: CPT | Mod: HCNC,PO | Performed by: INTERNAL MEDICINE

## 2023-02-15 PROCEDURE — 1126F PR PAIN SEVERITY QUANTIFIED, NO PAIN PRESENT: ICD-10-PCS | Mod: HCNC,CPTII,S$GLB, | Performed by: INTERNAL MEDICINE

## 2023-02-15 PROCEDURE — 84443 ASSAY THYROID STIM HORMONE: CPT | Mod: HCNC | Performed by: INTERNAL MEDICINE

## 2023-02-15 PROCEDURE — 99397 PER PM REEVAL EST PAT 65+ YR: CPT | Mod: 25,HCNC,S$GLB, | Performed by: INTERNAL MEDICINE

## 2023-02-15 PROCEDURE — 99999 PR PBB SHADOW E&M-EST. PATIENT-LVL III: ICD-10-PCS | Mod: PBBFAC,HCNC,, | Performed by: INTERNAL MEDICINE

## 2023-02-15 RX ORDER — NITROGLYCERIN 0.4 MG/1
0.4 TABLET SUBLINGUAL EVERY 5 MIN PRN
Qty: 25 TABLET | Refills: 0 | Status: SHIPPED | OUTPATIENT
Start: 2023-02-15 | End: 2023-03-17

## 2023-02-15 NOTE — PROGRESS NOTES
"Chief complaint:  Physical exam    77-year-old white female last seen a couple of years ago.  mammo 2018?   colonoscopy w 1 Tubular adenoma 5/19 -5 yrs.  We discussed mammogram.  She did not like the procedure and she declines.  Perhaps in the future when she recovers more so from her stroke.  Similarly with bone density.      She is here with her  who is her primary caregiver right now        ROS:   CONST: weight stable. EYES: no vision change. ENT: no sore throat. CV: no chest pain w/ exertion. RESP: no shortness of breath. GI: no nausea, vomiting, diarrhea. No dysphagia. : no urinary issues. MUSCULOSKELETAL: no new myalgias or arthralgias. SKIN: no new changes. NEURO: no focal deficits. PSYCH: no new issues. ENDOCRINE: no polyuria. HEME: no lymph nodes. ALLERGY: no general pruritis.om          Past Medical History:   Diagnosis Date    Cataract     Chronic diarrhea 11/26/2018    Depression     GERD (gastroesophageal reflux disease)     History of colonic polyps - colonoscopy w 1 Tubular adenoma 5/19 -5 yrs 11/26/2018    Hyperlipidemia     Hypertension     Menopausal syndrome     PAD (peripheral artery disease)     s/p stent   Acute strokes December 2021-acute infarcts the left posterolateral caudate and right periventricular white matter."  Osteopenia 2017    Past Surgical History:   Procedure Laterality Date    APPENDECTOMY      COLONOSCOPY N/A 5/20/2019    Procedure: COLONOSCOPY;  Surgeon: Kenia Arias MD;  Location: [facility];  Service: Endoscopy;  Laterality: N/A;  RX PLETAL ok to hold    HYSTERECTOMY      MONTY with BSO    ILIAC ARTERY STENT Bilateral      Social History     Socioeconomic History    Marital status:    Tobacco Use    Smoking status: Former    Smokeless tobacco: Former    Tobacco comments:     .  Retired  at [employer].   Substance and Sexual Activity    Alcohol use: No    Drug use: No    Sexual activity: Yes     Partners: Male     Comment:  "     family history includes Amblyopia in her mother; Cancer in her brother, father, maternal uncle, maternal uncle, maternal uncle, and maternal uncle; Cataracts in her mother; Diabetes in her maternal aunt and maternal uncle; Hypertension in her mother; Strabismus in her sister; Thyroid disease in her mother.      Labs, x-ray, ultrasound, CT scan, colonoscopy reports and prior clinical notes reviewed and summarized time patient counseled.    Gen: no distress  EYES: conjunctiva clear, non-icteric, PERRL  ENT: nose clear, nasal mucosa normal, oropharynx clear and moist, teeth good  NECK:supple, thyroid non-palpable  RESP: effort is good, lungs clear  CV: heart RRR w/o murmur, gallops or rubs; no carotid bruits, no edema  GI: abdomen soft, non-distended, non-tender, no hepatosplenomegaly  MS:  In wheelchair no clubbing or cyanosis of the digits, she can raise both legs.  Dorsiflexion and plantar flexion is 3/5 with poor effort.  Slight clonus.  .  No tremor.  Smiling but affect is somewhat slow  SKIN: no rashes, warm to touch        Assessment and plan:    Sophia was seen today for follow-up.    Diagnoses and all orders for this visit:    Routine medical exam, update yearly labs    Encounter for screening mammogram for breast cancer, overdue for mammogram but she declines setting it up at this time will push the issue later when she recovers from her strokes.    History of colonic polyps, actually up-to-date but likely to decline any further                                                    Additional evaluation and management issues:    Additionally patient has numerous other medical issues to address separate from her physical.  Reviewed the prior events.  Her  says that she was getting more incontinent and more off balance and had a few more falls.  She did have her 1st vaccine and definitely had decreased ability to do walking after that.  After the 2nd vaccine two days later they went to the hospital  because she was falling more incontinent and confused.  Her memory is definitely been going often was worse after the 1st vaccine.  The  verbalizes is not trying to blame the vaccine but clearly from these time point she worsened but there was some developing walking issues, balance issues.  She currently still remains off balance more so.  She always wants to fall backwards.  She has had incontinence but does definitely worse and now she has pretty much incontinence of urine and feces.    Reviewed interval Neurology notes.  Has suspected, normal pressure hydrocephalus is suspected but patient declined spinal tap.  I again spent a good deal of time today reassuring her about the safety of the procedure in the benefits and that she could have significant improve function and avoid worsening incontinence and eventual dementia.  Family does feel the she is competent to make her own decisions and she continuously declines.  Eventually if she becomes not competent to make her own decisions family may need to make that decision for her for the spinal tap that improvement at that point may be limited.    Reviewed when she went to the ER they originally were entertaining normal pressure hydrocephalus in the routine brain MRI however showed two acute strokes.  She has a neurology appointment but is not until May.  I will try put in a referral to stroke Neurology as she does need to get the etiology of her two separate strokes ruled out and make sure this was not embolic strokes and so forth.  Her carotid ultrasound was unremarkable.  She is on Plavix now.  No new neurological deficits.  Her symptoms of NPH still persist and I discussed with the  should she acutely worsen in any way from a neurological standpoint he should bring her to the emergency room and Neurology may need to come see her there.  Apparently she did not have a trial of a therapeutic spinal tap in the hospital after the acute strokes were  "found.    Neuro appt 7/2022  Assessment and Plan:    1. NPH (normal pressure hydrocephalus)  - spoke about the possibly of LP, pt declined at this time. She wishes to think it over do to her current condition   2. Cerebrovascular accident (CVA), unspecified mechanism  -Continue ASA, Plavix, and Atorvastatin   -follow up in one year     hospital as below:  HPI:   Sophia Landis 76 y.o. female with HTN, HLD, PAD, GERD presents to the hospital with a chief complaint of confusion. Her  reports for the last week she has had increasing difficulty ambulating and problems with her memory. Beginning 5 days ago she has become unable to ambulate. She has baseline problems with incontinence and he reports mild memory impairments, but over the last week the memory impairments have worsened. She does not smoke or drink. She denies fever, chest pain, SOB, nausea, vomiting, abdominal pain, leg swelling, facial droop, slurred speech, difficulty swallowing. The  notes decreased appetite for the last week.    In the ED, UDS negative, troponin negative, TSH within normal limits, CT head with enlargements of the ventricles concerning for NPH.   Hospital Course:   Ms Landis presented with urinary incontinence, memory loss and unstable gait for months. CT brain suggests normal pressure hydrocephalus. Neurology consulted and home aspirin/clopidogrel held in anticipation for lumbar puncture. An MRI with IV contrast obtained to rule out mass. This was negative for a mass but positive for "acute infarcts the left posterolateral caudate and right periventricular white matter." Resumed aspirin and clopidogrel, continued statin and started intrvenous fluids. PT/OT consulted. Recommend SNF. Family will discuss this to see if they prefer home health vs SNF. Workup negative for illicit drugs, troponin negative. TSH WNL. Negative COVID. Abnormal urinalysis however patient is AAO x 3, afebrile, non toxic appearing and with no " leukocytosis. Would start antibioitcs if signs of sepsis. Carotid ultrasound and Echo ordered. No arrhythmias thus far. Will treat UTI given AMS as it may be contributing, Pan-sen E.coli. Working on SNF placement- accepted on Friday, will dispo then unless one accepted prior to then.           Patient has hypertension which appears to be under good control and was up when she presented for the stroke and reassured  that is normal.  Apparently her mood disorder and depression been under control.  She has some history of some right carotid disease but most recent ultrasounds do not look impressive.  Constipation not a problem is now she is having bowel movements and some incontinence.    she does have osteopenia and is postmenopausal.  Her last bone density was 2017.  Will update that when she is in a better physical state.    Evaluation and management all the separate issues will be based on medical decision making as below.  Labs and x-ray reports reviewed and summarized.          Assessment and plan:            Cerebrovascular accident (CVA), unspecified mechanism, no interval neurological deficits to suspect interval CVA but other symptoms might make it difficult  -     Comprehensive Metabolic Panel; Future  -     TSH; Future  -     Vitamin D; Future  -     CBC Auto Differential; Future    NPH (normal pressure hydrocephalus), ongoing symptoms and encouraged her to follow through on spinal tap but she declines    Essential hypertension, chronic and stable  -     Comprehensive Metabolic Panel; Future  -     TSH; Future  -     Vitamin D; Future  -     CBC Auto Differential; Future    Mild recurrent major depression, apparently stable    Hyperlipidemia, unspecified hyperlipidemia type,  says she holes pills in her mouth today dissolved but she otherwise eats good so she might be making conscious decision about not taking the pills because her  brings them to her.  Prior lipids under control and  given the questionable taking of the pravastatin, will defer checking lipids at this time.  She will continue statin    Ectatic abdominal aorta    PAD (peripheral artery disease)    Stenosis of right carotid artery    Chronic constipation, apparently stable on some incontinence    ANDRES on CPAP    Gastroesophageal reflux disease, unspecified whether esophagitis present    Mood disorder, chronic and stable    Osteopenia, unspecified location, eventual reassessment  -     Vitamin D; Future    Postmenopausal state  -     Vitamin D; Future    Vitamin D deficiency disease, in light of osteopenia check vitamin-D  -     Vitamin D; Future    Other orders  -     nitroGLYCERIN (NITROSTAT) 0.4 MG SL tablet; Place 1 tablet (0.4 mg total) under the tongue every 5 (five) minutes as needed for Chest pain.

## 2023-02-16 LAB — 25(OH)D3+25(OH)D2 SERPL-MCNC: 16 NG/ML (ref 30–96)

## 2023-03-21 ENCOUNTER — PES CALL (OUTPATIENT)
Dept: ADMINISTRATIVE | Facility: CLINIC | Age: 78
End: 2023-03-21
Payer: MEDICARE

## 2023-05-24 DIAGNOSIS — I10 ESSENTIAL HYPERTENSION: Chronic | ICD-10-CM

## 2023-05-24 RX ORDER — METOPROLOL SUCCINATE 50 MG/1
TABLET, EXTENDED RELEASE ORAL
Qty: 90 TABLET | Refills: 2 | Status: ON HOLD | OUTPATIENT
Start: 2023-05-24 | End: 2023-07-20 | Stop reason: HOSPADM

## 2023-05-24 NOTE — TELEPHONE ENCOUNTER
Refill Routing Note   Medication(s) are not appropriate for processing by [facility] for the following reason(s):      Drug-disease interaction    [facility] action(s):  Defer Labs due   Medication Therapy Plan: Drug-Disease: metoprolol succinate and PAD (peripheral artery disease); DEFER      Appointments  past 12m or future 3m with PCP    Date Provider   Last Visit   2/15/2023 Sarthak Johnson MD   Next Visit   Visit date not found Sarthak Johnson MD   ED visits in past 90 days: 0        Note composed:10:06 AM 05/24/2023

## 2023-05-24 NOTE — TELEPHONE ENCOUNTER
Care Due:                  Date            Visit Type   Department     Provider  --------------------------------------------------------------------------------                                [facility]                              PRIMARY      MED/ INTERNAL  Sarthak Hinds  Last Visit: 02-      CARE ([facility])   MED/ PEDS      Ehrensing  Next Visit: None Scheduled  None         None Found                                                            Last  Test          Frequency    Reason                     Performed    Due Date  --------------------------------------------------------------------------------    Lipid Panel.  12 months..  pravastatin..............  Not Found    Overdue    [employer] Embedded Care Due Messages. Reference number: [id number].   5/24/2023 9:56:37 AM CDT

## 2023-05-25 ENCOUNTER — TELEPHONE (OUTPATIENT)
Dept: FAMILY MEDICINE | Facility: CLINIC | Age: 78
End: 2023-05-25
Payer: MEDICARE

## 2023-05-25 ENCOUNTER — HOSPITAL ENCOUNTER (OUTPATIENT)
Facility: HOSPITAL | Age: 78
Discharge: HOME | End: 2023-05-26
Attending: INTERNAL MEDICINE | Admitting: INTERNAL MEDICINE
Payer: MEDICARE

## 2023-05-25 DIAGNOSIS — N30.90 CYSTITIS: ICD-10-CM

## 2023-05-25 DIAGNOSIS — K92.2 GI BLEED: Primary | ICD-10-CM

## 2023-05-25 LAB
ABO + RH BLD: NORMAL
ALBUMIN SERPL BCP-MCNC: 3.6 G/DL (ref 3.5–5.2)
ALP SERPL-CCNC: 84 U/L (ref 55–135)
ALT SERPL W/O P-5'-P-CCNC: 24 U/L (ref 10–44)
ANION GAP SERPL CALC-SCNC: 11 MMOL/L (ref 8–16)
APTT PPP: 35.6 SEC (ref 21–32)
AST SERPL-CCNC: 33 U/L (ref 10–40)
BACTERIA #/AREA URNS HPF: ABNORMAL /HPF
BASOPHILS # BLD AUTO: 0.04 K/UL (ref 0–0.2)
BASOPHILS NFR BLD: 0.6 % (ref 0–1.9)
BILIRUB SERPL-MCNC: 0.5 MG/DL (ref 0.1–1)
BILIRUB UR QL STRIP: NEGATIVE
BLD GP AB SCN CELLS X3 SERPL QL: NORMAL
BUN SERPL-MCNC: 13 MG/DL (ref 8–23)
CALCIUM SERPL-MCNC: 9.2 MG/DL (ref 8.7–10.5)
CHLORIDE SERPL-SCNC: 107 MMOL/L (ref 95–110)
CLARITY UR: CLEAR
CO2 SERPL-SCNC: 25 MMOL/L (ref 23–29)
COLOR UR: YELLOW
CREAT SERPL-MCNC: 0.9 MG/DL (ref 0.5–1.4)
DIFFERENTIAL METHOD: ABNORMAL
EOSINOPHIL # BLD AUTO: 0.3 K/UL (ref 0–0.5)
EOSINOPHIL NFR BLD: 4.1 % (ref 0–8)
ERYTHROCYTE [DISTWIDTH] IN BLOOD BY AUTOMATED COUNT: 16.1 % (ref 11.5–14.5)
EST. GFR  (NO RACE VARIABLE): >60 ML/MIN/1.73 M^2
GLUCOSE SERPL-MCNC: 87 MG/DL (ref 70–110)
GLUCOSE UR QL STRIP: NEGATIVE
HCT VFR BLD AUTO: 35 % (ref 37–48.5)
HGB BLD-MCNC: 11.8 G/DL (ref 12–16)
HGB UR QL STRIP: ABNORMAL
HYALINE CASTS #/AREA URNS LPF: 3 /LPF
IMM GRANULOCYTES # BLD AUTO: 0.02 K/UL (ref 0–0.04)
IMM GRANULOCYTES NFR BLD AUTO: 0.3 % (ref 0–0.5)
INR PPP: 1 (ref 0.8–1.2)
KETONES UR QL STRIP: NEGATIVE
LEUKOCYTE ESTERASE UR QL STRIP: ABNORMAL
LIPASE SERPL-CCNC: 79 U/L (ref 4–60)
LYMPHOCYTES # BLD AUTO: 1.9 K/UL (ref 1–4.8)
LYMPHOCYTES NFR BLD: 30.2 % (ref 18–48)
MCH RBC QN AUTO: 27.7 PG (ref 27–31)
MCHC RBC AUTO-ENTMCNC: 33.7 G/DL (ref 32–36)
MCV RBC AUTO: 82 FL (ref 82–98)
MICROSCOPIC COMMENT: ABNORMAL
MONOCYTES # BLD AUTO: 0.5 K/UL (ref 0.3–1)
MONOCYTES NFR BLD: 8 % (ref 4–15)
NEUTROPHILS # BLD AUTO: 3.5 K/UL (ref 1.8–7.7)
NEUTROPHILS NFR BLD: 56.8 % (ref 38–73)
NITRITE UR QL STRIP: NEGATIVE
NRBC BLD-RTO: 0 /100 WBC
PH UR STRIP: 6 [PH] (ref 5–8)
PLATELET # BLD AUTO: 250 K/UL (ref 150–450)
PMV BLD AUTO: 9.6 FL (ref 9.2–12.9)
POTASSIUM SERPL-SCNC: 3.6 MMOL/L (ref 3.5–5.1)
PROT SERPL-MCNC: 7.1 G/DL (ref 6–8.4)
PROT UR QL STRIP: ABNORMAL
PROTHROMBIN TIME: 10.9 SEC (ref 9–12.5)
RBC # BLD AUTO: 4.26 M/UL (ref 4–5.4)
RBC #/AREA URNS HPF: 7 /HPF (ref 0–4)
SODIUM SERPL-SCNC: 143 MMOL/L (ref 136–145)
SP GR UR STRIP: 1.01 (ref 1–1.03)
SPECIMEN OUTDATE: NORMAL
UNIDENT CRYS URNS QL MICRO: ABNORMAL
URN SPEC COLLECT METH UR: ABNORMAL
UROBILINOGEN UR STRIP-ACNC: NEGATIVE EU/DL
WBC # BLD AUTO: 6.16 K/UL (ref 3.9–12.7)
WBC #/AREA URNS HPF: >100 /HPF (ref 0–5)
WBC CLUMPS URNS QL MICRO: ABNORMAL

## 2023-05-25 PROCEDURE — G0378 HOSPITAL OBSERVATION PER HR: HCPCS

## 2023-05-25 PROCEDURE — C9113 INJ PANTOPRAZOLE SODIUM, VIA: HCPCS

## 2023-05-25 PROCEDURE — 80053 COMPREHEN METABOLIC PANEL: CPT | Performed by: NURSE PRACTITIONER

## 2023-05-25 PROCEDURE — 85730 THROMBOPLASTIN TIME PARTIAL: CPT

## 2023-05-25 PROCEDURE — 83690 ASSAY OF LIPASE: CPT | Performed by: NURSE PRACTITIONER

## 2023-05-25 PROCEDURE — 63600175 PHARM REV CODE 636 W HCPCS

## 2023-05-25 PROCEDURE — 87086 URINE CULTURE/COLONY COUNT: CPT | Performed by: NURSE PRACTITIONER

## 2023-05-25 PROCEDURE — 85610 PROTHROMBIN TIME: CPT

## 2023-05-25 PROCEDURE — 81000 URINALYSIS NONAUTO W/SCOPE: CPT | Performed by: NURSE PRACTITIONER

## 2023-05-25 PROCEDURE — 96366 THER/PROPH/DIAG IV INF ADDON: CPT

## 2023-05-25 PROCEDURE — 96375 TX/PRO/DX INJ NEW DRUG ADDON: CPT

## 2023-05-25 PROCEDURE — 99285 EMERGENCY DEPT VISIT HI MDM: CPT | Mod: 25

## 2023-05-25 PROCEDURE — 85025 COMPLETE CBC W/AUTO DIFF WBC: CPT | Performed by: NURSE PRACTITIONER

## 2023-05-25 PROCEDURE — 96365 THER/PROPH/DIAG IV INF INIT: CPT

## 2023-05-25 PROCEDURE — 86900 BLOOD TYPING SEROLOGIC ABO: CPT | Performed by: NURSE PRACTITIONER

## 2023-05-25 RX ORDER — HYDROCHLOROTHIAZIDE 25 MG/1
25 TABLET ORAL DAILY
Status: DISCONTINUED | OUTPATIENT
Start: 2023-05-26 | End: 2023-05-26 | Stop reason: HOSPADM

## 2023-05-25 RX ORDER — METOPROLOL SUCCINATE 50 MG/1
50 TABLET, EXTENDED RELEASE ORAL DAILY
Status: DISCONTINUED | OUTPATIENT
Start: 2023-05-26 | End: 2023-05-26 | Stop reason: HOSPADM

## 2023-05-25 RX ORDER — PRAVASTATIN SODIUM 40 MG/1
80 TABLET ORAL DAILY
Status: DISCONTINUED | OUTPATIENT
Start: 2023-05-26 | End: 2023-05-26 | Stop reason: HOSPADM

## 2023-05-25 RX ORDER — PANTOPRAZOLE SODIUM 40 MG/10ML
40 INJECTION, POWDER, LYOPHILIZED, FOR SOLUTION INTRAVENOUS 2 TIMES DAILY
Status: DISCONTINUED | OUTPATIENT
Start: 2023-05-25 | End: 2023-05-26 | Stop reason: HOSPADM

## 2023-05-25 RX ORDER — SERTRALINE HYDROCHLORIDE 50 MG/1
100 TABLET, FILM COATED ORAL DAILY
Status: DISCONTINUED | OUTPATIENT
Start: 2023-05-26 | End: 2023-05-26 | Stop reason: HOSPADM

## 2023-05-25 RX ADMIN — PANTOPRAZOLE SODIUM 40 MG: 40 INJECTION, POWDER, FOR SOLUTION INTRAVENOUS at 10:05

## 2023-05-25 RX ADMIN — CEFTRIAXONE 1 G: 1 INJECTION, SOLUTION INTRAVENOUS at 09:05

## 2023-05-25 NOTE — TELEPHONE ENCOUNTER
If passing black stools, significant soft multiple black stools, that has to be assumed to be bleeding and Dr. Johnson says you would need to really go to the emergency room for that.      If using Pepto-Bismol lately, that will defer in the stools black     Please get more details if this is loose black, very black stools and how many per day and we can get an idea if this is melena     Also ask to document current blood pressure

## 2023-05-25 NOTE — FIRST PROVIDER EVALUATION
"Medical screening examination initiated.  I have conducted a focused provider triage encounter, findings are as follows:    Brief history of present illness:  melena    Vitals:    05/25/23 1853   BP: (!) 140/60   BP Location: Right arm   Patient Position: Sitting   Pulse: 67   Resp: 17   Temp: 97.6 °F (36.4 °C)   TempSrc: Oral   SpO2: 95%   Weight: 54.4 kg (120 lb)   Height: 4' 11" (1.499 m)       Pertinent physical exam:  in nad during triage    Brief workup plan:  labs    Preliminary workup initiated; this workup will be continued and followed by the physician or advanced practice provider that is assigned to the patient when roomed.  "

## 2023-05-25 NOTE — Clinical Note
Diagnosis: GI bleed [[id number]]   Future Attending Provider: SHELLY RUSH [[id number]]   Admitting Provider:: RADHAMES WALL [[id number]]

## 2023-05-25 NOTE — TELEPHONE ENCOUNTER
Spoke with  who states  stool is just black and sticky, about 2/day and pt has had no pepto bismol

## 2023-05-25 NOTE — TELEPHONE ENCOUNTER
----- Message from Ted Boles sent at 5/25/2023 11:48 AM CDT -----  Regarding: feliz Rodas  Type: Callback     Who called: Feliz Rodas     What is the request in detail:  Clark stated the PT is passing black stool and has been doing it for 3 days and doesn't have any control of her body. Stated that she is not in pain and she is on anxiety pills. Need to know what to do. Please call as soon as possible.     Can the clinic reply by [facility]?  yes     Would the patient rather a call back or a response via [facility]? Callback     Best call back number: [phone]      Additional Information:

## 2023-05-26 ENCOUNTER — ANESTHESIA (OUTPATIENT)
Dept: ENDOSCOPY | Facility: HOSPITAL | Age: 78
End: 2023-05-26
Payer: MEDICARE

## 2023-05-26 ENCOUNTER — ANESTHESIA EVENT (OUTPATIENT)
Dept: ENDOSCOPY | Facility: HOSPITAL | Age: 78
End: 2023-05-26
Payer: MEDICARE

## 2023-05-26 VITALS
SYSTOLIC BLOOD PRESSURE: 168 MMHG | OXYGEN SATURATION: 96 % | BODY MASS INDEX: 20.4 KG/M2 | HEIGHT: 59 IN | TEMPERATURE: 99 F | DIASTOLIC BLOOD PRESSURE: 86 MMHG | HEART RATE: 84 BPM | WEIGHT: 101.19 LBS | RESPIRATION RATE: 18 BRPM

## 2023-05-26 PROBLEM — Z86.73 HISTORY OF STROKE: Status: ACTIVE | Noted: 2021-12-12

## 2023-05-26 LAB
BASOPHILS # BLD AUTO: 0.03 K/UL (ref 0–0.2)
BASOPHILS # BLD AUTO: 0.04 K/UL (ref 0–0.2)
BASOPHILS NFR BLD: 0.5 % (ref 0–1.9)
BASOPHILS NFR BLD: 0.7 % (ref 0–1.9)
DIFFERENTIAL METHOD: ABNORMAL
DIFFERENTIAL METHOD: ABNORMAL
EOSINOPHIL # BLD AUTO: 0.2 K/UL (ref 0–0.5)
EOSINOPHIL # BLD AUTO: 0.2 K/UL (ref 0–0.5)
EOSINOPHIL NFR BLD: 3.4 % (ref 0–8)
EOSINOPHIL NFR BLD: 3.7 % (ref 0–8)
ERYTHROCYTE [DISTWIDTH] IN BLOOD BY AUTOMATED COUNT: 15.9 % (ref 11.5–14.5)
ERYTHROCYTE [DISTWIDTH] IN BLOOD BY AUTOMATED COUNT: 16.2 % (ref 11.5–14.5)
HCT VFR BLD AUTO: 34.1 % (ref 37–48.5)
HCT VFR BLD AUTO: 34.5 % (ref 37–48.5)
HGB BLD-MCNC: 11.2 G/DL (ref 12–16)
HGB BLD-MCNC: 11.4 G/DL (ref 12–16)
IMM GRANULOCYTES # BLD AUTO: 0.02 K/UL (ref 0–0.04)
IMM GRANULOCYTES # BLD AUTO: 0.02 K/UL (ref 0–0.04)
IMM GRANULOCYTES NFR BLD AUTO: 0.3 % (ref 0–0.5)
IMM GRANULOCYTES NFR BLD AUTO: 0.3 % (ref 0–0.5)
LYMPHOCYTES # BLD AUTO: 2.2 K/UL (ref 1–4.8)
LYMPHOCYTES # BLD AUTO: 2.3 K/UL (ref 1–4.8)
LYMPHOCYTES NFR BLD: 36.3 % (ref 18–48)
LYMPHOCYTES NFR BLD: 37.5 % (ref 18–48)
MCH RBC QN AUTO: 27.5 PG (ref 27–31)
MCH RBC QN AUTO: 27.7 PG (ref 27–31)
MCHC RBC AUTO-ENTMCNC: 32.5 G/DL (ref 32–36)
MCHC RBC AUTO-ENTMCNC: 33.4 G/DL (ref 32–36)
MCV RBC AUTO: 83 FL (ref 82–98)
MCV RBC AUTO: 85 FL (ref 82–98)
MONOCYTES # BLD AUTO: 0.4 K/UL (ref 0.3–1)
MONOCYTES # BLD AUTO: 0.5 K/UL (ref 0.3–1)
MONOCYTES NFR BLD: 7.4 % (ref 4–15)
MONOCYTES NFR BLD: 8.6 % (ref 4–15)
NEUTROPHILS # BLD AUTO: 3.1 K/UL (ref 1.8–7.7)
NEUTROPHILS # BLD AUTO: 3.1 K/UL (ref 1.8–7.7)
NEUTROPHILS NFR BLD: 49.7 % (ref 38–73)
NEUTROPHILS NFR BLD: 51.6 % (ref 38–73)
NRBC BLD-RTO: 0 /100 WBC
NRBC BLD-RTO: 0 /100 WBC
PLATELET # BLD AUTO: 204 K/UL (ref 150–450)
PLATELET # BLD AUTO: 233 K/UL (ref 150–450)
PMV BLD AUTO: 10.1 FL (ref 9.2–12.9)
PMV BLD AUTO: 9.7 FL (ref 9.2–12.9)
RBC # BLD AUTO: 4.08 M/UL (ref 4–5.4)
RBC # BLD AUTO: 4.12 M/UL (ref 4–5.4)
WBC # BLD AUTO: 5.98 K/UL (ref 3.9–12.7)
WBC # BLD AUTO: 6.14 K/UL (ref 3.9–12.7)

## 2023-05-26 PROCEDURE — 25000003 PHARM REV CODE 250

## 2023-05-26 PROCEDURE — 25000003 PHARM REV CODE 250: Performed by: HOSPITALIST

## 2023-05-26 PROCEDURE — 37000008 HC ANESTHESIA 1ST 15 MINUTES: Performed by: INTERNAL MEDICINE

## 2023-05-26 PROCEDURE — 43255 EGD CONTROL BLEEDING ANY: CPT | Mod: ,,, | Performed by: INTERNAL MEDICINE

## 2023-05-26 PROCEDURE — C9113 INJ PANTOPRAZOLE SODIUM, VIA: HCPCS

## 2023-05-26 PROCEDURE — 43255 PR EGD, FLEX, W/CTRL BLEED, ANY METHOD: ICD-10-PCS | Mod: ,,, | Performed by: INTERNAL MEDICINE

## 2023-05-26 PROCEDURE — 63600175 PHARM REV CODE 636 W HCPCS: Performed by: ANESTHESIOLOGY

## 2023-05-26 PROCEDURE — D9220A PRA ANESTHESIA: ICD-10-PCS | Mod: CRNA,,, | Performed by: STUDENT IN AN ORGANIZED HEALTH CARE EDUCATION/TRAINING PROGRAM

## 2023-05-26 PROCEDURE — G0378 HOSPITAL OBSERVATION PER HR: HCPCS

## 2023-05-26 PROCEDURE — 63600175 PHARM REV CODE 636 W HCPCS: Performed by: HOSPITALIST

## 2023-05-26 PROCEDURE — 85025 COMPLETE CBC W/AUTO DIFF WBC: CPT | Mod: 91

## 2023-05-26 PROCEDURE — 25000003 PHARM REV CODE 250: Performed by: STUDENT IN AN ORGANIZED HEALTH CARE EDUCATION/TRAINING PROGRAM

## 2023-05-26 PROCEDURE — 99223 PR INITIAL HOSPITAL CARE,LEVL III: ICD-10-PCS | Mod: 25,,, | Performed by: STUDENT IN AN ORGANIZED HEALTH CARE EDUCATION/TRAINING PROGRAM

## 2023-05-26 PROCEDURE — D9220A PRA ANESTHESIA: Mod: CRNA,,, | Performed by: STUDENT IN AN ORGANIZED HEALTH CARE EDUCATION/TRAINING PROGRAM

## 2023-05-26 PROCEDURE — 63600175 PHARM REV CODE 636 W HCPCS

## 2023-05-26 PROCEDURE — 36415 COLL VENOUS BLD VENIPUNCTURE: CPT

## 2023-05-26 PROCEDURE — D9220A PRA ANESTHESIA: ICD-10-PCS | Mod: ANES,,, | Performed by: ANESTHESIOLOGY

## 2023-05-26 PROCEDURE — D9220A PRA ANESTHESIA: Mod: ANES,,, | Performed by: ANESTHESIOLOGY

## 2023-05-26 PROCEDURE — 43255 EGD CONTROL BLEEDING ANY: CPT | Performed by: INTERNAL MEDICINE

## 2023-05-26 PROCEDURE — 63600175 PHARM REV CODE 636 W HCPCS: Performed by: STUDENT IN AN ORGANIZED HEALTH CARE EDUCATION/TRAINING PROGRAM

## 2023-05-26 PROCEDURE — 37000009 HC ANESTHESIA EA ADD 15 MINS: Performed by: INTERNAL MEDICINE

## 2023-05-26 PROCEDURE — 27202087 HC PROBE, APC: Performed by: INTERNAL MEDICINE

## 2023-05-26 PROCEDURE — 96376 TX/PRO/DX INJ SAME DRUG ADON: CPT

## 2023-05-26 PROCEDURE — 99223 1ST HOSP IP/OBS HIGH 75: CPT | Mod: 25,,, | Performed by: STUDENT IN AN ORGANIZED HEALTH CARE EDUCATION/TRAINING PROGRAM

## 2023-05-26 RX ORDER — SODIUM CHLORIDE 9 MG/ML
INJECTION, SOLUTION INTRAVENOUS CONTINUOUS
Status: DISCONTINUED | OUTPATIENT
Start: 2023-05-26 | End: 2023-05-26

## 2023-05-26 RX ORDER — SULFAMETHOXAZOLE AND TRIMETHOPRIM 800; 160 MG/1; MG/1
1 TABLET ORAL 2 TIMES DAILY
Qty: 6 TABLET | Refills: 0 | Status: SHIPPED | OUTPATIENT
Start: 2023-05-26 | End: 2023-05-29

## 2023-05-26 RX ORDER — ONDANSETRON 2 MG/ML
8 INJECTION INTRAMUSCULAR; INTRAVENOUS EVERY 6 HOURS PRN
Status: DISCONTINUED | OUTPATIENT
Start: 2023-05-26 | End: 2023-05-26 | Stop reason: HOSPADM

## 2023-05-26 RX ORDER — ACETAMINOPHEN 325 MG/1
650 TABLET ORAL EVERY 4 HOURS PRN
Status: DISCONTINUED | OUTPATIENT
Start: 2023-05-26 | End: 2023-05-26 | Stop reason: HOSPADM

## 2023-05-26 RX ORDER — LIDOCAINE HYDROCHLORIDE 20 MG/ML
INJECTION INTRAVENOUS
Status: DISCONTINUED | OUTPATIENT
Start: 2023-05-26 | End: 2023-05-26

## 2023-05-26 RX ORDER — HYDRALAZINE HYDROCHLORIDE 20 MG/ML
10 INJECTION INTRAMUSCULAR; INTRAVENOUS ONCE
Status: COMPLETED | OUTPATIENT
Start: 2023-05-26 | End: 2023-05-26

## 2023-05-26 RX ORDER — LIDOCAINE HYDROCHLORIDE 20 MG/ML
INJECTION, SOLUTION EPIDURAL; INFILTRATION; INTRACAUDAL; PERINEURAL
Status: DISCONTINUED
Start: 2023-05-26 | End: 2023-05-26 | Stop reason: HOSPADM

## 2023-05-26 RX ORDER — HYDRALAZINE HYDROCHLORIDE 20 MG/ML
10 INJECTION INTRAMUSCULAR; INTRAVENOUS EVERY 8 HOURS PRN
Status: DISCONTINUED | OUTPATIENT
Start: 2023-05-26 | End: 2023-05-26 | Stop reason: HOSPADM

## 2023-05-26 RX ORDER — HYDRALAZINE HYDROCHLORIDE 20 MG/ML
INJECTION INTRAMUSCULAR; INTRAVENOUS
Status: DISCONTINUED
Start: 2023-05-26 | End: 2023-05-26 | Stop reason: HOSPADM

## 2023-05-26 RX ORDER — PROPOFOL 10 MG/ML
INJECTION, EMULSION INTRAVENOUS
Status: DISCONTINUED
Start: 2023-05-26 | End: 2023-05-26 | Stop reason: HOSPADM

## 2023-05-26 RX ORDER — PROPOFOL 10 MG/ML
VIAL (ML) INTRAVENOUS
Status: DISCONTINUED | OUTPATIENT
Start: 2023-05-26 | End: 2023-05-26

## 2023-05-26 RX ORDER — LIDOCAINE HYDROCHLORIDE 40 MG/ML
SOLUTION TOPICAL
Status: DISCONTINUED | OUTPATIENT
Start: 2023-05-26 | End: 2023-05-26

## 2023-05-26 RX ORDER — POLYETHYLENE GLYCOL 3350 17 G/17G
17 POWDER, FOR SOLUTION ORAL 2 TIMES DAILY PRN
Status: DISCONTINUED | OUTPATIENT
Start: 2023-05-26 | End: 2023-05-26 | Stop reason: HOSPADM

## 2023-05-26 RX ADMIN — PANTOPRAZOLE SODIUM 40 MG: 40 INJECTION, POWDER, FOR SOLUTION INTRAVENOUS at 08:05

## 2023-05-26 RX ADMIN — SODIUM CHLORIDE: 0.9 INJECTION, SOLUTION INTRAVENOUS at 12:05

## 2023-05-26 RX ADMIN — HYDROCHLOROTHIAZIDE 25 MG: 25 TABLET ORAL at 08:05

## 2023-05-26 RX ADMIN — PROPOFOL 20 MG: 10 INJECTION, EMULSION INTRAVENOUS at 12:05

## 2023-05-26 RX ADMIN — LIDOCAINE HYDROCHLORIDE 40 MG: 20 INJECTION, SOLUTION INTRAVENOUS at 12:05

## 2023-05-26 RX ADMIN — PROPOFOL 10 MG: 10 INJECTION, EMULSION INTRAVENOUS at 12:05

## 2023-05-26 RX ADMIN — PRAVASTATIN SODIUM 80 MG: 40 TABLET ORAL at 08:05

## 2023-05-26 RX ADMIN — SERTRALINE HYDROCHLORIDE 100 MG: 50 TABLET ORAL at 08:05

## 2023-05-26 RX ADMIN — PROPOFOL 40 MG: 10 INJECTION, EMULSION INTRAVENOUS at 12:05

## 2023-05-26 RX ADMIN — LIDOCAINE HYDROCHLORIDE 4 ML: 40 SOLUTION TOPICAL at 12:05

## 2023-05-26 RX ADMIN — HYDRALAZINE HYDROCHLORIDE 10 MG: 20 INJECTION INTRAMUSCULAR; INTRAVENOUS at 01:05

## 2023-05-26 RX ADMIN — HYDRALAZINE HYDROCHLORIDE 10 MG: 20 INJECTION INTRAMUSCULAR; INTRAVENOUS at 03:05

## 2023-05-26 RX ADMIN — METOPROLOL SUCCINATE 50 MG: 50 TABLET, EXTENDED RELEASE ORAL at 03:05

## 2023-05-26 NOTE — NURSING
Patient left the unit via stretcher on room air, awake and alert. Going to Endo for EGD.  alongside

## 2023-05-26 NOTE — ED TRIAGE NOTES
"77 year old female with hx of 2 fuchs on Plavix and pt  reports 4 days of black tarry stools and foul smelly urine. Pt is incontinent of bladder and bowels.  also report pt has" fluid on the brain".   "

## 2023-05-26 NOTE — ANESTHESIA POSTPROCEDURE EVALUATION
Anesthesia Post Evaluation    Patient: Sophia Landis    Procedure(s) Performed: Procedure(s) (LRB):  EGD (ESOPHAGOGASTRODUODENOSCOPY) (N/A)    Final Anesthesia Type: general      Patient location during evaluation: GI PACU  Patient participation: Yes- Able to Participate  Level of consciousness: awake and alert  Post-procedure vital signs: reviewed and stable  Airway patency: patent    PONV status at discharge: No PONV  Anesthetic complications: no      Cardiovascular status: blood pressure returned to baseline and hemodynamically stable  Respiratory status: unassisted, spontaneous ventilation and room air  Hydration status: euvolemic  Follow-up not needed.          Vitals Value Taken Time   /68 05/26/23 1357   Temp 35.6 °C (96.1 °F) 05/26/23 1246   Pulse 68 05/26/23 1359   Resp 20 05/26/23 1359   SpO2 97 % 05/26/23 1359   Vitals shown include unvalidated device data.      No case tracking events are documented in the log.      Pain/Alisha Score: Alisha Score: 10 (5/26/2023  1:00 PM)

## 2023-05-26 NOTE — PHARMACY MED REC
"Admission Medication History     The home medication history was taken by Meredith Brooks.    You may go to "Admission" then "Reconcile Home Medications" tabs to review and/or act upon these items.     The home medication list has been updated by the Pharmacy department.   Please read ALL comments highlighted in yellow.   Please address this information as you see fit.    Feel free to contact us if you have any questions or require assistance.        Medications listed below were obtained from: Patient/family and Analytic software- [employer]  (Not in a hospital admission)          Meredith Brooks  [phone]                 .        "

## 2023-05-26 NOTE — TRANSFER OF CARE
"Anesthesia Transfer of Care Note    Patient: Sophia Landis    Procedure(s) Performed: Procedure(s) (LRB):  EGD (ESOPHAGOGASTRODUODENOSCOPY) (N/A)    Patient location: GI    Anesthesia Type: general    Transport from OR: Transported from OR on room air with adequate spontaneous ventilation    Post pain: adequate analgesia    Post assessment: no apparent anesthetic complications and tolerated procedure well    Post vital signs: stable    Level of consciousness: sedated    Nausea/Vomiting: no nausea/vomiting    Complications: none    Transfer of care protocol was followed      Last vitals:   Visit Vitals  BP (!) 154/65 (BP Location: Right arm)   Pulse (!) 58   Temp 35.6 °C (96.1 °F) (Axillary)   Resp 17   Ht 4' 11" (1.499 m)   Wt 45.9 kg (101 lb 3.1 oz)   SpO2 97%   Breastfeeding No   BMI 20.44 kg/m²     "

## 2023-05-26 NOTE — HPI
Sophia Landis 77 y.o. female with hypertension, hyperlipidemia, PID, GERD, 2 strokes presents to the hospital with a chief complaint of black tarry stools and cloudy urine for the last 3 days.  Patient states her last bowel movement was last night described as dark and tarry, still passing gas, denies abdominal pain, fever, chills, pain, shortness breast, nausea, vomiting, diarrhea, dysuria, hematuria.  Patient denied attempted any self-treatment, patient was chronic left-sided weakness secondary to stroke and wheelchair-bound at baseline.  No other alleviating or exacerbating factors noted.    In the ED patient was afebrile without leukocytosis H/H 11.8/35.0, reportedly guaiac positive in the ED PT/INR WNL APTT 35.6, lipase 79, A positive indirect Kaylee negative UA showed +1 protein +3 leukocytes, 100 WBCs, few WBC clumps.  Urine culture pending patient was placed in observation for further workup and management of GI bleed.

## 2023-05-26 NOTE — NURSING
[facility] [facility] [place] 4 [place]      5/26/2023       Skin assessed on: Admit      [x] No Pressure Injuries Present    [x]Prevention Measures Documented    [] Yes LDA  for Pressure Injury Previously documented     [] Yes New Pressure Injury Discovered   [] LDA for New Pressure Injury Added      Attending RN:  Harriett Rose RN     Second RN:  Reta Esteban RN

## 2023-05-26 NOTE — ASSESSMENT & PLAN NOTE
Reportedly guaiac positive in the ED, initial H&H stable continue to monitor  IV PPI  H&H Q6H initially, CBC daily if stable, transfuse for < 7  Keep NPO for GI procedure  GI Consult

## 2023-05-26 NOTE — ASSESSMENT & PLAN NOTE
Per chart review occurred 12/2021, chronic left-sided weakness secondary to stroke and wheelchair-bound at baseline.  Continue to monitor

## 2023-05-26 NOTE — ED PROVIDER NOTES
Encounter Date: 5/25/2023       History     Chief Complaint   Patient presents with    Melena     Pt c/o black tarry stools accompanied by cloudy urine x 3 days. Pt denies cp, sob, n/v/d. Pt taking plavix     77-year-old female with past medical history of hypertension, hyperlipidemia, PID, GERD, 2 strokes presents to ED for 3-4 day history of melena.  She states her last bowel movement was yesterday night.  She is still passing flatulence.  She denies any abdominal pain, fever, chills, chest pain, shortness of breath, nausea, vomiting, diarrhea, dysuria, hematuria.  No attempted treatment reported.  Patient has chronic left-sided weakness secondary to stroke.  She is wheelchair-bound.  No other symptoms reported.    The history is provided by the patient. No  was used.   Review of patient's allergies indicates:   Allergen Reactions    Antihistamines - alkylamine Nausea Only    Ciprofloxacin Nausea Only    Penicillins Hives     Past Medical History:   Diagnosis Date    Cataract     Chronic diarrhea 11/26/2018    Depression     GERD (gastroesophageal reflux disease)     History of colonic polyps 11/26/2018    Hyperlipidemia     Hypertension     Menopausal syndrome     PAD (peripheral artery disease)     s/p stent    Stroke      Past Surgical History:   Procedure Laterality Date    APPENDECTOMY      COLONOSCOPY N/A 5/20/2019    Procedure: COLONOSCOPY;  Surgeon: Kenia Arias MD;  Location: [facility];  Service: Endoscopy;  Laterality: N/A;  RX PLETAL ok to hold    HYSTERECTOMY      MONTY with BSO    ILIAC ARTERY STENT Bilateral      Family History   Problem Relation Age of Onset    Amblyopia Mother     Cataracts Mother     Hypertension Mother     Thyroid disease Mother     Cancer Father     Strabismus Sister     Cancer Brother     Diabetes Maternal Aunt     Cancer Maternal Uncle     Diabetes Maternal Uncle     Cancer Maternal Uncle     Cancer Maternal Uncle     Cancer Maternal Uncle     Blindness Neg  Hx     Glaucoma Neg Hx     Macular degeneration Neg Hx     Retinal detachment Neg Hx     Stroke Neg Hx      Social History     Tobacco Use    Smoking status: Former    Smokeless tobacco: Former    Tobacco comments:     .  Retired  at [employer].   Substance Use Topics    Alcohol use: No    Drug use: No     Review of Systems   Constitutional:  Negative for chills and fever.   HENT:  Negative for congestion, ear pain, rhinorrhea and sore throat.    Eyes:  Negative for redness.   Respiratory:  Negative for cough and shortness of breath.    Cardiovascular:  Negative for chest pain.   Gastrointestinal:  Positive for blood in stool. Negative for abdominal pain, diarrhea, nausea and vomiting.   Genitourinary:  Negative for decreased urine volume, difficulty urinating, dysuria, frequency, hematuria and urgency.   Musculoskeletal:  Negative for back pain and neck pain.   Skin:  Negative for rash.   Neurological:  Negative for headaches.   Psychiatric/Behavioral:  Negative for confusion.      Physical Exam     Initial Vitals [05/25/23 1853]   BP Pulse Resp Temp SpO2   (!) 140/60 67 17 97.6 °F (36.4 °C) 95 %      MAP       --         Physical Exam    Nursing note and vitals reviewed.  Constitutional: She appears well-developed and well-nourished.  Non-toxic appearance. She does not appear ill.   HENT:   Head: Normocephalic and atraumatic.   Right Ear: Hearing, tympanic membrane, external ear and ear canal normal. Tympanic membrane is not perforated, not erythematous and not bulging.   Left Ear: Hearing, tympanic membrane, external ear and ear canal normal. Tympanic membrane is not perforated, not erythematous and not bulging.   Nose: Nose normal.   Mouth/Throat: Uvula is midline, oropharynx is clear and moist and mucous membranes are normal.   Eyes: Conjunctivae and EOM are normal.   Neck: Neck supple.   Normal range of motion.   Full passive range of motion without pain.     Cardiovascular:  Normal rate  and regular rhythm.           Pulses:       Radial pulses are 2+ on the right side and 2+ on the left side.   Pulmonary/Chest: Effort normal and breath sounds normal. No accessory muscle usage. No respiratory distress. She has no decreased breath sounds.   Abdominal: Abdomen is soft. Bowel sounds are normal. She exhibits no distension. There is no abdominal tenderness.   Guaiac-positive.  Dark brown stool.  No melena.   No right CVA tenderness.  No left CVA tenderness. There is no rebound and no guarding.   Musculoskeletal:         General: Normal range of motion.      Cervical back: Full passive range of motion without pain, normal range of motion and neck supple. No rigidity.     Neurological: She is alert. No cranial nerve deficit.   Neuro intact.  Strength and sensation intact bilateral upper and lower extremities.   Skin: Skin is warm and dry.   Psychiatric: She has a normal mood and affect.       ED Course   Procedures  Labs Reviewed   CBC W/ AUTO DIFFERENTIAL - Abnormal; Notable for the following components:       Result Value    Hemoglobin 11.8 (*)     Hematocrit 35.0 (*)     RDW 16.1 (*)     All other components within normal limits   LIPASE - Abnormal; Notable for the following components:    Lipase 79 (*)     All other components within normal limits   URINALYSIS, REFLEX TO URINE CULTURE - Abnormal; Notable for the following components:    Protein, UA 1+ (*)     Occult Blood UA 1+ (*)     Leukocytes, UA 3+ (*)     All other components within normal limits    Narrative:     Specimen Source->Urine   APTT - Abnormal; Notable for the following components:    aPTT 35.6 (*)     All other components within normal limits   URINALYSIS MICROSCOPIC - Abnormal; Notable for the following components:    RBC, UA 7 (*)     WBC, UA >100 (*)     WBC Clumps, UA Few (*)     Hyaline Casts, UA 3 (*)     All other components within normal limits    Narrative:     Specimen Source->Urine   CULTURE, URINE   COMPREHENSIVE METABOLIC  PANEL   PROTIME-INR   POCT OCCULT BLOOD STOOL   TYPE & SCREEN          Imaging Results    None          Medications   hydroCHLOROthiazide tablet 25 mg (has no administration in time range)   metoprolol succinate (TOPROL-XL) 24 hr tablet 50 mg (has no administration in time range)   pravastatin tablet 80 mg (has no administration in time range)   sertraline tablet 100 mg (has no administration in time range)   pantoprazole injection 40 mg (40 mg Intravenous Given 5/25/23 2223)   cefTRIAXone (ROCEPHIN) 1 g/50 mL D5W IVPB (has no administration in time range)   cefTRIAXone (ROCEPHIN) 1 g/50 mL D5W IVPB (0 g Intravenous Stopped 5/25/23 2374)     Medical Decision Making:   ED Management:  This is a 77-year-old female with past medical history of hypertension, hyperlipidemia, PID, GERD, 2 strokes presents to ED for 3-4 day history of melena. On physical exam, patient is well-appearing and in no acute distress.  Nontoxic appearing.  Lungs are clear to auscultation bilaterally.  Abdomen is soft and nontender.  No guarding, rigidity, rebound.  2+ radial pulses bilaterally.  Posterior oropharynx is not erythematous.  No edema or exudate.  Uvula midline.  Bilateral tympanic membrane is normal.  No erythema, bulging, or perforations.  Neuro intact.  Strength and sensation intact bilateral upper and lower extremities.  Dark brown stool.  No melena.  Guaiac positive.  Rectal exam chaperoned by nurse, Jacquelyn.  Unable to perform orthostatic secondary to patient's weakness status post stroke.  CBC without evidence of any leukocytosis.  Hemoglobin 11.8, hematocrit 35.0.  CMP without evidence of any electrolyte abnormality.  Lipase 79.  Doubt pancreatitis.  Patient is A positive.  UA revealed 3+ leukocytes, 7 red blood cells, greater than 100 white blood cells, few white blood cell clumps.  Rocephin ordered.  PT INR unremarkable.  PTT 35.6.    Consulted with GI, Dr. Redd, at 9:08PM.  He states that he will see the patient in the  morning once she is admitted.    Consulted with hospital medicine, Mann Howell- JOHN, at 9:52PM.  He has accepted the patient will resume further care of the patient upon admission for cystitis and GI bleed.    After review of the patient's physical exam, ED testing, and history/symptoms, the patient requires additional care in the hospital overnight. Dr. Talbot with [facility] will accept the patient and any labs, imaging, or procedures were discussed. The diagnosis, treatment and plan were discussed with the patient. All questions or concerns have been addressed.                        Clinical Impression:   Final diagnoses:  [K92.2] GI bleed (Primary)  [N30.90] Cystitis        ED Disposition Condition    Observation Stable                Jah Khanna PA-C  05/26/23 0959

## 2023-05-26 NOTE — PLAN OF CARE
05/26/23 1413   Final Note   Assessment Type Final Discharge Note   Anticipated Discharge Disposition Home   Hospital Resources/Appts/Education Provided Provided patient/caregiver with written discharge plan information;Appointments scheduled and added to AVS;Appointments scheduled by Navigator/Coordinator   Post-Acute Status   Discharge Delays None known at this time

## 2023-05-26 NOTE — CONSULTS
[facility]  Gastroenterology  Consult Note    Patient Name: Sophia Landis  MRN: [id number]  Admission Date: 5/25/2023  Hospital Length of Stay: 0 days  Code Status: Full Code   Attending Provider: Pranav Paniagua MD   Consulting Provider: Janet Whitaker MD  Primary Care Physician: Sarthak Johnson MD  Principal Problem:GI bleed    Inpatient consult to Gastroenterology  Consult performed by: Janet Whitaker MD  Consult ordered by: Mann Howell NP      Subjective:     HPI: Sophia Landis is a 77 y.o. female with history of HTN, GERD, CVA who is admitted with melena. Vitals stable in ED. Hg 11 from baseline of 13. She is on plavix for PAD. No prior upper GI bleeding episodes. No EGD on file. Last cscope in 2019 showed diverticulosis. She at baseline is oriented to self. Is verbal but there is a delay in responses.  is POA       Past Medical History:   Diagnosis Date    Cataract     Chronic diarrhea 11/26/2018    Depression     GERD (gastroesophageal reflux disease)     History of colonic polyps 11/26/2018    Hyperlipidemia     Hypertension     Menopausal syndrome     PAD (peripheral artery disease)     s/p stent    Stroke        Past Surgical History:   Procedure Laterality Date    APPENDECTOMY      COLONOSCOPY N/A 5/20/2019    Procedure: COLONOSCOPY;  Surgeon: Kenia Arias MD;  Location: [facility];  Service: Endoscopy;  Laterality: N/A;  RX PLETAL ok to hold    HYSTERECTOMY      MONTY with BSO    ILIAC ARTERY STENT Bilateral        Objective:     Vitals:    05/26/23 0439   BP: 128/60   Pulse: (!) 56   Resp: 19   Temp: 97.7 °F (36.5 °C)         Constitutional:  not in acute distress and well developed  HENT: Head: Normal, normocephalic, atraumatic.  Eyes: conjunctiva clear and sclera nonicteric  Cardiovascular: regular rate and rhythm and no murmur  Respiratory: normal chest expansion & respiratory effort   and no accessory muscle use  GI: soft, non-tender, without masses or  organomegaly  Musculoskeletal: no muscular tenderness noted  Skin: normal color  Neurological: alert, oriented x3, left sided weakness     Significant Labs:  Recent Labs   Lab 05/25/23 1954 05/26/23  0414   HGB 11.8* 11.2*       Lab Results   Component Value Date    WBC 6.14 05/26/2023    HGB 11.2 (L) 05/26/2023    HCT 34.5 (L) 05/26/2023    MCV 85 05/26/2023     05/26/2023       Lab Results   Component Value Date     05/25/2023    K 3.6 05/25/2023     05/25/2023    CO2 25 05/25/2023    BUN 13 05/25/2023    CREATININE 0.9 05/25/2023    CALCIUM 9.2 05/25/2023    ANIONGAP 11 05/25/2023    ESTGFRAFRICA >60 12/10/2021    EGFRNONAA 55 (A) 12/10/2021       Lab Results   Component Value Date    ALT 24 05/25/2023    AST 33 05/25/2023    ALKPHOS 84 05/25/2023    BILITOT 0.5 05/25/2023       Lab Results   Component Value Date    INR 1.0 05/25/2023    INR 0.9 09/04/2007       Significant Imaging:  Reviewed pertinent radiology findings.       Assessment/Plan:     Sophia Landis is a 77 y.o. female with history of CVA and PAD on plavix who is admitted with melena and drop in Hg. She is hemodynamically stable.     Problem List:  Melena   Acute Blood loss Anemia   Chronic anticoagulation   CVA     - Plan for EGD today   - Continue PPI IV BID   - Keep NPO   - Hold Plavix   - Further plan based on results of endoscopy    Thank you for involving us in the care of Sophia Landis. Please call with any additional questions, concerns or changes in the patient's clinical status. We will continue to follow.     Janet Whitaker   Gastroenterology     [facility]

## 2023-05-26 NOTE — NURSING
Pt is AAOx2 and denies pain, 0/10 Pain Scale. Pt compliant with all TX modalities without incidence and remains free from falls/injuries R/T the adherence of all safety protocols.

## 2023-05-26 NOTE — PLAN OF CARE
Case Management Assessment     PCP: Sarthak Johnson  Ph[facility]: [facility]    Patient Arrived From: home  Existing Help at Home: Clark (spouse)    Barriers to Discharge: None    Discharge Plan:    A. Home with family   B. Home with family      SW completed brief assessment and discussed discharge planning with patient and her spouse at her bedside. Patient lives with her  her main support. Patient will need transportation set up for her to get home when discharge from the hospital.     05/25/23 6406   Discharge Planning   Assessment Type Discharge Planning Brief Assessment   Resource/Environmental Concerns none   Support Systems Spouse/significant other   Equipment Currently Used at Home bedside commode   Current Living Arrangements home   Patient/Family Anticipates Transition to home with help/services   Patient/Family Anticipated Services at Transition none   DME Needed Upon Discharge  none   Discharge Plan A Home Health   Discharge Plan B Home with family

## 2023-05-26 NOTE — NURSING
[facility]  Nurses Note -- 4 Eyes      5/26/2023       Skin assessed on: Q Shift      [x] No Pressure Injuries Present    []Prevention Measures Documented  Bruising to bilat lower extremitie and abdomen.    [] Yes LDA  for Pressure Injury Previously documented     [] Yes New Pressure Injury Discovered   [] LDA for New Pressure Injury Added      Attending RN:  Maryann Terry LPN     Second RN:  Harriett PAREDES

## 2023-05-26 NOTE — ANESTHESIA PREPROCEDURE EVALUATION
05/26/2023  Sophia Landis is a 77 y.o., female.      Pre-op Assessment    I have reviewed the Patient Summary Reports.     I have reviewed the Nursing Notes. I have reviewed the NPO Status.   I have reviewed the Medications.     Review of Systems  Anesthesia Hx:  No problems with previous Anesthesia  Denies Family Hx of Anesthesia complications.   Denies Personal Hx of Anesthesia complications.   Social:  No Alcohol Use, Non-Smoker    Cardiovascular:   Hypertension hyperlipidemia    Pulmonary:   Sleep Apnea    Hepatic/GI:   GERD    Endocrine:  Endocrine Normal    Psych:   depression          Physical Exam  General: Cachexia    Airway:  Mallampati: II   Mouth Opening: Normal  TM Distance: 4 - 6 cm  Tongue: Normal  Neck ROM: Normal ROM    Dental:  Intact        Anesthesia Plan  Type of Anesthesia, risks & benefits discussed:    Anesthesia Type: Gen Natural Airway  Intra-op Monitoring Plan: Standard ASA Monitors  Induction:  IV  Informed Consent: Informed consent signed with the Patient representative and all parties understand the risks and agree with anesthesia plan.  All questions answered.   ASA Score: 3    Ready For Surgery From Anesthesia Perspective.     .

## 2023-05-26 NOTE — OR NURSING
PROCEDURE AND RECOVERY COMPLETED; REPORT CALLED TO REGGIE KAPLAN. PATIENT GIVEN APRESOLINE  10MG IV FOR B/P; PRINTED REPORT GIVEN TO PATIENT AND SPOUSE; DR. DAVIS DISCUSSED FINDINGS WITH SPOUSE AND PATIENT; PATIENT READY TO RETURN TO ROOM.

## 2023-05-26 NOTE — HOSPITAL COURSE
76 y/o female presents with melena.  No significant drop in H/H and stable.  Placed in Obs and GI consulted.  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  No evidence of bleeding during hospital stay.  She has remained afebrile and hemodynamically stable.  Patient with hx of CVA on ASA and Plavix.  Will continue ASA, but hold Plavix until reevaluated by PCP, Neurology and GI.  She will be discharged home.

## 2023-05-26 NOTE — [employer] PATIENT INSTRUCTIONS
Discharge Summary/Instructions after an Endoscopic Procedure  Patient Name: Sophia Landis  Patient MRN: [id number]  Patient YOB: 1945  Friday, May 26, 2023  Debbie Cooper MD  Dear patient,  As a result of recent federal legislation (The Federal Cures Act), you may   receive lab or pathology results from your procedure in your [facility]   account before your physician is able to contact you. Your physician or   their representative will relay the results to you with their   recommendations at their soonest availability.  Thank you,  RESTRICTIONS:  During your procedure today, you received medications for sedation.  These   medications may affect your judgment, balance and coordination.  Therefore,   for 24 hours, you have the following restrictions:   - DO NOT drive a car, operate machinery, make legal/financial decisions,   sign important papers or drink alcohol.    ACTIVITY:  Today: no heavy lifting, straining or running due to procedural   sedation/anesthesia.  The following day: return to full activity including work.  DIET:  Eat and drink normally unless instructed otherwise.     TREATMENT FOR COMMON SIDE EFFECTS:  - Mild abdominal pain, nausea, belching, bloating or excessive gas:  rest,   eat lightly and use a heating pad.  - Sore Throat: treat with throat lozenges and/or gargle with warm salt   water.  - Because air was used during the procedure, expelling large amounts of air   from your rectum or belching is normal.  - If a bowel prep was taken, you may not have a bowel movement for 1-3 days.    This is normal.  SYMPTOMS TO WATCH FOR AND REPORT TO YOUR PHYSICIAN:  1. Abdominal pain or bloating, other than gas cramps.  2. Chest pain.  3. Back pain.  4. Signs of infection such as: chills or fever occurring within 24 hours   after the procedure.  5. Rectal bleeding, which would show as bright red, maroon, or black stools.   (A tablespoon of blood from the rectum is not serious, especially if    hemorrhoids are present.)  6. Vomiting.  7. Weakness or dizziness.  GO DIRECTLY TO THE NEAREST EMERGENCY ROOM IF YOU HAVE ANY OF THE FOLLOWING:      Difficulty breathing              Chills and/or fever over 101 F   Persistent vomiting and/or vomiting blood   Severe abdominal pain   Severe chest pain   Black, tarry stools   Bleeding- more than one tablespoon   Any other symptom or condition that you feel may need urgent attention  Your doctor recommends these additional instructions:  If any biopsies were taken, your doctors clinic will contact you in 1 to 2   weeks with any results.  - Return patient to [facility] for ongoing care. Ok to discharge to home   from a GI standpoint.  - Resume previous diet.   - Continue present medications.  For questions, problems or results please call your physician - Debbie Cooper MD at Work:  [phone].  [facility] can be reached at [phone]     IF A COMPLICATION OR EMERGENCY SITUATION ARISES AND YOU ARE UNABLE TO REACH   YOUR PHYSICIAN - GO DIRECTLY TO THE EMERGENCY ROOM.  Debbie Cooper MD  5/26/2023 12:48:09 PM  This report has been verified and signed electronically.  Dear patient,  As a result of recent federal legislation (The Federal Cures Act), you may   receive lab or pathology results from your procedure in your [facility]   account before your physician is able to contact you. Your physician or   their representative will relay the results to you with their   recommendations at their soonest availability.  Thank you,  [employer]

## 2023-05-26 NOTE — NURSING
Patient is discharged on room air, no distress noted. Tele and IV removed with tip intact. Instructions reviewed with  at bedside. Questions asked/answered, understanding verbalized. Bed in lowest position, wheels locked, call light in reach.

## 2023-05-26 NOTE — DISCHARGE SUMMARY
[facility] Medicine  Discharge Summary      Patient Name: Sophia Landis  MRN: [id number]  BRETT: [id number]  Patient Class: OP- Observation  Admission Date: 5/25/2023  Hospital Length of Stay: 0 days  Discharge Date and Time:  05/26/2023 2:47 PM  Attending Physician: Pranav Paniagua MD   Discharging Provider: Pranav Paniagua MD  Primary Care Provider: Sarthak Johnson MD    Primary Care Team: Networked reference to record PCT     HPI:   Sophia Landis 77 y.o. female with hypertension, hyperlipidemia, PID, GERD, 2 strokes presents to the hospital with a chief complaint of black tarry stools and cloudy urine for the last 3 days.  Patient states her last bowel movement was last night described as dark and tarry, still passing gas, denies abdominal pain, fever, chills, pain, shortness breast, nausea, vomiting, diarrhea, dysuria, hematuria.  Patient denied attempted any self-treatment, patient was chronic left-sided weakness secondary to stroke and wheelchair-bound at baseline.  No other alleviating or exacerbating factors noted.    In the ED patient was afebrile without leukocytosis H/H 11.8/35.0, reportedly guaiac positive in the ED PT/INR WNL APTT 35.6, lipase 79, A positive indirect Kaylee negative UA showed +1 protein +3 leukocytes, 100 WBCs, few WBC clumps.  Urine culture pending patient was placed in observation for further workup and management of GI bleed.       Procedure(s) (LRB):  EGD (ESOPHAGOGASTRODUODENOSCOPY) (N/A)      Hospital Course:   78 y/o female presents with melena.  No significant drop in H/H and stable.  Placed in Obs and GI consulted.  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  No evidence of bleeding during hospital stay.  She has remained afebrile and hemodynamically stable.  Patient with hx of CVA on ASA and Plavix.  Will continue ASA, but hold Plavix until reevaluated by PCP, Neurology and GI.  She will be  discharged home.       Goals of Care Treatment Preferences:  Code Status: Full Code      Consults:   Consults (From admission, onward)        Status Ordering Provider     Inpatient consult to Gastroenterology  Once        Provider:  Andrea Redd MD    Completed NIMISHA DUKE          No new Assessment & Plan notes have been filed under this hospital service since the last note was generated.  Service: Hospital Medicine    Final Active Diagnoses:    Diagnosis Date Noted POA    PRINCIPAL PROBLEM:  GI bleed [K92.2] 05/25/2023 Yes    History of stroke [Z86.73] 12/12/2021 Not Applicable    Recurrent UTI [N39.0] 01/20/2017 Yes    PAD (peripheral artery disease) [I73.9] 05/31/2013 Yes     Chronic    Essential hypertension [I10] 07/14/2012 Yes     Chronic    Hyperlipidemia [E78.5] 07/14/2012 Yes     Chronic    Mild recurrent major depression [F33.0] 07/14/2012 Yes     Chronic      Problems Resolved During this Admission:       Discharged Condition: stable    Disposition: Home or Self Care    Follow Up:   Follow-up Information     Reba Rome NP Follow up in 2 week(s).    Specialty: Neurology  Contact information:  [address] [place] [address] [phone]             Sarthak Johnson MD Follow up in 1 week(s).    Specialty: Internal Medicine  Contact information:  [address] [address] [phone]                       Patient Instructions:      Ambulatory referral/consult to Gastroenterology   Standing Status: Future   Referral Priority: Routine Referral Type: Consultation   Referral Reason: Specialty Services Required   Requested Specialty: Gastroenterology   Number of Visits Requested: 1     Diet Cardiac     Notify your health care provider if you experience any of the following:  temperature >100.4     Notify your health care provider if you experience any of the following:  persistent nausea and vomiting or diarrhea     Notify your health care provider if you experience  any of the following:  severe uncontrolled pain     Notify your health care provider if you experience any of the following:  difficulty breathing or increased cough     Notify your health care provider if you experience any of the following:  persistent dizziness, light-headedness, or visual disturbances     Notify your health care provider if you experience any of the following:  increased confusion or weakness     Activity as tolerated           Pending Diagnostic Studies:     Procedure Component Value Units Date/Time    CBC auto differential [[id number]]     Order Status: Sent Lab Status: No result     Specimen: Blood          Medications:  Reconciled Home Medications:      Medication List      START taking these medications    sulfamethoxazole-trimethoprim 800-160mg 800-160 mg Tab  Commonly known as: BACTRIM DS  Take 1 tablet by mouth 2 (two) times daily. for 3 days        CONTINUE taking these medications    aspirin 81 MG EC tablet  Commonly known as: ECOTRIN  TAKE 1 TABLET EVERY DAY     hydroCHLOROthiazide 25 MG tablet  Commonly known as: HYDRODIURIL  TAKE 1 TABLET ONE TIME DAILY     metoprolol succinate 50 MG 24 hr tablet  Commonly known as: TOPROL-XL  TAKE 1 TABLET EVERY DAY     nitroGLYCERIN 0.4 MG SL tablet  Commonly known as: NITROSTAT  PLACE 1 TABLET UNDER THE TONGUE EVERY 5 MINUTES AS NEEDED FOR CHEST PAIN.     omeprazole 20 MG capsule  Commonly known as: PRILOSEC  TAKE 2 CAPSULES ONE TIME DAILY     pravastatin 80 MG tablet  Commonly known as: PRAVACHOL  TAKE 1 TABLET EVERY DAY     sertraline 100 MG tablet  Commonly known as: ZOLOFT  TAKE 1 TABLET EVERY DAY        STOP taking these medications    clopidogreL 75 mg tablet  Commonly known as: PLAVIX            Indwelling Lines/Drains at time of discharge:   Lines/Drains/Airways     None                 Time spent on the discharge of patient: >30 minutes         Pranav Paniagua MD  Department of Hospital Medicine  [facility]

## 2023-05-26 NOTE — H&P
[facility] Medicine  History & Physical    Patient Name: Sophia Landis  MRN: [id number]  Patient Class: OP- Observation  Admission Date: 5/25/2023  Attending Physician: Najma Marcano MD   Primary Care Provider: Sarthak Johnson MD         Patient information was obtained from patient, past medical records and ER records.     Subjective:     Principal Problem:GI bleed    Chief Complaint:   Chief Complaint   Patient presents with    Melena     Pt c/o black tarry stools accompanied by cloudy urine x 3 days. Pt denies cp, sob, n/v/d. Pt taking plavix        HPI: Sophia Landis 77 y.o. female with hypertension, hyperlipidemia, PID, GERD, 2 strokes presents to the hospital with a chief complaint of black tarry stools and cloudy urine for the last 3 days.  Patient states her last bowel movement was last night described as dark and tarry, still passing gas, denies abdominal pain, fever, chills, pain, shortness breast, nausea, vomiting, diarrhea, dysuria, hematuria.  Patient denied attempted any self-treatment, patient was chronic left-sided weakness secondary to stroke and wheelchair-bound at baseline.  No other alleviating or exacerbating factors noted.    In the ED patient was afebrile without leukocytosis H/H 11.8/35.0, reportedly guaiac positive in the ED PT/INR WNL APTT 35.6, lipase 79, A positive indirect Kaylee negative UA showed +1 protein +3 leukocytes, 100 WBCs, few WBC clumps.  Urine culture pending patient was placed in observation for further workup and management of GI bleed.       Past Medical History:   Diagnosis Date    Cataract     Chronic diarrhea 11/26/2018    Depression     GERD (gastroesophageal reflux disease)     History of colonic polyps 11/26/2018    Hyperlipidemia     Hypertension     Menopausal syndrome     PAD (peripheral artery disease)     s/p stent    Stroke        Past Surgical History:   Procedure Laterality Date    APPENDECTOMY      COLONOSCOPY N/A  5/20/2019    Procedure: COLONOSCOPY;  Surgeon: Kenia Arias MD;  Location: [facility];  Service: Endoscopy;  Laterality: N/A;  RX PLETAL ok to hold    HYSTERECTOMY      MONTY with BSO    ILIAC ARTERY STENT Bilateral        Review of patient's allergies indicates:   Allergen Reactions    Antihistamines - alkylamine Nausea Only    Ciprofloxacin Nausea Only    Penicillins Hives       No current facility-administered medications on file prior to encounter.     Current Outpatient Medications on File Prior to Encounter   Medication Sig    aspirin (ECOTRIN) 81 MG EC tablet TAKE 1 TABLET EVERY DAY    clopidogreL (PLAVIX) 75 mg tablet TAKE 1 TABLET EVERY DAY    hydroCHLOROthiazide (HYDRODIURIL) 25 MG tablet TAKE 1 TABLET ONE TIME DAILY    metoprolol succinate (TOPROL-XL) 50 MG 24 hr tablet TAKE 1 TABLET EVERY DAY    nitroGLYCERIN (NITROSTAT) 0.4 MG SL tablet PLACE 1 TABLET UNDER THE TONGUE EVERY 5 MINUTES AS NEEDED FOR CHEST PAIN.    omeprazole (PRILOSEC) 20 MG capsule TAKE 2 CAPSULES ONE TIME DAILY    pravastatin (PRAVACHOL) 80 MG tablet TAKE 1 TABLET EVERY DAY    sertraline (ZOLOFT) 100 MG tablet TAKE 1 TABLET EVERY DAY     Family History       Problem Relation (Age of Onset)    Amblyopia Mother    Cancer Father, Brother, Maternal Uncle, Maternal Uncle, Maternal Uncle, Maternal Uncle    Cataracts Mother    Diabetes Maternal Aunt, Maternal Uncle    Hypertension Mother    Strabismus Sister    Thyroid disease Mother          Tobacco Use    Smoking status: Former    Smokeless tobacco: Former    Tobacco comments:     .  Retired  at [employer].   Substance and Sexual Activity    Alcohol use: No    Drug use: No    Sexual activity: Yes     Partners: Male     Comment:      Review of Systems   Constitutional:  Negative for chills, fatigue and fever.   HENT:  Negative for congestion and rhinorrhea.    Eyes:  Negative for photophobia and visual disturbance.   Respiratory:  Negative for cough and shortness  of breath.    Cardiovascular:  Negative for chest pain, palpitations and leg swelling.   Gastrointestinal:  Positive for blood in stool. Negative for abdominal pain, anal bleeding, constipation, diarrhea, nausea and vomiting.   Genitourinary:  Negative for dysuria, frequency and urgency.   Skin:  Negative for pallor, rash and wound.   Neurological:  Negative for light-headedness and headaches.   Psychiatric/Behavioral:  Negative for confusion and decreased concentration.    Objective:     Vital Signs (Most Recent):  Temp: 98.7 °F (37.1 °C) (05/26/23 0017)  Pulse: 61 (05/26/23 0003)  Resp: 17 (05/26/23 0003)  BP: (!) 138/54 (05/26/23 0003)  SpO2: 97 % (05/26/23 0003) Vital Signs (24h Range):  Temp:  [97.6 °F (36.4 °C)-98.7 °F (37.1 °C)] 98.7 °F (37.1 °C)  Pulse:  [61-67] 61  Resp:  [17-19] 17  SpO2:  [95 %-98 %] 97 %  BP: (138-154)/(54-64) 138/54     Weight: 54.4 kg (120 lb)  Body mass index is 24.24 kg/m².     Physical Exam  Vitals and nursing note reviewed.   Constitutional:       General: She is not in acute distress.     Appearance: She is well-developed.   HENT:      Head: Normocephalic and atraumatic.      Right Ear: External ear normal.      Left Ear: External ear normal.      Nose: Nose normal.   Eyes:      Conjunctiva/sclera: Conjunctivae normal.      Pupils: Pupils are equal, round, and reactive to light.   Cardiovascular:      Rate and Rhythm: Normal rate and regular rhythm.   Pulmonary:      Effort: Pulmonary effort is normal. No respiratory distress.      Breath sounds: Normal breath sounds. No wheezing.   Abdominal:      General: Bowel sounds are normal. There is no distension.      Palpations: Abdomen is soft.      Tenderness: There is no abdominal tenderness.      Comments: No palpable hepatomegaly or splenomegaly    Musculoskeletal:         General: No tenderness. Normal range of motion.      Cervical back: Normal range of motion and neck supple.   Skin:     General: Skin is warm and dry.    Neurological:      Mental Status: She is alert and oriented to person, place, and time.      Motor: Weakness (left-sided, chronic) present.   Psychiatric:         Thought Content: Thought content normal.            CRANIAL NERVES     CN III, IV, VI   Pupils are equal, round, and reactive to light.     Significant Labs: All pertinent labs within the past 24 hours have been reviewed.  Recent Lab Results         05/25/23 2050 05/25/23 2048 05/25/23 1954        Albumin     3.6       Alkaline Phosphatase     84       ALT     24       Anion Gap     11       Appearance, UA   Clear         aPTT 35.6  Comment: Refer to local heparin nomogram for intensity/dose specific   therapeutic   range.             AST     33       Bacteria, UA   Rare         Baso #     0.04       Basophil %     0.6       Bilirubin (UA)   Negative         BILIRUBIN TOTAL     0.5  Comment: For infants and newborns, interpretation of results should be based  on gestational age, weight and in agreement with clinical  observations.    Premature Infant recommended reference ranges:  Up to 24 hours.............<8.0 mg/dL  Up to 48 hours............<12.0 mg/dL  3-5 days..................<15.0 mg/dL  6-29 days.................<15.0 mg/dL         BUN     13       Calcium     9.2       Chloride     107       CO2     25       Color, UA   Yellow         Creatinine     0.9       Differential Method     Automated       eGFR     >60       Eos #     0.3       Eosinophil %     4.1       Glucose     87       Glucose, UA   Negative         Gran # (ANC)     3.5       Gran %     56.8       Group & Rh     A POS       Hematocrit     35.0       Hemoglobin     11.8       Hyaline Casts, UA   3         Immature Grans (Abs)     0.02  Comment: Mild elevation in immature granulocytes is non specific and   can be seen in a variety of conditions including stress response,   acute inflammation, trauma and pregnancy. Correlation with other   laboratory and clinical findings is  essential.         Immature Granulocytes     0.3       INDIRECT MAGALY     NEG       INR 1.0  Comment: Coumadin Therapy:  2.0 - 3.0 for INR for all indicators except mechanical heart valves  and antiphospholipid syndromes which should use 2.5 - 3.5.             Ketones, UA   Negative         Leukocytes, UA   3+         Lipase     79       Lymph #     1.9       Lymph %     30.2       MCH     27.7       MCHC     33.7       MCV     82       Microscopic Comment   SEE COMMENT  Comment: Other formed elements not mentioned in the report are not   present in the microscopic examination.            Mono #     0.5       Mono %     8.0       MPV     9.6       NITRITE UA   Negative         nRBC     0       Occult Blood UA   1+         pH, UA   6.0         Platelets     250       Potassium     3.6       PROTEIN TOTAL     7.1       Protein, UA   1+  Comment: Recommend a 24 hour urine protein or a urine   protein/creatinine ratio if globulin induced proteinuria is  clinically suspected.           Protime 10.9           RBC     4.26       RBC, UA   7         RDW     16.1       Sodium     143       Specific [place], UA   1.015         Specimen Outdate     05/28/2023 23:59       Specimen UA   Urine, Catheterized         Unclass [place] UA   Rare         UROBILINOGEN UA   Negative         WBC Clumps, UA   Few         WBC, UA   >100         WBC     6.16               Significant Imaging: I have reviewed all pertinent imaging results/findings within the past 24 hours.  Imaging Results    None          Assessment/Plan:     GI bleed  Reportedly guaiac positive in the ED, initial H&H stable continue to monitor  IV PPI  H&H Q6H initially, CBC daily if stable, transfuse for < 7  Keep NPO for GI procedure  GI Consult    History of stroke  Per chart review occurred 12/2021, chronic left-sided weakness secondary to stroke and wheelchair-bound at baseline.  Continue to monitor      Recurrent UTI  UA with evidence of UTI, patient denies  dysuria/hematuria  Urine Cultures pending  IV Rocephin    PAD (peripheral artery disease)  Hold Plavix due to GI bleed      Mild recurrent major depression  Patient has persistent depression which is mild and is currently controlled. Will Continue anti-depressant medications. We will not consult psychiatry at this time. Patient does not display psychosis at this time. Continue to monitor closely and adjust plan of care as needed.    Hyperlipidemia  Chronic, stable, continue statin    Essential hypertension  BP moderately well controlled in ED, continue home antihypertensives        VTE Risk Mitigation (From admission, onward)           Ordered     IP VTE HIGH RISK PATIENT  Once         05/25/23 2205     Place sequential compression device  Until discontinued         05/25/23 2205                         On 05/26/2023, patient should be placed in hospital observation services under my care in collaboration with Najma Marcano MD.      Mann Howell NP  Department of Hospital Medicine  [facility] - Emergency Dept

## 2023-05-26 NOTE — PLAN OF CARE
Problem: Adult Inpatient Plan of Care  Goal: Plan of Care Review  Outcome: Ongoing, Progressing  Flowsheets (Taken 5/26/2023 3687)  Plan of Care Reviewed With:   patient   spouse     Problem: Bleeding (Gastrointestinal Bleeding)  Goal: Hemostasis  Outcome: Ongoing, Progressing  Intervention: Manage Gastrointestinal Bleeding  Flowsheets (Taken 5/26/2023 6707)  Environmental Support:   calm environment promoted   rest periods encouraged

## 2023-05-26 NOTE — SUBJECTIVE & OBJECTIVE
Past Medical History:   Diagnosis Date    Cataract     Chronic diarrhea 11/26/2018    Depression     GERD (gastroesophageal reflux disease)     History of colonic polyps 11/26/2018    Hyperlipidemia     Hypertension     Menopausal syndrome     PAD (peripheral artery disease)     s/p stent    Stroke        Past Surgical History:   Procedure Laterality Date    APPENDECTOMY      COLONOSCOPY N/A 5/20/2019    Procedure: COLONOSCOPY;  Surgeon: Kenia Arias MD;  Location: [facility];  Service: Endoscopy;  Laterality: N/A;  RX PLETAL ok to hold    HYSTERECTOMY      MONTY with BSO    ILIAC ARTERY STENT Bilateral        Review of patient's allergies indicates:   Allergen Reactions    Antihistamines - alkylamine Nausea Only    Ciprofloxacin Nausea Only    Penicillins Hives       No current facility-administered medications on file prior to encounter.     Current Outpatient Medications on File Prior to Encounter   Medication Sig    aspirin (ECOTRIN) 81 MG EC tablet TAKE 1 TABLET EVERY DAY    clopidogreL (PLAVIX) 75 mg tablet TAKE 1 TABLET EVERY DAY    hydroCHLOROthiazide (HYDRODIURIL) 25 MG tablet TAKE 1 TABLET ONE TIME DAILY    metoprolol succinate (TOPROL-XL) 50 MG 24 hr tablet TAKE 1 TABLET EVERY DAY    nitroGLYCERIN (NITROSTAT) 0.4 MG SL tablet PLACE 1 TABLET UNDER THE TONGUE EVERY 5 MINUTES AS NEEDED FOR CHEST PAIN.    omeprazole (PRILOSEC) 20 MG capsule TAKE 2 CAPSULES ONE TIME DAILY    pravastatin (PRAVACHOL) 80 MG tablet TAKE 1 TABLET EVERY DAY    sertraline (ZOLOFT) 100 MG tablet TAKE 1 TABLET EVERY DAY     Family History       Problem Relation (Age of Onset)    Amblyopia Mother    Cancer Father, Brother, Maternal Uncle, Maternal Uncle, Maternal Uncle, Maternal Uncle    Cataracts Mother    Diabetes Maternal Aunt, Maternal Uncle    Hypertension Mother    Strabismus Sister    Thyroid disease Mother          Tobacco Use    Smoking status: Former    Smokeless tobacco: Former    Tobacco comments:     .  Retired   at [email or web address]   Substance and Sexual Activity    Alcohol use: No    Drug use: No    Sexual activity: Yes     Partners: Male     Comment:      Review of Systems   Constitutional:  Negative for chills, fatigue and fever.   HENT:  Negative for congestion and rhinorrhea.    Eyes:  Negative for photophobia and visual disturbance.   Respiratory:  Negative for cough and shortness of breath.    Cardiovascular:  Negative for chest pain, palpitations and leg swelling.   Gastrointestinal:  Positive for blood in stool. Negative for abdominal pain, anal bleeding, constipation, diarrhea, nausea and vomiting.   Genitourinary:  Negative for dysuria, frequency and urgency.   Skin:  Negative for pallor, rash and wound.   Neurological:  Negative for light-headedness and headaches.   Psychiatric/Behavioral:  Negative for confusion and decreased concentration.    Objective:     Vital Signs (Most Recent):  Temp: 98.7 °F (37.1 °C) (05/26/23 0017)  Pulse: 61 (05/26/23 0003)  Resp: 17 (05/26/23 0003)  BP: (!) 138/54 (05/26/23 0003)  SpO2: 97 % (05/26/23 0003) Vital Signs (24h Range):  Temp:  [97.6 °F (36.4 °C)-98.7 °F (37.1 °C)] 98.7 °F (37.1 °C)  Pulse:  [61-67] 61  Resp:  [17-19] 17  SpO2:  [95 %-98 %] 97 %  BP: (138-154)/(54-64) 138/54     Weight: 54.4 kg (120 lb)  Body mass index is 24.24 kg/m².     Physical Exam  Vitals and nursing note reviewed.   Constitutional:       General: She is not in acute distress.     Appearance: She is well-developed.   HENT:      Head: Normocephalic and atraumatic.      Right Ear: External ear normal.      Left Ear: External ear normal.      Nose: Nose normal.   Eyes:      Conjunctiva/sclera: Conjunctivae normal.      Pupils: Pupils are equal, round, and reactive to light.   Cardiovascular:      Rate and Rhythm: Normal rate and regular rhythm.   Pulmonary:      Effort: Pulmonary effort is normal. No respiratory distress.      Breath sounds: Normal breath sounds. No wheezing.    Abdominal:      General: Bowel sounds are normal. There is no distension.      Palpations: Abdomen is soft.      Tenderness: There is no abdominal tenderness.      Comments: No palpable hepatomegaly or splenomegaly    Musculoskeletal:         General: No tenderness. Normal range of motion.      Cervical back: Normal range of motion and neck supple.   Skin:     General: Skin is warm and dry.   Neurological:      Mental Status: She is alert and oriented to person, place, and time.      Motor: Weakness (left-sided, chronic) present.   Psychiatric:         Thought Content: Thought content normal.            CRANIAL NERVES     CN III, IV, VI   Pupils are equal, round, and reactive to light.     Significant Labs: All pertinent labs within the past 24 hours have been reviewed.  Recent Lab Results         05/25/23 2050 05/25/23 2048 05/25/23 1954        Albumin     3.6       Alkaline Phosphatase     84       ALT     24       Anion Gap     11       Appearance, UA   Clear         aPTT 35.6  Comment: Refer to local heparin nomogram for intensity/dose specific   therapeutic   range.             AST     33       Bacteria, UA   Rare         Baso #     0.04       Basophil %     0.6       Bilirubin (UA)   Negative         BILIRUBIN TOTAL     0.5  Comment: For infants and newborns, interpretation of results should be based  on gestational age, weight and in agreement with clinical  observations.    Premature Infant recommended reference ranges:  Up to 24 hours.............<8.0 mg/dL  Up to 48 hours............<12.0 mg/dL  3-5 days..................<15.0 mg/dL  6-29 days.................<15.0 mg/dL         BUN     13       Calcium     9.2       Chloride     107       CO2     25       Color, UA   Yellow         Creatinine     0.9       Differential Method     Automated       eGFR     >60       Eos #     0.3       Eosinophil %     4.1       Glucose     87       Glucose, UA   Negative         Gran # (ANC)     3.5       Gran %      56.8       Group & Rh     A POS       Hematocrit     35.0       Hemoglobin     11.8       Hyaline Casts, UA   3         Immature Grans (Abs)     0.02  Comment: Mild elevation in immature granulocytes is non specific and   can be seen in a variety of conditions including stress response,   acute inflammation, trauma and pregnancy. Correlation with other   laboratory and clinical findings is essential.         Immature Granulocytes     0.3       INDIRECT MAGALY     NEG       INR 1.0  Comment: Coumadin Therapy:  2.0 - 3.0 for INR for all indicators except mechanical heart valves  and antiphospholipid syndromes which should use 2.5 - 3.5.             Ketones, UA   Negative         Leukocytes, UA   3+         Lipase     79       Lymph #     1.9       Lymph %     30.2       MCH     27.7       MCHC     33.7       MCV     82       Microscopic Comment   SEE COMMENT  Comment: Other formed elements not mentioned in the report are not   present in the microscopic examination.            Mono #     0.5       Mono %     8.0       MPV     9.6       NITRITE UA   Negative         nRBC     0       Occult Blood UA   1+         pH, UA   6.0         Platelets     250       Potassium     3.6       PROTEIN TOTAL     7.1       Protein, UA   1+  Comment: Recommend a 24 hour urine protein or a urine   protein/creatinine ratio if globulin induced proteinuria is  clinically suspected.           Protime 10.9           RBC     4.26       RBC, UA   7         RDW     16.1       Sodium     143       Specific [place], UA   1.015         Specimen Outdate     05/28/2023 23:59       Specimen UA   Urine, Catheterized         Unclass [place] UA   Rare         UROBILINOGEN UA   Negative         WBC Clumps, UA   Few         WBC, UA   >100         WBC     6.16               Significant Imaging: I have reviewed all pertinent imaging results/findings within the past 24 hours.  Imaging Results    None

## 2023-05-27 LAB — BACTERIA UR CULT: NO GROWTH

## 2023-06-03 DIAGNOSIS — I10 ESSENTIAL HYPERTENSION: Chronic | ICD-10-CM

## 2023-06-03 NOTE — TELEPHONE ENCOUNTER
No care due was identified.  [facility] Embedded Care Due Messages. Reference number: [id number].   6/03/2023 2:56:31 AM CDT

## 2023-06-05 RX ORDER — HYDROCHLOROTHIAZIDE 25 MG/1
TABLET ORAL
Qty: 90 TABLET | Refills: 12 | Status: ON HOLD | OUTPATIENT
Start: 2023-06-05 | End: 2023-07-20 | Stop reason: HOSPADM

## 2023-06-12 NOTE — TELEPHONE ENCOUNTER
Yareli Patel is a 53 year old female presenting with bilateral neck and upper back pain. She also presents with midline lower back pain. She reports numbness/tingling into both LE's.      ALLERGIES:   Allergen Reactions   • Penicillins VOMITING   • Sulfa Antibiotics VOMITING   • Bactrim Ds RASH   • Fluzone Quadrivalent SWELLING     Arm swelling    • Prednisone Other (See Comments)     Steroid responsive eye pressure elevation after lesion removed form conjunctiva OS years ago elsewhere        Denies Latex allergy or sensitivity.     Patient notes that she currently is a smoker.    There were no vitals taken for this visit.    No changes to Patient's medical history or social history since their last visit.         Lab appointment scheduled.

## 2023-06-22 ENCOUNTER — TELEPHONE (OUTPATIENT)
Dept: FAMILY MEDICINE | Facility: CLINIC | Age: 78
End: 2023-06-22
Payer: MEDICARE

## 2023-06-22 NOTE — TELEPHONE ENCOUNTER
----- Message from Trev Amato sent at 6/22/2023  2:32 PM CDT -----  Regarding:  [phone]  Type: Patient Call Back     What is the request in detail: Pt is requesting clarification on (sulfamethoxazole-trimethoprim 800-160mg (BACTRIM DS) 800-160 mg Tab)     Can the clinic reply by [facility]? No     Would the patient rather a call back or a response via [facility]? Call back    Best call back number: .[phone]      Additional Information: pt went to the ER and was prescribed antibiotics and was previously taking blood thinners but was told to stop due to taking antibiotics,  is requesting clarification on when she can start taking her blood thinners again     Thank you.

## 2023-06-27 DIAGNOSIS — K21.9 GASTROESOPHAGEAL REFLUX DISEASE: ICD-10-CM

## 2023-06-27 NOTE — TELEPHONE ENCOUNTER
No care due was identified.  [facility] Embedded Care Due Messages. Reference number: [id number].   6/27/2023 12:08:49 PM CDT

## 2023-06-28 RX ORDER — OMEPRAZOLE 20 MG/1
CAPSULE, DELAYED RELEASE ORAL
Qty: 180 CAPSULE | Refills: 0 | Status: ON HOLD | OUTPATIENT
Start: 2023-06-28 | End: 2023-07-20 | Stop reason: HOSPADM

## 2023-07-12 ENCOUNTER — PES CALL (OUTPATIENT)
Dept: ADMINISTRATIVE | Facility: CLINIC | Age: 78
End: 2023-07-12
Payer: MEDICARE

## 2023-07-15 ENCOUNTER — HOSPITAL ENCOUNTER (INPATIENT)
Facility: HOSPITAL | Age: 78
LOS: 5 days | Discharge: HOSPICE | DRG: 065 | End: 2023-07-20
Attending: EMERGENCY MEDICINE | Admitting: HOSPITALIST
Payer: MEDICARE

## 2023-07-15 DIAGNOSIS — I63.9 ISCHEMIC STROKE: Primary | ICD-10-CM

## 2023-07-15 DIAGNOSIS — R07.9 CHEST PAIN: ICD-10-CM

## 2023-07-15 DIAGNOSIS — R41.82 ALTERED MENTAL STATUS: ICD-10-CM

## 2023-07-15 DIAGNOSIS — I63.9 CVA (CEREBRAL VASCULAR ACCIDENT): ICD-10-CM

## 2023-07-15 LAB
ALBUMIN SERPL BCP-MCNC: 3.7 G/DL (ref 3.5–5.2)
ALP SERPL-CCNC: 73 U/L (ref 55–135)
ALT SERPL W/O P-5'-P-CCNC: 14 U/L (ref 10–44)
AMPHET+METHAMPHET UR QL: NEGATIVE
ANION GAP SERPL CALC-SCNC: 13 MMOL/L (ref 8–16)
AST SERPL-CCNC: 23 U/L (ref 10–40)
BARBITURATES UR QL SCN>200 NG/ML: NEGATIVE
BASOPHILS # BLD AUTO: 0.02 K/UL (ref 0–0.2)
BASOPHILS NFR BLD: 0.3 % (ref 0–1.9)
BENZODIAZ UR QL SCN>200 NG/ML: NEGATIVE
BILIRUB SERPL-MCNC: 0.5 MG/DL (ref 0.1–1)
BILIRUB UR QL STRIP: NEGATIVE
BUN SERPL-MCNC: 22 MG/DL (ref 8–23)
BZE UR QL SCN: NEGATIVE
CALCIUM SERPL-MCNC: 9.7 MG/DL (ref 8.7–10.5)
CANNABINOIDS UR QL SCN: NEGATIVE
CHLORIDE SERPL-SCNC: 106 MMOL/L (ref 95–110)
CLARITY UR: CLEAR
CO2 SERPL-SCNC: 20 MMOL/L (ref 23–29)
COLOR UR: YELLOW
CREAT SERPL-MCNC: 0.9 MG/DL (ref 0.5–1.4)
CREAT UR-MCNC: 109.4 MG/DL (ref 15–325)
DIFFERENTIAL METHOD: ABNORMAL
EOSINOPHIL # BLD AUTO: 0.1 K/UL (ref 0–0.5)
EOSINOPHIL NFR BLD: 1.9 % (ref 0–8)
ERYTHROCYTE [DISTWIDTH] IN BLOOD BY AUTOMATED COUNT: 14.9 % (ref 11.5–14.5)
EST. GFR  (NO RACE VARIABLE): >60 ML/MIN/1.73 M^2
ETHANOL SERPL-MCNC: <10 MG/DL
GLUCOSE SERPL-MCNC: 82 MG/DL (ref 70–110)
GLUCOSE UR QL STRIP: NEGATIVE
HCT VFR BLD AUTO: 39.7 % (ref 37–48.5)
HGB BLD-MCNC: 12.8 G/DL (ref 12–16)
HGB UR QL STRIP: NEGATIVE
IMM GRANULOCYTES # BLD AUTO: 0.01 K/UL (ref 0–0.04)
IMM GRANULOCYTES NFR BLD AUTO: 0.2 % (ref 0–0.5)
INR PPP: 1.1 (ref 0.8–1.2)
KETONES UR QL STRIP: NEGATIVE
LEUKOCYTE ESTERASE UR QL STRIP: NEGATIVE
LYMPHOCYTES # BLD AUTO: 2.2 K/UL (ref 1–4.8)
LYMPHOCYTES NFR BLD: 34.7 % (ref 18–48)
MAGNESIUM SERPL-MCNC: 1.9 MG/DL (ref 1.6–2.6)
MCH RBC QN AUTO: 26.8 PG (ref 27–31)
MCHC RBC AUTO-ENTMCNC: 32.2 G/DL (ref 32–36)
MCV RBC AUTO: 83 FL (ref 82–98)
METHADONE UR QL SCN>300 NG/ML: NEGATIVE
MONOCYTES # BLD AUTO: 0.5 K/UL (ref 0.3–1)
MONOCYTES NFR BLD: 8.2 % (ref 4–15)
NEUTROPHILS # BLD AUTO: 3.5 K/UL (ref 1.8–7.7)
NEUTROPHILS NFR BLD: 54.7 % (ref 38–73)
NITRITE UR QL STRIP: NEGATIVE
NRBC BLD-RTO: 0 /100 WBC
OPIATES UR QL SCN: NEGATIVE
PCP UR QL SCN>25 NG/ML: NEGATIVE
PH UR STRIP: 6 [PH] (ref 5–8)
PLATELET # BLD AUTO: 248 K/UL (ref 150–450)
PMV BLD AUTO: 9.9 FL (ref 9.2–12.9)
POTASSIUM SERPL-SCNC: 3.6 MMOL/L (ref 3.5–5.1)
PROT SERPL-MCNC: 7 G/DL (ref 6–8.4)
PROT UR QL STRIP: NEGATIVE
PROTHROMBIN TIME: 11.4 SEC (ref 9–12.5)
RBC # BLD AUTO: 4.78 M/UL (ref 4–5.4)
SODIUM SERPL-SCNC: 139 MMOL/L (ref 136–145)
SP GR UR STRIP: 1.01 (ref 1–1.03)
TOXICOLOGY INFORMATION: NORMAL
TROPONIN I SERPL DL<=0.01 NG/ML-MCNC: <0.006 NG/ML (ref 0–0.03)
URN SPEC COLLECT METH UR: NORMAL
UROBILINOGEN UR STRIP-ACNC: NEGATIVE EU/DL
WBC # BLD AUTO: 6.43 K/UL (ref 3.9–12.7)

## 2023-07-15 PROCEDURE — 80307 DRUG TEST PRSMV CHEM ANLYZR: CPT | Mod: HCNC | Performed by: EMERGENCY MEDICINE

## 2023-07-15 PROCEDURE — 21400001 HC TELEMETRY ROOM: Mod: HCNC

## 2023-07-15 PROCEDURE — 99285 EMERGENCY DEPT VISIT HI MDM: CPT | Mod: 25,HCNC

## 2023-07-15 PROCEDURE — 80053 COMPREHEN METABOLIC PANEL: CPT | Mod: HCNC | Performed by: EMERGENCY MEDICINE

## 2023-07-15 PROCEDURE — 81003 URINALYSIS AUTO W/O SCOPE: CPT | Mod: HCNC,59 | Performed by: EMERGENCY MEDICINE

## 2023-07-15 PROCEDURE — 63600175 PHARM REV CODE 636 W HCPCS: Mod: HCNC | Performed by: EMERGENCY MEDICINE

## 2023-07-15 PROCEDURE — 85025 COMPLETE CBC W/AUTO DIFF WBC: CPT | Mod: HCNC | Performed by: EMERGENCY MEDICINE

## 2023-07-15 PROCEDURE — 25000003 PHARM REV CODE 250: Mod: HCNC | Performed by: EMERGENCY MEDICINE

## 2023-07-15 PROCEDURE — 84484 ASSAY OF TROPONIN QUANT: CPT | Mod: HCNC | Performed by: EMERGENCY MEDICINE

## 2023-07-15 PROCEDURE — 93010 ELECTROCARDIOGRAM REPORT: CPT | Mod: HCNC,,, | Performed by: INTERNAL MEDICINE

## 2023-07-15 PROCEDURE — 93005 ELECTROCARDIOGRAM TRACING: CPT | Mod: HCNC

## 2023-07-15 PROCEDURE — 96374 THER/PROPH/DIAG INJ IV PUSH: CPT | Mod: HCNC

## 2023-07-15 PROCEDURE — 93010 EKG 12-LEAD: ICD-10-PCS | Mod: HCNC,,, | Performed by: INTERNAL MEDICINE

## 2023-07-15 PROCEDURE — 85610 PROTHROMBIN TIME: CPT | Mod: HCNC | Performed by: EMERGENCY MEDICINE

## 2023-07-15 PROCEDURE — 82077 ASSAY SPEC XCP UR&BREATH IA: CPT | Mod: HCNC | Performed by: EMERGENCY MEDICINE

## 2023-07-15 PROCEDURE — 83735 ASSAY OF MAGNESIUM: CPT | Mod: HCNC | Performed by: EMERGENCY MEDICINE

## 2023-07-15 RX ORDER — MAG HYDROX/ALUMINUM HYD/SIMETH 200-200-20
30 SUSPENSION, ORAL (FINAL DOSE FORM) ORAL 4 TIMES DAILY PRN
Status: DISCONTINUED | OUTPATIENT
Start: 2023-07-15 | End: 2023-07-20 | Stop reason: HOSPADM

## 2023-07-15 RX ORDER — ATORVASTATIN CALCIUM 40 MG/1
40 TABLET, FILM COATED ORAL DAILY
Status: DISCONTINUED | OUTPATIENT
Start: 2023-07-16 | End: 2023-07-20 | Stop reason: HOSPADM

## 2023-07-15 RX ORDER — IBUPROFEN 200 MG
16 TABLET ORAL
Status: DISCONTINUED | OUTPATIENT
Start: 2023-07-15 | End: 2023-07-20 | Stop reason: HOSPADM

## 2023-07-15 RX ORDER — PROCHLORPERAZINE EDISYLATE 5 MG/ML
5 INJECTION INTRAMUSCULAR; INTRAVENOUS EVERY 6 HOURS PRN
Status: DISCONTINUED | OUTPATIENT
Start: 2023-07-15 | End: 2023-07-20 | Stop reason: HOSPADM

## 2023-07-15 RX ORDER — POLYETHYLENE GLYCOL 3350 17 G/17G
17 POWDER, FOR SOLUTION ORAL DAILY
Status: DISCONTINUED | OUTPATIENT
Start: 2023-07-16 | End: 2023-07-20 | Stop reason: HOSPADM

## 2023-07-15 RX ORDER — CLONIDINE 0.3 MG/24H
1 PATCH, EXTENDED RELEASE TRANSDERMAL
Status: DISCONTINUED | OUTPATIENT
Start: 2023-07-16 | End: 2023-07-15

## 2023-07-15 RX ORDER — IPRATROPIUM BROMIDE AND ALBUTEROL SULFATE 2.5; .5 MG/3ML; MG/3ML
3 SOLUTION RESPIRATORY (INHALATION) EVERY 4 HOURS PRN
Status: DISCONTINUED | OUTPATIENT
Start: 2023-07-15 | End: 2023-07-20 | Stop reason: HOSPADM

## 2023-07-15 RX ORDER — ACETAMINOPHEN 325 MG/1
650 TABLET ORAL EVERY 8 HOURS PRN
Status: DISCONTINUED | OUTPATIENT
Start: 2023-07-15 | End: 2023-07-20 | Stop reason: HOSPADM

## 2023-07-15 RX ORDER — SODIUM CHLORIDE 0.9 % (FLUSH) 0.9 %
10 SYRINGE (ML) INJECTION EVERY 12 HOURS PRN
Status: DISCONTINUED | OUTPATIENT
Start: 2023-07-15 | End: 2023-07-20 | Stop reason: HOSPADM

## 2023-07-15 RX ORDER — HYDRALAZINE HYDROCHLORIDE 20 MG/ML
10 INJECTION INTRAMUSCULAR; INTRAVENOUS
Status: COMPLETED | OUTPATIENT
Start: 2023-07-15 | End: 2023-07-15

## 2023-07-15 RX ORDER — ASPIRIN 81 MG/1
81 TABLET ORAL DAILY
Status: DISCONTINUED | OUTPATIENT
Start: 2023-07-16 | End: 2023-07-20 | Stop reason: HOSPADM

## 2023-07-15 RX ORDER — BISACODYL 10 MG
10 SUPPOSITORY, RECTAL RECTAL DAILY PRN
Status: DISCONTINUED | OUTPATIENT
Start: 2023-07-15 | End: 2023-07-20 | Stop reason: HOSPADM

## 2023-07-15 RX ORDER — LABETALOL HYDROCHLORIDE 5 MG/ML
20 INJECTION, SOLUTION INTRAVENOUS
Status: DISCONTINUED | OUTPATIENT
Start: 2023-07-15 | End: 2023-07-15

## 2023-07-15 RX ORDER — LANOLIN ALCOHOL/MO/W.PET/CERES
800 CREAM (GRAM) TOPICAL
Status: DISCONTINUED | OUTPATIENT
Start: 2023-07-15 | End: 2023-07-20 | Stop reason: HOSPADM

## 2023-07-15 RX ORDER — SODIUM,POTASSIUM PHOSPHATES 280-250MG
2 POWDER IN PACKET (EA) ORAL
Status: DISCONTINUED | OUTPATIENT
Start: 2023-07-15 | End: 2023-07-20 | Stop reason: HOSPADM

## 2023-07-15 RX ORDER — IBUPROFEN 200 MG
24 TABLET ORAL
Status: DISCONTINUED | OUTPATIENT
Start: 2023-07-15 | End: 2023-07-20 | Stop reason: HOSPADM

## 2023-07-15 RX ORDER — HYDRALAZINE HYDROCHLORIDE 20 MG/ML
20 INJECTION INTRAMUSCULAR; INTRAVENOUS
Status: COMPLETED | OUTPATIENT
Start: 2023-07-15 | End: 2023-07-15

## 2023-07-15 RX ORDER — LABETALOL HYDROCHLORIDE 5 MG/ML
10 INJECTION, SOLUTION INTRAVENOUS
Status: DISCONTINUED | OUTPATIENT
Start: 2023-07-15 | End: 2023-07-20 | Stop reason: HOSPADM

## 2023-07-15 RX ORDER — HEPARIN SODIUM 5000 [USP'U]/ML
5000 INJECTION, SOLUTION INTRAVENOUS; SUBCUTANEOUS EVERY 8 HOURS
Status: DISCONTINUED | OUTPATIENT
Start: 2023-07-16 | End: 2023-07-20 | Stop reason: HOSPADM

## 2023-07-15 RX ORDER — SODIUM CHLORIDE 0.9 % (FLUSH) 0.9 %
10 SYRINGE (ML) INJECTION
Status: DISCONTINUED | OUTPATIENT
Start: 2023-07-15 | End: 2023-07-20 | Stop reason: HOSPADM

## 2023-07-15 RX ORDER — SERTRALINE HYDROCHLORIDE 50 MG/1
100 TABLET, FILM COATED ORAL DAILY
Status: DISCONTINUED | OUTPATIENT
Start: 2023-07-16 | End: 2023-07-20 | Stop reason: HOSPADM

## 2023-07-15 RX ORDER — SIMETHICONE 80 MG
1 TABLET,CHEWABLE ORAL 4 TIMES DAILY PRN
Status: DISCONTINUED | OUTPATIENT
Start: 2023-07-15 | End: 2023-07-20 | Stop reason: HOSPADM

## 2023-07-15 RX ORDER — ONDANSETRON 2 MG/ML
4 INJECTION INTRAMUSCULAR; INTRAVENOUS EVERY 8 HOURS PRN
Status: DISCONTINUED | OUTPATIENT
Start: 2023-07-15 | End: 2023-07-20 | Stop reason: HOSPADM

## 2023-07-15 RX ORDER — ASPIRIN 300 MG/1
300 SUPPOSITORY RECTAL
Status: COMPLETED | OUTPATIENT
Start: 2023-07-15 | End: 2023-07-15

## 2023-07-15 RX ORDER — NALOXONE HCL 0.4 MG/ML
0.02 VIAL (ML) INJECTION
Status: DISCONTINUED | OUTPATIENT
Start: 2023-07-15 | End: 2023-07-20 | Stop reason: HOSPADM

## 2023-07-15 RX ORDER — TALC
6 POWDER (GRAM) TOPICAL NIGHTLY PRN
Status: DISCONTINUED | OUTPATIENT
Start: 2023-07-15 | End: 2023-07-20 | Stop reason: HOSPADM

## 2023-07-15 RX ORDER — GLUCAGON 1 MG
1 KIT INJECTION
Status: DISCONTINUED | OUTPATIENT
Start: 2023-07-15 | End: 2023-07-20 | Stop reason: HOSPADM

## 2023-07-15 RX ORDER — ACETAMINOPHEN 325 MG/1
650 TABLET ORAL EVERY 4 HOURS PRN
Status: DISCONTINUED | OUTPATIENT
Start: 2023-07-15 | End: 2023-07-20 | Stop reason: HOSPADM

## 2023-07-15 RX ADMIN — HYDRALAZINE HYDROCHLORIDE 10 MG: 20 INJECTION INTRAMUSCULAR; INTRAVENOUS at 04:07

## 2023-07-15 RX ADMIN — HYDRALAZINE HYDROCHLORIDE 20 MG: 20 INJECTION INTRAMUSCULAR; INTRAVENOUS at 06:07

## 2023-07-15 RX ADMIN — NITROGLYCERIN 2 INCH: 20 OINTMENT TOPICAL at 04:07

## 2023-07-15 RX ADMIN — ASPIRIN 300 MG: 300 SUPPOSITORY RECTAL at 06:07

## 2023-07-15 NOTE — ED NOTES
Sophia Landis, a 77 y.o. female presents to the ED via EMS for evaluation for AMS onset a week ago. History obtained from independent historians- her  and her daughter in law- who state that after 2 episodes of stroke, she became a little bit confused, but still has daily conversation with her family members and takes her medication sometimes with her 's help. However, they further report that patient became more lethargic, confused, and quieter than normal. They also reports patient denies to take her medications or move around, and did not response, answer, or has any conversation with family members or eat in the past week. They notes that patient is unable to ambulate by herself after two times of stroke. Daughter in law states that she also concerns about patient's BP because her BP was high when they measure it on Thursday night, which was 191/113. Further states that patient is experiencing urinary dysuria onset a month ago, and was diagnosed with UTI last month without finishing AEB. Endorses constipation onset 3 days ago, and more frequent urination. No other alleviating or exacerbating factors. Denies new neuro deficits. Further denies cough, shortness of breath, chest pain, fever, chills, abdominal pain, nausea, vomiting, diarrhea, headaches, congestion, sore throat, eye pain, ear pain, rash, or other associated symptoms. Pt is alert and eyes open spontaneously. Bed locked in lowest position, call bell in reach, side rails raised x 2, and family members at bedside. Will continue to monitor.     Triage note:  Chief Complaint   Patient presents with    Altered Mental Status     Pt reports to the ED with C/O dysuria and AMS x 1 month. Pt was Dx with a UTI last month. Pt did not finish AEB. Pt having more frequent urination. Pt has been more lethargic and less verbal then normal. Pt has a Pmhx of a CVA. No new neuro deficits. Pt placed in [facility] bed for eval.      Review of patient's allergies  indicates:   Allergen Reactions    Antihistamines - alkylamine Nausea Only    Ciprofloxacin Nausea Only    Penicillin     Penicillins Hives     Past Medical History:   Diagnosis Date    Cataract     Chronic diarrhea 11/26/2018    Depression     GERD (gastroesophageal reflux disease)     History of colonic polyps 11/26/2018    Hyperlipidemia     Hypertension     Menopausal syndrome     PAD (peripheral artery disease)     s/p stent    Stroke

## 2023-07-15 NOTE — PHARMACY MED REC
"Admission Medication History     The home medication history was taken by Susy Middleton.    You may go to "Admission" then "Reconcile Home Medications" tabs to review and/or act upon these items.     The home medication list has been updated by the Pharmacy department.   Please read ALL comments highlighted in yellow.   Please address this information as you see fit.    Feel free to contact us if you have any questions or require assistance.      Medications listed below were obtained from: Patient/family and Analytic software- [employer]    The medication reconciliation was completed by the patient's bedside.      Susy Middleton  [phone]                .        "

## 2023-07-15 NOTE — ED PROVIDER NOTES
Encounter Date: 7/15/2023    SCRIBE #1 NOTE: I, Juliann Miranda, am scribing for, and in the presence of,  Bart Crawford MD. I have scribed the following portions of the note - Other sections scribed: HPI, ROS.     History     Chief Complaint   Patient presents with    Altered Mental Status     Pt reports to the ED with C/O dysuria and AMS x 1 month. Pt was Dx with a UTI last month. Pt did not finish AEB. Pt having more frequent urination. Pt has been more lethargic and less verbal then normal. Pt has a Pmhx of a CVA. No new neuro deficits. Pt placed in [facility] bed for eval.      A years old female with a PMHx of HLD, HTN, CVA, who presents to the ED for an evaluation for AMS onset a week ago. History obtained from independent historians- her  and her daughter in law- who state that after 2 episodes of stroke, she became a little bit confused, but still has daily conversation with her family members and takes her medication sometimes with her 's help. However, they further report that patient became more lethargic, confused, and quite than normal. They also reports patient denies to take her medications or move around, and did not response, answer, or has any conversation with family members or eat in the past week. They notes that patient is unable to ambulate by herself after two times of stroke. Daughter in law states that she also concerns about patient's BP because her BP was high when they measure it on Thursday night, which was 191/113. Further states that patient is experiencing dysuria onset a month ago, and was diagnosed with UTI last month without finishing AEB. Endorses constipation onset 3 days ago, and more frequent urination. No other alleviating or exacerbating factors. Denies new neuro deficits. Further denies cough, shortness of breath, chest pain, fever, chills, abdominal pain, nausea, vomiting, diarrhea, headaches, congestion, sore throat, eye pain, ear pain, rash, or other  associated symptoms. She is allergic to Antihistamines, Ciprofloxacin, and Penicillins.          The history is provided by a relative and the spouse. The history is limited by the condition of the patient. No  was used.   Review of patient's allergies indicates:   Allergen Reactions    Antihistamines - alkylamine Nausea Only    Ciprofloxacin Nausea Only    Penicillin     Penicillins Hives     Past Medical History:   Diagnosis Date    Cataract     Chronic diarrhea 2018    Depression     GERD (gastroesophageal reflux disease)     History of colonic polyps 2018    Hyperlipidemia     Hypertension     Menopausal syndrome     PAD (peripheral artery disease)     s/p stent    Stroke      Past Surgical History:   Procedure Laterality Date    APPENDECTOMY      COLONOSCOPY N/A 2019    Procedure: COLONOSCOPY;  Surgeon: Kenia Arias MD;  Location: [facility] ENDO;  Service: Endoscopy;  Laterality: N/A;  RX PLETAL ok to hold    ESOPHAGOGASTRODUODENOSCOPY N/A 2023    Procedure: EGD (ESOPHAGOGASTRODUODENOSCOPY);  Surgeon: Debbie Cooper MD;  Location: [facility] ENDO;  Service: Endoscopy;  Laterality: N/A;    HYSTERECTOMY      MONTY with BSO    ILIAC ARTERY STENT Bilateral      Family History   Problem Relation Age of Onset    Amblyopia Mother     Cataracts Mother     Hypertension Mother     Thyroid disease Mother     Cancer Father     Strabismus Sister     Cancer Brother     Diabetes Maternal Aunt     Cancer Maternal Uncle     Diabetes Maternal Uncle     Cancer Maternal Uncle     Cancer Maternal Uncle     Cancer Maternal Uncle     Blindness Neg Hx     Glaucoma Neg Hx     Macular degeneration Neg Hx     Retinal detachment Neg Hx     Stroke Neg Hx      Social History     Tobacco Use    Smoking status: Former     Types: Cigarettes     Quit date:      Years since quittin.5    Smokeless tobacco: Never    Tobacco comments:     .  Retired  at [employer].   Substance Use  Topics    Alcohol use: No    Drug use: No     Review of Systems   Constitutional:  Negative for chills, diaphoresis and fever.   HENT:  Negative for congestion, ear pain and sore throat.    Eyes:  Negative for pain.   Respiratory:  Negative for cough and shortness of breath.    Cardiovascular:  Negative for chest pain.   Gastrointestinal:  Positive for constipation. Negative for abdominal pain, diarrhea, nausea and vomiting.   Genitourinary:  Positive for dysuria (secondary to UTI) and frequency (secondary to UTI).   Musculoskeletal:  Negative for back pain.        (-) Arm or leg trouble.    Skin:  Negative for rash.   Neurological:  Negative for headaches.   Psychiatric/Behavioral:  Positive for confusion.      Physical Exam     Initial Vitals [07/15/23 1514]   BP Pulse Resp Temp SpO2   (!) 199/80 73 16 98.1 °F (36.7 °C) 96 %      MAP       --         Physical Exam  The patient was examined specifically for the following:   General:No significant distress, Good color, Warm and dry. Head and neck:Scalp atraumatic, Neck supple. Neurological:Appropriate conversation, Gross motor deficits. Eyes:Conjugate gaze, Clear corneas. ENT: No epistaxis. Cardiac: Regular rate and rhythm, Grossly normal heart tones. Pulmonary: Wheezing, Rales. Gastrointestinal: Abdominal tenderness, Abdominal distention. Musculoskeletal: Extremity deformity, Apparent pain with range of motion of the joints. Skin: Rash.   The findings on examination were normal except for the following:  The patient will track me with her eyes.  She will occasionally give one-word answers.  She has motor tone in all 4 extremities.  The face is symmetrical.  Lungs are clear and free of wheezing rales rubs or rhonchi.  The patient's blood pressure is 199/80.  The heart tones are normal.  The patient has regular rate and rhythm.  On rectal exam the rectum is empty.  ED Course   Procedures  Labs Reviewed   CBC W/ AUTO DIFFERENTIAL - Abnormal; Notable for the following  components:       Result Value    MCH 26.8 (*)     RDW 14.9 (*)     All other components within normal limits   COMPREHENSIVE METABOLIC PANEL - Abnormal; Notable for the following components:    CO2 20 (*)     All other components within normal limits   URINALYSIS, REFLEX TO URINE CULTURE    Narrative:     Specimen Source->Urine   PROTIME-INR   TROPONIN I   MAGNESIUM   ALCOHOL,MEDICAL (ETHANOL)   DRUG SCREEN PANEL, URINE EMERGENCY    Narrative:     Specimen Source->Urine     EKG Readings: (Independently Interpreted)   This patient is in a normal sinus rhythm with a heart rate of 68.  There are no significant ST segment or T-wave changes.  There is no evidence of acute myocardial infarction or malignant arrhythmia.       Imaging Results               CT Head Without Contrast (Final result)  Result time 07/15/23 17:18:42      Final result by Tani Whitney MD (07/15/23 17:18:42)                   Impression:      1. Findings concerning for left parietal lobe recent infarction.  No intracranial hemorrhage.  Further evaluation with MRI brain can be obtained as warranted.  2. Stable enlargement of the ventricles out of proportion to the degree of sulcal atrophy, nonspecific but can be seen with normal pressure hydrocephalus.  3. Involutional change with sequela of moderate to advanced chronic microvascular ischemic change and multifocal remote lacunar type infarcts.  This report was flagged in Epic as abnormal.      Electronically signed by: Tani Whitney MD  Date:    07/15/2023  Time:    17:18               Narrative:    EXAMINATION:  CT HEAD WITHOUT CONTRAST    CLINICAL HISTORY:  Mental status change, unknown cause; Altered mental status, unspecified    TECHNIQUE:  Low dose axial CT images obtained throughout the head without intravenous contrast. Sagittal and coronal reconstructions were performed.    COMPARISON:  Head CT 05/09/2022, MRI brain 12/11/2021    FINDINGS:  Patient is somewhat rotated and tilted within the  scanner.    Intracranial compartment:    Newly developed moderate-sized area of hypoattenuation with sulcal effacement involving the left parietal lobe near the vertex concerning for recent infarction.  No extra-axial blood or fluid collections.  Stable subcentimeter extra-axial lipoma along the anterior interhemispheric fissure.    Ventricles are midline with grossly similar enlargement of the lateral, 3rd and 4th ventricles when compared to most recent prior imaging.  There is superimposed generalized cerebral volume loss elsewhere.  Grossly stable patchy hypoattenuation of the supratentorial white matter consistent with chronic microvascular ischemic change.  Similar remote lacunar type infarcts at the right sided corona radiata, caudate, basal ganglia and thalamus and posterior limb left internal capsule.  No parenchymal mass or hemorrhage.  No significant midline shift.  Skull base atherosclerotic vascular calcifications noted.    Skull/extracranial contents (limited evaluation): No fracture. Mastoid air cells and paranasal sinuses are essentially clear.  Imaged portions of the orbits are within normal limits.                                       X-Ray Chest AP Portable (Final result)  Result time 07/15/23 16:27:05      Final result by Bailey Marroquin MD (07/15/23 16:27:05)                   Impression:      No acute abnormality or detrimental change since December 10, 2021..      Electronically signed by: Bailey Marroquin MD  Date:    07/15/2023  Time:    16:27               Narrative:    EXAMINATION:  XR CHEST AP PORTABLE    CLINICAL HISTORY:  chest pain;    TECHNIQUE:  Single frontal view of the chest was performed.    COMPARISON:  December 1, 2021    FINDINGS:  The lungs are free of lobar consolidation and alveolar edema and similar to the previous examination, with normal appearance of pulmonary vasculature and no large pleural effusion or pneumothorax.  Left hemidiaphragm remains indistinct  laterally, unchanged.    The cardiac silhouette is normal in size. The hilar and mediastinal contours are unremarkable.  There are atherosclerotic calcifications of the thoracic aorta.    Visualized osseous structures are stable.                                       Medications   sodium chloride 0.9% flush 10 mL (has no administration in time range)   sodium chloride 0.9% bolus 500 mL 500 mL (has no administration in time range)   labetaloL injection 10 mg (has no administration in time range)   heparin (porcine) injection 5,000 Units (has no administration in time range)   aspirin EC tablet 81 mg (has no administration in time range)   atorvastatin tablet 40 mg (has no administration in time range)   sodium chloride 0.9% flush 10 mL (has no administration in time range)   albuterol-ipratropium 2.5 mg-0.5 mg/3 mL nebulizer solution 3 mL (has no administration in time range)   melatonin tablet 6 mg (has no administration in time range)   ondansetron injection 4 mg (has no administration in time range)   prochlorperazine injection Soln 5 mg (has no administration in time range)   polyethylene glycol packet 17 g (has no administration in time range)   bisacodyL suppository 10 mg (has no administration in time range)   acetaminophen tablet 650 mg (has no administration in time range)   simethicone chewable tablet 80 mg (has no administration in time range)   aluminum-magnesium hydroxide-simethicone 200-200-20 mg/5 mL suspension 30 mL (has no administration in time range)   acetaminophen tablet 650 mg (has no administration in time range)   naloxone 0.4 mg/mL injection 0.02 mg (has no administration in time range)   potassium bicarbonate disintegrating tablet 50 mEq (has no administration in time range)   potassium bicarbonate disintegrating tablet 35 mEq (has no administration in time range)   potassium bicarbonate disintegrating tablet 60 mEq (has no administration in time range)   magnesium oxide tablet 800 mg (has  no administration in time range)   magnesium oxide tablet 800 mg (has no administration in time range)   potassium, sodium phosphates 280-160-250 mg packet 2 packet (has no administration in time range)   potassium, sodium phosphates 280-160-250 mg packet 2 packet (has no administration in time range)   potassium, sodium phosphates 280-160-250 mg packet 2 packet (has no administration in time range)   glucose chewable tablet 16 g (has no administration in time range)   glucose chewable tablet 24 g (has no administration in time range)   dextrose 50% injection 12.5 g (has no administration in time range)   dextrose 50% injection 25 g (has no administration in time range)   glucagon (human recombinant) injection 1 mg (has no administration in time range)   sertraline tablet 100 mg (has no administration in time range)   hydrALAZINE injection 10 mg (10 mg Intravenous Given 7/15/23 1627)   nitroGLYCERIN 2% TD oint ointment 2 inch (2 inches Topical (Top) Given 7/15/23 1630)   hydrALAZINE injection 20 mg (20 mg Intravenous Given 7/15/23 1818)   aspirin suppository 300 mg (300 mg Rectal Given 7/15/23 1819)     Medical Decision Making:   History:   Old Medical Records: I decided to obtain old medical records.  Old Records Summarized: other records.       <> Summary of Records: External documents reviewed  Clinical Tests:   Lab Tests: Ordered and Reviewed  Radiological Study: Ordered and Reviewed  Medical Tests: Ordered and Reviewed     Given the above this patient is brought to the emergency room for 1 week of being poorly responsive not conversational not eating.  Family is concerned about the patient's ability to swallow.  The patient had markedly elevated hypertension.  CT of the head reveals an acute left parietal lobe infarction.  Chest x-ray is unremarkable.  The patient's EKG is essentially unremarkable.  CBC fails to reveal evidence of infection or significant anemia chemistries failed to reveal electrolyte  abnormalities hepatic or renal failure that might produce altered mental status the patient's urine drug screen is negative.  The ethanol is negative intoxication is not likely a cause of this presentation.  The patient's urinalysis is normal urinary tract infection causing altered mental status is not likely here.  Stroke remains the most likely cause of this mental status change.  I will admit the patient to hospital medicine.  We will consult Neurology.       Scribe Attestation:   Scribe #1: I performed the above scribed service and the documentation accurately describes the services I performed. I attest to the accuracy of the note.                 I personally performed the services described in this documentation.  All medical record  entries made by the scribe are at my direction and in my presence.  Signed, Dr. Crawford  Clinical Impression:   Final diagnoses:  [R41.82] Altered mental status  [I63.9] Ischemic stroke - Left parietal (Primary)        ED Disposition Condition    Admit Stable                Bart Crawford MD  07/15/23 [id number]

## 2023-07-16 LAB
ALBUMIN SERPL BCP-MCNC: 4 G/DL (ref 3.5–5.2)
ALP SERPL-CCNC: 82 U/L (ref 55–135)
ALT SERPL W/O P-5'-P-CCNC: 15 U/L (ref 10–44)
ANION GAP SERPL CALC-SCNC: 14 MMOL/L (ref 8–16)
APTT PPP: 34.8 SEC (ref 21–32)
AST SERPL-CCNC: 24 U/L (ref 10–40)
BASOPHILS # BLD AUTO: 0.03 K/UL (ref 0–0.2)
BASOPHILS NFR BLD: 0.4 % (ref 0–1.9)
BILIRUB SERPL-MCNC: 0.4 MG/DL (ref 0.1–1)
BUN SERPL-MCNC: 23 MG/DL (ref 8–23)
CALCIUM SERPL-MCNC: 10.2 MG/DL (ref 8.7–10.5)
CHLORIDE SERPL-SCNC: 107 MMOL/L (ref 95–110)
CHOLEST SERPL-MCNC: 276 MG/DL (ref 120–199)
CHOLEST/HDLC SERPL: 6.6 {RATIO} (ref 2–5)
CO2 SERPL-SCNC: 18 MMOL/L (ref 23–29)
CREAT SERPL-MCNC: 0.9 MG/DL (ref 0.5–1.4)
DIFFERENTIAL METHOD: ABNORMAL
EOSINOPHIL # BLD AUTO: 0.1 K/UL (ref 0–0.5)
EOSINOPHIL NFR BLD: 1.5 % (ref 0–8)
ERYTHROCYTE [DISTWIDTH] IN BLOOD BY AUTOMATED COUNT: 15.2 % (ref 11.5–14.5)
EST. GFR  (NO RACE VARIABLE): >60 ML/MIN/1.73 M^2
ESTIMATED AVG GLUCOSE: 91 MG/DL (ref 68–131)
GLUCOSE SERPL-MCNC: 76 MG/DL (ref 70–110)
HBA1C MFR BLD: 4.8 % (ref 4–5.6)
HCT VFR BLD AUTO: 40.4 % (ref 37–48.5)
HDLC SERPL-MCNC: 42 MG/DL (ref 40–75)
HDLC SERPL: 15.2 % (ref 20–50)
HGB BLD-MCNC: 13.2 G/DL (ref 12–16)
IMM GRANULOCYTES # BLD AUTO: 0.01 K/UL (ref 0–0.04)
IMM GRANULOCYTES NFR BLD AUTO: 0.1 % (ref 0–0.5)
INR PPP: 1.1 (ref 0.8–1.2)
LDLC SERPL CALC-MCNC: 197.2 MG/DL (ref 63–159)
LYMPHOCYTES # BLD AUTO: 2.3 K/UL (ref 1–4.8)
LYMPHOCYTES NFR BLD: 33.4 % (ref 18–48)
MAGNESIUM SERPL-MCNC: 1.9 MG/DL (ref 1.6–2.6)
MCH RBC QN AUTO: 26.5 PG (ref 27–31)
MCHC RBC AUTO-ENTMCNC: 32.7 G/DL (ref 32–36)
MCV RBC AUTO: 81 FL (ref 82–98)
MONOCYTES # BLD AUTO: 0.5 K/UL (ref 0.3–1)
MONOCYTES NFR BLD: 7.6 % (ref 4–15)
NEUTROPHILS # BLD AUTO: 3.9 K/UL (ref 1.8–7.7)
NEUTROPHILS NFR BLD: 57 % (ref 38–73)
NONHDLC SERPL-MCNC: 234 MG/DL
NRBC BLD-RTO: 0 /100 WBC
PHOSPHATE SERPL-MCNC: 3.2 MG/DL (ref 2.7–4.5)
PLATELET # BLD AUTO: 209 K/UL (ref 150–450)
PMV BLD AUTO: 9.9 FL (ref 9.2–12.9)
POTASSIUM SERPL-SCNC: 3.8 MMOL/L (ref 3.5–5.1)
PROT SERPL-MCNC: 7.6 G/DL (ref 6–8.4)
PROTHROMBIN TIME: 11.4 SEC (ref 9–12.5)
RBC # BLD AUTO: 4.98 M/UL (ref 4–5.4)
SODIUM SERPL-SCNC: 139 MMOL/L (ref 136–145)
TRIGL SERPL-MCNC: 184 MG/DL (ref 30–150)
TSH SERPL DL<=0.005 MIU/L-ACNC: 2.82 UIU/ML (ref 0.4–4)
WBC # BLD AUTO: 6.8 K/UL (ref 3.9–12.7)

## 2023-07-16 PROCEDURE — 80061 LIPID PANEL: CPT | Mod: HCNC | Performed by: STUDENT IN AN ORGANIZED HEALTH CARE EDUCATION/TRAINING PROGRAM

## 2023-07-16 PROCEDURE — 83735 ASSAY OF MAGNESIUM: CPT | Mod: HCNC | Performed by: STUDENT IN AN ORGANIZED HEALTH CARE EDUCATION/TRAINING PROGRAM

## 2023-07-16 PROCEDURE — 85730 THROMBOPLASTIN TIME PARTIAL: CPT | Mod: HCNC | Performed by: STUDENT IN AN ORGANIZED HEALTH CARE EDUCATION/TRAINING PROGRAM

## 2023-07-16 PROCEDURE — 63600175 PHARM REV CODE 636 W HCPCS: Mod: HCNC | Performed by: STUDENT IN AN ORGANIZED HEALTH CARE EDUCATION/TRAINING PROGRAM

## 2023-07-16 PROCEDURE — 84100 ASSAY OF PHOSPHORUS: CPT | Mod: HCNC | Performed by: STUDENT IN AN ORGANIZED HEALTH CARE EDUCATION/TRAINING PROGRAM

## 2023-07-16 PROCEDURE — 84443 ASSAY THYROID STIM HORMONE: CPT | Mod: HCNC | Performed by: STUDENT IN AN ORGANIZED HEALTH CARE EDUCATION/TRAINING PROGRAM

## 2023-07-16 PROCEDURE — 36415 COLL VENOUS BLD VENIPUNCTURE: CPT | Mod: HCNC | Performed by: STUDENT IN AN ORGANIZED HEALTH CARE EDUCATION/TRAINING PROGRAM

## 2023-07-16 PROCEDURE — 97161 PT EVAL LOW COMPLEX 20 MIN: CPT | Mod: HCNC | Performed by: PHYSICAL THERAPIST

## 2023-07-16 PROCEDURE — 99222 1ST HOSP IP/OBS MODERATE 55: CPT | Mod: HCNC,,, | Performed by: PSYCHIATRY & NEUROLOGY

## 2023-07-16 PROCEDURE — 80053 COMPREHEN METABOLIC PANEL: CPT | Mod: HCNC | Performed by: STUDENT IN AN ORGANIZED HEALTH CARE EDUCATION/TRAINING PROGRAM

## 2023-07-16 PROCEDURE — 83036 HEMOGLOBIN GLYCOSYLATED A1C: CPT | Mod: HCNC | Performed by: STUDENT IN AN ORGANIZED HEALTH CARE EDUCATION/TRAINING PROGRAM

## 2023-07-16 PROCEDURE — 97165 OT EVAL LOW COMPLEX 30 MIN: CPT | Mod: HCNC

## 2023-07-16 PROCEDURE — 85610 PROTHROMBIN TIME: CPT | Mod: HCNC | Performed by: STUDENT IN AN ORGANIZED HEALTH CARE EDUCATION/TRAINING PROGRAM

## 2023-07-16 PROCEDURE — 85025 COMPLETE CBC W/AUTO DIFF WBC: CPT | Mod: HCNC | Performed by: STUDENT IN AN ORGANIZED HEALTH CARE EDUCATION/TRAINING PROGRAM

## 2023-07-16 PROCEDURE — 21400001 HC TELEMETRY ROOM: Mod: HCNC

## 2023-07-16 PROCEDURE — 97535 SELF CARE MNGMENT TRAINING: CPT | Mod: HCNC

## 2023-07-16 PROCEDURE — 92610 EVALUATE SWALLOWING FUNCTION: CPT | Mod: HCNC

## 2023-07-16 PROCEDURE — 99222 PR INITIAL HOSPITAL CARE,LEVL II: ICD-10-PCS | Mod: HCNC,,, | Performed by: PSYCHIATRY & NEUROLOGY

## 2023-07-16 RX ORDER — DEXTROSE MONOHYDRATE AND SODIUM CHLORIDE 5; .9 G/100ML; G/100ML
INJECTION, SOLUTION INTRAVENOUS CONTINUOUS
Status: DISCONTINUED | OUTPATIENT
Start: 2023-07-16 | End: 2023-07-17

## 2023-07-16 RX ADMIN — HEPARIN SODIUM 5000 UNITS: 5000 INJECTION INTRAVENOUS; SUBCUTANEOUS at 10:07

## 2023-07-16 RX ADMIN — HEPARIN SODIUM 5000 UNITS: 5000 INJECTION INTRAVENOUS; SUBCUTANEOUS at 06:07

## 2023-07-16 RX ADMIN — DEXTROSE AND SODIUM CHLORIDE: 5; 900 INJECTION, SOLUTION INTRAVENOUS at 12:07

## 2023-07-16 RX ADMIN — HEPARIN SODIUM 5000 UNITS: 5000 INJECTION INTRAVENOUS; SUBCUTANEOUS at 02:07

## 2023-07-16 NOTE — PLAN OF CARE
Problem: Occupational Therapy  Goal: Occupational Therapy Goal  Description: Goals to be met by: 7/30/23     Patient will increase functional independence with ADLs by performing:    UE Dressing with Minimal Assistance.  Grooming while EOB with Minimal Assistance.  Toileting from bedside commode with Minimal Assistance for hygiene and clothing management.   Sitting at edge of bed x25 minutes with Contact Guard Assistance.  Rolling to Bilateral with Minimal Assistance.   Supine to sit with Minimal Assistance.  Step transfer with Minimal Assistance  Toilet transfer to bedside commode with Minimal Assistance.  Upper extremity exercise program x15 reps per handout, with assistance as needed.    Outcome: Ongoing, Progressing

## 2023-07-16 NOTE — PT/OT/SLP EVAL
Occupational Therapy   Evaluation    Name: Sophia Landis  MRN: [id number]  Admitting Diagnosis: Ischemic stroke  Recent Surgery: * No surgery found *      Recommendations:     Discharge Recommendations:  (TBD pending ongoing assessment.)  Discharge Equipment Recommendations:   (TBD)  Barriers to discharge:   (Pt requried increased level of assist for ADLs and functional mobility.)    Assessment:     Sophia Landis is a 77 y.o. female with a medical diagnosis of Ischemic stroke.  Performance deficits affecting function: impaired endurance, impaired self care skills, impaired functional mobility, weakness, decreased lower extremity function, decreased coordination, decreased upper extremity function, decreased safety awareness, pain, impaired cognition, impaired balance, abnormal tone, decreased ROM, impaired coordination, impaired fine motor.      Pt w/ flat affect, non-verbal and poor command following throughout eval this date; pt w/ R neglect and decreased use of RUE/RLE, requiring total A for bed mobility to stand and laterally step to HOB. Pt crossing RLE over LLE throughout, requiring manual assist for weight shifting to advance each LE. Pt will continue to benefit from skilled acute OT services to maximize functional capacity for safe performance w/ ADLs and functional mobility.     Rehab Prognosis: Good; patient would benefit from acute skilled OT services to address these deficits and reach maximum level of function.       Plan:     Patient to be seen  (3-5x/wk) to address the above listed problems via self-care/home management, therapeutic activities, therapeutic exercises, neuromuscular re-education  Plan of Care Expires: 07/30/23  Plan of Care Reviewed with: patient, spouse    Subjective     Chief Complaint: None stated   Patient/Family Comments/goals: Spouse reports pt has been declining over the past year; goal is to take her home and continue to care for her as he has been doing .     Occupational  Profile:  Living Environment: Pt lives w/ spouse in [facility] w/ ramp to enter.   Previous level of function: Pt required total A for all ADLs; spouse reports pt was once able to ambulate w/ close HHA from him but as of recently, she has been in the bed.   Roles and Routines: None stated   Equipment Used at Home: wheelchair, bedside commode, hospital bed, walker, rolling, rollator  Assistance upon Discharge: Spouse     Pain/Comfort:  Pain Rating 1: 0/10  Pain Rating Post-Intervention 1: 0/10    Patients cultural, spiritual, Worship conflicts given the current situation: no    Objective:     Communicated with: Nurse prior to session.  Patient found left sidelying with bed alarm, peripheral IV, PureWick, telemetry upon OT entry to room.    General Precautions: Standard, fall, NPO (nonverbal)  Orthopedic Precautions:  N/A  Braces: N/A  Respiratory Status: Room air    Occupational Performance:    Bed Mobility:  Pt w/ posterior lean and rigid while sitting EOB, observed crossing RLE over LLE and keeping it off the floor. Pt intermittently grabbed handrail using LUE but inattentive to R side.   Patient completed Rolling/Turning to Right with total assistance  Patient completed Scooting hips to EOB with total assistance  Patient completed Supine to Sit with total assistance  Patient completed Sit to Supine with total assistance    Functional Mobility/Transfers:  Patient completed Sit <> Stand Transfer with total assistance  with  hand-held assist   Functional Mobility: Pt required total A for weight-shifting and advancing BLEs to HOB.    Activities of Daily Living:  Upper Body Dressing: total assistance for donning gown over back     Cognitive/Visual Perceptual:  Cognitive/Psychosocial Skills:     -       Oriented to: unable to assess  -       Follows Commands/attention:Inattentive and unable to follow simple commands   -       Communication: non-verbal   -       Memory: unable to assess   -       Safety awareness/insight to  disability: impaired    Physical Exam:  Balance:    -       fair - sitting balance; poor standing   Postural examination/scapula alignment:    -       Rounded shoulders  -       Forward head  Skin integrity: Visible skin intact  Edema:  None noted  Sensation: unable to assess   Upper Extremity Range of Motion:     -       Right Upper Extremity:  PROM WFL but noted w/ stiffness; inattentive to R side   -       Left Upper Extremity: WFL  Upper Extremity Strength:    -       Right Upper Extremity:  unable to assess   -       Left Upper Extremity:  observed to be WFL   Strength:    -   Pt unable to follow commands for squeezing therapist's fingers     AMPA 6 Click ADL:  AMPAC Total Score: 6    Treatment & Education:  -Initial eval complete.     Patient left right sidelying with all lines intact, call button in reach, bed alarm on, and BLE pressure relief boots in place.     GOALS:   Multidisciplinary Problems       Occupational Therapy Goals          Problem: Occupational Therapy    Goal Priority Disciplines Outcome Interventions   Occupational Therapy Goal     OT, PT/OT Ongoing, Progressing    Description: Goals to be met by: 7/30/23     Patient will increase functional independence with ADLs by performing:    UE Dressing with Minimal Assistance.  Grooming while EOB with Minimal Assistance.  Toileting from bedside commode with Minimal Assistance for hygiene and clothing management.   Sitting at edge of bed x25 minutes with Contact Guard Assistance.  Rolling to Bilateral with Minimal Assistance.   Supine to sit with Minimal Assistance.  Step transfer with Minimal Assistance  Toilet transfer to bedside commode with Minimal Assistance.  Upper extremity exercise program x15 reps per handout, with assistance as needed.                         History:     Past Medical History:   Diagnosis Date    Cataract     Chronic diarrhea 11/26/2018    Depression     GERD (gastroesophageal reflux disease)     History of colonic  polyps 11/26/2018    Hyperlipidemia     Hypertension     Menopausal syndrome     PAD (peripheral artery disease)     s/p stent    Stroke          Past Surgical History:   Procedure Laterality Date    APPENDECTOMY      COLONOSCOPY N/A 5/20/2019    Procedure: COLONOSCOPY;  Surgeon: Kenia Arias MD;  Location: [facility] ENDO;  Service: Endoscopy;  Laterality: N/A;  RX PLETAL ok to hold    ESOPHAGOGASTRODUODENOSCOPY N/A 5/26/2023    Procedure: EGD (ESOPHAGOGASTRODUODENOSCOPY);  Surgeon: Debbie Cooper MD;  Location: [facility] ENDO;  Service: Endoscopy;  Laterality: N/A;    HYSTERECTOMY      MONTY with BSO    ILIAC ARTERY STENT Bilateral        Time Tracking:     OT Date of Treatment: 07/16/23  OT Start Time: 1643  OT Stop Time: 1658  OT Total Time (min): 15 min    Billable Minutes:Evaluation 15  Total Time 15    7/16/2023

## 2023-07-16 NOTE — PLAN OF CARE
Patient to discharge home with spouse who provides total assist; previously had Home Health with [facility]; may need Home Health; possible palliative care consult.

## 2023-07-16 NOTE — CONSULTS
[facility] - [place] - Telemetry  Neurology  Consult Note    Patient Name: Sophia Landis  MRN: [id number]  Admission Date: 7/15/2023  Hospital Length of Stay: 1 days  Code Status: Full Code   Attending Provider: Himanshu Busby MD   Consulting Provider: Alexis Powers MD  Primary Care Physician: Sarthak Johnson MD  Principal Problem:Ischemic stroke    Inpatient consult to Neurology  Consult performed by: Alexis Powers MD  Consult ordered by: Dave Victor MD      Subjective:     Chief Complaint/Reason for consult: Stroke    HPI: 78 y/o female with medical Hx of stroke, HTN, hyperlipidemia, PAD was brought to ED due change in mental status. Family states that pt stopped talking and has not been eating for several days. Family also notes weakness on right side. Pt is dependent is many ADL's at baseline.    Past Medical History:   Diagnosis Date    Cataract     Chronic diarrhea 11/26/2018    Depression     GERD (gastroesophageal reflux disease)     History of colonic polyps 11/26/2018    Hyperlipidemia     Hypertension     Menopausal syndrome     PAD (peripheral artery disease)     s/p stent    Stroke        Past Surgical History:   Procedure Laterality Date    APPENDECTOMY      COLONOSCOPY N/A 5/20/2019    Procedure: COLONOSCOPY;  Surgeon: Kenia Arias MD;  Location: [facility];  Service: Endoscopy;  Laterality: N/A;  RX PLETAL ok to hold    ESOPHAGOGASTRODUODENOSCOPY N/A 5/26/2023    Procedure: EGD (ESOPHAGOGASTRODUODENOSCOPY);  Surgeon: Debbie Cooper MD;  Location: [facility];  Service: Endoscopy;  Laterality: N/A;    HYSTERECTOMY      MONTY with BSO    ILIAC ARTERY STENT Bilateral        Review of patient's allergies indicates:   Allergen Reactions    Antihistamines - alkylamine Nausea Only    Ciprofloxacin Nausea Only    Penicillin     Penicillins Hives       Current Neurological Medications:     No current facility-administered medications on file prior to encounter.     Current Outpatient Medications on File Prior to  Encounter   Medication Sig    aspirin (ECOTRIN) 81 MG EC tablet TAKE 1 TABLET EVERY DAY    hydroCHLOROthiazide (HYDRODIURIL) 25 MG tablet TAKE 1 TABLET EVERY DAY    metoprolol succinate (TOPROL-XL) 50 MG 24 hr tablet TAKE 1 TABLET EVERY DAY    nitroGLYCERIN (NITROSTAT) 0.4 MG SL tablet PLACE 1 TABLET UNDER THE TONGUE EVERY 5 MINUTES AS NEEDED FOR CHEST PAIN.    omeprazole (PRILOSEC) 20 MG capsule TAKE 2 CAPSULES ONE TIME DAILY    pravastatin (PRAVACHOL) 80 MG tablet TAKE 1 TABLET EVERY DAY    sertraline (ZOLOFT) 100 MG tablet TAKE 1 TABLET EVERY DAY      Family History       Problem Relation (Age of Onset)    Amblyopia Mother    Cancer Father, Brother, Maternal Uncle, Maternal Uncle, Maternal Uncle, Maternal Uncle    Cataracts Mother    Diabetes Maternal Aunt, Maternal Uncle    Hypertension Mother    Strabismus Sister    Thyroid disease Mother          Tobacco Use    Smoking status: Former     Types: Cigarettes     Quit date:      Years since quittin.5    Smokeless tobacco: Never    Tobacco comments:     .  Retired  at [employer], [place].   Substance and Sexual Activity    Alcohol use: No    Drug use: No    Sexual activity: Not Currently     Partners: Male     Comment:      Review of Systems   Unable to perform ROS: Patient nonverbal   Objective:     Vital Signs (Most Recent):  Temp: 97.5 °F (36.4 °C) (23 0735)  Pulse: 67 (23 0735)  Resp: 16 (23 0735)  BP: (!) 179/70 (23 0735)  SpO2: 96 % (23 07) Vital Signs (24h Range):  Temp:  [97.5 °F (36.4 °C)-98.3 °F (36.8 °C)] 97.5 °F (36.4 °C)  Pulse:  [64-95] 67  Resp:  [16-20] 16  SpO2:  [94 %-98 %] 96 %  BP: (155-216)/(64-87) 179/70     Weight: 48.2 kg (106 lb 4.2 oz)  Body mass index is 21.46 kg/m².    Physical Exam  Constitutional:       General: She is not in acute distress.  HENT:      Head: Normocephalic.   Eyes:      General:         Right eye: No discharge.         Left eye: No discharge.    Cardiovascular:      Rate and Rhythm: Normal rate.   Pulmonary:      Breath sounds: Normal breath sounds.   Abdominal:      Palpations: Abdomen is soft.   Musculoskeletal:         General: No swelling.      Cervical back: Neck supple.   Skin:     Findings: No rash.       NEUROLOGICAL EXAMINATION:     MENTAL STATUS   Level of consciousness: alert       Nonverbal     CRANIAL NERVES     CN III, IV, VI   Right pupil: Size: 2 mm. Shape: regular.   Left pupil: Size: 2 mm. Shape: regular.   Nystagmus: none     CN VII   Right facial weakness: none  Left facial weakness: none    CN XII   Tongue deviation: none    MOTOR EXAM        Hypoactive on right side  AROM of LUE and RUE against gravity     SENSORY EXAM   Left arm light touch: normal       Grimaces to noxious stimulation of UE's and LE's      Significant Labs: CBC:   Recent Labs   Lab 07/15/23  1633 07/16/23  0520 07/17/23  0602   WBC 6.43 6.80 4.52   HGB 12.8 13.2 11.3*   HCT 39.7 40.4 35.5*    209 176     CMP:   Recent Labs   Lab 07/15/23  1633 07/16/23  0520 07/17/23  0602   GLU 82 76 120*    139 143   K 3.6 3.8 2.9*    107 113*   CO2 20* 18* 21*   BUN 22 23 14   CREATININE 0.9 0.9 0.8   CALCIUM 9.7 10.2 8.9   MG 1.9 1.9  --    PROT 7.0 7.6 6.0   ALBUMIN 3.7 4.0 3.2*   BILITOT 0.5 0.4 0.4   ALKPHOS 73 82 62   AST 23 24 19   ALT 14 15 13   ANIONGAP 13 14 9     Urine Studies:   Recent Labs   Lab 07/15/23  1605   COLORU Yellow   APPEARANCEUA Clear   PHUR 6.0   SPECGRAV 1.015   PROTEINUA Negative   GLUCUA Negative   KETONESU Negative   BILIRUBINUA Negative   OCCULTUA Negative   NITRITE Negative   UROBILINOGEN Negative   LEUKOCYTESUR Negative   LIPIDS/LFTS:  Recent Labs   Lab 08/11/20  1132 07/16/23  0520   Cholesterol 179 276 H   Triglycerides 162 H 184 H   HDL 56 42   LDL Cholesterol 90.6 197.2 H   Non-HDL Cholesterol 123 234         Significant Imaging: I have reviewed all pertinent imaging results/findings within the past 24 hours.    Head  CT  Impression:     1. Findings concerning for left parietal lobe recent infarction.  No intracranial hemorrhage.  Further evaluation with MRI brain can be obtained as warranted.  2. Stable enlargement of the ventricles out of proportion to the degree of sulcal atrophy, nonspecific but can be seen with normal pressure hydrocephalus.  3. Involutional change with sequela of moderate to advanced chronic microvascular ischemic change and multifocal remote lacunar type infarcts.  This report was flagged in Epic as abnormal.        Electronically signed by: Tani Whitney MD  Date:                                            07/15/2023  Time:                                           17:18    Assessment and Plan:     76 y/o female with acute stroke    Left parietal stroke: acute infarction of mod size on left MCA branch. Pt is aphasia and with right hemiparesis.  MRI brain.  Pt is sevreal days out for which no thrombolytics or intervention are granted.  No need for permissive HTN.  ASA 81 mg daily. Clopidogrel 75 mg daily.  ST/OT/PT.  Palliative Care.    Active Diagnoses:    Diagnosis Date Noted POA    PRINCIPAL PROBLEM:  Ischemic stroke [I63.9] 07/15/2023 Unknown    NPH (normal pressure hydrocephalus) [G91.2] 12/10/2021 Yes    PAD (peripheral artery disease) [I73.9] 05/31/2013 Yes     Chronic    Mild recurrent major depression [F33.0] 07/14/2012 Yes     Chronic    Hyperlipidemia [E78.5] 07/14/2012 Yes     Chronic    Essential hypertension [I10] 07/14/2012 Yes     Chronic      Problems Resolved During this Admission:       VTE Risk Mitigation (From admission, onward)           Ordered     heparin (porcine) injection 5,000 Units  Every 8 hours         07/15/23 1903     IP VTE HIGH RISK PATIENT  Once         07/15/23 1903     Place sequential compression device  Until discontinued         07/15/23 1903                    Thank you for your consult. I will follow-up with patient. Please contact us if you have any additional  questions.    Alexis Powers MD  Neurology  [facility] - Telemetry

## 2023-07-16 NOTE — PROGRESS NOTES
[facility] Medicine  Progress Note    Patient Name: Sophia Landis  MRN: [id number]  Patient Class: IP- Inpatient   Admission Date: 7/15/2023  Length of Stay: 1 days  Attending Physician: Himanshu Busby MD  Primary Care Provider: Sarthak Johnson MD        Subjective:     Principal Problem:Ischemic stroke        HPI:  This is a 77-year-old female with a past medical history of CVA, NPH, hypertension, hyperlipidemia, peripheral artery disease, ANDRES on CPAP who presents with altered mental status.    Per the family, the patient has been declining over the last 2 weeks.  She was noted to be more confused, refusing to take her medications, had decreased p.o. intake associated with choking along with being less responsive and less conversational.  Her  noted that her left leg was more weak.  She has a history of stroke, a urine half ago after which she became wheelchair dependent and requires full assistance with her ADLs.    In the ED, the patient was hypertensive otherwise hemodynamically stable.  Labs were unremarkable.  CT head showed findings concerning for left parietal lobe recent infarction, stable enlargement of the ventricles out of proportion to the degree of sulcal atrophy (nonspecific but can be seen with normal pressure hydrocephalus) and involutional change with sequela of moderate to advanced chronic microvascular ischemic change and multifocal remote lacunar type infarcts.  Patient was given aspirin 300 mg, hydralazine 10 mg IV, 20 mg IV, and nitro paste.  She was admitted for further management.      Overview/Hospital Course:  No notes on file    Interval History: still not talking, moving left side but not right side. Not following commands or making eye contact    Review of Systems  Objective:     Vital Signs (Most Recent):  Temp: 97.4 °F (36.3 °C) (07/16/23 1117)  Pulse: 73 (07/16/23 1117)  Resp: 16 (07/16/23 1117)  BP: (!) 182/77 (07/16/23 1117)  SpO2: 95 % (07/16/23 1117)  Vital Signs (24h Range):  Temp:  [97.4 °F (36.3 °C)-98.3 °F (36.8 °C)] 97.4 °F (36.3 °C)  Pulse:  [64-95] 73  Resp:  [16-20] 16  SpO2:  [94 %-98 %] 95 %  BP: (155-216)/(64-87) 182/77     Weight: 48.2 kg (106 lb 4.2 oz)  Body mass index is 21.46 kg/m².  No intake or output data in the 24 hours ending 07/16/23 1200      Physical Exam  Vitals and nursing note reviewed.   Constitutional:       General: She is not in acute distress.     Appearance: She is not ill-appearing.   Cardiovascular:      Rate and Rhythm: Normal rate.      Pulses: Normal pulses.      Heart sounds: No murmur heard.  Pulmonary:      Effort: Pulmonary effort is normal. No respiratory distress.   Abdominal:      General: Abdomen is flat.      Palpations: Abdomen is soft.      Tenderness: There is no abdominal tenderness.   Musculoskeletal:      Right lower leg: No edema.      Left lower leg: No edema.   Skin:     General: Skin is warm.   Neurological:      Mental Status: She is alert.      Comments: Exam limited. Not speaking. Not following commands. Does move head, eyes and left side spontaneously, right side not as much.            Significant Labs: All pertinent labs within the past 24 hours have been reviewed.    Significant Imaging: I have reviewed all pertinent imaging results/findings within the past 24 hours.      Assessment/Plan:      * Ischemic stroke  Antithrombotics for secondary stroke prevention: Antiplatelets: Aspirin: 81 mg daily    Statins for secondary stroke prevention and hyperlipidemia, if present:   Statins: Atorvastatin- 40 mg daily    Aggressive risk factor modification: HTN, HLD, Diet     Rehab efforts: The patient has been evaluated by a stroke team provider and the therapy needs have been fully considered based off the presenting complaints and exam findings. The following therapy evaluations are needed: PT evaluate and treat, OT evaluate and treat, SLP evaluate and treat    Diagnostics ordered/pending: per neurology    VTE  prophylaxis: Heparin 5000 units SQ every 8 hours    BP parameters: Infarct: No intervention, SBP <220      - Unfortunately, not safe for PO intake at this time. Palliative care consulted. Family would like to pursue PEG placement if no improvement. Also discussed possible rehab placement.    NPH (normal pressure hydrocephalus)  Appears stable on CT. - CT head showed findings concerning for left parietal lobe recent infarction, stable enlargement of the ventricles out of proportion to the degree of sulcal atrophy (nonspecific but can be seen with normal pressure hydrocephalus) and involutional change with sequela of moderate to advanced chronic microvascular ischemic change and multifocal remote lacunar type infarcts.  Neurology consult     PAD (peripheral artery disease)  History noted. No acute issues.       Mild recurrent major depression  Resume zoloft      Hyperlipidemia  Resume statin      Essential hypertension  Allow for permissive hypertension. Monitor BP        VTE Risk Mitigation (From admission, onward)         Ordered     heparin (porcine) injection 5,000 Units  Every 8 hours         07/15/23 1903     IP VTE HIGH RISK PATIENT  Once         07/15/23 1903     Place sequential compression device  Until discontinued         07/15/23 1903                Discharge Planning   DANYA:      Code Status: Full Code   Is the patient medically ready for discharge?:     Reason for patient still in hospital (select all that apply): Treatment  Discharge Plan A: Home with family, Home Health (Previously with [facility]; palliatative consult?)                  Himanshu Busby MD  Department of Hospital Medicine   [facility]

## 2023-07-16 NOTE — ASSESSMENT & PLAN NOTE
Antithrombotics for secondary stroke prevention: Antiplatelets: Aspirin: 81 mg daily    Statins for secondary stroke prevention and hyperlipidemia, if present:   Statins: Atorvastatin- 40 mg daily    Aggressive risk factor modification: HTN, HLD, Diet     Rehab efforts: The patient has been evaluated by a stroke team provider and the therapy needs have been fully considered based off the presenting complaints and exam findings. The following therapy evaluations are needed: PT evaluate and treat, OT evaluate and treat, SLP evaluate and treat    Diagnostics ordered/pending: per neurology    VTE prophylaxis: Heparin 5000 units SQ every 8 hours    BP parameters: Infarct: No intervention, SBP <220

## 2023-07-16 NOTE — ASSESSMENT & PLAN NOTE
Antithrombotics for secondary stroke prevention: Antiplatelets: Aspirin: 81 mg daily    Statins for secondary stroke prevention and hyperlipidemia, if present:   Statins: Atorvastatin- 40 mg daily    Aggressive risk factor modification: HTN, HLD, Diet     Rehab efforts: The patient has been evaluated by a stroke team provider and the therapy needs have been fully considered based off the presenting complaints and exam findings. The following therapy evaluations are needed: PT evaluate and treat, OT evaluate and treat, SLP evaluate and treat    Diagnostics ordered/pending: per neurology    VTE prophylaxis: Heparin 5000 units SQ every 8 hours    BP parameters: Infarct: No intervention, SBP <220      - Unfortunately, not safe for PO intake at this time. Palliative care consulted. Family would like to pursue PEG placement if no improvement. Also discussed possible rehab placement.

## 2023-07-16 NOTE — SUBJECTIVE & OBJECTIVE
Interval History: still not talking, moving left side but not right side. Not following commands or making eye contact    Review of Systems  Objective:     Vital Signs (Most Recent):  Temp: 97.4 °F (36.3 °C) (07/16/23 1117)  Pulse: 73 (07/16/23 1117)  Resp: 16 (07/16/23 1117)  BP: (!) 182/77 (07/16/23 1117)  SpO2: 95 % (07/16/23 1117) Vital Signs (24h Range):  Temp:  [97.4 °F (36.3 °C)-98.3 °F (36.8 °C)] 97.4 °F (36.3 °C)  Pulse:  [64-95] 73  Resp:  [16-20] 16  SpO2:  [94 %-98 %] 95 %  BP: (155-216)/(64-87) 182/77     Weight: 48.2 kg (106 lb 4.2 oz)  Body mass index is 21.46 kg/m².  No intake or output data in the 24 hours ending 07/16/23 1200      Physical Exam  Vitals and nursing note reviewed.   Constitutional:       General: She is not in acute distress.     Appearance: She is not ill-appearing.   Cardiovascular:      Rate and Rhythm: Normal rate.      Pulses: Normal pulses.      Heart sounds: No murmur heard.  Pulmonary:      Effort: Pulmonary effort is normal. No respiratory distress.   Abdominal:      General: Abdomen is flat.      Palpations: Abdomen is soft.      Tenderness: There is no abdominal tenderness.   Musculoskeletal:      Right lower leg: No edema.      Left lower leg: No edema.   Skin:     General: Skin is warm.   Neurological:      Mental Status: She is alert.      Comments: Exam limited. Not speaking. Not following commands. Does move head, eyes and left side spontaneously, right side not as much.            Significant Labs: All pertinent labs within the past 24 hours have been reviewed.    Significant Imaging: I have reviewed all pertinent imaging results/findings within the past 24 hours.

## 2023-07-16 NOTE — PLAN OF CARE
Case Management Assessment     PCP: Sarthak Johnson M  Pharmacy: [facility]  Patient Arrived From: Home  Existing Help at Home: Clark, spouse provides total care  Barriers to Discharge: None    Discharge Plan:    A. Home with spouse; may need HH; possible palliative consult   B. TBD  Patient requires total assist at home with spouse, who provides total assist; no current services; uses wheelchair; had [facility] but was discontinued. May need HH or palliative consult; spouse reported patient is getting worse and not wanting to eat.     07/16/23 0915   Discharge Assessment   Assessment Type Discharge Planning Assessment   Confirmed/corrected address, phone number and insurance Yes   Confirmed Demographics Correct on Facesheet   Source of Information family;health record   If unable to respond/provide information was family/caregiver contacted? Yes   Contact Name/Number Clark-spouse: [phone]; [phone]   Communicated DANYA with patient/caregiver Date not available/Unable to determine   Reason For Admission AMS   People in Home spouse   Facility Arrived From: Home   Do you expect to return to your current living situation? Yes   Do you have help at home or someone to help you manage your care at home? Yes   Who are your caregiver(s) and their phone number(s)? Clark-spouse: [phone]   Prior to hospitilization cognitive status: Alert/Oriented   Current cognitive status: Unable to Assess   Walking or Climbing Stairs ambulation difficulty, requires equipment   Mobility Management WC   Dressing/Bathing bathing difficulty, dependent;dressing difficulty, dependent   Dressing/Bathing Management Spouse provides care   Do you have any problems with: Errands/Grocery;Needs other help   Specify other help Spouse provides total care; transport   Home Accessibility wheelchair accessible   Home Layout Able to live on 1st floor   Equipment Currently Used at Home wheelchair   Readmission within 30 days? No    Patient currently being followed by outpatient case management? No   Do you currently have service(s) that help you manage your care at home? No   Do you take prescription medications? Yes   Do you have prescription coverage? Yes   Coverage [employer]   Do you have any problems affording any of your prescribed medications? No   Is the patient taking medications as prescribed? yes   Who is going to help you get home at discharge? Clark-spouse   How do you get to doctors appointments? family or friend will provide   Are you on dialysis? No   Do you take coumadin? No   Discharge Plan A Home with family;Home Health  (Previously with [facility] ; palliatative consult?)   Discharge Plan B Other  (TBD)   DME Needed Upon Discharge  other (see comments)  (TBD)   Discharge Plan discussed with: Spouse/sig other   Name(s) and Number(s) Clark-spouse: [phone]   Transition of Care Barriers None     SW Role explained to patient; two patient identifiers recognized; SW contact information placed on Communication board. Discussed patient managing health care at home and discussed discharge plans A and B; determined who would be helping patient at home with recovery: Clark, spouse, provides total care and will help with recovery at home.

## 2023-07-16 NOTE — PLAN OF CARE
Problem: Physical Therapy  Goal: Physical Therapy Goal  Description: Goals to be met by:  2023    Patient will increase functional independence with mobility by performin. Supine to sit with Modified [place]  2. Sit to supine with Modified [place]  3. Sit to stand transfer with Modified [place]  4. Gait  x 300 feet with Modified [place] using Rolling Walker.    Recommend: SNF at time of discharge to home with family.         Outcome: Ongoing, Progressing

## 2023-07-16 NOTE — HPI
This is a 77-year-old female with a past medical history of CVA, NPH, hypertension, hyperlipidemia, peripheral artery disease, ANDRES on CPAP who presents with altered mental status.    Per the family, the patient has been declining over the last 2 weeks.  She was noted to be more confused, refusing to take her medications, had decreased p.o. intake associated with choking along with being less responsive and less conversational.  Her  noted that her left leg was more weak.  She has a history of stroke, a urine half ago after which she became wheelchair dependent and requires full assistance with her ADLs.    In the ED, the patient was hypertensive otherwise hemodynamically stable.  Labs were unremarkable.  CT head showed findings concerning for left parietal lobe recent infarction, stable enlargement of the ventricles out of proportion to the degree of sulcal atrophy (nonspecific but can be seen with normal pressure hydrocephalus) and involutional change with sequela of moderate to advanced chronic microvascular ischemic change and multifocal remote lacunar type infarcts.  Patient was given aspirin 300 mg, hydralazine 10 mg IV, 20 mg IV, and nitro paste.  She was admitted for further management.

## 2023-07-16 NOTE — SUBJECTIVE & OBJECTIVE
Past Medical History:   Diagnosis Date    Cataract     Chronic diarrhea 11/26/2018    Depression     GERD (gastroesophageal reflux disease)     History of colonic polyps 11/26/2018    Hyperlipidemia     Hypertension     Menopausal syndrome     PAD (peripheral artery disease)     s/p stent    Stroke        Past Surgical History:   Procedure Laterality Date    APPENDECTOMY      COLONOSCOPY N/A 5/20/2019    Procedure: COLONOSCOPY;  Surgeon: Kenia Arias MD;  Location: [facility] ENDO;  Service: Endoscopy;  Laterality: N/A;  RX PLETAL ok to hold    ESOPHAGOGASTRODUODENOSCOPY N/A 5/26/2023    Procedure: EGD (ESOPHAGOGASTRODUODENOSCOPY);  Surgeon: Debbie Cooper MD;  Location: [facility] ENDO;  Service: Endoscopy;  Laterality: N/A;    HYSTERECTOMY      MONTY with BSO    ILIAC ARTERY STENT Bilateral        Review of patient's allergies indicates:   Allergen Reactions    Antihistamines - alkylamine Nausea Only    Ciprofloxacin Nausea Only    Penicillin     Penicillins Hives       No current facility-administered medications on file prior to encounter.     Current Outpatient Medications on File Prior to Encounter   Medication Sig    aspirin (ECOTRIN) 81 MG EC tablet TAKE 1 TABLET EVERY DAY    hydroCHLOROthiazide (HYDRODIURIL) 25 MG tablet TAKE 1 TABLET EVERY DAY    metoprolol succinate (TOPROL-XL) 50 MG 24 hr tablet TAKE 1 TABLET EVERY DAY    nitroGLYCERIN (NITROSTAT) 0.4 MG SL tablet PLACE 1 TABLET UNDER THE TONGUE EVERY 5 MINUTES AS NEEDED FOR CHEST PAIN.    omeprazole (PRILOSEC) 20 MG capsule TAKE 2 CAPSULES ONE TIME DAILY    pravastatin (PRAVACHOL) 80 MG tablet TAKE 1 TABLET EVERY DAY    sertraline (ZOLOFT) 100 MG tablet TAKE 1 TABLET EVERY DAY     Family History       Problem Relation (Age of Onset)    Amblyopia Mother    Cancer Father, Brother, Maternal Uncle, Maternal Uncle, Maternal Uncle, Maternal Uncle    Cataracts Mother    Diabetes Maternal Aunt, Maternal Uncle    Hypertension Mother    Strabismus Sister    Thyroid disease  Mother          Tobacco Use    Smoking status: Former     Types: Cigarettes     Quit date:      Years since quittin.5    Smokeless tobacco: Never    Tobacco comments:     .  Retired  at [employer]   Substance and Sexual Activity    Alcohol use: No    Drug use: No    Sexual activity: Not Currently     Partners: Male     Comment:      Review of Systems   Unable to perform ROS: Mental status change   Objective:     Vital Signs (Most Recent):  Temp: 98.1 °F (36.7 °C) (07/15/23 1514)  Pulse: 85 (07/15/23 1842)  Resp: 20 (07/15/23 1819)  BP: (!) 179/74 (07/15/23 1848)  SpO2: 95 % (07/15/23 1842) Vital Signs (24h Range):  Temp:  [98.1 °F (36.7 °C)] 98.1 °F (36.7 °C)  Pulse:  [64-85] 85  Resp:  [16-20] 20  SpO2:  [95 %-96 %] 95 %  BP: (171-208)/(70-87) 179/74     Weight: 49.9 kg (110 lb)  Body mass index is 22.22 kg/m².     Physical Exam  Vitals and nursing note reviewed.   Constitutional:       General: She is not in acute distress.     Appearance: Normal appearance. She is not ill-appearing.   HENT:      Head: Normocephalic and atraumatic.      Nose: Nose normal.      Mouth/Throat:      Mouth: Mucous membranes are moist.   Eyes:      Extraocular Movements: Extraocular movements intact.   Cardiovascular:      Rate and Rhythm: Normal rate.      Pulses: Normal pulses.      Heart sounds: No murmur heard.  Pulmonary:      Effort: Pulmonary effort is normal. No respiratory distress.   Abdominal:      General: Abdomen is flat.      Palpations: Abdomen is soft.      Tenderness: There is no abdominal tenderness.   Musculoskeletal:      Right lower leg: No edema.      Left lower leg: No edema.   Skin:     General: Skin is warm.      Capillary Refill: Capillary refill takes less than 2 seconds.   Neurological:      Mental Status: She is alert.      Comments: A&Ox0. Able to move extremities but does not reliably follow commands. Exam is limited.               Significant Labs: All pertinent  labs within the past 24 hours have been reviewed.    Significant Imaging: I have reviewed all pertinent imaging results/findings within the past 24 hours.

## 2023-07-16 NOTE — ASSESSMENT & PLAN NOTE
Appears stable on CT. - CT head showed findings concerning for left parietal lobe recent infarction, stable enlargement of the ventricles out of proportion to the degree of sulcal atrophy (nonspecific but can be seen with normal pressure hydrocephalus) and involutional change with sequela of moderate to advanced chronic microvascular ischemic change and multifocal remote lacunar type infarcts.  Neurology consult

## 2023-07-16 NOTE — PT/OT/SLP EVAL
Physical Therapy Evaluation    Patient Name:  Sophia Landis   MRN:  [id number]    Recommendations:     Discharge Recommendations: nursing facility, skilled   Discharge Equipment Recommendations: bath bench   Barriers to discharge: None    Assessment:     Sophia Landis is a 77 y.o. female admitted with a medical diagnosis of Ischemic stroke.  She presents with the following impairments/functional limitations: weakness, impaired endurance, impaired functional mobility, impaired balance, gait instability, decreased lower extremity function, decreased safety awareness, pain.    Rehab Prognosis: Good; patient would benefit from acute skilled PT services to address these deficits and reach maximum level of function.    Recent Surgery: * No surgery found *      Plan:     During this hospitalization, patient to be seen 5 x/week to address the identified rehab impairments via gait training, therapeutic activities, therapeutic exercises and progress toward the following goals:    Plan of Care Expires:  07/30/23    Subjective     Chief Complaint: None stated   Patient/Family Comments/goals: to return to PLOF  Pain/Comfort:  Pain Rating 1: 0/10    Patients cultural, spiritual, Anabaptist conflicts given the current situation:      Living Environment:  Pt lives with spouse in a [facility] with ramp.   Prior to admission, patients level of function was Mod I.  Equipment used at home: wheelchair, cane, straight, hospital bed, walker, rolling.  DME owned (not currently used): none.  Upon discharge, patient will have assistance from Facility Staff and Spouse.    Objective:     Communicated with Nurse prior to session.  Patient found supine with bed alarm, peripheral IV, PureWick, telemetry  upon PT entry to room.    General Precautions: Standard, fall  Orthopedic Precautions:Full weight bearing   Braces: N/A  Respiratory Status: Room air    Exams:  Cognitive Exam:  Patient is oriented to Person  Gross Motor Coordination:  Limited due to  2nd Stroke R>L  Postural Exam:  Patient presented with the following abnormalities:    -       Rounded shoulders  -       Forward head  Skin Integrity/Edema:      -       Skin integrity: Visible skin intact  -       Edema: None noted    RLE ROM: Deficits: Knee AROM = 75%  RLE Strength: Deficits: Quad = 3+/5 (grossly observed with ambulation)  LLE ROM: Deficits: Knee AROM = 75%  LLE Strength: Deficits: Quad = 3+/5 (grossly observed with ambulation)    Functional Mobility:  Bed Mobility:     Supine to Sit: moderate assistance  Sit to Supine: moderate assistance  Transfers:     Sit to Stand:  moderate assistance with hand-held assist  Gait: 75' with Mod A (HHA) with Weight Shifting TC's to initiate    AM-PAC 6 CLICK MOBILITY  Total Score:11     Patient left supine with all lines intact and call button in reach.    GOALS:   Multidisciplinary Problems       Physical Therapy Goals          Problem: Physical Therapy    Goal Priority Disciplines Outcome Goal Variances Interventions   Physical Therapy Goal     PT, PT/OT Ongoing, Progressing     Description: Goals to be met by:  2023    Patient will increase functional independence with mobility by performin. Supine to sit with Modified [place]  2. Sit to supine with Modified [place]  3. Sit to stand transfer with Modified [place]  4. Gait  x 300 feet with Modified [place] using Rolling Walker.    Recommend: SNF at time of discharge to home with family.                              History:     Past Medical History:   Diagnosis Date    Cataract     Chronic diarrhea 2018    Depression     GERD (gastroesophageal reflux disease)     History of colonic polyps 2018    Hyperlipidemia     Hypertension     Menopausal syndrome     PAD (peripheral artery disease)     s/p stent    Stroke        Past Surgical History:   Procedure Laterality Date    APPENDECTOMY      COLONOSCOPY N/A 2019    Procedure: COLONOSCOPY;  Surgeon: Kenia Arias  MD;  Location: [facility] ENDO;  Service: Endoscopy;  Laterality: N/A;  RX PLETAL ok to hold    ESOPHAGOGASTRODUODENOSCOPY N/A 5/26/2023    Procedure: EGD (ESOPHAGOGASTRODUODENOSCOPY);  Surgeon: Debbie Cooper MD;  Location: [facility];  Service: Endoscopy;  Laterality: N/A;    HYSTERECTOMY      MONTY with BSO    ILIAC ARTERY STENT Bilateral        Time Tracking:     PT Received On: 07/16/23  PT Start Time: 0900     PT Stop Time: 0930  PT Total Time (min): 30 min     Billable Minutes: Evaluation 30 min    Amish Muñoz, PT  07/16/2023

## 2023-07-16 NOTE — H&P
[facility]  [facility]  History & Physical    Patient Name: Sophia Landis  MRN: [id number]  Patient Class: IP- Inpatient  Admission Date: 7/15/2023  Attending Physician: Najma Marcano MD   Primary Care Provider: Sarthak Johnson MD         Patient information was obtained from spouse/SO, relative(s) and ER records.     Subjective:     Principal Problem:Ischemic stroke    Chief Complaint:   Chief Complaint   Patient presents with    Altered Mental Status     Pt reports to the ED with C/O dysuria and AMS x 1 month. Pt was Dx with a UTI last month. Pt did not finish AEB. Pt having more frequent urination. Pt has been more lethargic and less verbal then normal. Pt has a Pmhx of a CVA. No new neuro deficits. Pt placed in [facility] bed for eval.         HPI: This is a 77-year-old female with a past medical history of CVA, NPH, hypertension, hyperlipidemia, peripheral artery disease, ANDRES on CPAP who presents with altered mental status.    Per the family, the patient has been declining over the last 2 weeks.  She was noted to be more confused, refusing to take her medications, had decreased p.o. intake associated with choking along with being less responsive and less conversational.  Her  noted that her left leg was more weak.  She has a history of stroke, a urine half ago after which she became wheelchair dependent and requires full assistance with her ADLs.    In the ED, the patient was hypertensive otherwise hemodynamically stable.  Labs were unremarkable.  CT head showed findings concerning for left parietal lobe recent infarction, stable enlargement of the ventricles out of proportion to the degree of sulcal atrophy (nonspecific but can be seen with normal pressure hydrocephalus) and involutional change with sequela of moderate to advanced chronic microvascular ischemic change and multifocal remote lacunar type infarcts.  Patient was given aspirin 300 mg, hydralazine 10 mg IV, 20 mg  IV, and nitro paste.  She was admitted for further management.      Past Medical History:   Diagnosis Date    Cataract     Chronic diarrhea 11/26/2018    Depression     GERD (gastroesophageal reflux disease)     History of colonic polyps 11/26/2018    Hyperlipidemia     Hypertension     Menopausal syndrome     PAD (peripheral artery disease)     s/p stent    Stroke        Past Surgical History:   Procedure Laterality Date    APPENDECTOMY      COLONOSCOPY N/A 5/20/2019    Procedure: COLONOSCOPY;  Surgeon: Kenia Arias MD;  Location: [facility] ENDO;  Service: Endoscopy;  Laterality: N/A;  RX PLETAL ok to hold    ESOPHAGOGASTRODUODENOSCOPY N/A 5/26/2023    Procedure: EGD (ESOPHAGOGASTRODUODENOSCOPY);  Surgeon: Debbie Cooper MD;  Location: [facility] ENDO;  Service: Endoscopy;  Laterality: N/A;    HYSTERECTOMY      MONTY with BSO    ILIAC ARTERY STENT Bilateral        Review of patient's allergies indicates:   Allergen Reactions    Antihistamines - alkylamine Nausea Only    Ciprofloxacin Nausea Only    Penicillin     Penicillins Hives       No current facility-administered medications on file prior to encounter.     Current Outpatient Medications on File Prior to Encounter   Medication Sig    aspirin (ECOTRIN) 81 MG EC tablet TAKE 1 TABLET EVERY DAY    hydroCHLOROthiazide (HYDRODIURIL) 25 MG tablet TAKE 1 TABLET EVERY DAY    metoprolol succinate (TOPROL-XL) 50 MG 24 hr tablet TAKE 1 TABLET EVERY DAY    nitroGLYCERIN (NITROSTAT) 0.4 MG SL tablet PLACE 1 TABLET UNDER THE TONGUE EVERY 5 MINUTES AS NEEDED FOR CHEST PAIN.    omeprazole (PRILOSEC) 20 MG capsule TAKE 2 CAPSULES ONE TIME DAILY    pravastatin (PRAVACHOL) 80 MG tablet TAKE 1 TABLET EVERY DAY    sertraline (ZOLOFT) 100 MG tablet TAKE 1 TABLET EVERY DAY     Family History       Problem Relation (Age of Onset)    Amblyopia Mother    Cancer Father, Brother, Maternal Uncle, Maternal Uncle, Maternal Uncle, Maternal Uncle    Cataracts Mother     Diabetes Maternal Aunt, Maternal Uncle    Hypertension Mother    Strabismus Sister    Thyroid disease Mother          Tobacco Use    Smoking status: Former     Types: Cigarettes     Quit date:      Years since quittin.5    Smokeless tobacco: Never    Tobacco comments:     .  Retired  at [employer].   Substance and Sexual Activity    Alcohol use: No    Drug use: No    Sexual activity: Not Currently     Partners: Male     Comment:      Review of Systems   Unable to perform ROS: Mental status change   Objective:     Vital Signs (Most Recent):  Temp: 98.1 °F (36.7 °C) (07/15/23 1514)  Pulse: 85 (07/15/23 1842)  Resp: 20 (07/15/23 1819)  BP: (!) 179/74 (07/15/23 1848)  SpO2: 95 % (07/15/23 184) Vital Signs (24h Range):  Temp:  [98.1 °F (36.7 °C)] 98.1 °F (36.7 °C)  Pulse:  [64-85] 85  Resp:  [16-20] 20  SpO2:  [95 %-96 %] 95 %  BP: (171-208)/(70-87) 179/74     Weight: 49.9 kg (110 lb)  Body mass index is 22.22 kg/m².     Physical Exam  Vitals and nursing note reviewed.   Constitutional:       General: She is not in acute distress.     Appearance: Normal appearance. She is not ill-appearing.   HENT:      Head: Normocephalic and atraumatic.      Nose: Nose normal.      Mouth/Throat:      Mouth: Mucous membranes are moist.   Eyes:      Extraocular Movements: Extraocular movements intact.   Cardiovascular:      Rate and Rhythm: Normal rate.      Pulses: Normal pulses.      Heart sounds: No murmur heard.  Pulmonary:      Effort: Pulmonary effort is normal. No respiratory distress.   Abdominal:      General: Abdomen is flat.      Palpations: Abdomen is soft.      Tenderness: There is no abdominal tenderness.   Musculoskeletal:      Right lower leg: No edema.      Left lower leg: No edema.   Skin:     General: Skin is warm.      Capillary Refill: Capillary refill takes less than 2 seconds.   Neurological:      Mental Status: She is alert.      Comments: A&Ox0. Able to move  extremities but does not reliably follow commands. Exam is limited.               Significant Labs: All pertinent labs within the past 24 hours have been reviewed.    Significant Imaging: I have reviewed all pertinent imaging results/findings within the past 24 hours.    Assessment/Plan:     * Ischemic stroke  Antithrombotics for secondary stroke prevention: Antiplatelets: Aspirin: 81 mg daily    Statins for secondary stroke prevention and hyperlipidemia, if present:   Statins: Atorvastatin- 40 mg daily    Aggressive risk factor modification: HTN, HLD, Diet     Rehab efforts: The patient has been evaluated by a stroke team provider and the therapy needs have been fully considered based off the presenting complaints and exam findings. The following therapy evaluations are needed: PT evaluate and treat, OT evaluate and treat, SLP evaluate and treat    Diagnostics ordered/pending: per neurology    VTE prophylaxis: Heparin 5000 units SQ every 8 hours    BP parameters: Infarct: No intervention, SBP <220        NPH (normal pressure hydrocephalus)  Appears stable on CT. - CT head showed findings concerning for left parietal lobe recent infarction, stable enlargement of the ventricles out of proportion to the degree of sulcal atrophy (nonspecific but can be seen with normal pressure hydrocephalus) and involutional change with sequela of moderate to advanced chronic microvascular ischemic change and multifocal remote lacunar type infarcts.  Neurology consult     PAD (peripheral artery disease)  History noted. No acute issues.       Mild recurrent major depression  Resume zoloft      Hyperlipidemia  Resume statin      Essential hypertension  Allow for permissive hypertension. Monitor BP        VTE Risk Mitigation (From admission, onward)         Ordered     heparin (porcine) injection 5,000 Units  Every 8 hours         07/15/23 1903     IP VTE HIGH RISK PATIENT  Once         07/15/23 1903     Place sequential compression  device  Until discontinued         07/15/23 2353                  Dave Victor MD  Department of Hospital Medicine  [facility] - Emergency Dept

## 2023-07-16 NOTE — PT/OT/SLP EVAL
"Speech Language Pathology Evaluation  Bedside Swallow    Patient Name:  Sophia Landis   MRN:  [id number]  Admitting Diagnosis: Ischemic stroke    Recommendations:                 General Recommendations:  Cognitive-linguistic evaluation and continued BSE  Diet recommendations:  NPO, NPO, Other (Comment) (IV liquids)   Aspiration Precautions:  NPO    General Precautions: Standard, NPO  Communication strategies:  none    History:     Past Medical History:   Diagnosis Date    Cataract     Chronic diarrhea 11/26/2018    Depression     GERD (gastroesophageal reflux disease)     History of colonic polyps 11/26/2018    Hyperlipidemia     Hypertension     Menopausal syndrome     PAD (peripheral artery disease)     s/p stent    Stroke        Past Surgical History:   Procedure Laterality Date    APPENDECTOMY      COLONOSCOPY N/A 5/20/2019    Procedure: COLONOSCOPY;  Surgeon: Kenia Arias MD;  Location: [facility] ENDO;  Service: Endoscopy;  Laterality: N/A;  RX PLETAL ok to hold    ESOPHAGOGASTRODUODENOSCOPY N/A 5/26/2023    Procedure: EGD (ESOPHAGOGASTRODUODENOSCOPY);  Surgeon: Debbie Cooper MD;  Location: [facility] ENDO;  Service: Endoscopy;  Laterality: N/A;    HYSTERECTOMY      MONTY with BSO    ILIAC ARTERY STENT Bilateral        Social History: Patient lives at home with her  of 54 years. Her children come by to assist.    Prior Intubation HX:  n/a    Modified Barium Swallow: No prior MBSS in EMR    Chest X-Rays: 7/15/23 "The lungs are free of lobar consolidation and alveolar edema and similar to the previous examination, with normal appearance of pulmonary vasculature and no large pleural effusion or pneumothorax.  Left hemidiaphragm remains indistinct laterally, unchanged.     The cardiac silhouette is normal in size. The hilar and mediastinal contours are unremarkable.  There are atherosclerotic calcifications of the thoracic aorta.     Visualized osseous structures are stable." Per Dr. Marroquin    Prior diet: " NPO.    Occupation/hobbies/homemaking: Pt enjoys country music per her . She previously worked as a .    Subjective     Pt was awake in bed with her , son and daughter-in-law at bedside. Pt's  stated she likely won't participate in swallow study due to her current state.     Patient goals: Pt did not state current goals     Pain/Comfort:  Pain Rating 1: 0/10    Respiratory Status: Room air    Objective:     Oral Musculature Evaluation  Oral Musculature: unable to assess due to poor participation/comprehension  Dentition: teeth in poor condition (per pt's , she would not participate in oral care for the past few months)  Secretion Management: adequate  Mucosal Quality: other (see comments) (unable to assess d/t pt not opening mouth)  Mandibular Strength and Mobility:  (unable to asses d/t pt not opening mouth)    Bedside Swallow Eval:   Consistencies Assessed:  Thin liquids attempted straw sip       Oral Phase:   Unable to assess    Pharyngeal Phase:   Unable to assess    Compensatory Strategies  None    Attempted straw sips of thin liquids, however, pt did not open mouth or demonstrate any acknowledgment of cup of water. Pt was determined to be not safe for anything by mouth at this time due to her current status.     Cog-Ling Screen:   Attempted to complete a cognitive-linguistic assessment, however, pt did not respond to clinician's verbalizations. Pt did not respond to her name being called. SLP played pt's favorite music and she did not demonstrate awareness that music was on. SLP wrote the alphabet on a piece of paper and asked patient to point to S, patient acknowledged paper and moved hand in direction of paper, however, she then looked away and did not acknowledge paper again.         Goals:   Multidisciplinary Problems       SLP Goals          Problem: SLP    Goal Priority Disciplines Outcome   SLP Goal     SLP Ongoing, Progressing   Description: 1. Ms. Landis will  participate in bedside swallow evaluation to determine safest and least restrictive diet.   2. Ms. Landis will participate in cognitive-linguistic assessment.                        Assessment:     Sophia Landis is a 77 y.o. female. Pt is currently non-verbal, with minimal attempts at 1 word per . Pt was unable to participate in a cognitive-linguistic assessment or bedside swallow evaluation due to her current status. Speech will continue to attempt to re-assess.   Pt would benefit from IV liquids at this time. It is recommended that palliative care begin discussions with pt's  regarding patient goals of care.       Plan:     Patient to be seen:  3 x/week   Plan of Care expires:     Plan of Care reviewed with:  spouse, son, other (see comments) (daughter in law)   SLP Follow-Up:  Yes       Discharge recommendations:      Barriers to Discharge:  Level of Skilled Assistance Needed      Time Tracking:     SLP Treatment Date:      Speech Start Time:  1020  Speech Stop Time:  1048     Speech Total Time (min):  28 min    Billable Minutes: Eval Swallow and Oral Function 18 and Self Care/Home Management Training 10    07/16/2023

## 2023-07-16 NOTE — NURSING
[facility]  Nurses Note -- 4 Eyes      7/16/2023       Skin assessed on: Q Shift      [x] No Pressure Injuries Present    []Prevention Measures Documented    [] Yes LDA  for Pressure Injury Previously documented     [] Yes New Pressure Injury Discovered   [] LDA for New Pressure Injury Added      Attending RN:  Mary Kay Stockton, RN     Second RN:  Melissa Toth

## 2023-07-16 NOTE — PLAN OF CARE
Attempted BSE and cognitive-linguistic assessment today, however, patient was unable to participate in either due to level of alertness. Pt did not open mouth in response to straw placement. Pt is not safe for anything by mouth at this time.

## 2023-07-17 PROBLEM — Z71.89 ACP (ADVANCE CARE PLANNING): Status: ACTIVE | Noted: 2023-07-17

## 2023-07-17 LAB
ALBUMIN SERPL BCP-MCNC: 3.2 G/DL (ref 3.5–5.2)
ALP SERPL-CCNC: 62 U/L (ref 55–135)
ALT SERPL W/O P-5'-P-CCNC: 13 U/L (ref 10–44)
ANION GAP SERPL CALC-SCNC: 9 MMOL/L (ref 8–16)
ASCENDING AORTA: 2.87 CM
AST SERPL-CCNC: 19 U/L (ref 10–40)
AV INDEX (PROSTH): 0.75
AV MEAN GRADIENT: 3 MMHG
AV PEAK GRADIENT: 1 MMHG
AV REGURGITATION PRESSURE HALF TIME: 382.18 MS
AV VALVE AREA: 2.26 CM2
AV VELOCITY RATIO: 1.64
BASOPHILS # BLD AUTO: 0.02 K/UL (ref 0–0.2)
BASOPHILS NFR BLD: 0.4 % (ref 0–1.9)
BILIRUB SERPL-MCNC: 0.4 MG/DL (ref 0.1–1)
BSA FOR ECHO PROCEDURE: 1.41 M2
BUN SERPL-MCNC: 14 MG/DL (ref 8–23)
CALCIUM SERPL-MCNC: 8.9 MG/DL (ref 8.7–10.5)
CHLORIDE SERPL-SCNC: 113 MMOL/L (ref 95–110)
CO2 SERPL-SCNC: 21 MMOL/L (ref 23–29)
CREAT SERPL-MCNC: 0.8 MG/DL (ref 0.5–1.4)
CV ECHO LV RWT: 0.52 CM
DIFFERENTIAL METHOD: ABNORMAL
DOP CALC AO PEAK VEL: 0.55 M/S
DOP CALC AO VTI: 27.1 CM
DOP CALC LVOT AREA: 3 CM2
DOP CALC LVOT DIAMETER: 1.96 CM
DOP CALC LVOT PEAK VEL: 0.9 M/S
DOP CALC LVOT STROKE VOLUME: 61.22 CM3
DOP CALC MV VTI: 16.9 CM
DOP CALCLVOT PEAK VEL VTI: 20.3 CM
E WAVE DECELERATION TIME: 249.06 MSEC
E/A RATIO: 0.61
E/E' RATIO: 15.56 M/S
ECHO LV POSTERIOR WALL: 0.9 CM (ref 0.6–1.1)
EJECTION FRACTION: 65 %
EOSINOPHIL # BLD AUTO: 0.1 K/UL (ref 0–0.5)
EOSINOPHIL NFR BLD: 2 % (ref 0–8)
ERYTHROCYTE [DISTWIDTH] IN BLOOD BY AUTOMATED COUNT: 15.1 % (ref 11.5–14.5)
EST. GFR  (NO RACE VARIABLE): >60 ML/MIN/1.73 M^2
FRACTIONAL SHORTENING: 31 % (ref 28–44)
GLUCOSE SERPL-MCNC: 120 MG/DL (ref 70–110)
HCT VFR BLD AUTO: 35.5 % (ref 37–48.5)
HGB BLD-MCNC: 11.3 G/DL (ref 12–16)
IMM GRANULOCYTES # BLD AUTO: 0.01 K/UL (ref 0–0.04)
IMM GRANULOCYTES NFR BLD AUTO: 0.2 % (ref 0–0.5)
INTERVENTRICULAR SEPTUM: 1.25 CM (ref 0.6–1.1)
IVC DIAMETER: 1.07 CM
LA MAJOR: 5.4 CM
LA MINOR: 2.82 CM
LA WIDTH: 3.3 CM
LEFT ATRIUM SIZE: 2.72 CM
LEFT ATRIUM VOLUME INDEX: 20 ML/M2
LEFT ATRIUM VOLUME: 28.27 CM3
LEFT INTERNAL DIMENSION IN SYSTOLE: 2.36 CM (ref 2.1–4)
LEFT VENTRICLE DIASTOLIC VOLUME INDEX: 34.7 ML/M2
LEFT VENTRICLE DIASTOLIC VOLUME: 48.93 ML
LEFT VENTRICLE MASS INDEX: 79 G/M2
LEFT VENTRICLE SYSTOLIC VOLUME INDEX: 13.8 ML/M2
LEFT VENTRICLE SYSTOLIC VOLUME: 19.43 ML
LEFT VENTRICULAR INTERNAL DIMENSION IN DIASTOLE: 3.44 CM (ref 3.5–6)
LEFT VENTRICULAR MASS: 112.06 G
LV LATERAL E/E' RATIO: 14 M/S
LV SEPTAL E/E' RATIO: 17.5 M/S
LVOT MG: 1.85 MMHG
LVOT MV: 0.64 CM/S
LYMPHOCYTES # BLD AUTO: 1.8 K/UL (ref 1–4.8)
LYMPHOCYTES NFR BLD: 40.5 % (ref 18–48)
MCH RBC QN AUTO: 26.7 PG (ref 27–31)
MCHC RBC AUTO-ENTMCNC: 31.8 G/DL (ref 32–36)
MCV RBC AUTO: 84 FL (ref 82–98)
MONOCYTES # BLD AUTO: 0.5 K/UL (ref 0.3–1)
MONOCYTES NFR BLD: 10.2 % (ref 4–15)
MV MEAN GRADIENT: 1 MMHG
MV PEAK A VEL: 1.15 M/S
MV PEAK E VEL: 0.7 M/S
MV PEAK GRADIENT: 5 MMHG
MV STENOSIS PRESSURE HALF TIME: 71.19 MS
MV VALVE AREA BY CONTINUITY EQUATION: 3.62 CM2
MV VALVE AREA P 1/2 METHOD: 3.09 CM2
NEUTROPHILS # BLD AUTO: 2.1 K/UL (ref 1.8–7.7)
NEUTROPHILS NFR BLD: 46.7 % (ref 38–73)
NRBC BLD-RTO: 0 /100 WBC
PISA AR MAX VEL: 4.1 M/S
PISA TR MAX VEL: 2 M/S
PLATELET # BLD AUTO: 176 K/UL (ref 150–450)
PMV BLD AUTO: 9.4 FL (ref 9.2–12.9)
POTASSIUM SERPL-SCNC: 2.9 MMOL/L (ref 3.5–5.1)
PROT SERPL-MCNC: 6 G/DL (ref 6–8.4)
PULM VEIN S/D RATIO: 2.3
PV PEAK D VEL: 0.3 M/S
PV PEAK S VEL: 0.69 M/S
PV PEAK VELOCITY: 0.73 CM/S
RA MAJOR: 4.81 CM
RA PRESSURE: 3 MMHG
RA WIDTH: 1.93 CM
RBC # BLD AUTO: 4.24 M/UL (ref 4–5.4)
RIGHT VENTRICULAR END-DIASTOLIC DIMENSION: 2.8 CM
SINUS: 2.68 CM
SODIUM SERPL-SCNC: 143 MMOL/L (ref 136–145)
STJ: 2.3 CM
TDI LATERAL: 0.05 M/S
TDI SEPTAL: 0.04 M/S
TDI: 0.05 M/S
TR MAX PG: 16 MMHG
TRICUSPID ANNULAR PLANE SYSTOLIC EXCURSION: 1.99 CM
TV REST PULMONARY ARTERY PRESSURE: 19 MMHG
WBC # BLD AUTO: 4.52 K/UL (ref 3.9–12.7)

## 2023-07-17 PROCEDURE — 99497 PR ADVNCD CARE PLAN 30 MIN: ICD-10-PCS | Mod: HCNC,,, | Performed by: REGISTERED NURSE

## 2023-07-17 PROCEDURE — 99232 PR SUBSEQUENT HOSPITAL CARE,LEVL II: ICD-10-PCS | Mod: HCNC,,, | Performed by: PSYCHIATRY & NEUROLOGY

## 2023-07-17 PROCEDURE — 99233 SBSQ HOSP IP/OBS HIGH 50: CPT | Mod: HCNC,,, | Performed by: REGISTERED NURSE

## 2023-07-17 PROCEDURE — 99900035 HC TECH TIME PER 15 MIN (STAT): Mod: HCNC

## 2023-07-17 PROCEDURE — 21400001 HC TELEMETRY ROOM: Mod: HCNC

## 2023-07-17 PROCEDURE — G0316 PR PROLONG INPT EVAL EA ADDL 15 MIN: HCPCS | Mod: HCNC,,, | Performed by: REGISTERED NURSE

## 2023-07-17 PROCEDURE — 63600175 PHARM REV CODE 636 W HCPCS: Mod: HCNC | Performed by: STUDENT IN AN ORGANIZED HEALTH CARE EDUCATION/TRAINING PROGRAM

## 2023-07-17 PROCEDURE — G0316 PR PROLONG INPT EVAL EA ADDL 15 MIN: ICD-10-PCS | Mod: HCNC,,, | Performed by: REGISTERED NURSE

## 2023-07-17 PROCEDURE — 99233 PR SUBSEQUENT HOSPITAL CARE,LEVL III: ICD-10-PCS | Mod: HCNC,,, | Performed by: REGISTERED NURSE

## 2023-07-17 PROCEDURE — 36415 COLL VENOUS BLD VENIPUNCTURE: CPT | Mod: HCNC | Performed by: STUDENT IN AN ORGANIZED HEALTH CARE EDUCATION/TRAINING PROGRAM

## 2023-07-17 PROCEDURE — 99232 SBSQ HOSP IP/OBS MODERATE 35: CPT | Mod: HCNC,,, | Performed by: PSYCHIATRY & NEUROLOGY

## 2023-07-17 PROCEDURE — 92523 SPEECH SOUND LANG COMPREHEN: CPT | Mod: HCNC

## 2023-07-17 PROCEDURE — 99223 1ST HOSP IP/OBS HIGH 75: CPT | Mod: HCNC,,, | Performed by: NURSE PRACTITIONER

## 2023-07-17 PROCEDURE — 94761 N-INVAS EAR/PLS OXIMETRY MLT: CPT | Mod: HCNC

## 2023-07-17 PROCEDURE — 99497 ADVNCD CARE PLAN 30 MIN: CPT | Mod: HCNC,,, | Performed by: REGISTERED NURSE

## 2023-07-17 PROCEDURE — 99223 PR INITIAL HOSPITAL CARE,LEVL III: ICD-10-PCS | Mod: HCNC,,, | Performed by: NURSE PRACTITIONER

## 2023-07-17 PROCEDURE — 80053 COMPREHEN METABOLIC PANEL: CPT | Mod: HCNC | Performed by: STUDENT IN AN ORGANIZED HEALTH CARE EDUCATION/TRAINING PROGRAM

## 2023-07-17 PROCEDURE — 92526 ORAL FUNCTION THERAPY: CPT | Mod: HCNC

## 2023-07-17 PROCEDURE — 85025 COMPLETE CBC W/AUTO DIFF WBC: CPT | Mod: HCNC | Performed by: STUDENT IN AN ORGANIZED HEALTH CARE EDUCATION/TRAINING PROGRAM

## 2023-07-17 RX ORDER — POTASSIUM CHLORIDE 7.45 MG/ML
10 INJECTION INTRAVENOUS
Status: COMPLETED | OUTPATIENT
Start: 2023-07-17 | End: 2023-07-17

## 2023-07-17 RX ADMIN — HEPARIN SODIUM 5000 UNITS: 5000 INJECTION INTRAVENOUS; SUBCUTANEOUS at 01:07

## 2023-07-17 RX ADMIN — HEPARIN SODIUM 5000 UNITS: 5000 INJECTION INTRAVENOUS; SUBCUTANEOUS at 11:07

## 2023-07-17 RX ADMIN — DEXTROSE AND SODIUM CHLORIDE: 5; 900 INJECTION, SOLUTION INTRAVENOUS at 02:07

## 2023-07-17 RX ADMIN — HEPARIN SODIUM 5000 UNITS: 5000 INJECTION INTRAVENOUS; SUBCUTANEOUS at 06:07

## 2023-07-17 RX ADMIN — POTASSIUM CHLORIDE 10 MEQ: 7.46 INJECTION, SOLUTION INTRAVENOUS at 01:07

## 2023-07-17 RX ADMIN — POTASSIUM CHLORIDE 10 MEQ: 7.46 INJECTION, SOLUTION INTRAVENOUS at 11:07

## 2023-07-17 RX ADMIN — DEXTROSE AND SODIUM CHLORIDE: 5; 900 INJECTION, SOLUTION INTRAVENOUS at 12:07

## 2023-07-17 RX ADMIN — POTASSIUM CHLORIDE 10 MEQ: 7.46 INJECTION, SOLUTION INTRAVENOUS at 12:07

## 2023-07-17 RX ADMIN — POTASSIUM CHLORIDE 10 MEQ: 7.46 INJECTION, SOLUTION INTRAVENOUS at 10:07

## 2023-07-17 NOTE — HOSPITAL COURSE
76 y/o female presents with aphasia and right hemiparesis.  MRI showing large left parietal CVA.  Out of window for intervention. Neurology consulted. SLP/OT/OT consulted. Initially difficulty with swallowing and recommending NPO, possible will need alternative source of nutrition if no improvement. GI consulted in case PEG is needed. Palliative care consulted. ASA, Statin and Plavix. PT/OT consulted and recommending SNF.  ST following with some improvement and recommending pureed food, but patient with minimal oral intake. Discussions with family held about options.  PEG placement with SNF placement and see if any improvement of symptoms vs hospice care.  Patient's  and family have decided on hospice care.  SW/CM consulted. She will be discharged home with hospice care once arranged.

## 2023-07-17 NOTE — CONSULTS
[facility] Consultation Note    Reason for consult: Eval for PEG placement    PCP:   Sarthak Johnson       LOS: 2        Initial History of Present Illness (HPI):  This is a 77 y.o. female consulted to GI service for Eval for PEG placement. PMH CVA, NPH, hypertension, hyperlipidemia, peripheral artery disease, ANDRES on CPAP. Patient nonverbal, tracking with eyes. SLP at bedside admin swallow study, patient obeying commands. Family at bedside. SLP reports patient with acute onset of dysphagia with associated symptoms of ams and generalized weakness that began 5 days ago. Patient with hx of stroke ~1yr ago.     Admit labs k 2.8  Currently on scheduled heparin  CT- left parietal lobe recent infarction        Medical History:  has a past medical history of Cataract, Chronic diarrhea (11/26/2018), Depression, GERD (gastroesophageal reflux disease), History of colonic polyps (11/26/2018), Hyperlipidemia, Hypertension, Menopausal syndrome, PAD (peripheral artery disease), and Stroke.    Surgical History:  has a past surgical history that includes Appendectomy; Hysterectomy; Iliac artery stent (Bilateral); Colonoscopy (N/A, 5/20/2019); and Esophagogastroduodenoscopy (N/A, 5/26/2023).      Objective Findings:    Vital Signs:  Temp:  [97.3 °F (36.3 °C)-98.6 °F (37 °C)]   Pulse:  [63-77]   Resp:  [16-18]   BP: (101-158)/(53-66)   SpO2:  [94 %-97 %]   Body mass index is 21.46 kg/m².      Physical Exam  Vitals and nursing note reviewed.   HENT:      Head: Normocephalic.   Pulmonary:      Effort: Pulmonary effort is normal.   Abdominal:      General: Bowel sounds are normal.      Palpations: Abdomen is soft.   Skin:     General: Skin is warm and dry.   Neurological:      Mental Status: She is alert.      Comments: nonverbal   Psychiatric:         Behavior: Behavior normal.             Labs:  Lab Results   Component Value Date    WBC 4.52 07/17/2023    HGB 11.3 (L) 07/17/2023    HCT 35.5 (L) 07/17/2023      2023    CHOL 276 (H) 2023    TRIG 184 (H) 2023    HDL 42 2023    ALT 13 2023    AST 19 2023     2023    K 2.9 (L) 2023     (H) 2023    CREATININE 0.8 2023    BUN 14 2023    CO2 21 (L) 2023    TSH 2.822 2023    INR 1.1 2023    HGBA1C 4.8 2023             Imagin/15 CT head-Findings concerning for left parietal lobe recent infarction.  No intracranial hemorrhage. Stable enlargement of the ventricles out of proportion to the degree of sulcal atrophy, nonspecific but can be seen with normal pressure hydrocephalus.  Involutional change with sequela of moderate to advanced chronic microvascular ischemic change and multifocal remote lacunar type infarcts.      Endoscopy: 2023 EGD- 2 cm hiatal hernia.                          - Normal stomach.                          - Two non-bleeding angioectasias in the duodenum.                          Treated with argon plasma coagulation (APC).                          - No specimens collected.    I have independently reviewed and interpreted the imaging above           Ischemic Stroke. AMS. Dysphagia. Hypokalemia.  Plan/ Recommendations:  1.  Continue supportive care. SLP at bedside and patient following verbal cues/commands and eating ice chips. If oral intake does not improve, rec NGT placement for meds and nutrition to allow patient to optimize and possibly progress her oral intake. Rec replace K.  2.  Neuro following and rec plavix, patient currently on heparin and would have to halt anticoags prior to endoscopy if warranted.       Thank you so much for allowing us to participate in the care of Sophia Landis . Please contact us if you have any additional questions.    Maureen Field NP  Gastroenterology  [facility] - [place] - Med Surg

## 2023-07-17 NOTE — ASSESSMENT & PLAN NOTE
Antithrombotics for secondary stroke prevention: Antiplatelets: Aspirin: 81 mg daily    Statins for secondary stroke prevention and hyperlipidemia, if present:   Statins: Atorvastatin- 40 mg daily    Aggressive risk factor modification: HTN, HLD, Diet     Rehab efforts: The patient has been evaluated by a stroke team provider and the therapy needs have been fully considered based off the presenting complaints and exam findings. The following therapy evaluations are needed: PT evaluate and treat, OT evaluate and treat, SLP evaluate and treat    Diagnostics ordered/pending: per neurology    VTE prophylaxis: Heparin 5000 units SQ every 8 hours    BP parameters: Infarct: No intervention, SBP <220      - With profound dysphagia initially, seems to be improving and now cleared for pureed diet. May need PEG in future.

## 2023-07-17 NOTE — PT/OT/SLP PROGRESS
Physical Therapy      Patient Name:  Sophia Landis   MRN:  [id number]    Patient not seen today secondary to Patient fatigue, Other (pt found sleeping in bed with family in room, family preferred pt to rest right now 2* pt is tired from working with SPL earlier). Will follow-up tomorrow.

## 2023-07-17 NOTE — PROGRESS NOTES
[facility] Medicine  Progress Note    Patient Name: Sophia Landis  MRN: [id number]  Patient Class: IP- Inpatient   Admission Date: 7/15/2023  Length of Stay: 2 days  Attending Physician: Himanshu Busby MD  Primary Care Provider: Sarthak Johnson MD        Subjective:     Principal Problem:Ischemic stroke        HPI:  This is a 77-year-old female with a past medical history of CVA, NPH, hypertension, hyperlipidemia, peripheral artery disease, ANDRES on CPAP who presents with altered mental status.    Per the family, the patient has been declining over the last 2 weeks.  She was noted to be more confused, refusing to take her medications, had decreased p.o. intake associated with choking along with being less responsive and less conversational.  Her  noted that her left leg was more weak.  She has a history of stroke, a urine half ago after which she became wheelchair dependent and requires full assistance with her ADLs.    In the ED, the patient was hypertensive otherwise hemodynamically stable.  Labs were unremarkable.  CT head showed findings concerning for left parietal lobe recent infarction, stable enlargement of the ventricles out of proportion to the degree of sulcal atrophy (nonspecific but can be seen with normal pressure hydrocephalus) and involutional change with sequela of moderate to advanced chronic microvascular ischemic change and multifocal remote lacunar type infarcts.  Patient was given aspirin 300 mg, hydralazine 10 mg IV, 20 mg IV, and nitro paste.  She was admitted for further management.      Overview/Hospital Course:  Admitted with left parietal CVA.  Out of window for intervention. Neurology consulted. SLP/OT/OT consulted. Initially difficulty with swallowing and recommending NPO, possible will need alternative source of nutrition if no improvement. GI following in case PEG is needed. Palliative care consulted.      Interval History: did better with speech today,  still not following commands for me    Review of Systems  Objective:     Vital Signs (Most Recent):  Temp: 97.3 °F (36.3 °C) (07/17/23 1135)  Pulse: 71 (07/17/23 1135)  Resp: 18 (07/17/23 1135)  BP: (!) 131/59 (07/17/23 1135)  SpO2: (!) 94 % (07/17/23 1135) Vital Signs (24h Range):  Temp:  [97.3 °F (36.3 °C)-98.6 °F (37 °C)] 97.3 °F (36.3 °C)  Pulse:  [63-77] 71  Resp:  [16-18] 18  SpO2:  [94 %-97 %] 94 %  BP: (101-158)/(53-66) 131/59     Weight: 48.2 kg (106 lb 4.2 oz)  Body mass index is 21.46 kg/m².    Intake/Output Summary (Last 24 hours) at 7/17/2023 1516  Last data filed at 7/17/2023 1426  Gross per 24 hour   Intake --   Output 1100 ml   Net -1100 ml         Physical Exam  Vitals and nursing note reviewed.   Constitutional:       General: She is not in acute distress.     Appearance: She is not ill-appearing.   Cardiovascular:      Rate and Rhythm: Normal rate.      Pulses: Normal pulses.      Heart sounds: No murmur heard.  Pulmonary:      Effort: Pulmonary effort is normal. No respiratory distress.   Abdominal:      General: Abdomen is flat.      Palpations: Abdomen is soft.      Tenderness: There is no abdominal tenderness.   Musculoskeletal:      Right lower leg: No edema.      Left lower leg: No edema.   Skin:     General: Skin is warm.   Neurological:      Mental Status: She is alert.      Comments: Exam limited. Not speaking. Not following commands. Does move head, eyes and left side spontaneously, right upper extr not so much           Significant Labs: All pertinent labs within the past 24 hours have been reviewed.    Significant Imaging: I have reviewed all pertinent imaging results/findings within the past 24 hours.      Assessment/Plan:      * Ischemic stroke  Antithrombotics for secondary stroke prevention: Antiplatelets: Aspirin: 81 mg daily    Statins for secondary stroke prevention and hyperlipidemia, if present:   Statins: Atorvastatin- 40 mg daily    Aggressive risk factor modification: HTN,  HLD, Diet     Rehab efforts: The patient has been evaluated by a stroke team provider and the therapy needs have been fully considered based off the presenting complaints and exam findings. The following therapy evaluations are needed: PT evaluate and treat, OT evaluate and treat, SLP evaluate and treat    Diagnostics ordered/pending: per neurology    VTE prophylaxis: Heparin 5000 units SQ every 8 hours    BP parameters: Infarct: No intervention, SBP <220      - With profound dysphagia initially, seems to be improving and now cleared for pureed diet. May need PEG in future.    NPH (normal pressure hydrocephalus)  Appears stable on CT. - CT head showed findings concerning for left parietal lobe recent infarction, stable enlargement of the ventricles out of proportion to the degree of sulcal atrophy (nonspecific but can be seen with normal pressure hydrocephalus) and involutional change with sequela of moderate to advanced chronic microvascular ischemic change and multifocal remote lacunar type infarcts.  Neurology consult - appreciate recommendations    PAD (peripheral artery disease)  History noted. No acute issues.       Mild recurrent major depression  Resume zoloft      Hyperlipidemia  Resume statin      Essential hypertension  Allow for permissive hypertension. Monitor BP        VTE Risk Mitigation (From admission, onward)         Ordered     heparin (porcine) injection 5,000 Units  Every 8 hours         07/15/23 1903     IP VTE HIGH RISK PATIENT  Once         07/15/23 1903     Place sequential compression device  Until discontinued         07/15/23 1903                Discharge Planning   DANYA: 7/19/2023     Code Status: Full Code   Is the patient medically ready for discharge?:     Reason for patient still in hospital (select all that apply): Treatment  Discharge Plan A: Home with family, Home Health (Previously with [facility] ; palliatative consult?)                  Himanshu Busby MD  Department of Hospital  Medicine   [facility] [place] [facility]

## 2023-07-17 NOTE — ASSESSMENT & PLAN NOTE
"7/17/2023 - Consult   - consult received; interval chart reviewed in detail; discussed pt with hospital primary   - met with patient,  (Clark), daughter in law, and "family member" at bedside; introduction to palliative medicine team and role in current care and admission;  does not recall previous palliative involvement in care   - learned more about pt outside of current admission; Clark is pt's primary caregiver at home; she has suffered multiple strokes and with each had increased declines both physically and cognitively, with significant changes in the past month; pt now non-verbal, requiring max assist with ADLs and not eating and drinking  - discussed current full code status, potential interventions and situations involved, and potential risks and outcomes associated; discussed difference between full code and DNR; discussed care that would be provided at the end of life if DNR is elected;  wishes for pt to remain full code, but if she were to require life support in the future would wish for time limited trial  - GOC/ACP discussion; initially 's GOC were for rehab and PEG; following in some improvements during SLP visit and in depth discussion/education of trajectory of strokes, dementia, and treatments being offered, GOC are to continue to allow for SLP therapy and assess pt's ability to tolerate PO for a few days and readdressing needs/wishes regarding nutrition; end of life nutrition education also provided for informed decision making in this process   - discussed pt's appropriateness for hospice if/when aligns with GOC; discussed hospice, philosophy of care, and general overview of services provided for home hospice care   -  and family were appreciate of the education and information provided; all said a lot of this information was new to them given pt's recent decline; encouraged family to let palliative team and primary team know if any questions arise about " information provided and availability to re-discuss as pt's condition is re-assessed over the next few days   -  does express that he wants pt to stay with him as long as she can as they have been  for 50+ years, but overall does not wish to see her suffer if he feels she has a poor quality of life in the future; he is hopeful for some improvement   - emotional support provided   - Allowed time for questions/concerns; all addressed; expressed availability of myself/palliative team for additional questions/concerns

## 2023-07-17 NOTE — SUBJECTIVE & OBJECTIVE
Interval History: did better with speech today, still not following commands for me    Review of Systems  Objective:     Vital Signs (Most Recent):  Temp: 97.3 °F (36.3 °C) (07/17/23 1135)  Pulse: 71 (07/17/23 1135)  Resp: 18 (07/17/23 1135)  BP: (!) 131/59 (07/17/23 1135)  SpO2: (!) 94 % (07/17/23 1135) Vital Signs (24h Range):  Temp:  [97.3 °F (36.3 °C)-98.6 °F (37 °C)] 97.3 °F (36.3 °C)  Pulse:  [63-77] 71  Resp:  [16-18] 18  SpO2:  [94 %-97 %] 94 %  BP: (101-158)/(53-66) 131/59     Weight: 48.2 kg (106 lb 4.2 oz)  Body mass index is 21.46 kg/m².    Intake/Output Summary (Last 24 hours) at 7/17/2023 1516  Last data filed at 7/17/2023 1426  Gross per 24 hour   Intake --   Output 1100 ml   Net -1100 ml         Physical Exam  Vitals and nursing note reviewed.   Constitutional:       General: She is not in acute distress.     Appearance: She is not ill-appearing.   Cardiovascular:      Rate and Rhythm: Normal rate.      Pulses: Normal pulses.      Heart sounds: No murmur heard.  Pulmonary:      Effort: Pulmonary effort is normal. No respiratory distress.   Abdominal:      General: Abdomen is flat.      Palpations: Abdomen is soft.      Tenderness: There is no abdominal tenderness.   Musculoskeletal:      Right lower leg: No edema.      Left lower leg: No edema.   Skin:     General: Skin is warm.   Neurological:      Mental Status: She is alert.      Comments: Exam limited. Not speaking. Not following commands. Does move head, eyes and left side spontaneously, right upper extr not so much           Significant Labs: All pertinent labs within the past 24 hours have been reviewed.    Significant Imaging: I have reviewed all pertinent imaging results/findings within the past 24 hours.

## 2023-07-17 NOTE — ASSESSMENT & PLAN NOTE
- pt with history of strokes   - admitted following AMS/decline over the past month, including becoming nonverbal and not eating for several days prior to admit   - pt is bed bound, max assist, and requires assistance with all ADLS  - neurology consulted/following   - prior to palliative consult pt's /family wishing for PEG/rehab if no improvements; pt worked with SLP prior to consult and was able to tolerate some PO; family wishes to continue to assess progress/improvements before proceeding with PEG   - in depth discussion/education with pt's , daughter in law, and family memeber of strokes and dementia including trajectory of pt's condition and trajectory/risks of treatments being offered such as PEG to assist in GOC/ACP  - FAST Score 7C at minimum and PPS of approx 30%; hospice appropriate if/when aligns with GOC   - noted; continued management per hospital primary

## 2023-07-17 NOTE — ASSESSMENT & PLAN NOTE
Appears stable on CT. - CT head showed findings concerning for left parietal lobe recent infarction, stable enlargement of the ventricles out of proportion to the degree of sulcal atrophy (nonspecific but can be seen with normal pressure hydrocephalus) and involutional change with sequela of moderate to advanced chronic microvascular ischemic change and multifocal remote lacunar type infarcts.  Neurology consult - appreciate recommendations

## 2023-07-17 NOTE — PT/OT/SLP PROGRESS
Occupational Therapy      Patient Name:  Sophia Landis   MRN:  [id number]    Patient not seen today secondary to hold per family request; pt just worked w/ SLP and family reports pt is very fatigues and would like for her to rest. . Will follow-up as able.    7/17/2023

## 2023-07-17 NOTE — PROGRESS NOTES
[facility] - Telemetry  Neurology  Progress Note    Patient Name: Sophia Landis  MRN: [id number]  Admission Date: 7/15/2023  Hospital Length of Stay: 2 days  Code Status: Full Code   Attending Provider: Himanshu Busby MD  Primary Care Physician: Sarthak Johnson MD   Principal Problem:Ischemic stroke    Subjective:     Interval History: 76 y/o female with medical Hx of stroke, HTN, hyperlipidemia, PAD was brought to ED due change in mental status. Family states that pt stopped talking and has not been eating for several days. Family also notes weakness on right side. Pt is dependent is many ADL's at baseline.    -7/17/23: No acute issues overnight.    Current Neurological Medications:     Current Facility-Administered Medications   Medication Dose Route Frequency Provider Last Rate Last Admin    acetaminophen tablet 650 mg  650 mg Oral Q8H PRN Dave Victor MD        acetaminophen tablet 650 mg  650 mg Oral Q4H PRN Dave Victor MD        albuterol-ipratropium 2.5 mg-0.5 mg/3 mL nebulizer solution 3 mL  3 mL Nebulization Q4H PRN Dave Victor MD        aluminum-magnesium hydroxide-simethicone 200-200-20 mg/5 mL suspension 30 mL  30 mL Oral QID PRN Dave Victor MD        aspirin EC tablet 81 mg  81 mg Oral Daily Dave Victor MD        atorvastatin tablet 40 mg  40 mg Oral Daily Dave Victor MD        bisacodyL suppository 10 mg  10 mg Rectal Daily PRN Dave Victor MD        dextrose 5 % and 0.9 % NaCl infusion   Intravenous Continuous Himanshu Busby MD 75 mL/hr at 07/17/23 1423 New Bag at 07/17/23 1423    dextrose 50% injection 12.5 g  12.5 g Intravenous PRN Dave Victor MD        dextrose 50% injection 25 g  25 g Intravenous PRN Dave Victor MD        glucagon (human recombinant) injection 1 mg  1 mg Intramuscular PRN Dave Victor MD        glucose chewable tablet 16 g  16 g Oral PRN Dave Victor MD        glucose chewable tablet 24 g  24 g Oral PRN Dave Victor MD        heparin (porcine) injection 5,000 Units  5,000  Units Subcutaneous Q8H Dave Victor MD   5,000 Units at 07/17/23 1329    labetaloL injection 10 mg  10 mg Intravenous Q15 Min PRN Dave Victor MD        magnesium oxide tablet 800 mg  800 mg Oral PRN Dave Victor MD        magnesium oxide tablet 800 mg  800 mg Oral PRN Dave Victor MD        melatonin tablet 6 mg  6 mg Oral Nightly PRN Dave Victor MD        naloxone 0.4 mg/mL injection 0.02 mg  0.02 mg Intravenous PRN Dave Victor MD        ondansetron injection 4 mg  4 mg Intravenous Q8H PRN Dave Victor MD        polyethylene glycol packet 17 g  17 g Oral Daily Dave Victor MD        potassium bicarbonate disintegrating tablet 35 mEq  35 mEq Oral PRN Dave Victor MD        potassium bicarbonate disintegrating tablet 50 mEq  50 mEq Oral PRN Dave Victor MD        potassium bicarbonate disintegrating tablet 60 mEq  60 mEq Oral PRN Dave Victor MD        potassium, sodium phosphates 280-160-250 mg packet 2 packet  2 packet Oral PRN Dave Victor MD        potassium, sodium phosphates 280-160-250 mg packet 2 packet  2 packet Oral PRN Dave Victor MD        potassium, sodium phosphates 280-160-250 mg packet 2 packet  2 packet Oral PRN Dave Victor MD        prochlorperazine injection Soln 5 mg  5 mg Intravenous Q6H PRN Dave Victor MD        sertraline tablet 100 mg  100 mg Oral Daily Dave Victor MD        simethicone chewable tablet 80 mg  1 tablet Oral QID PRN Dave Victor MD        sodium chloride 0.9% bolus 500 mL 500 mL  500 mL Intravenous Continuous PRN Dave Victor MD        sodium chloride 0.9% flush 10 mL  10 mL Intravenous PRN Dave Victor MD        sodium chloride 0.9% flush 10 mL  10 mL Intravenous Q12H PRN Dave Victor MD           Review of Systems  Unable to perform ROS: Patient nonverbal     Objective:     Vital Signs (Most Recent):  Temp: 97.3 °F (36.3 °C) (07/17/23 1135)  Pulse: 71 (07/17/23 1135)  Resp: 18 (07/17/23 1135)  BP: (!) 131/59 (07/17/23 1135)  SpO2: (!) 94 % (07/17/23 1135) Vital Signs  (24h Range):  Temp:  [97.3 °F (36.3 °C)-98.6 °F (37 °C)] 97.3 °F (36.3 °C)  Pulse:  [63-77] 71  Resp:  [16-18] 18  SpO2:  [94 %-97 %] 94 %  BP: (101-158)/(53-66) 131/59     Weight: 48.2 kg (106 lb 4.2 oz)  Body mass index is 21.46 kg/m².    Physical Exam  Constitutional:       General: She is not in acute distress.  HENT:      Head: Normocephalic.   Eyes:      General:         Right eye: No discharge.         Left eye: No discharge.   Cardiovascular:      Rate and Rhythm: Normal rate.   Pulmonary:      Breath sounds: Normal breath sounds.   Abdominal:      Palpations: Abdomen is soft.   Musculoskeletal:         General: No swelling.      Cervical back: Neck supple.   Skin:     Findings: No rash.         NEUROLOGICAL EXAMINATION:      MENTAL STATUS   Level of consciousness: alert       Nonverbal      CRANIAL NERVES      CN III, IV, VI   Right pupil: Size: 2 mm. Shape: regular.   Left pupil: Size: 2 mm. Shape: regular.   Nystagmus: none      CN VII   Right facial weakness: none  Left facial weakness: none     CN XII   Tongue deviation: none     MOTOR EXAM        Hypoactive on right side  AROM of LUE and RUE against gravity      SENSORY EXAM   Left arm light touch: normal       Grimaces to noxious stimulation of UE's and LE's            Significant Labs: CBC:   Recent Labs   Lab 07/17/23  0602 07/18/23  0551   WBC 4.52 5.09   HGB 11.3* 11.3*   HCT 35.5* 34.1*    167     CMP:   Recent Labs   Lab 07/17/23  0602 07/18/23  0551   * 84    141   K 2.9* 3.5   * 113*   CO2 21* 22*   BUN 14 5*   CREATININE 0.8 0.7   CALCIUM 8.9 8.8   PROT 6.0 5.9*   ALBUMIN 3.2* 3.2*   BILITOT 0.4 0.5   ALKPHOS 62 64   AST 19 19   ALT 13 12   ANIONGAP 9 6*       Significant Imaging: I have reviewed all pertinent imaging results/findings within the past 24 hours.    Assessment and Plan:     78 y/o female with acute stroke     Left parietal stroke: acute infarction of mod size on left MCA branch. Pt is aphasia and with  right hemiparesis. Pt is several days out for which no thrombolytics or intervention are granted.  MRI brain ordered.  No need for permissive HTN.  ASA 81 mg daily. Clopidogrel 75 mg daily.  ST/OT/PT. May need NGT.  Palliative Care.    Active Diagnoses:    Diagnosis Date Noted POA    PRINCIPAL PROBLEM:  Ischemic stroke [I63.9] 07/15/2023 Yes    ACP (advance care planning) [Z71.89] 07/17/2023 Not Applicable    NPH (normal pressure hydrocephalus) [G91.2] 12/10/2021 Yes    PAD (peripheral artery disease) [I73.9] 05/31/2013 Yes     Chronic    Mild recurrent major depression [F33.0] 07/14/2012 Yes     Chronic    Hyperlipidemia [E78.5] 07/14/2012 Yes     Chronic    Essential hypertension [I10] 07/14/2012 Yes     Chronic      Problems Resolved During this Admission:       VTE Risk Mitigation (From admission, onward)           Ordered     heparin (porcine) injection 5,000 Units  Every 8 hours         07/15/23 1903     IP VTE HIGH RISK PATIENT  Once         07/15/23 1903     Place sequential compression device  Until discontinued         07/15/23 1903                    Alexis Powers MD  Neurology  [facility] - [place] - Telemetry

## 2023-07-17 NOTE — NURSING
Reported off to oncoming nurse, patient resting in bed, aaox1. Max assist required for adls and transfers, no acute distress noted, safety precautions maintained.    Chart check completed.

## 2023-07-17 NOTE — PLAN OF CARE
ST RECS: puree with thin liquids, nutritional supplements, crush oral meds, monitor intake, following for language as well as she is not at baseline

## 2023-07-17 NOTE — HPI
"From H&P: "This is a 77-year-old female with a past medical history of CVA, NPH, hypertension, hyperlipidemia, peripheral artery disease, ANDRES on CPAP who presents with altered mental status.     Per the family, the patient has been declining over the last 2 weeks.  She was noted to be more confused, refusing to take her medications, had decreased p.o. intake associated with choking along with being less responsive and less conversational.  Her  noted that her left leg was more weak.  She has a history of stroke, a urine half ago after which she became wheelchair dependent and requires full assistance with her ADLs.     Palliative medicine consulted for goals of care discussion and advance care planning; for details of visit, see advance care planning section of plan.   "

## 2023-07-17 NOTE — NURSING
[facility]  Nurses Note -- 4 Eyes      7/17/2023       Skin assessed on: Q Shift      [x] No Pressure Injuries Present    []Prevention Measures Documented  Scattered Bruising on bilat arms    [] Yes LDA  for Pressure Injury Previously documented     [] Yes New Pressure Injury Discovered   [] LDA for New Pressure Injury Added      Attending RN:  Maryann Terry LPN     Second RN:  Maylin Student Nurse

## 2023-07-17 NOTE — PT/OT/SLP EVAL
Speech Language Pathology Evaluation  Cognitive/Bedside Swallow Reassess    Patient Name:  Sophia Landis   MRN:  [id number]  Admitting Diagnosis: Ischemic stroke    Recommendations:                  General Recommendations:  Dysphagia therapy  Diet recommendations: puree with thin liquids, nutritional supplements, monitor intake  Aspiration Precautions: assist with meals provide redirection   General Precautions: Standard, aspiration  Communication strategies:   needs frequent redirection to task    Assessment:     Sophia Landis is a 77 y.o. female with dx of CVA she presents with oral dysphagia c/b decreased attention to bolus presentation and severe cognitive linguistic deficits c/b poor sustained attention, global aphasia.     History:     Past Medical History:   Diagnosis Date    Cataract     Chronic diarrhea 11/26/2018    Depression     GERD (gastroesophageal reflux disease)     History of colonic polyps 11/26/2018    Hyperlipidemia     Hypertension     Menopausal syndrome     PAD (peripheral artery disease)     s/p stent    Stroke        Past Surgical History:   Procedure Laterality Date    APPENDECTOMY      COLONOSCOPY N/A 5/20/2019    Procedure: COLONOSCOPY;  Surgeon: Kenia Arias MD;  Location: [facility];  Service: Endoscopy;  Laterality: N/A;  RX PLETAL ok to hold    ESOPHAGOGASTRODUODENOSCOPY N/A 5/26/2023    Procedure: EGD (ESOPHAGOGASTRODUODENOSCOPY);  Surgeon: Debbie Cooper MD;  Location: [facility];  Service: Endoscopy;  Laterality: N/A;    HYSTERECTOMY      MONTY with BSO    ILIAC ARTERY STENT Bilateral      Social History: Patient lives with     Chest X-Rays: 7/15/23   No acute abnormality or detrimental change since December 10, 2021..    Prior diet: regular with thin prior to 5 days ago.     Subjective   Pt's family present considering PEG tube as Pt has had poor po intake for the last 5 days and with decline in cognitive status. Of note CT revealing likely recent parietal lobe infarct,  also revealed moderate-advance chronic microvascular changes.  Patient goals: per family, safe nutrition     Pain/Comfort:  Pain Rating 1: 0/10    Respiratory Status: Room air    Objective:     Cognitive Status:    Ox0  Poor sustained attention  Required max assist to make eye contact     Receptive Language:   Comprehension:   Pt did not follow commands despite max verbal and tactile cuing     Pragmatics:    Pt cannot readily attend to communication partner.     Expressive Language:  Verbal:    Pt responded with automatic speech acts X2      Motor Speech:  Single word responses X2    Voice:   wfl    Visual-Spatial:  Poor sustained attention overall     Oral Musculature Evaluation  Oral Musculature: unable to assess due to poor participation/comprehension  Dentition: teeth in poor condition (per pt's , she would not participate in oral care for the past few months)  Secretion Management: adequate  Mucosal Quality: other (see comments) (unable to assess d/t pt not opening mouth)  Mandibular Strength and Mobility:  (unable to asses d/t pt not opening mouth)    Bedside Swallow Eval:   Consistencies Assessed:  Thin liquids ~3oz via straw  Puree 4oz      Oral Phase:   Pt required verbal and tactile cuing to attend to bolus presentation, once placed Pt was able to attend to A-P transport.     Pharyngeal Phase:   no overt clinical signs/symptoms of aspiration    Compensatory Strategies  Frequent verbal and tactile cuing    Treatment: please note silent aspiration cannot be r/o at bedside.      Goals:   Multidisciplinary Problems       SLP Goals          Problem: SLP    Goal Priority Disciplines Outcome   SLP Goal     SLP Ongoing, Progressing   Description: 1. Ms. Landis will participate in bedside swallow evaluation to determine safest and least restrictive diet.   2. Ms. Landis will participate in cognitive-linguistic assessment. (Goal met, 7/17)  3. Pt will tolerate puree with thin liquids without overt s/s of  aspiration.   4. Pt will respond to name with eye contact 3 of 5 prompts.  5. Pt will perform simple motoric commands given model and tactile stimulation with 30% accuracy.                          Plan:     Patient to be seen:  3 x/week   Plan of Care expires:   7/30  Plan of Care reviewed with:  spouse, son, other (see comments) (daughter in law)   SLP Follow-Up:  Yes       Discharge recommendations:    TBD  Barriers to Discharge:  Level of Skilled Assistance Needed .    Time Tracking:     SLP Treatment Date:   07/17/23  Speech Start Time:  1130  Speech Stop Time:  1200     Speech Total Time (min):  30 min    Billable Minutes: Eval 15  and Treatment Swallowing Dysfunction 15    07/17/2023

## 2023-07-18 LAB
ALBUMIN SERPL BCP-MCNC: 3.2 G/DL (ref 3.5–5.2)
ALP SERPL-CCNC: 64 U/L (ref 55–135)
ALT SERPL W/O P-5'-P-CCNC: 12 U/L (ref 10–44)
ANION GAP SERPL CALC-SCNC: 6 MMOL/L (ref 8–16)
AST SERPL-CCNC: 19 U/L (ref 10–40)
BASOPHILS # BLD AUTO: 0.03 K/UL (ref 0–0.2)
BASOPHILS NFR BLD: 0.6 % (ref 0–1.9)
BILIRUB SERPL-MCNC: 0.5 MG/DL (ref 0.1–1)
BUN SERPL-MCNC: 5 MG/DL (ref 8–23)
CALCIUM SERPL-MCNC: 8.8 MG/DL (ref 8.7–10.5)
CHLORIDE SERPL-SCNC: 113 MMOL/L (ref 95–110)
CO2 SERPL-SCNC: 22 MMOL/L (ref 23–29)
CREAT SERPL-MCNC: 0.7 MG/DL (ref 0.5–1.4)
DIFFERENTIAL METHOD: ABNORMAL
EOSINOPHIL # BLD AUTO: 0.2 K/UL (ref 0–0.5)
EOSINOPHIL NFR BLD: 3.1 % (ref 0–8)
ERYTHROCYTE [DISTWIDTH] IN BLOOD BY AUTOMATED COUNT: 15.1 % (ref 11.5–14.5)
EST. GFR  (NO RACE VARIABLE): >60 ML/MIN/1.73 M^2
GLUCOSE SERPL-MCNC: 84 MG/DL (ref 70–110)
HCT VFR BLD AUTO: 34.1 % (ref 37–48.5)
HGB BLD-MCNC: 11.3 G/DL (ref 12–16)
IMM GRANULOCYTES # BLD AUTO: 0.01 K/UL (ref 0–0.04)
IMM GRANULOCYTES NFR BLD AUTO: 0.2 % (ref 0–0.5)
LYMPHOCYTES # BLD AUTO: 2.1 K/UL (ref 1–4.8)
LYMPHOCYTES NFR BLD: 40.9 % (ref 18–48)
MCH RBC QN AUTO: 26.9 PG (ref 27–31)
MCHC RBC AUTO-ENTMCNC: 33.1 G/DL (ref 32–36)
MCV RBC AUTO: 81 FL (ref 82–98)
MONOCYTES # BLD AUTO: 0.5 K/UL (ref 0.3–1)
MONOCYTES NFR BLD: 9.2 % (ref 4–15)
NEUTROPHILS # BLD AUTO: 2.3 K/UL (ref 1.8–7.7)
NEUTROPHILS NFR BLD: 46 % (ref 38–73)
NRBC BLD-RTO: 0 /100 WBC
PLATELET # BLD AUTO: 167 K/UL (ref 150–450)
PMV BLD AUTO: 10 FL (ref 9.2–12.9)
POTASSIUM SERPL-SCNC: 3.5 MMOL/L (ref 3.5–5.1)
PROT SERPL-MCNC: 5.9 G/DL (ref 6–8.4)
RBC # BLD AUTO: 4.2 M/UL (ref 4–5.4)
SODIUM SERPL-SCNC: 141 MMOL/L (ref 136–145)
WBC # BLD AUTO: 5.09 K/UL (ref 3.9–12.7)

## 2023-07-18 PROCEDURE — 99232 PR SUBSEQUENT HOSPITAL CARE,LEVL II: ICD-10-PCS | Mod: HCNC,,, | Performed by: PSYCHIATRY & NEUROLOGY

## 2023-07-18 PROCEDURE — 97530 THERAPEUTIC ACTIVITIES: CPT | Mod: HCNC

## 2023-07-18 PROCEDURE — 36415 COLL VENOUS BLD VENIPUNCTURE: CPT | Mod: HCNC | Performed by: STUDENT IN AN ORGANIZED HEALTH CARE EDUCATION/TRAINING PROGRAM

## 2023-07-18 PROCEDURE — 97110 THERAPEUTIC EXERCISES: CPT | Mod: HCNC

## 2023-07-18 PROCEDURE — 80053 COMPREHEN METABOLIC PANEL: CPT | Mod: HCNC | Performed by: STUDENT IN AN ORGANIZED HEALTH CARE EDUCATION/TRAINING PROGRAM

## 2023-07-18 PROCEDURE — 25000003 PHARM REV CODE 250: Mod: HCNC | Performed by: HOSPITALIST

## 2023-07-18 PROCEDURE — 99900035 HC TECH TIME PER 15 MIN (STAT): Mod: HCNC

## 2023-07-18 PROCEDURE — 92526 ORAL FUNCTION THERAPY: CPT | Mod: HCNC

## 2023-07-18 PROCEDURE — 94761 N-INVAS EAR/PLS OXIMETRY MLT: CPT | Mod: HCNC

## 2023-07-18 PROCEDURE — 63600175 PHARM REV CODE 636 W HCPCS: Mod: HCNC | Performed by: STUDENT IN AN ORGANIZED HEALTH CARE EDUCATION/TRAINING PROGRAM

## 2023-07-18 PROCEDURE — 97535 SELF CARE MNGMENT TRAINING: CPT | Mod: HCNC

## 2023-07-18 PROCEDURE — 92507 TX SP LANG VOICE COMM INDIV: CPT | Mod: HCNC

## 2023-07-18 PROCEDURE — 99232 SBSQ HOSP IP/OBS MODERATE 35: CPT | Mod: HCNC,,, | Performed by: PSYCHIATRY & NEUROLOGY

## 2023-07-18 PROCEDURE — 85025 COMPLETE CBC W/AUTO DIFF WBC: CPT | Mod: HCNC | Performed by: STUDENT IN AN ORGANIZED HEALTH CARE EDUCATION/TRAINING PROGRAM

## 2023-07-18 PROCEDURE — 21400001 HC TELEMETRY ROOM: Mod: HCNC

## 2023-07-18 RX ORDER — CLOPIDOGREL BISULFATE 75 MG/1
75 TABLET ORAL DAILY
Status: DISCONTINUED | OUTPATIENT
Start: 2023-07-18 | End: 2023-07-20 | Stop reason: HOSPADM

## 2023-07-18 RX ORDER — METOPROLOL TARTRATE 25 MG/1
25 TABLET, FILM COATED ORAL 2 TIMES DAILY
Status: DISCONTINUED | OUTPATIENT
Start: 2023-07-18 | End: 2023-07-20 | Stop reason: HOSPADM

## 2023-07-18 RX ADMIN — HEPARIN SODIUM 5000 UNITS: 5000 INJECTION INTRAVENOUS; SUBCUTANEOUS at 06:07

## 2023-07-18 RX ADMIN — HEPARIN SODIUM 5000 UNITS: 5000 INJECTION INTRAVENOUS; SUBCUTANEOUS at 02:07

## 2023-07-18 NOTE — PT/OT/SLP PROGRESS
"Occupational Therapy   Treatment    Name: Sophia Landis  MRN: [id number]  Admitting Diagnosis:  Ischemic stroke       Recommendations:     Discharge Recommendations:  (HHOT with 24 hr care vs NH placement with OT services)  Discharge Equipment Recommendations:  lift device  Barriers to discharge:   (pt will require 24 hours of care at d/c)    Assessment:     Sophia Landis is a 77 y.o. female with a medical diagnosis of Ischemic stroke. Performance deficits affecting function are weakness, visual deficits, decreased ROM, impaired cognition, impaired endurance, impaired coordination, impaired self care skills, decreased upper extremity function, impaired fine motor, decreased lower extremity function, impaired functional mobility, decreased safety awareness, gait instability, impaired balance.     Pt with poor command following. Appears to have R visual neglect. Resisting RUE PROM then allowed some PROM with distractions. Non-purposeful LUE/LLE ROM, but not on command. Pt stood with total A and required total A for stand>sit 2* resistance.     Rehab Prognosis:  Poor; patient would benefit from acute skilled OT services to address these deficits and reach maximum level of function.       Plan:     Patient to be seen  (3-5x/week) to address the above listed problems via self-care/home management, therapeutic activities, therapeutic exercises, neuromuscular re-education  Plan of Care Expires: 07/30/23  Plan of Care Reviewed with: patient    Subjective     Chief Complaint: pt appeared scared with assist from stand>sit 2* pt resisting hip flexion and began to curse - pt calmed down in supine   Patient/Family Comments/goals:  and DIL calling family, realizing that they have a lot to talk about      reports that recently at home, he assists her out of the hospital bed, "bear hugs" her, and guides her 1-2 steps into her w/c for ~6 hours/day.  performs all her ADLs. Pt is able to stand and hold onto a bar " with assistance while he cleans her BLE and completes her lower body dressing. Pt was not able to hold her balance sitting at the EOB at home and would fall posteriorly.     Pain/Comfort:  Pain Rating 1: 0/10    Objective:     Patient found HOB elevated with bed alarm, PureWick, peripheral IV, telemetry upon OT entry to room. Pt kicked off her pressure relief boots.     General Precautions: Standard, fall, aspiration, vision impaired    Orthopedic Precautions:N/A  Braces: N/A  Respiratory Status: Room air     Occupational Performance:     Bed Mobility:    Patient completed Rolling/Turning to Left with  total assistance  Patient completed Rolling/Turning to Right with total assistance  Patient completed Scooting anteriorly with total assistance  Patient completed Supine to Sit with total assistance and HOB elevated  Patient completed Sit to Supine with total assistance   Patient completed Scooting in supine with bed in trendelenburg position with total assistance and 2 persons     Functional Mobility/Transfers:  Patient completed Sit <> Stand Transfer with total assistance  with  BUE assisted      Activities of Daily Living:  Upper Body Dressing: total assistance for gown  Lower Body Dressing: total assistance for socks   Grooming: not assessed 2* pt not following commands       [facility] 6 Click ADL: 6    Treatment & Education:  Pt's  and DIL re-educated on OT role/POC.   Importance of turning every 2 hours and edu on pressure relief; OT retrieved extra pillows   Safety with functional mobility   Self-care tasks/functional mobility completed- assistance level noted above   OT sat on pt's R side and pt with difficulty turning head to midline, primarily cervical rotation to the L. OT had pt's family at her midline and pt not making purposeful eye contact at midline. Pt easily distracted by her sheets and telemetry monitor etc.   Pt with poor sit balance at EOB. Total A to reposition RUE sitting EOB  Pt resisting  RUE PROM initially with HOB elevated. Then, pt able to tolerate x10 PROM shoulder flex/ext, shoulder horizontal ab/dduction   All questions/concerns answered within OT scope of practice       Patient left  HOB elevated R sidelying with RUE elevated on pillow, pillow in-between knees, and B/L pressure relief boots in place  with all lines intact, call button in reach, bed alarm on, nurse, Maryann, and CNA, Clarice, notified, , DIL, and son present, and all needs met/within reach. Lights on, blinds opened, tv on.     GOALS:   Multidisciplinary Problems       Occupational Therapy Goals          Problem: Occupational Therapy    Goal Priority Disciplines Outcome Interventions   Occupational Therapy Goal     OT, PT/OT Ongoing, Progressing    Description: Goals to be met by: 7/30/23     Patient will increase functional independence with ADLs by performing:    UE Dressing with Maximal Assistance.  Pt will stand with Minimal Assistance for caregiver assistance for Toileting.   Sitting at edge of bed x25 minutes with Contact Guard Assistance.  Rolling to Bilateral with Minimal Assistance.   Supine to sit with Minimal Assistance.  Upper extremity exercise program x15 reps per handout, with assistance as needed.                         Time Tracking:     OT Date of Treatment: 07/18/23  OT Start Time: 1436  OT Stop Time: 1501  OT Total Time (min): 25 min    Billable Minutes:Therapeutic Activity 15 min  Therapeutic Exercise 10 min  Total Time 25 min    OT/GALEN: OT     Number of GALEN visits since last OT visit: 0    7/18/2023

## 2023-07-18 NOTE — NURSING
[facility]  Nurses Note -- 4 Eyes        7/17/2023         Skin assessed on: Q Shift        [x] No Pressure Injuries Present                 []Prevention Measures Documented  Scattered Bruising on bilat arms     [] Yes LDA  for Pressure Injury Previously documented      [] Yes New Pressure Injury Discovered              [] LDA for New Pressure Injury Added        Attending RN:  Maryann Terry LPN      Second RN:  Tyler PAREDES

## 2023-07-18 NOTE — PLAN OF CARE
Decreased oral acceptance as compared to yesterday's ST session. No significant improvement in cognitive function. Family education provided.

## 2023-07-18 NOTE — SUBJECTIVE & OBJECTIVE
Interval History: slightly more interactive today.    Review of Systems   HENT:  Negative for ear discharge and ear pain.    Eyes:  Negative for discharge and itching.   Endocrine: Negative for cold intolerance and heat intolerance.   Neurological:  Negative for seizures and syncope.   Objective:     Vital Signs (Most Recent):  Temp: 98 °F (36.7 °C) (07/18/23 1140)  Pulse: 73 (07/18/23 1140)  Resp: 18 (07/18/23 1140)  BP: 134/60 (07/18/23 1140)  SpO2: 96 % (07/18/23 1140) Vital Signs (24h Range):  Temp:  [97.5 °F (36.4 °C)-98.5 °F (36.9 °C)] 98 °F (36.7 °C)  Pulse:  [67-83] 73  Resp:  [18-20] 18  SpO2:  [95 %-96 %] 96 %  BP: (123-157)/(55-80) 134/60     Weight: 48.1 kg (106 lb)  Body mass index is 21.41 kg/m².    Intake/Output Summary (Last 24 hours) at 7/18/2023 1357  Last data filed at 7/18/2023 0610  Gross per 24 hour   Intake --   Output 1200 ml   Net -1200 ml         Physical Exam  Vitals and nursing note reviewed.   Constitutional:       General: She is not in acute distress.     Appearance: She is not ill-appearing.   Cardiovascular:      Rate and Rhythm: Normal rate.      Pulses: Normal pulses.      Heart sounds: No murmur heard.  Pulmonary:      Effort: Pulmonary effort is normal. No respiratory distress.   Abdominal:      General: Abdomen is flat.      Palpations: Abdomen is soft.      Tenderness: There is no abdominal tenderness.   Musculoskeletal:      Right lower leg: No edema.      Left lower leg: No edema.   Skin:     General: Skin is warm.   Neurological:      Mental Status: She is alert.      Comments: Awake and alert.  Nods to some questions.  Does move head, eyes and left side spontaneously.  Right sided weakness           Significant Labs: All pertinent labs within the past 24 hours have been reviewed.  BMP:   Recent Labs   Lab 07/18/23  0551   GLU 84      K 3.5   *   CO2 22*   BUN 5*   CREATININE 0.7   CALCIUM 8.8     CBC:   Recent Labs   Lab 07/17/23  0602 07/18/23  0551   WBC 4.52  5.09   HGB 11.3* 11.3*   HCT 35.5* 34.1*    167       Significant Imaging: I have reviewed all pertinent imaging results/findings within the past 24 hours.

## 2023-07-18 NOTE — CONSULTS
"[facility] - Telemetry  Palliative Medicine  Consult Note    Patient Name: Sophia Landis  MRN: [id number]  Admission Date: 7/15/2023  Hospital Length of Stay: 3 days  Code Status: Full Code   Attending Provider: Pranav Paniagua MD  Consulting Provider: Reba Jennings NP  Primary Care Physician: Sarthak Johnson MD  Principal Problem:Ischemic stroke    Patient information was obtained from spouse/SO, relative(s), past medical records, ER records and primary team.      Inpatient consult to Palliative Care  Consult performed by: Reba Jennings NP  Consult ordered by: Himanshu Busby MD  Reason for consult: goals of care and advanced care planning         Assessment/Plan:   Advance Care Planning     Palliative Care  ACP (advance care planning)  7/17/2023 - Consult   - consult received; interval chart reviewed in detail; discussed pt with hospital primary   - met with patient,  (Clark), daughter in law, and "family member" at bedside; introduction to palliative medicine team and role in current care and admission;  does not recall previous palliative involvement in care   - learned more about pt outside of current admission; Clark is pt's primary caregiver at home; she has suffered multiple strokes and with each had increased declines both physically and cognitively, with significant changes in the past month; pt now non-verbal, requiring max assist with ADLs and not eating and drinking  - discussed current full code status, potential interventions and situations involved, and potential risks and outcomes associated; discussed difference between full code and DNR; discussed care that would be provided at the end of life if DNR is elected;  wishes for pt to remain full code, but if she were to require life support in the future would wish for time limited trial  - GOC/ACP discussion; initially 's GOC were for rehab and PEG; following in some improvements during SLP visit and in depth " discussion/education of trajectory of strokes, dementia, and treatments being offered, GOC are to continue to allow for SLP therapy and assess pt's ability to tolerate PO for a few days and readdressing needs/wishes regarding nutrition; end of life nutrition education also provided for informed decision making in this process   - discussed pt's appropriateness for hospice if/when aligns with GOC; discussed hospice, philosophy of care, and general overview of services provided for home hospice care   -  and family were appreciate of the education and information provided; all said a lot of this information was new to them given pt's recent decline; encouraged family to let palliative team and primary team know if any questions arise about information provided and availability to re-discuss as pt's condition is re-assessed over the next few days   -  does express that he wants pt to stay with him as long as she can as they have been  for 50+ years, but overall does not wish to see her suffer if he feels she has a poor quality of life in the future; he is hopeful for some improvement   - emotional support provided   - Allowed time for questions/concerns; all addressed; expressed availability of myself/palliative team for additional questions/concerns     Neuro  * Ischemic stroke  - pt with history of strokes   - admitted following AMS/decline over the past month, including becoming nonverbal and not eating for several days prior to admit   - pt is bed bound, max assist, and requires assistance with all ADLS  - neurology consulted/following   - prior to palliative consult pt's /family wishing for PEG/rehab if no improvements; pt worked with SLP prior to consult and was able to tolerate some PO; family wishes to continue to assess progress/improvements before proceeding with PEG   - in depth discussion/education with pt's , daughter in law, and family memeber of strokes and dementia  "including trajectory of pt's condition and trajectory/risks of treatments being offered such as PEG to assist in GOC/ACP  - FAST Score 7C at minimum and PPS of approx 30%; hospice appropriate if/when aligns with GOC   - noted; continued management per hospital primary     NPH (normal pressure hydrocephalus)  - neurology following   - noted; management per hospital primary and neurology     Psychiatric  Mild recurrent major depression  - currently on Zoloft   - noted; management per hospital primary     Thank you for your consult. I will follow-up with patient. Please contact us if you have any additional questions.    Total visit time: 90 minutes    > 50% of  70  min visit spent in chart review, face to face discussion of symptom assessment, coordination of care with other specialists, documentation, and discharge planning.    20 min ACP time spent: goals of care, emotional support, formulating and communicating prognosis, exploring burden/ benefit of various approaches of treatment.     Reba Jennings NP  Palliative Medicine  [facility]    Subjective:     HPI:   From H&P: "This is a 77-year-old female with a past medical history of CVA, NPH, hypertension, hyperlipidemia, peripheral artery disease, ANDRES on CPAP who presents with altered mental status.     Per the family, the patient has been declining over the last 2 weeks.  She was noted to be more confused, refusing to take her medications, had decreased p.o. intake associated with choking along with being less responsive and less conversational.  Her  noted that her left leg was more weak.  She has a history of stroke, a urine half ago after which she became wheelchair dependent and requires full assistance with her ADLs.     Palliative medicine consulted for goals of care discussion and advance care planning; for details of visit, see advance care planning section of plan.       Hospital Course:  No notes on file    Past Medical " History:   Diagnosis Date    Cataract     Chronic diarrhea 11/26/2018    Depression     GERD (gastroesophageal reflux disease)     History of colonic polyps 11/26/2018    Hyperlipidemia     Hypertension     Menopausal syndrome     PAD (peripheral artery disease)     s/p stent    Stroke      Past Surgical History:   Procedure Laterality Date    APPENDECTOMY      COLONOSCOPY N/A 5/20/2019    Procedure: COLONOSCOPY;  Surgeon: Kenia Arias MD;  Location: [facility];  Service: Endoscopy;  Laterality: N/A;  RX PLETAL ok to hold    ESOPHAGOGASTRODUODENOSCOPY N/A 5/26/2023    Procedure: EGD (ESOPHAGOGASTRODUODENOSCOPY);  Surgeon: Debbie Cooper MD;  Location: [facility];  Service: Endoscopy;  Laterality: N/A;    HYSTERECTOMY      MONTY with BSO    ILIAC ARTERY STENT Bilateral      Review of patient's allergies indicates:   Allergen Reactions    Antihistamines - alkylamine Nausea Only    Ciprofloxacin Nausea Only    Penicillin     Penicillins Hives     Medications:  Continuous Infusions:   dextrose 5 % and 0.9 % NaCl 75 mL/hr at 07/17/23 0043    sodium chloride 0.9%       Scheduled Meds:   aspirin  81 mg Oral Daily    atorvastatin  40 mg Oral Daily    heparin (porcine)  5,000 Units Subcutaneous Q8H    polyethylene glycol  17 g Oral Daily    potassium chloride  10 mEq Intravenous Q1H    sertraline  100 mg Oral Daily     PRN Meds:acetaminophen, acetaminophen, albuterol-ipratropium, aluminum-magnesium hydroxide-simethicone, bisacodyL, dextrose 50%, dextrose 50%, glucagon (human recombinant), glucose, glucose, labetaloL, magnesium oxide, magnesium oxide, melatonin, naloxone, ondansetron, potassium bicarbonate, potassium bicarbonate, potassium bicarbonate, potassium, sodium phosphates, potassium, sodium phosphates, potassium, sodium phosphates, prochlorperazine, simethicone, sodium chloride 0.9%, sodium chloride 0.9%, sodium chloride 0.9%    Family History       Problem Relation (Age of Onset)     Amblyopia Mother    Cancer Father, Brother, Maternal Uncle, Maternal Uncle, Maternal Uncle, Maternal Uncle    Cataracts Mother    Diabetes Maternal Aunt, Maternal Uncle    Hypertension Mother    Strabismus Sister    Thyroid disease Mother          Tobacco Use    Smoking status: Former     Types: Cigarettes     Quit date:      Years since quittin.5    Smokeless tobacco: Never    Tobacco comments:     .  Retired  at [employer].   Substance and Sexual Activity    Alcohol use: No    Drug use: No    Sexual activity: Not Currently     Partners: Male     Comment:      Review of Systems   Unable to perform ROS: Patient nonverbal   Objective:     Vital Signs (Most Recent):  Temp: 97.5 °F (36.4 °C) (23 0753)  Pulse: 63 (23)  Resp: 18 (23)  BP: (!) 158/66 (23)  SpO2: 97 % (23) Vital Signs (24h Range):  Temp:  [97.3 °F (36.3 °C)-98.6 °F (37 °C)] 97.5 °F (36.4 °C)  Pulse:  [63-77] 63  Resp:  [16-18] 18  SpO2:  [94 %-97 %] 97 %  BP: (101-182)/(53-77) 158/66     Weight: 48.2 kg (106 lb 4.2 oz)  Body mass index is 21.46 kg/m².     Physical Exam  Vitals and nursing note reviewed.   Constitutional:       Appearance: She is ill-appearing.   HENT:      Head: Normocephalic and atraumatic.      Nose: Nose normal.   Pulmonary:      Effort: Pulmonary effort is normal. No respiratory distress.   Neurological:      Mental Status: She is alert.      Comments: Pt non-verbal; tracks, will make eye contact with  but not provider; would not follow commands; eyes closed/sleeping most of visit       Advance Care Planning   Advance Directives:   Living Will: No    LaPOST: No    Do Not Resuscitate Status: No ('s GOC for time limited trial of life support if needed in future; if pt were to decline would be open to further discussion)    Medical Power of : No ( - Clark, legal next of kin)      Decision Making:  Family answered  questions  Goals of Care: The family endorses that what is most important right now is to focus on extending life as long as possible, even it it means sacrificing quality and improvement in condition.     Accordingly, we have decided that the best plan to meet the patient's goals includes continuing with treatment and continued assessment of pt's ability to tolerate PO to determine next steps/further GOC.      Significant Labs: All pertinent labs within the past 24 hours have been reviewed.  CBC:   Recent Labs   Lab 07/17/23 0602   WBC 4.52   HGB 11.3*   HCT 35.5*   MCV 84        BMP:  Recent Labs   Lab 07/17/23 0602   *      K 2.9*   *   CO2 21*   BUN 14   CREATININE 0.8   CALCIUM 8.9     LFT:  Lab Results   Component Value Date    AST 19 07/17/2023    ALKPHOS 62 07/17/2023    BILITOT 0.4 07/17/2023     Albumin:   Albumin   Date Value Ref Range Status   07/17/2023 3.2 (L) 3.5 - 5.2 g/dL Final     Protein:   Total Protein   Date Value Ref Range Status   07/17/2023 6.0 6.0 - 8.4 g/dL Final     Lactic acid:   No results found for: LACTATE    Significant Imaging: I have reviewed all pertinent imaging results/findings within the past 24 hours.      I spent a total of 90 minutes on the day of the visit. This includes face to face time in discussion of goals of care, symptom assessment, coordination of care and emotional support.  This also includes non-face to face time preparing to see the patient (eg, review of tests/imaging), obtaining and/or reviewing separately obtained history, documenting clinical information in the electronic or other health record, independently interpreting results and communicating results to the patient/family/caregiver, or care coordinator.    Reba Jennings NP  Palliative Medicine  [facility]

## 2023-07-18 NOTE — PROGRESS NOTES
[facility] Medicine  Progress Note    Patient Name: Sophia Landis  MRN: [id number]  Patient Class: IP- Inpatient   Admission Date: 7/15/2023  Length of Stay: 3 days  Attending Physician: Pranav Paniagua MD  Primary Care Provider: Sarthak Johnson MD        Subjective:     Principal Problem:Ischemic stroke        HPI:  This is a 77-year-old female with a past medical history of CVA, NPH, hypertension, hyperlipidemia, peripheral artery disease, ANDRES on CPAP who presents with altered mental status.    Per the family, the patient has been declining over the last 2 weeks.  She was noted to be more confused, refusing to take her medications, had decreased p.o. intake associated with choking along with being less responsive and less conversational.  Her  noted that her left leg was more weak.  She has a history of stroke, a urine half ago after which she became wheelchair dependent and requires full assistance with her ADLs.    In the ED, the patient was hypertensive otherwise hemodynamically stable.  Labs were unremarkable.  CT head showed findings concerning for left parietal lobe recent infarction, stable enlargement of the ventricles out of proportion to the degree of sulcal atrophy (nonspecific but can be seen with normal pressure hydrocephalus) and involutional change with sequela of moderate to advanced chronic microvascular ischemic change and multifocal remote lacunar type infarcts.  Patient was given aspirin 300 mg, hydralazine 10 mg IV, 20 mg IV, and nitro paste.  She was admitted for further management.      Overview/Hospital Course:  Admitted with left parietal CVA.  Out of window for intervention. Neurology consulted. SLP/OT/OT consulted. Initially difficulty with swallowing and recommending NPO, possible will need alternative source of nutrition if no improvement. GI following in case PEG is needed. Palliative care consulted.  Some improvement and tolerating pureed.  ASA, Statin  and Plavix. PT/OT consulted and recommending SNF.      Interval History: slightly more interactive today.    Review of Systems   HENT:  Negative for ear discharge and ear pain.    Eyes:  Negative for discharge and itching.   Endocrine: Negative for cold intolerance and heat intolerance.   Neurological:  Negative for seizures and syncope.   Objective:     Vital Signs (Most Recent):  Temp: 98 °F (36.7 °C) (07/18/23 1140)  Pulse: 73 (07/18/23 1140)  Resp: 18 (07/18/23 1140)  BP: 134/60 (07/18/23 1140)  SpO2: 96 % (07/18/23 1140) Vital Signs (24h Range):  Temp:  [97.5 °F (36.4 °C)-98.5 °F (36.9 °C)] 98 °F (36.7 °C)  Pulse:  [67-83] 73  Resp:  [18-20] 18  SpO2:  [95 %-96 %] 96 %  BP: (123-157)/(55-80) 134/60     Weight: 48.1 kg (106 lb)  Body mass index is 21.41 kg/m².    Intake/Output Summary (Last 24 hours) at 7/18/2023 1357  Last data filed at 7/18/2023 0610  Gross per 24 hour   Intake --   Output 1200 ml   Net -1200 ml         Physical Exam  Vitals and nursing note reviewed.   Constitutional:       General: She is not in acute distress.     Appearance: She is not ill-appearing.   Cardiovascular:      Rate and Rhythm: Normal rate.      Pulses: Normal pulses.      Heart sounds: No murmur heard.  Pulmonary:      Effort: Pulmonary effort is normal. No respiratory distress.   Abdominal:      General: Abdomen is flat.      Palpations: Abdomen is soft.      Tenderness: There is no abdominal tenderness.   Musculoskeletal:      Right lower leg: No edema.      Left lower leg: No edema.   Skin:     General: Skin is warm.   Neurological:      Mental Status: She is alert.      Comments: Awake and alert.  Nods to some questions.  Does move head, eyes and left side spontaneously.  Right sided weakness           Significant Labs: All pertinent labs within the past 24 hours have been reviewed.  BMP:   Recent Labs   Lab 07/18/23  0551   GLU 84      K 3.5   *   CO2 22*   BUN 5*   CREATININE 0.7   CALCIUM 8.8     CBC:    Recent Labs   Lab 07/17/23  0602 07/18/23  0551   WBC 4.52 5.09   HGB 11.3* 11.3*   HCT 35.5* 34.1*    167       Significant Imaging: I have reviewed all pertinent imaging results/findings within the past 24 hours.      Assessment/Plan:      * Ischemic stroke  MRI confirming large left parietal stroke.  ASA, Statin and Plavix.  Appreciate Neurology input.  PT/OT/ST consulted.  Some improvement with ST and started on pureed diet.  Monitor oral intake.  GI already consulted for possible PEG.  Therapy recommending SNF.  SW/CM consulted.    ACP (advance care planning)  Palliative Care following.      NPH (normal pressure hydrocephalus)  Appears stable on CT. - CT head showed findings concerning for left parietal lobe recent infarction, stable enlargement of the ventricles out of proportion to the degree of sulcal atrophy (nonspecific but can be seen with normal pressure hydrocephalus) and involutional change with sequela of moderate to advanced chronic microvascular ischemic change and multifocal remote lacunar type infarcts.  Neurology consult - appreciate recommendations    PAD (peripheral artery disease)  History noted. No acute issues.       Mild recurrent major depression  Resume zoloft      Hyperlipidemia  Resume statin      Essential hypertension  No need for permissive HTN per Neuro.  Resume home medications.        VTE Risk Mitigation (From admission, onward)         Ordered     heparin (porcine) injection 5,000 Units  Every 8 hours         07/15/23 1903     IP VTE HIGH RISK PATIENT  Once         07/15/23 1903     Place sequential compression device  Until discontinued         07/15/23 1903                Discharge Planning   DANYA: 7/19/2023     Code Status: Full Code   Is the patient medically ready for discharge?:     Reason for patient still in hospital (select all that apply): Patient trending condition  Discharge Plan A: Home with family, Home Health (Previously with [facility]; palliatative  consult?)                  Pranav Paniagua MD  Department of Hospital Medicine   [facility] [place] [facility]

## 2023-07-18 NOTE — PLAN OF CARE
Problem: Physical Therapy  Goal: Physical Therapy Goal  Description: Goals to be met by:  7/30/2023      1. Pt/family education and training on safe transfers bed<>wheelchair.  2. Pt/family education on positioning in bed to decreased pressure areas.  3. Pt/family to demonstrate independence with ROM exs in supine.  4. Pt to tolerate 2 hrs OOB sitting.         Outcome: Ongoing, Not Progressing   Pt seen and goals updated.

## 2023-07-18 NOTE — PLAN OF CARE
07/18/23 1437   Post-Acute Status   Post-Acute Authorization Placement   Post-Acute Placement Status Pending payor medical review/second level review   Coverage [employer]   Patient choice form signed by patient/caregiver List from System Post-Acute Care   Discharge Delays None known at this time   Discharge Plan   Discharge Plan A Skilled Nursing Facility   Discharge Plan B Home with family

## 2023-07-18 NOTE — PLAN OF CARE
Problem: Occupational Therapy  Goal: Occupational Therapy Goal  Description: Goals to be met by: 7/30/23     Patient will increase functional independence with ADLs by performing:    UE Dressing with Maximal Assistance.  Pt will stand with Minimal Assistance for caregiver assistance for Toileting.   Sitting at edge of bed x25 minutes with Contact Guard Assistance.  Rolling to Bilateral with Minimal Assistance.   Supine to sit with Minimal Assistance.  Upper extremity exercise program x15 reps per handout, with assistance as needed.    Outcome: Ongoing     Pt with poor command following. Appears to have R visual neglect. Resisting RUE PROM then allowed some PROM with distractions. Non-purposeful LUE/LLE ROM, but not on command. Pt stood with total A and required total A for stand>sit 2* resistance.     OT rec HHOT with 24 hour care with maribel lift vs NH placement with OT services at d/c in order to increase safety with ADLs and mobility.

## 2023-07-18 NOTE — ASSESSMENT & PLAN NOTE
MRI confirming large left parietal stroke.  ASA, Statin and Plavix.  Appreciate Neurology input.  PT/OT/ST consulted.  Some improvement with ST and started on pureed diet.  Monitor oral intake.  GI already consulted for possible PEG.  Therapy recommending SNF.  SW/CM consulted.

## 2023-07-18 NOTE — PT/OT/SLP PROGRESS
Speech Language Pathology Treatment    Patient Name:  Sophia Landis   MRN:  [id number]  Admitting Diagnosis: Ischemic stroke    Recommendations:                 General Recommendations:  Dysphagia therapy and Cognitive-linguistic therapy  Diet recommendations:  Puree, Liquid Diet Level: Thin   Aspiration Precautions:  feed only when attending to spoon    General Precautions: Standard, aspiration  Communication strategies:  calm tone    Assessment:     Sophia Landis is a 77 y.o. female with dx of CVA she presents with oral dysphagia c/b decreased attention to bolus presentation and severe cognitive linguistic deficits c/b poor sustained attention, global aphasia.     Subjective   Pt's daughter in law reporting Pt is inconsistently accepting po. They are asking direct questions and requesting direct responds regarding ST's opinion regarding her prognosis of recovery of cognitive linguistic skills and attention to bolus. Given severity of this CVA, 2021 prior medical history of of at least moderate cognitive linguistic deficits, previous CVAs, oral aversion (Pt not allowing teeth to be brushed) at baseline, moderate to severe chronic microvascular changes, and lack of improvement from yesterday's session, prognosis for recovery of language and sustained attention is poor.   Patient goals: for  to be able to take care of patient at home    Pain/Comfort:  Pain Rating 1: 0/10    Respiratory Status: Room air    Objective:     Has the patient been evaluated by SLP for swallowing?   Yes  Keep patient NPO? No     Pt repositioned upright for trials of puree and thin liquids. Pt with X3 sips of thin liquids via straw before holding noted. Pt accepted X1 trial of puree with max verbal and tactile cuing. For second trial holding noted and persisted despite max tactile cuing. Family continues to consider long term medical plan.   Despite max cuing, pt did not respond to y/n questions, simple motoric commands. She  inconsistently smiled when smiled at and made eye contact in response to name ~60% of attempts. Extensive family education provided.     Goals:   Multidisciplinary Problems       SLP Goals          Problem: SLP    Goal Priority Disciplines Outcome   SLP Goal     SLP Ongoing, Not Progressing   Description: 1. Ms. Landis will participate in bedside swallow evaluation to determine safest and least restrictive diet.   2. Ms. Landis will participate in cognitive-linguistic assessment. (Goal met, 7/17)  3. Pt will tolerate puree with thin liquids without overt s/s of aspiration.   4. Pt will respond to name with eye contact 3 of 5 prompts.  5. Pt will perform simple motoric commands given model and tactile stimulation with 30% accuracy.                          Plan:     Patient to be seen:  3 x/week   Plan of Care expires:   7/30  Plan of Care reviewed with:  spouse, son, other (see comments) (daughter in law)   SLP Follow-Up:  Yes       Discharge recommendations:    TBD  Barriers to Discharge:  Level of Skilled Assistance Needed .    Time Tracking:     SLP Treatment Date:   07/18/23  Speech Start Time:  1307  Speech Stop Time:  1407     Speech Total Time (min):  60 min    Billable Minutes: Speech Therapy Individual 10, swallow 10, Self care 40 minutes    07/18/2023

## 2023-07-18 NOTE — PT/OT/SLP PROGRESS
Physical Therapy      Patient Name:  Sophia Landis   MRN:  [id number]    PT to recommend home with HHPT with 24 hr care vs NH placement.

## 2023-07-18 NOTE — PLAN OF CARE
Problem: Adult Inpatient Plan of Care  Goal: Plan of Care Review  Outcome: Ongoing, Progressing  Flowsheets (Taken 7/18/2023 1839)  Plan of Care Reviewed With: spouse     Problem: Adult Inpatient Plan of Care  Goal: Absence of Hospital-Acquired Illness or Injury  Intervention: Identify and Manage Fall Risk  Flowsheets (Taken 7/18/2023 1839)  Safety Promotion/Fall Prevention:   assistive device/personal item within reach   bed alarm set   nonskid shoes/socks when out of bed   medications reviewed   side rails raised x 3   supervised activity   instructed to call staff for mobility

## 2023-07-18 NOTE — PLAN OF CARE
Problem: Adjustment to Illness (Stroke, Ischemic/Transient Ischemic Attack)  Goal: Optimal Coping  Outcome: Ongoing, Progressing     Problem: Cerebral Tissue Perfusion (Stroke, Ischemic/Transient Ischemic Attack)  Goal: Optimal Cerebral Tissue Perfusion  Outcome: Ongoing, Progressing     Problem: Cognitive Impairment (Stroke, Ischemic/Transient Ischemic Attack)  Goal: Optimal Cognitive Function  Outcome: Ongoing, Progressing     Problem: Communication Impairment (Stroke, Ischemic/Transient Ischemic Attack)  Goal: Improved Communication Skills  Outcome: Ongoing, Progressing     Problem: Skin Injury Risk Increased  Goal: Skin Health and Integrity  Outcome: Ongoing, Progressing

## 2023-07-18 NOTE — PT/OT/SLP PROGRESS
Physical Therapy Treatment    Patient Name:  Sophia Landis   MRN:  [id number]    Recommendations:     Discharge Recommendations: home, nursing facility, basic (home with 24 hr care vs NH placement)  Discharge Equipment Recommendations: none      Assessment:     Sophia Landis is a 77 y.o. female admitted with a medical diagnosis of Ischemic stroke.  She presents with the following impairments/functional limitations: weakness, impaired endurance, impaired functional mobility, impaired self care skills, gait instability, impaired cognition, decreased upper extremity function, decreased lower extremity function, impaired skin.    Rehab Prognosis: Poor; patient would benefit from acute skilled PT services to address these deficits and reach maximum level of function.    Recent Surgery: * No surgery found *      Plan:     During this hospitalization, patient to be seen 3 x/week to address the identified rehab impairments via therapeutic activities, therapeutic exercises and progress toward the following goals:    Plan of Care Expires:  07/30/23    Subjective     Chief Complaint: None; pt non-verbal  Patient/Family Comments/goals: Spoke with , expressed desire to be able to get pt to wheelchair a few hours a day.  Pain/Comfort:  Pain Rating 1: 0/10      Objective:     Communicated with nurseMaryann prior to session.  Patient found supine with Fredrick, telemetry upon PT entry to room.     General Precautions: Standard, fall  Orthopedic Precautions: N/A  Braces: N/A  Respiratory Status: Room air     Functional Mobility:  Bed Mobility:     Supine to Sit: total assistance  Sit to Supine: total assistance  Balance: poor sitting EOB; TA      AM-PAC 6 CLICK MOBILITY  Turning over in bed (including adjusting bedclothes, sheets and blankets)?: 1  Sitting down on and standing up from a chair with arms (e.g., wheelchair, bedside commode, etc.): 1  Moving from lying on back to sitting on the side of the bed?: 2  Moving to and  from a bed to a chair (including a wheelchair)?: 1  Need to walk in hospital room?: 1  Climbing 3-5 steps with a railing?: 1  Basic Mobility Total Score: 7       Treatment & Education:  Educated spouse on role of PT and POC; PT to focus on education and safe transfers.    Patient left supine with bed alarm on,  present, and positioning boots on .    GOALS:   Multidisciplinary Problems       Physical Therapy Goals          Problem: Physical Therapy    Goal Priority Disciplines Outcome Goal Variances Interventions   Physical Therapy Goal     PT, PT/OT Ongoing, Not Progressing     Description: Goals to be met by:  7/30/2023      1. Pt/family education and training on safe transfers bed<>wheelchair.  2. Pt/family education on positioning in bed to decreased pressure areas.  3. Pt/family to demonstrate independence with ROM exs in supine.  4. Pt to tolerate 2 hrs OOB sitting.                              Time Tracking:     PT Received On: 07/18/23  PT Start Time: 1806     PT Stop Time: 1824  PT Total Time (min): 18 min     Billable Minutes: Therapeutic Activity 18    Treatment Type: Treatment  PT/PTA: PT     Number of PTA visits since last PT visit: 0     07/18/2023

## 2023-07-18 NOTE — PROGRESS NOTES
[facility] - Telemetry  Neurology  Progress Note    Patient Name: Sophia Landis  MRN: [id number]  Admission Date: 7/15/2023  Hospital Length of Stay: 3 days  Code Status: Full Code   Attending Provider: Pranav Paniagua MD  Primary Care Physician: Sarthak Johnson MD   Principal Problem:Ischemic stroke    Subjective:     Interval History: 78 y/o female with medical Hx of stroke, HTN, hyperlipidemia, PAD was brought to ED due change in mental status. Family states that pt stopped talking and has not been eating for several days. Family also notes weakness on right side. Pt is dependent is many ADL's at baseline.     -7/17/23: No acute issues overnight.    -7/18/23: No new symptoms.    Current Neurological Medications:     Current Facility-Administered Medications   Medication Dose Route Frequency Provider Last Rate Last Admin    acetaminophen tablet 650 mg  650 mg Oral Q8H PRN Dave Victor MD        acetaminophen tablet 650 mg  650 mg Oral Q4H PRN Dave Victor MD        albuterol-ipratropium 2.5 mg-0.5 mg/3 mL nebulizer solution 3 mL  3 mL Nebulization Q4H PRN Dave Victor MD        aluminum-magnesium hydroxide-simethicone 200-200-20 mg/5 mL suspension 30 mL  30 mL Oral QID PRN Dave Victor MD        aspirin EC tablet 81 mg  81 mg Oral Daily Dave Victor MD        atorvastatin tablet 40 mg  40 mg Oral Daily Dave Victor MD        bisacodyL suppository 10 mg  10 mg Rectal Daily PRN Dave Victor MD        clopidogreL tablet 75 mg  75 mg Oral Daily Pranav Paniagua MD        dextrose 50% injection 12.5 g  12.5 g Intravenous PRN Dave Victor MD        dextrose 50% injection 25 g  25 g Intravenous PRN Dave Victor MD        glucagon (human recombinant) injection 1 mg  1 mg Intramuscular PRN Dave Victor MD        glucose chewable tablet 16 g  16 g Oral PRN Dave Victor MD        glucose chewable tablet 24 g  24 g Oral PRN Dave Victor MD        heparin (porcine) injection 5,000 Units  5,000 Units Subcutaneous Q8H Dave  MD Valente   5,000 Units at 07/18/23 1436    labetaloL injection 10 mg  10 mg Intravenous Q15 Min PRN Dave Victor MD        magnesium oxide tablet 800 mg  800 mg Oral PRN Dave Victor MD        magnesium oxide tablet 800 mg  800 mg Oral PRN Dave Victor MD        melatonin tablet 6 mg  6 mg Oral Nightly PRN Dave Victor MD        metoprolol tartrate (LOPRESSOR) tablet 25 mg  25 mg Oral BID Pranav Paniagua MD        naloxone 0.4 mg/mL injection 0.02 mg  0.02 mg Intravenous PRN Dave Victor MD        ondansetron injection 4 mg  4 mg Intravenous Q8H PRN Dave Victor MD        polyethylene glycol packet 17 g  17 g Oral Daily Dave Victor MD        potassium bicarbonate disintegrating tablet 35 mEq  35 mEq Oral PRN Dave Victor MD        potassium bicarbonate disintegrating tablet 50 mEq  50 mEq Oral PRN Dave Victor MD        potassium bicarbonate disintegrating tablet 60 mEq  60 mEq Oral PRN Dave Victor MD        potassium, sodium phosphates 280-160-250 mg packet 2 packet  2 packet Oral PRN Dave Victor MD        potassium, sodium phosphates 280-160-250 mg packet 2 packet  2 packet Oral PRN Dave Victor MD        potassium, sodium phosphates 280-160-250 mg packet 2 packet  2 packet Oral PRN Dave Victor MD        prochlorperazine injection Soln 5 mg  5 mg Intravenous Q6H PRN Dave Victor MD        sertraline tablet 100 mg  100 mg Oral Daily Dave Victor MD        simethicone chewable tablet 80 mg  1 tablet Oral QID PRN Dave Victor MD        sodium chloride 0.9% bolus 500 mL 500 mL  500 mL Intravenous Continuous PRN Dave Victor MD        sodium chloride 0.9% flush 10 mL  10 mL Intravenous PRN Dave Victor MD        sodium chloride 0.9% flush 10 mL  10 mL Intravenous Q12H PRN Dave Victor MD           Review of Systems   Unable to perform ROS: Patient nonverbal   Objective:     Vital Signs (Most Recent):  Temp: 97.8 °F (36.6 °C) (07/18/23 1601)  Pulse: 72 (07/18/23 1601)  Resp: 18 (07/18/23 1601)  BP: 127/85  (07/18/23 1601)  SpO2: (!) 94 % (07/18/23 1601) Vital Signs (24h Range):  Temp:  [97.5 °F (36.4 °C)-98.5 °F (36.9 °C)] 97.8 °F (36.6 °C)  Pulse:  [67-83] 72  Resp:  [18-20] 18  SpO2:  [94 %-96 %] 94 %  BP: (123-157)/(55-85) 127/85     Weight: 48.1 kg (106 lb)  Body mass index is 21.41 kg/m².    Physical Exam  Constitutional:       General: She is not in acute distress.  HENT:      Head: Normocephalic.   Eyes:      General:         Right eye: No discharge.         Left eye: No discharge.   Cardiovascular:      Rate and Rhythm: Normal rate.   Pulmonary:      Breath sounds: Normal breath sounds.   Abdominal:      Palpations: Abdomen is soft.   Musculoskeletal:         General: No swelling.      Cervical back: Neck supple.   Skin:     Findings: No rash.         NEUROLOGICAL EXAMINATION:      MENTAL STATUS   Level of consciousness: alert       Nonverbal      CRANIAL NERVES      CN III, IV, VI   Right pupil: Size: 2 mm. Shape: regular.   Left pupil: Size: 2 mm. Shape: regular.   Nystagmus: none      CN VII   Right facial weakness: none  Left facial weakness: none     CN XII   Tongue deviation: none     MOTOR EXAM        Hypoactive on right side but able to partially elevate RUE and RLE  AROM of LUE and RUE against gravity      SENSORY EXAM   Left arm light touch: normal       Grimaces to noxious stimulation of UE's and LE's         Significant Labs: CBC:   Recent Labs   Lab 07/18/23  0551   WBC 5.09   HGB 11.3*   HCT 34.1*        CMP:   Recent Labs   Lab 07/18/23  0551   GLU 84      K 3.5   *   CO2 22*   BUN 5*   CREATININE 0.7   CALCIUM 8.8   PROT 5.9*   ALBUMIN 3.2*   BILITOT 0.5   ALKPHOS 64   AST 19   ALT 12   ANIONGAP 6*       Significant Imaging: I have reviewed all pertinent imaging results/findings within the past 24 hours.    Impression:     Large area of acute infarction in the left parietal lobe.  No evidence of hemorrhagic conversion or significant midline shift.     Unchanged  ventriculomegaly.     Motion limited examination.     This report was flagged in Epic as abnormal.        Electronically signed by: Curt Stock MD  Date:                                            07/18/2023  Time:                                           11:44    Assessment and Plan:     76 y/o female with acute stroke     Left parietal stroke: acute infarction of mod size on left MCA branch. Pt with aphasia and right hemiparesis. Pt is several days out for which no thrombolytics or intervention are granted.  Treat BP  ASA 81 mg daily. Clopidogrel 75 mg daily.  ST/OT/PT.     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

## 2023-07-19 PROCEDURE — 99233 PR SUBSEQUENT HOSPITAL CARE,LEVL III: ICD-10-PCS | Mod: HCNC,,, | Performed by: NURSE PRACTITIONER

## 2023-07-19 PROCEDURE — 25000003 PHARM REV CODE 250: Mod: HCNC | Performed by: HOSPITALIST

## 2023-07-19 PROCEDURE — 63600175 PHARM REV CODE 636 W HCPCS: Mod: HCNC | Performed by: STUDENT IN AN ORGANIZED HEALTH CARE EDUCATION/TRAINING PROGRAM

## 2023-07-19 PROCEDURE — 99497 PR ADVNCD CARE PLAN 30 MIN: ICD-10-PCS | Mod: HCNC,,, | Performed by: NURSE PRACTITIONER

## 2023-07-19 PROCEDURE — 99233 SBSQ HOSP IP/OBS HIGH 50: CPT | Mod: HCNC,,, | Performed by: NURSE PRACTITIONER

## 2023-07-19 PROCEDURE — 25000003 PHARM REV CODE 250: Mod: HCNC | Performed by: STUDENT IN AN ORGANIZED HEALTH CARE EDUCATION/TRAINING PROGRAM

## 2023-07-19 PROCEDURE — 99232 PR SUBSEQUENT HOSPITAL CARE,LEVL II: ICD-10-PCS | Mod: HCNC,,, | Performed by: PSYCHIATRY & NEUROLOGY

## 2023-07-19 PROCEDURE — 21400001 HC TELEMETRY ROOM: Mod: HCNC

## 2023-07-19 PROCEDURE — 94761 N-INVAS EAR/PLS OXIMETRY MLT: CPT | Mod: HCNC

## 2023-07-19 PROCEDURE — 99497 ADVNCD CARE PLAN 30 MIN: CPT | Mod: HCNC,,, | Performed by: NURSE PRACTITIONER

## 2023-07-19 PROCEDURE — 99232 SBSQ HOSP IP/OBS MODERATE 35: CPT | Mod: HCNC,,, | Performed by: PSYCHIATRY & NEUROLOGY

## 2023-07-19 RX ORDER — DRONABINOL 2.5 MG/1
2.5 CAPSULE ORAL 2 TIMES DAILY
Status: DISCONTINUED | OUTPATIENT
Start: 2023-07-19 | End: 2023-07-20 | Stop reason: HOSPADM

## 2023-07-19 RX ADMIN — HEPARIN SODIUM 5000 UNITS: 5000 INJECTION INTRAVENOUS; SUBCUTANEOUS at 06:07

## 2023-07-19 RX ADMIN — HEPARIN SODIUM 5000 UNITS: 5000 INJECTION INTRAVENOUS; SUBCUTANEOUS at 02:07

## 2023-07-19 RX ADMIN — HEPARIN SODIUM 5000 UNITS: 5000 INJECTION INTRAVENOUS; SUBCUTANEOUS at 09:07

## 2023-07-19 NOTE — ASSESSMENT & PLAN NOTE
"Palliative Care following.  Discussed options with patient's  today.  PEG tube with SNF vs Hospice.  Having some trouble trying to figure out what  wants.  He does not want SNF because "she is not going to get better".  Sounds like he is looking more towards hospice care, but still thinking about PEG placement.  Continue goals of care discussions.    "

## 2023-07-19 NOTE — NURSING
[facility] [place]  Nurses Note -- 4 Eyes      7/19/2023       Skin assessed on: Q Shift      [x] No Pressure Injuries Present    []Prevention Measures Documented    [] Yes LDA  for Pressure Injury Previously documented     [] Yes New Pressure Injury Discovered   [] LDA for New Pressure Injury Added      Attending RN:  Jayashree Potter LPN     Second RN:  DIANA Chow

## 2023-07-19 NOTE — SUBJECTIVE & OBJECTIVE
Interval History: patient sleeping but easily aroused. Opens eyes, nonverbal    Medications:  Continuous Infusions:   sodium chloride 0.9%       Scheduled Meds:   aspirin  81 mg Oral Daily    atorvastatin  40 mg Oral Daily    clopidogreL  75 mg Oral Daily    droNABinol  2.5 mg Oral BID    heparin (porcine)  5,000 Units Subcutaneous Q8H    metoprolol tartrate  25 mg Oral BID    polyethylene glycol  17 g Oral Daily    sertraline  100 mg Oral Daily     PRN Meds:acetaminophen, acetaminophen, albuterol-ipratropium, aluminum-magnesium hydroxide-simethicone, bisacodyL, dextrose 50%, dextrose 50%, glucagon (human recombinant), glucose, glucose, labetaloL, magnesium oxide, magnesium oxide, melatonin, naloxone, ondansetron, potassium bicarbonate, potassium bicarbonate, potassium bicarbonate, potassium, sodium phosphates, potassium, sodium phosphates, potassium, sodium phosphates, prochlorperazine, simethicone, sodium chloride 0.9%, sodium chloride 0.9%, sodium chloride 0.9%    Objective:     Vital Signs (Most Recent):  Temp: 98.4 °F (36.9 °C) (07/19/23 1120)  Pulse: 68 (07/19/23 1120)  Resp: 19 (07/19/23 1120)  BP: (!) 105/56 (07/19/23 1120)  SpO2: 95 % (07/19/23 1120) Vital Signs (24h Range):  Temp:  [97.4 °F (36.3 °C)-98.6 °F (37 °C)] 98.4 °F (36.9 °C)  Pulse:  [68-77] 68  Resp:  [18-20] 19  SpO2:  [94 %-96 %] 95 %  BP: (105-173)/(56-85) 105/56     Weight: 48.1 kg (106 lb)  Body mass index is 21.41 kg/m².       Physical Exam  Vitals and nursing note reviewed.   Constitutional:       General: She is not in acute distress.     Appearance: She is ill-appearing. She is not toxic-appearing or diaphoretic.   HENT:      Head: Normocephalic and atraumatic.      Right Ear: External ear normal.      Left Ear: External ear normal.      Nose: Nose normal.      Mouth/Throat:      Mouth: Mucous membranes are dry.   Eyes:      General:         Right eye: No discharge.         Left eye: No discharge.   Cardiovascular:      Rate and Rhythm:  Normal rate and regular rhythm.   Pulmonary:      Effort: Pulmonary effort is normal.   Abdominal:      Palpations: Abdomen is soft.   Skin:     General: Skin is warm and dry.   Neurological:      Motor: Weakness present.      Comments: Opens eyes, tracks          Review of Symptoms      Symptom Assessment (ESAS 0-10 Scale)  Pain:  0  Dyspnea:  0  Anxiety:  0  Nausea:  0  Depression:  0  Anorexia:  0  Fatigue:  0  Insomnia:  0  Restlessness:  0  Agitation:  0  Unable to complete assessment due to Patient nonverbal         Pain Assessment in Advanced Demential Scale (PAINAD)   Breathing - Independent of vocalization:  0  Negative vocalization:  0  Facial expression:  0  Body language:  0  Consolability:  0  Total:  0    Performance Status:  30    Functional Assessment Scale (FAST):  7c    Living Arrangements:  Lives with spouse    Psychosocial/Cultural:   See Palliative Psychosocial Note: No  **Primary  to Follow**  Palliative Care  Consult: No      Advance Care Planning   Advance Directives:     Decision Making:  Family answered questions and Patient unable to communicate due to disease severity/cognitive impairment  Goals of Care: The family endorses that what is most important right now is to focus on spending time at home, avoiding the hospital, quality of life, even if it means sacrificing a little time, and comfort and QOL     Accordingly, we have decided that the best plan to meet the patient's goals includes enrolling in hospice care         Significant Labs: All pertinent labs within the past 24 hours have been reviewed.  CBC:   Recent Labs   Lab 07/18/23  0551   WBC 5.09   HGB 11.3*   HCT 34.1*   MCV 81*        BMP:  No results for input(s): GLU, NA, K, CL, CO2, BUN, CREATININE, CALCIUM, MG in the last 24 hours.  LFT:  Lab Results   Component Value Date    AST 19 07/18/2023    ALKPHOS 64 07/18/2023    BILITOT 0.5 07/18/2023     Albumin:   Albumin   Date Value Ref Range  Status   07/18/2023 3.2 (L) 3.5 - 5.2 g/dL Final     Protein:   Total Protein   Date Value Ref Range Status   07/18/2023 5.9 (L) 6.0 - 8.4 g/dL Final     Lactic acid:   No results found for: LACTATE    Significant Imaging:  nothing new

## 2023-07-19 NOTE — PT/OT/SLP PROGRESS
Occupational Therapy      Patient Name:  Sophia Landis   MRN:  [id number]    JOHN Geller, canceled OT orders. Per chart, hospice is plan A for d/c. Pt is appropriate for level 1 progressive mobility with nursing staff. Thank you.     7/19/2023

## 2023-07-19 NOTE — PLAN OF CARE
Problem: Adult Inpatient Plan of Care  Goal: Plan of Care Review  Outcome: Ongoing, Progressing  Goal: Patient-Specific Goal (Individualized)  Outcome: Ongoing, Progressing  Goal: Absence of Hospital-Acquired Illness or Injury  Outcome: Ongoing, Progressing  Goal: Optimal Comfort and Wellbeing  Outcome: Ongoing, Progressing  Goal: Readiness for Transition of Care  Outcome: Ongoing, Progressing     Problem: Adjustment to Illness (Stroke, Ischemic/Transient Ischemic Attack)  Goal: Optimal Coping  Outcome: Ongoing, Progressing     Problem: Bowel Elimination Impaired (Stroke, Ischemic/Transient Ischemic Attack)  Goal: Effective Bowel Elimination  Outcome: Ongoing, Progressing     Problem: Cerebral Tissue Perfusion (Stroke, Ischemic/Transient Ischemic Attack)  Goal: Optimal Cerebral Tissue Perfusion  Outcome: Ongoing, Progressing     Problem: Cognitive Impairment (Stroke, Ischemic/Transient Ischemic Attack)  Goal: Optimal Cognitive Function  Outcome: Ongoing, Progressing     Problem: Communication Impairment (Stroke, Ischemic/Transient Ischemic Attack)  Goal: Improved Communication Skills  Outcome: Ongoing, Progressing     Problem: Functional Ability Impaired (Stroke, Ischemic/Transient Ischemic Attack)  Goal: Optimal Functional Ability  Outcome: Ongoing, Progressing     Problem: Respiratory Compromise (Stroke, Ischemic/Transient Ischemic Attack)  Goal: Effective Oxygenation and Ventilation  Outcome: Ongoing, Progressing     Problem: Sensorimotor Impairment (Stroke, Ischemic/Transient Ischemic Attack)  Goal: Improved Sensorimotor Function  Outcome: Ongoing, Progressing     Problem: Swallowing Impairment (Stroke, Ischemic/Transient Ischemic Attack)  Goal: Optimal Eating and Swallowing without Aspiration  Outcome: Ongoing, Progressing     Problem: Infection  Goal: Absence of Infection Signs and Symptoms  Outcome: Ongoing, Progressing     Problem: Urinary Elimination Impaired (Stroke, Ischemic/Transient Ischemic  Attack)  Goal: Effective Urinary Elimination  Outcome: Ongoing, Progressing

## 2023-07-19 NOTE — PLAN OF CARE
Pt's family has chose [facility]. MD notified.     07/19/23 4950   Discharge Reassessment   Assessment Type Discharge Planning Reassessment   Did the patient's condition or plan change since previous assessment? Yes   Discharge Plan discussed with: Spouse/sig other   Name(s) and Number(s) Clark- spouse [phone]   Discharge Plan A Hospice/home   Discharge Plan B Home with family   DME Needed Upon Discharge  none   Transition of Care Barriers None   Post-Acute Status   Post-Acute Authorization Hospice   Post-Acute Placement Status Set-up Complete/Auth obtained   Hospice Status Set-up Complete/Auth obtained   Coverage [employer]   Discharge Delays None known at this time

## 2023-07-19 NOTE — NURSING
Report given to RN Patient resting quietly, no apparent distress noted at this time. Wheels locked in lowest position, call light within reach, Telemetry monitoring in place.

## 2023-07-19 NOTE — PLAN OF CARE
07/19/23 1511   Post-Acute Status   Post-Acute Authorization Hospice   Hospice Status Set-up Complete/Auth obtained   Coverage [employer]   Discharge Delays None known at this time   Discharge Plan   Discharge Plan A Hospice/home   Discharge Plan B Home with family     Pt 's family has chose [facility].

## 2023-07-19 NOTE — ASSESSMENT & PLAN NOTE
MRI confirming large left parietal stroke.  ASA, Statin and Plavix.  Appreciate Neurology input.  PT/OT/ST consulted.  Some improvement with ST and started on pureed diet. Minimal oral intake.  Start appetite stimulant.  Therapy recommending SNF.  SW/CM consulted.

## 2023-07-19 NOTE — PROGRESS NOTES
"[facility] [place]  Palliative Medicine  Progress Note    Patient Name: Sophia Landis  MRN: [id number]  Admission Date: 7/15/2023  Hospital Length of Stay: 4 days  Code Status: Full Code   Attending Provider: Pranav Paniagua MD  Consulting Provider: Duyen Faulkner NP  Primary Care Physician: Sarthak Johnson MD  Principal Problem:Ischemic stroke    Patient information was obtained from spouse/SO, relative(s), past medical records, ER records and primary team.      Assessment/Plan:   Palliative encounter:  Advance Care Planning   -interval chart reviewed  -discussed with Dr Paniagua HM via secure chat; unclear regarding family decision on disposition and PEG   -f/u today. See previous ACP notes below per my colleague Reba Jennings  -at time of visit patient's spouse Clark and daughter in law at bedside.   -discussed patient's current clinical condition, vascular dementia, nutritional status, PEG pros and cons and nutrition at EOL, and how hospice can offer the patient the best QOL at this time in her illness while also assisting family. Patient's spouse was reluctant to take hospice as he thought it meant that she was dying now. We did discuss criteria for hospice and progression of disease and anticipated outcomes over the coming weeks to months now that patient is taking little to no PO. He does not want a PEG.  -addressed all questions and concerns and he is agreeable to home hospice.  -emotional support provided  -updated Dr Paniagua and SHERIE Pinzon      Palliative Care  ACP (advance care planning)  7/17/2023 - Consult  Reba Jennings NP  - consult received; interval chart reviewed in detail; discussed pt with hospital primary   - met with patient,  (Clark), daughter in law, and "family member" at bedside; introduction to palliative medicine team and role in current care and admission;  does not recall previous palliative involvement in care   - learned more about pt outside of current " admission; Clark is pt's primary caregiver at home; she has suffered multiple strokes and with each had increased declines both physically and cognitively, with significant changes in the past month; pt now non-verbal, requiring max assist with ADLs and not eating and drinking  - discussed current full code status, potential interventions and situations involved, and potential risks and outcomes associated; discussed difference between full code and DNR; discussed care that would be provided at the end of life if DNR is elected;  wishes for pt to remain full code, but if she were to require life support in the future would wish for time limited trial  - GOC/ACP discussion; initially 's GOC were for rehab and PEG; following in some improvements during SLP visit and in depth discussion/education of trajectory of strokes, dementia, and treatments being offered, GOC are to continue to allow for SLP therapy and assess pt's ability to tolerate PO for a few days and readdressing needs/wishes regarding nutrition; end of life nutrition education also provided for informed decision making in this process   - discussed pt's appropriateness for hospice if/when aligns with GOC; discussed hospice, philosophy of care, and general overview of services provided for home hospice care   -  and family were appreciate of the education and information provided; all said a lot of this information was new to them given pt's recent decline; encouraged family to let palliative team and primary team know if any questions arise about information provided and availability to re-discuss as pt's condition is re-assessed over the next few days   -  does express that he wants pt to stay with him as long as she can as they have been  for 50+ years, but overall does not wish to see her suffer if he feels she has a poor quality of life in the future; he is hopeful for some improvement   - emotional support provided  "  - Allowed time for questions/concerns; all addressed; expressed availability of myself/palliative team for additional questions/concerns      Neuro  * Ischemic stroke  - pt with history of strokes   - admitted following AMS/decline over the past month, including becoming nonverbal and not eating for several days prior to admit   - pt is bed bound, max assist, and requires assistance with all ADLS  - neurology consulted/following   - prior to palliative consult pt's /family wishing for PEG/rehab if no improvements; pt worked with SLP prior to consult and was able to tolerate some PO; family wishes to continue to assess progress/improvements before proceeding with PEG   - in depth discussion/education with pt's , daughter in law, and family memeber of strokes and dementia including trajectory of pt's condition and trajectory/risks of treatments being offered such as PEG to assist in GOC/ACP  - FAST Score 7C at minimum and PPS of approx 30%; hospice appropriate if/when aligns with GOC   - noted; continued management per hospital primary      NPH (normal pressure hydrocephalus)  - neurology following   - noted; management per hospital primary and neurology      Psychiatric  Mild recurrent major depression  - currently on Zoloft   - noted; management per hospital primary           Subjective:     Chief Complaint:   Chief Complaint   Patient presents with    Altered Mental Status     Pt reports to the ED with C/O dysuria and AMS x 1 month. Pt was Dx with a UTI last month. Pt did not finish AEB. Pt having more frequent urination. Pt has been more lethargic and less verbal then normal. Pt has a Pmhx of a CVA. No new neuro deficits. Pt placed in [facility] bed for eval.        HPI:   From H&P: "This is a 77-year-old female with a past medical history of CVA, NPH, hypertension, hyperlipidemia, peripheral artery disease, ANDRES on CPAP who presents with altered mental status.     Per the family, the patient has been " declining over the last 2 weeks.  She was noted to be more confused, refusing to take her medications, had decreased p.o. intake associated with choking along with being less responsive and less conversational.  Her  noted that her left leg was more weak.  She has a history of stroke, a urine half ago after which she became wheelchair dependent and requires full assistance with her ADLs.     Palliative medicine consulted for goals of care discussion and advance care planning; for details of visit, see advance care planning section of plan.       Hospital Course:  No notes on file    Interval History: patient sleeping but easily aroused. Opens eyes, nonverbal    Medications:  Continuous Infusions:   sodium chloride 0.9%       Scheduled Meds:   aspirin  81 mg Oral Daily    atorvastatin  40 mg Oral Daily    clopidogreL  75 mg Oral Daily    droNABinol  2.5 mg Oral BID    heparin (porcine)  5,000 Units Subcutaneous Q8H    metoprolol tartrate  25 mg Oral BID    polyethylene glycol  17 g Oral Daily    sertraline  100 mg Oral Daily     PRN Meds:acetaminophen, acetaminophen, albuterol-ipratropium, aluminum-magnesium hydroxide-simethicone, bisacodyL, dextrose 50%, dextrose 50%, glucagon (human recombinant), glucose, glucose, labetaloL, magnesium oxide, magnesium oxide, melatonin, naloxone, ondansetron, potassium bicarbonate, potassium bicarbonate, potassium bicarbonate, potassium, sodium phosphates, potassium, sodium phosphates, potassium, sodium phosphates, prochlorperazine, simethicone, sodium chloride 0.9%, sodium chloride 0.9%, sodium chloride 0.9%    Objective:     Vital Signs (Most Recent):  Temp: 98.4 °F (36.9 °C) (07/19/23 1120)  Pulse: 68 (07/19/23 1120)  Resp: 19 (07/19/23 1120)  BP: (!) 105/56 (07/19/23 1120)  SpO2: 95 % (07/19/23 1120) Vital Signs (24h Range):  Temp:  [97.4 °F (36.3 °C)-98.6 °F (37 °C)] 98.4 °F (36.9 °C)  Pulse:  [68-77] 68  Resp:  [18-20] 19  SpO2:  [94 %-96 %] 95 %  BP:  (105-173)/(56-85) 105/56     Weight: 48.1 kg (106 lb)  Body mass index is 21.41 kg/m².       Physical Exam  Vitals and nursing note reviewed.   Constitutional:       General: She is not in acute distress.     Appearance: She is ill-appearing. She is not toxic-appearing or diaphoretic.   HENT:      Head: Normocephalic and atraumatic.      Right Ear: External ear normal.      Left Ear: External ear normal.      Nose: Nose normal.      Mouth/Throat:      Mouth: Mucous membranes are dry.   Eyes:      General:         Right eye: No discharge.         Left eye: No discharge.   Cardiovascular:      Rate and Rhythm: Normal rate and regular rhythm.   Pulmonary:      Effort: Pulmonary effort is normal.   Abdominal:      Palpations: Abdomen is soft.   Skin:     General: Skin is warm and dry.   Neurological:      Motor: Weakness present.      Comments: Opens eyes, tracks          Review of Symptoms      Symptom Assessment (ESAS 0-10 Scale)  Pain:  0  Dyspnea:  0  Anxiety:  0  Nausea:  0  Depression:  0  Anorexia:  0  Fatigue:  0  Insomnia:  0  Restlessness:  0  Agitation:  0  Unable to complete assessment due to Patient nonverbal         Pain Assessment in Advanced Demential Scale (PAINAD)   Breathing - Independent of vocalization:  0  Negative vocalization:  0  Facial expression:  0  Body language:  0  Consolability:  0  Total:  0    Performance Status:  30    Functional Assessment Scale (FAST):  7c    Living Arrangements:  Lives with spouse    Psychosocial/Cultural:   See Palliative Psychosocial Note: No  **Primary  to Follow**  Palliative Care  Consult: No      Advance Care Planning   Advance Directives:     Decision Making:  Family answered questions and Patient unable to communicate due to disease severity/cognitive impairment  Goals of Care: The family endorses that what is most important right now is to focus on spending time at home, avoiding the hospital, quality of life, even if it means  sacrificing a little time, and comfort and QOL     Accordingly, we have decided that the best plan to meet the patient's goals includes enrolling in hospice care         Significant Labs: All pertinent labs within the past 24 hours have been reviewed.  CBC:   Recent Labs   Lab 07/18/23  0551   WBC 5.09   HGB 11.3*   HCT 34.1*   MCV 81*        BMP:  No results for input(s): GLU, NA, K, CL, CO2, BUN, CREATININE, CALCIUM, MG in the last 24 hours.  LFT:  Lab Results   Component Value Date    AST 19 07/18/2023    ALKPHOS 64 07/18/2023    BILITOT 0.5 07/18/2023     Albumin:   Albumin   Date Value Ref Range Status   07/18/2023 3.2 (L) 3.5 - 5.2 g/dL Final     Protein:   Total Protein   Date Value Ref Range Status   07/18/2023 5.9 (L) 6.0 - 8.4 g/dL Final     Lactic acid:   No results found for: LACTATE    Significant Imaging:  nothing new       20 min ACP time spent in goals of care, emotional support, formulating and communicating prognosis, exploring burden/benefit of various approaches of treatment.      >50% of 35 mins spent in chart review, face to face discussion, symptom assessment, coordination of care with other specialists and d/c planning.    Total time 55 mins    Duyen Faulkner NP  Palliative Medicine  [facility] [place] [facility]

## 2023-07-19 NOTE — NURSING
[facility] [place]  Nurses Note -- 4 Eyes      7/19/2023       Skin assessed on: Q Shift      [x] No Pressure Injuries Present    [x]Prevention Measures Documented    [] Yes LDA  for Pressure Injury Previously documented     [] Yes New Pressure Injury Discovered   [] LDA for New Pressure Injury Added      Attending RN:  Claudine Sun LPN     Second RN:  Maryann Salazar LPN

## 2023-07-19 NOTE — PROGRESS NOTES
[facility]  Progress Note    Patient Name: Sophia Landis  MRN: [id number]  Patient Class: IP- Inpatient   Admission Date: 7/15/2023  Length of Stay: 4 days  Attending Physician: Pranav Paniagua MD  Primary Care Provider: Sarthak Johnson MD        Subjective:     Principal Problem:Ischemic stroke        HPI:  This is a 77-year-old female with a past medical history of CVA, NPH, hypertension, hyperlipidemia, peripheral artery disease, ANDRES on CPAP who presents with altered mental status.    Per the family, the patient has been declining over the last 2 weeks.  She was noted to be more confused, refusing to take her medications, had decreased p.o. intake associated with choking along with being less responsive and less conversational.  Her  noted that her left leg was more weak.  She has a history of stroke, a urine half ago after which she became wheelchair dependent and requires full assistance with her ADLs.    In the ED, the patient was hypertensive otherwise hemodynamically stable.  Labs were unremarkable.  CT head showed findings concerning for left parietal lobe recent infarction, stable enlargement of the ventricles out of proportion to the degree of sulcal atrophy (nonspecific but can be seen with normal pressure hydrocephalus) and involutional change with sequela of moderate to advanced chronic microvascular ischemic change and multifocal remote lacunar type infarcts.  Patient was given aspirin 300 mg, hydralazine 10 mg IV, 20 mg IV, and nitro paste.  She was admitted for further management.      Overview/Hospital Course:  Admitted with left parietal CVA.  Out of window for intervention. Neurology consulted. SLP/OT/OT consulted. Initially difficulty with swallowing and recommending NPO, possible will need alternative source of nutrition if no improvement. GI following in case PEG is needed. Palliative care consulted. ASA, Statin and Plavix. PT/OT consulted and  recommending SNF.  ST following with some improvement and recommending PEG, but patient with minimal oral intake.      Interval History: not wanting to eat.    Review of Systems   HENT:  Negative for ear discharge and ear pain.    Eyes:  Negative for discharge and itching.   Endocrine: Negative for cold intolerance and heat intolerance.   Neurological:  Negative for seizures and syncope.   Objective:     Vital Signs (Most Recent):  Temp: 97.6 °F (36.4 °C) (07/19/23 0724)  Pulse: 73 (07/19/23 0724)  Resp: 19 (07/19/23 0724)  BP: 118/68 (07/19/23 0724)  SpO2: (!) 94 % (07/19/23 0724) Vital Signs (24h Range):  Temp:  [97.4 °F (36.3 °C)-98.6 °F (37 °C)] 97.6 °F (36.4 °C)  Pulse:  [72-77] 73  Resp:  [18-20] 19  SpO2:  [94 %-96 %] 94 %  BP: (118-173)/(57-85) 118/68     Weight: 48.1 kg (106 lb)  Body mass index is 21.41 kg/m².  No intake or output data in the 24 hours ending 07/19/23 1021        Physical Exam  Vitals and nursing note reviewed.   Constitutional:       General: She is not in acute distress.     Appearance: She is not ill-appearing.   Cardiovascular:      Rate and Rhythm: Normal rate.      Pulses: Normal pulses.      Heart sounds: No murmur heard.  Pulmonary:      Effort: Pulmonary effort is normal. No respiratory distress.   Abdominal:      General: Abdomen is flat.      Palpations: Abdomen is soft.      Tenderness: There is no abdominal tenderness.   Musculoskeletal:      Right lower leg: No edema.      Left lower leg: No edema.   Skin:     General: Skin is warm.   Neurological:      Mental Status: She is alert.      Comments: Does move head, eyes and left side spontaneously.  Right sided weakness           Significant Labs: All pertinent labs within the past 24 hours have been reviewed.  BMP:   Recent Labs   Lab 07/18/23  0551   GLU 84      K 3.5   *   CO2 22*   BUN 5*   CREATININE 0.7   CALCIUM 8.8       CBC:   Recent Labs   Lab 07/18/23  0551   WBC 5.09   HGB 11.3*   HCT 34.1*     "        Significant Imaging: I have reviewed all pertinent imaging results/findings within the past 24 hours.      Assessment/Plan:      * Ischemic stroke  MRI confirming large left parietal stroke.  ASA, Statin and Plavix.  Appreciate Neurology input.  PT/OT/ST consulted.  Some improvement with ST and started on pureed diet. Minimal oral intake.  Start appetite stimulant.  Therapy recommending SNF.  SW/CM consulted.    ACP (advance care planning)  Palliative Care following.  Discussed options with patient's  today.  PEG tube with SNF vs Hospice.  Having some trouble trying to figure out what  wants.  He does not want SNF because "she is not going to get better".  Sounds like he is looking more towards hospice care, but still thinking about PEG placement.  Continue goals of care discussions.      NPH (normal pressure hydrocephalus)  Appears stable on CT. - CT head showed findings concerning for left parietal lobe recent infarction, stable enlargement of the ventricles out of proportion to the degree of sulcal atrophy (nonspecific but can be seen with normal pressure hydrocephalus) and involutional change with sequela of moderate to advanced chronic microvascular ischemic change and multifocal remote lacunar type infarcts.  Neurology consult - appreciate recommendations    PAD (peripheral artery disease)  History noted. No acute issues.       Mild recurrent major depression  Resume zoloft      Hyperlipidemia  Resume statin      Essential hypertension  No need for permissive HTN per Neuro.  Resume home medications.        VTE Risk Mitigation (From admission, onward)         Ordered     heparin (porcine) injection 5,000 Units  Every 8 hours         07/15/23 1903     IP VTE HIGH RISK PATIENT  Once         07/15/23 1903     Place sequential compression device  Until discontinued         07/15/23 1903                Discharge Planning   DANYA: 7/19/2023     Code Status: Full Code   Is the patient medically " ready for discharge?:     Reason for patient still in hospital (select all that apply): Pending disposition  Discharge Plan A: Skilled Nursing Facility   Discharge Delays: None known at this time              Pranav Paniagua MD  Department of [facility] Medicine   [facility]

## 2023-07-19 NOTE — PROGRESS NOTES
[facility] - Telemetry  Neurology  Progress Note    Patient Name: Sophia Landis  MRN: [id number]  Admission Date: 7/15/2023  Hospital Length of Stay: 4 days  Code Status: Full Code   Attending Provider: Pranav Paniagua MD  Primary Care Physician: Sarthak Johnson MD   Principal Problem:Ischemic stroke    Subjective:     Interval History: 76 y/o female with medical Hx of stroke, HTN, hyperlipidemia, PAD was brought to ED due change in mental status. Family states that pt stopped talking and has not been eating for several days. Family also notes weakness on right side. Pt is dependent is many ADL's at baseline.     -7/17/23: No acute issues overnight.     -7/18/23: No new symptoms.    Current Neurological Medications:     Current Facility-Administered Medications   Medication Dose Route Frequency Provider Last Rate Last Admin    acetaminophen tablet 650 mg  650 mg Oral Q8H PRN Dave Victor MD        acetaminophen tablet 650 mg  650 mg Oral Q4H PRN Dave Victor MD        albuterol-ipratropium 2.5 mg-0.5 mg/3 mL nebulizer solution 3 mL  3 mL Nebulization Q4H PRN Dave Victor MD        aluminum-magnesium hydroxide-simethicone 200-200-20 mg/5 mL suspension 30 mL  30 mL Oral QID PRN Dave Victor MD        aspirin EC tablet 81 mg  81 mg Oral Daily Dave Victor MD        atorvastatin tablet 40 mg  40 mg Oral Daily Dave Victor MD        bisacodyL suppository 10 mg  10 mg Rectal Daily PRN Dave Victor MD        clopidogreL tablet 75 mg  75 mg Oral Daily Pranav Paniagua MD        dextrose 50% injection 12.5 g  12.5 g Intravenous PRN Dave Victor MD        dextrose 50% injection 25 g  25 g Intravenous PRN Dave Victor MD        droNABinol capsule 2.5 mg  2.5 mg Oral BID Pranav Paniagua MD        glucagon (human recombinant) injection 1 mg  1 mg Intramuscular PRN Dave Victor MD        glucose chewable tablet 16 g  16 g Oral PRN Dave Victor MD        glucose chewable tablet 24 g  24 g Oral PRN Dave Victor MD         heparin (porcine) injection 5,000 Units  5,000 Units Subcutaneous Q8H Dave Victor MD   5,000 Units at 07/19/23 1417    labetaloL injection 10 mg  10 mg Intravenous Q15 Min PRN Dave Victor MD        magnesium oxide tablet 800 mg  800 mg Oral PRN Dave Victor MD        magnesium oxide tablet 800 mg  800 mg Oral PRN Dave Victor MD        melatonin tablet 6 mg  6 mg Oral Nightly PRN Dave Victor MD        metoprolol tartrate (LOPRESSOR) tablet 25 mg  25 mg Oral BID Pranav Paniagua MD        naloxone 0.4 mg/mL injection 0.02 mg  0.02 mg Intravenous PRN Dave Victor MD        ondansetron injection 4 mg  4 mg Intravenous Q8H PRN Dave Victor MD        polyethylene glycol packet 17 g  17 g Oral Daily Dave Victor MD        potassium bicarbonate disintegrating tablet 35 mEq  35 mEq Oral PRN Dave Victor MD        potassium bicarbonate disintegrating tablet 50 mEq  50 mEq Oral PRN Dave Victor MD        potassium bicarbonate disintegrating tablet 60 mEq  60 mEq Oral PRN Dave Victor MD        potassium, sodium phosphates 280-160-250 mg packet 2 packet  2 packet Oral PRN Dave Victor MD        potassium, sodium phosphates 280-160-250 mg packet 2 packet  2 packet Oral PRN Dave Victor MD        potassium, sodium phosphates 280-160-250 mg packet 2 packet  2 packet Oral PRN Dave Victor MD        prochlorperazine injection Soln 5 mg  5 mg Intravenous Q6H PRN Dave Victor MD        sertraline tablet 100 mg  100 mg Oral Daily Dave Victor MD        simethicone chewable tablet 80 mg  1 tablet Oral QID PRN Dave Victor MD        sodium chloride 0.9% bolus 500 mL 500 mL  500 mL Intravenous Continuous PRN Dave Victor MD        sodium chloride 0.9% flush 10 mL  10 mL Intravenous PRN Dave Victor MD        sodium chloride 0.9% flush 10 mL  10 mL Intravenous Q12H PRN Dave Victor MD           Review of Systems  Unable to perform ROS: Patient nonverbal     Objective:     Vital Signs (Most Recent):  Temp: 97.4 °F (36.3 °C) (07/19/23  1640)  Pulse: 72 (07/19/23 1640)  Resp: 19 (07/19/23 1640)  BP: (!) 142/65 (07/19/23 1640)  SpO2: (!) 94 % (07/19/23 1640) Vital Signs (24h Range):  Temp:  [97.4 °F (36.3 °C)-98.6 °F (37 °C)] 97.4 °F (36.3 °C)  Pulse:  [68-77] 72  Resp:  [18-20] 19  SpO2:  [91 %-96 %] 94 %  BP: (105-173)/(56-73) 142/65     Weight: 48.1 kg (106 lb)  Body mass index is 21.41 kg/m².    Physical Exam  Constitutional:       General: She is not in acute distress.  HENT:      Head: Normocephalic.   Eyes:      General:         Right eye: No discharge.         Left eye: No discharge.   Cardiovascular:      Rate and Rhythm: Normal rate.   Pulmonary:      Breath sounds: Normal breath sounds.   Abdominal:      Palpations: Abdomen is soft.   Musculoskeletal:         General: No swelling.      Cervical back: Neck supple.   Skin:     Findings: No rash.         NEUROLOGICAL EXAMINATION:      MENTAL STATUS   Level of consciousness: alert       Nonverbal      CRANIAL NERVES      CN III, IV, VI   Right pupil: Size: 2 mm. Shape: regular.   Left pupil: Size: 2 mm. Shape: regular.   Nystagmus: none      CN VII   Right facial weakness: none  Left facial weakness: none     CN XII   Tongue deviation: none     MOTOR EXAM        Hypoactive on right side but able to partially elevate RUE and RLE  AROM of LUE and RUE against gravity      SENSORY EXAM   Left arm light touch: normal       Grimaces to noxious stimulation of UE's and LE's         Significant Labs: CBC:   Recent Labs   Lab 07/18/23  0551   WBC 5.09   HGB 11.3*   HCT 34.1*        CMP:   Recent Labs   Lab 07/18/23  0551   GLU 84      K 3.5   *   CO2 22*   BUN 5*   CREATININE 0.7   CALCIUM 8.8   PROT 5.9*   ALBUMIN 3.2*   BILITOT 0.5   ALKPHOS 64   AST 19   ALT 12   ANIONGAP 6*       Significant Imaging: I have reviewed all pertinent imaging results/findings within the past 24 hours.    Assessment and Plan:     78 y/o female with acute stroke     Left parietal stroke: acute  infarction of mod size on left MCA branch. Pt with aphasia and right hemiparesis. Pt is several days out for which no thrombolytics or intervention are granted.  Treat BP.  ASA 81 mg daily. Clopidogrel 75 mg daily.  ST/OT/PT.   Palliative Care on case.    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

## 2023-07-19 NOTE — NURSING
Report received from night nurse DIANA Chow. Visualized patient and assessed patient's overall condition and appearance. No acute distress noted. Will continue to monitor

## 2023-07-19 NOTE — PLAN OF CARE
Problem: Adult Inpatient Plan of Care  Goal: Plan of Care Review  Outcome: Ongoing, Progressing  Goal: Optimal Comfort and Wellbeing  Outcome: Ongoing, Progressing  Intervention: Monitor Pain and Promote Comfort  Flowsheets (Taken 7/19/2023 5587)  Pain Management Interventions:   care clustered   pillow support provided   position adjusted

## 2023-07-19 NOTE — PLAN OF CARE
07/19/23 1229   Post-Acute Status   Post-Acute Authorization Hospice   Hospice Status Pending post acute provider review/more information requested   Coverage [employer]   Patient choice form signed by patient/caregiver List from System Post-Acute Care   Discharge Delays (!) Post-Acute Set-up   Discharge Plan   Discharge Plan A Hospice/home   Discharge Plan B Home with family

## 2023-07-19 NOTE — SUBJECTIVE & OBJECTIVE
Interval History: not wanting to eat.    Review of Systems   HENT:  Negative for ear discharge and ear pain.    Eyes:  Negative for discharge and itching.   Endocrine: Negative for cold intolerance and heat intolerance.   Neurological:  Negative for seizures and syncope.   Objective:     Vital Signs (Most Recent):  Temp: 97.6 °F (36.4 °C) (07/19/23 0724)  Pulse: 73 (07/19/23 0724)  Resp: 19 (07/19/23 0724)  BP: 118/68 (07/19/23 0724)  SpO2: (!) 94 % (07/19/23 0724) Vital Signs (24h Range):  Temp:  [97.4 °F (36.3 °C)-98.6 °F (37 °C)] 97.6 °F (36.4 °C)  Pulse:  [72-77] 73  Resp:  [18-20] 19  SpO2:  [94 %-96 %] 94 %  BP: (118-173)/(57-85) 118/68     Weight: 48.1 kg (106 lb)  Body mass index is 21.41 kg/m².  No intake or output data in the 24 hours ending 07/19/23 1021        Physical Exam  Vitals and nursing note reviewed.   Constitutional:       General: She is not in acute distress.     Appearance: She is not ill-appearing.   Cardiovascular:      Rate and Rhythm: Normal rate.      Pulses: Normal pulses.      Heart sounds: No murmur heard.  Pulmonary:      Effort: Pulmonary effort is normal. No respiratory distress.   Abdominal:      General: Abdomen is flat.      Palpations: Abdomen is soft.      Tenderness: There is no abdominal tenderness.   Musculoskeletal:      Right lower leg: No edema.      Left lower leg: No edema.   Skin:     General: Skin is warm.   Neurological:      Mental Status: She is alert.      Comments: Does move head, eyes and left side spontaneously.  Right sided weakness           Significant Labs: All pertinent labs within the past 24 hours have been reviewed.  BMP:   Recent Labs   Lab 07/18/23  0551   GLU 84      K 3.5   *   CO2 22*   BUN 5*   CREATININE 0.7   CALCIUM 8.8       CBC:   Recent Labs   Lab 07/18/23  0551   WBC 5.09   HGB 11.3*   HCT 34.1*            Significant Imaging: I have reviewed all pertinent imaging results/findings within the past 24 hours.

## 2023-07-20 VITALS
SYSTOLIC BLOOD PRESSURE: 135 MMHG | DIASTOLIC BLOOD PRESSURE: 61 MMHG | BODY MASS INDEX: 19.38 KG/M2 | OXYGEN SATURATION: 95 % | HEIGHT: 59 IN | RESPIRATION RATE: 19 BRPM | WEIGHT: 96.13 LBS | TEMPERATURE: 98 F | HEART RATE: 78 BPM

## 2023-07-20 PROCEDURE — 97535 SELF CARE MNGMENT TRAINING: CPT | Mod: HCNC

## 2023-07-20 PROCEDURE — 99900035 HC TECH TIME PER 15 MIN (STAT): Mod: HCNC

## 2023-07-20 PROCEDURE — 94761 N-INVAS EAR/PLS OXIMETRY MLT: CPT | Mod: HCNC

## 2023-07-20 PROCEDURE — 25000003 PHARM REV CODE 250: Mod: HCNC | Performed by: HOSPITALIST

## 2023-07-20 PROCEDURE — 92526 ORAL FUNCTION THERAPY: CPT | Mod: HCNC

## 2023-07-20 PROCEDURE — 25000003 PHARM REV CODE 250: Mod: HCNC | Performed by: STUDENT IN AN ORGANIZED HEALTH CARE EDUCATION/TRAINING PROGRAM

## 2023-07-20 PROCEDURE — 63600175 PHARM REV CODE 636 W HCPCS: Mod: HCNC | Performed by: STUDENT IN AN ORGANIZED HEALTH CARE EDUCATION/TRAINING PROGRAM

## 2023-07-20 RX ORDER — CLOPIDOGREL BISULFATE 75 MG/1
75 TABLET ORAL DAILY
Qty: 30 TABLET | Refills: 11 | Status: SHIPPED | OUTPATIENT
Start: 2023-07-20 | End: 2024-07-19

## 2023-07-20 RX ORDER — MEGESTROL ACETATE 40 MG/1
40 TABLET ORAL DAILY
Qty: 30 TABLET | Refills: 3 | Status: SHIPPED | OUTPATIENT
Start: 2023-07-20 | End: 2024-07-19

## 2023-07-20 RX ORDER — METOPROLOL TARTRATE 25 MG/1
25 TABLET, FILM COATED ORAL 2 TIMES DAILY
Qty: 60 TABLET | Refills: 11 | Status: SHIPPED | OUTPATIENT
Start: 2023-07-20 | End: 2024-07-19

## 2023-07-20 RX ADMIN — HEPARIN SODIUM 5000 UNITS: 5000 INJECTION INTRAVENOUS; SUBCUTANEOUS at 05:07

## 2023-07-20 NOTE — PLAN OF CARE
Problem: SLP  Goal: SLP Goal  Description: 1. Ms. Landis will participate in bedside swallow evaluation to determine safest and least restrictive diet.   2. Ms. Landis will participate in cognitive-linguistic assessment. (Goal met, 7/17)  3. Pt will tolerate puree with thin liquids without overt s/s of aspiration.   4. Pt will respond to name with eye contact 3 of 5 prompts.  5. Pt will perform simple motoric commands given model and tactile stimulation with 30% accuracy.     Outcome: Ongoing, Progressing   Pt non-verbal,not following commands. Pt did accept PO trial of puree and TL via spoon. Delayed swallow, no overt s/s of aspiration.

## 2023-07-20 NOTE — PLAN OF CARE
Problem: Adult Inpatient Plan of Care  Goal: Plan of Care Review  7/20/2023 1036 by Arabella Black RN  Outcome: Adequate for Care Transition  7/20/2023 1035 by Arabella Black RN  Outcome: Ongoing, Progressing  7/20/2023 1013 by Arabella Black RN  Outcome: Ongoing, Progressing  Goal: Patient-Specific Goal (Individualized)  7/20/2023 1036 by Arabella Black RN  Outcome: Adequate for Care Transition  7/20/2023 1035 by Arabella Black RN  Outcome: Ongoing, Progressing  7/20/2023 1013 by Arabella Black RN  Outcome: Ongoing, Progressing  Goal: Absence of Hospital-Acquired Illness or Injury  7/20/2023 1036 by Arabella Black RN  Outcome: Adequate for Care Transition  7/20/2023 1035 by Arabella Black RN  Outcome: Ongoing, Progressing  7/20/2023 1013 by Arabella Black RN  Outcome: Ongoing, Progressing  Goal: Optimal Comfort and Wellbeing  7/20/2023 1036 by Arabella Black RN  Outcome: Adequate for Care Transition  7/20/2023 1035 by Arabella Black RN  Outcome: Ongoing, Progressing  7/20/2023 1013 by Arabella Black RN  Outcome: Ongoing, Progressing  Goal: Readiness for Transition of Care  7/20/2023 1036 by Arabella Black RN  Outcome: Adequate for Care Transition  7/20/2023 1035 by Arabella Black RN  Outcome: Ongoing, Progressing  7/20/2023 1013 by Arabella Black RN  Outcome: Ongoing, Progressing     Problem: Adjustment to Illness (Stroke, Ischemic/Transient Ischemic Attack)  Goal: Optimal Coping  7/20/2023 1036 by Arabella Black RN  Outcome: Adequate for Care Transition  7/20/2023 1035 by Arabella Black RN  Outcome: Ongoing, Progressing  7/20/2023 1013 by Arabella Black RN  Outcome: Ongoing, Progressing     Problem: Bowel Elimination Impaired (Stroke, Ischemic/Transient Ischemic Attack)  Goal: Effective Bowel Elimination  7/20/2023 1036 by Arabella Black RN  Outcome: Adequate for Care Transition  7/20/2023 1035 by Arabella Black RN  Outcome: Ongoing,  Progressing  7/20/2023 1013 by Arabella Black RN  Outcome: Ongoing, Progressing     Problem: Cerebral Tissue Perfusion (Stroke, Ischemic/Transient Ischemic Attack)  Goal: Optimal Cerebral Tissue Perfusion  7/20/2023 1036 by Arabella Black RN  Outcome: Adequate for Care Transition  7/20/2023 1035 by Arabella Black RN  Outcome: Ongoing, Progressing  7/20/2023 1013 by Arabella Black RN  Outcome: Ongoing, Progressing     Problem: Cognitive Impairment (Stroke, Ischemic/Transient Ischemic Attack)  Goal: Optimal Cognitive Function  7/20/2023 1036 by Arabella Black RN  Outcome: Adequate for Care Transition  7/20/2023 1035 by Arabella Black RN  Outcome: Ongoing, Progressing  7/20/2023 1013 by Arabella Black RN  Outcome: Ongoing, Progressing     Problem: Functional Ability Impaired (Stroke, Ischemic/Transient Ischemic Attack)  Goal: Optimal Functional Ability  7/20/2023 1036 by Arabella Black RN  Outcome: Adequate for Care Transition  7/20/2023 1035 by Arabella Black RN  Outcome: Ongoing, Progressing  7/20/2023 1013 by Arabella Black RN  Outcome: Ongoing, Progressing     Problem: Respiratory Compromise (Stroke, Ischemic/Transient Ischemic Attack)  Goal: Effective Oxygenation and Ventilation  7/20/2023 1036 by Arabella Black RN  Outcome: Adequate for Care Transition  7/20/2023 1035 by Arabella Black RN  Outcome: Ongoing, Progressing  7/20/2023 1013 by Arabella Black RN  Outcome: Ongoing, Progressing     Problem: Urinary Elimination Impaired (Stroke, Ischemic/Transient Ischemic Attack)  Goal: Effective Urinary Elimination  7/20/2023 1036 by Arabella Black RN  Outcome: Adequate for Care Transition  7/20/2023 1035 by Arabella Black RN  Outcome: Ongoing, Progressing  7/20/2023 1013 by Arabella Black RN  Outcome: Ongoing, Progressing     Problem: Swallowing Impairment (Stroke, Ischemic/Transient Ischemic Attack)  Goal: Optimal Eating and Swallowing without Aspiration  7/20/2023 1036  by Arabella Black RN  Outcome: Adequate for Care Transition  7/20/2023 1035 by Arabella Black RN  Outcome: Ongoing, Progressing  7/20/2023 1013 by Arabella Black RN  Outcome: Ongoing, Progressing     Problem: Skin Injury Risk Increased  Goal: Skin Health and Integrity  7/20/2023 1036 by Arabella Black RN  Outcome: Adequate for Care Transition  7/20/2023 1035 by Arabella Black RN  Outcome: Ongoing, Progressing  7/20/2023 1013 by Arabella Black RN  Outcome: Ongoing, Progressing     Problem: Coping Ineffective  Goal: Effective Coping  7/20/2023 1036 by Arabella Black RN  Outcome: Adequate for Care Transition  7/20/2023 1035 by Arabella Black RN  Outcome: Ongoing, Progressing  7/20/2023 1013 by Arabella Black RN  Outcome: Ongoing, Progressing

## 2023-07-20 NOTE — NURSING
[facility], [facility]  Nurses Note -- 4 Eyes      7/20/2023       Skin assessed on: Q Shift      [x] No Pressure Injuries Present    [x]Prevention Measures Documented    [] Yes LDA  for Pressure Injury Previously documented     [] Yes New Pressure Injury Discovered   [] LDA for New Pressure Injury Added      Attending RN:  Arabella Black RN     Second RN:  Claudine

## 2023-07-20 NOTE — NURSING
[facility], [facility]  Nurses Note -- 4 Eyes      7/19/2023       Skin assessed on: Q Shift      [x] No Pressure Injuries Present    [x]Prevention Measures Documented    [] Yes LDA  for Pressure Injury Previously documented     [] Yes New Pressure Injury Discovered   [] LDA for New Pressure Injury Added      Attending RN:  Claudine Sun LPN     Second RN:  Jayashree Matos LPN         Report received from night RN. Patient resting quietly, no apparent distress noted at this time. Wheels locked in lowest position, call light within reach, Telemetry monitoring in place.

## 2023-07-20 NOTE — DISCHARGE SUMMARY
[facility] Medicine  Discharge Summary      Patient Name: Sophia Landis  MRN: [id number]  BRETT: [id number]  Patient Class: IP- Inpatient  Admission Date: 7/15/2023  Hospital Length of Stay: 5 days  Discharge Date and Time:  07/20/2023 10:19 AM  Attending Physician: Pranav Paniagua MD   Discharging Provider: Pranav Paniagua MD  Primary Care Provider: Sarthak Johnson MD    Primary Care Team: Networked reference to record PCT     HPI:   This is a 77-year-old female with a past medical history of CVA, NPH, hypertension, hyperlipidemia, peripheral artery disease, ANDRES on CPAP who presents with altered mental status.    Per the family, the patient has been declining over the last 2 weeks.  She was noted to be more confused, refusing to take her medications, had decreased p.o. intake associated with choking along with being less responsive and less conversational.  Her  noted that her left leg was more weak.  She has a history of stroke, a urine half ago after which she became wheelchair dependent and requires full assistance with her ADLs.    In the ED, the patient was hypertensive otherwise hemodynamically stable.  Labs were unremarkable.  CT head showed findings concerning for left parietal lobe recent infarction, stable enlargement of the ventricles out of proportion to the degree of sulcal atrophy (nonspecific but can be seen with normal pressure hydrocephalus) and involutional change with sequela of moderate to advanced chronic microvascular ischemic change and multifocal remote lacunar type infarcts.  Patient was given aspirin 300 mg, hydralazine 10 mg IV, 20 mg IV, and nitro paste.  She was admitted for further management.      * No surgery found *      Hospital Course:   76 y/o female presents with aphasia and right hemiparesis.  MRI showing large left parietal CVA.  Out of window for intervention. Neurology consulted. SLP/OT/OT consulted. Initially difficulty with swallowing and  recommending NPO, possible will need alternative source of nutrition if no improvement. GI consulted in case PEG is needed. Palliative care consulted. ASA, Statin and Plavix. PT/OT consulted and recommending SNF.  ST following with some improvement and recommending pureed food, but patient with minimal oral intake. Discussions with family held about options.  PEG placement with SNF placement and see if any improvement of symptoms vs hospice care.  Patient's  and family have decided on hospice care.  SW/CM consulted. She will be discharged home with hospice care once arranged.       Goals of Care Treatment Preferences:  Code Status: Full Code          What is most important right now is to focus on spending time at home, avoiding the hospital, quality of life, even if it means sacrificing a little time, comfort and QOL .  Accordingly, we have decided that the best plan to meet the patient's goals includes enrolling in hospice care.      Consults:   Consults (From admission, onward)        Status Ordering Provider     Inpatient consult to Hospice  Once        Provider:  (Not yet assigned)    Completed DUYEN FAULKNER     Inpatient consult to Social Work  Once        Provider:  (Not yet assigned)    Completed DUYEN FAULKNER     Inpatient consult to Gastroenterology  Once        Provider:  Maureen Field NP    Completed REY JACKSON     Inpatient consult to Palliative Care  Once        Provider:  Duyen Faulkner NP    Completed REY JACKSON     Inpatient consult to Registered Dietitian/Nutritionist  Once        Provider:  (Not yet assigned)    Completed ERAN CERRATO     IP consult to case management/social work  Once        Provider:  (Not yet assigned)    Completed ERAN CERRATO     Inpatient consult to Neurology  Once        Provider:  Alexis Powers MD    Completed ERAN CERRATO          No new Assessment & Plan notes have been filed under this hospital service since the last note was  generated.  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 Go on 7/25/2023.    Specialty: Internal Medicine  Why: @10:00am  Out[facility]Patient [facility] Hospital Follow-Up scheduled for you.  Contact information:  [address]  [place] [address] [phone]                       Patient Instructions:      Diet Dysphagia Pureed     Activity as tolerated           Pending Diagnostic Studies:     None         Medications:  Reconciled Home Medications:      Medication List      START taking these medications    clopidogreL 75 mg tablet  Commonly known as: PLAVIX  Take 1 tablet (75 mg total) by mouth once daily.     megestroL 40 MG Tab  Commonly known as: MEGACE  Take 1 tablet (40 mg total) by mouth once daily.     metoprolol tartrate 25 MG tablet  Commonly known as: LOPRESSOR  Take 1 tablet (25 mg total) by mouth 2 (two) times daily.        CONTINUE taking these medications    aspirin 81 MG EC tablet  Commonly known as: ECOTRIN  TAKE 1 TABLET EVERY DAY     nitroGLYCERIN 0.4 MG SL tablet  Commonly known as: NITROSTAT  PLACE 1 TABLET UNDER THE TONGUE EVERY 5 MINUTES AS NEEDED FOR CHEST PAIN.     pravastatin 80 MG tablet  Commonly known as: PRAVACHOL  TAKE 1 TABLET EVERY DAY     sertraline 100 MG tablet  Commonly known as: ZOLOFT  TAKE 1 TABLET EVERY DAY        STOP taking  these medications    hydroCHLOROthiazide 25 MG tablet  Commonly known as: HYDRODIURIL     metoprolol succinate 50 MG 24 hr tablet  Commonly known as: TOPROL-XL     omeprazole 20 MG capsule  Commonly known as: PRILOSEC            Indwelling Lines/Drains at time of discharge:   Lines/Drains/Airways     None                 Time spent on the discharge of patient: >30 minutes         Pranav Paniagua MD  Department of [facility] Medicine  [facility] - [place], [place] - [facility]

## 2023-07-20 NOTE — PLAN OF CARE
Problem: Adult Inpatient Plan of Care  Goal: Plan of Care Review  Outcome: Ongoing, Progressing  Goal: Patient-Specific Goal (Individualized)  Outcome: Ongoing, Progressing  Goal: Absence of Hospital-Acquired Illness or Injury  Outcome: Ongoing, Progressing  Goal: Optimal Comfort and Wellbeing  Outcome: Ongoing, Progressing  Goal: Readiness for Transition of Care  Outcome: Ongoing, Progressing     Problem: Functional Ability Impaired (Stroke, Ischemic/Transient Ischemic Attack)  Goal: Optimal Functional Ability  Outcome: Ongoing, Progressing

## 2023-07-20 NOTE — PLAN OF CARE
Transportation scheduled for 10:30 am to pt's home. Pt's , Clark has chose [facility].CM notified nurse, Arabella that pt is ready for discharge from CM standpoint. All CM needs met.     07/20/23 0912   Final Note   Assessment Type Final Discharge Note   Anticipated Discharge Disposition HospiceHome   What phone number can be called within the next 1-3 days to see how you are doing after discharge? [phone]   Post-Acute Status   Post-Acute Authorization Hospice   Hospice Status Set-up Complete/Auth obtained   Coverage [employer]   Patient choice form signed by patient/caregiver List from System Post-Acute Care   Discharge Delays (!) PFC Arranged Transportation

## 2023-07-20 NOTE — PLAN OF CARE
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       Vital Signs: Routine.    Allergies:  Review of patient's allergies indicates:   Allergen Reactions    Antihistamines - alkylamine Nausea Only    Ciprofloxacin Nausea Only    Penicillin     Penicillins Hives       Diet: Pureed    Activities: As tolerated    Nursing: Per Hospice Routine    Medications:         Comfort Care Medications per Hospice MD       Medication List        clopidogreL 75 mg tablet  Commonly known as: PLAVIX  Take 1 tablet (75 mg total) by mouth once daily.     metoprolol tartrate 25 MG tablet  Commonly known as: LOPRESSOR  Take 1 tablet (25 mg total) by mouth 2 (two) times daily.     Megace 40 mg tab  Take 1 tab po daily    aspirin 81 MG EC tablet  Commonly known as: ECOTRIN  TAKE 1 TABLET EVERY DAY     nitroGLYCERIN 0.4 MG SL tablet  Commonly known as: NITROSTAT  PLACE 1 TABLET UNDER THE TONGUE EVERY 5 MINUTES AS NEEDED FOR CHEST PAIN.     pravastatin 80 MG tablet  Commonly known as: PRAVACHOL  TAKE 1 TABLET EVERY DAY     sertraline 100 MG tablet  Commonly known as: ZOLOFT  TAKE 1 TABLET EVERY DAY     _________________________________  Pranav Paniagua MD  07/20/2023

## 2023-07-20 NOTE — NURSING
Patient is being discharge home on Hospice.  Patient education will be done per visual nurse.   have arranged transportation to home.  Patient remain nonverbal and not engaging in plan of care.

## 2023-07-20 NOTE — PT/OT/SLP PROGRESS
Speech Language Pathology Treatment    Patient Name:  Sophia Landis   MRN:  [id number]  Admitting Diagnosis: Ischemic stroke    Recommendations:                 General Recommendations:  Dysphagia therapy  Diet recommendations:  Puree Diet - IDDSI Level 4, Liquid Diet Level: Thin liquids - IDDSI Level 0   Aspiration Precautions: 1 bite/sip at a time, Alternating bites/sips, Assistance with meals, Feed only when awake/alert, Frequent oral care, Meds crushed in puree, and Small bites/sips   General Precautions: Standard, aspiration  Communication strategies:   Pt non-verbal at this time.    Assessment:     Sophia Landis is a 77 y.o. female with a dx of CVA . She presents with oral dysphagia c/b decreased attention to bolus presentation and severe cognitive linguistic deficits c/b poor sustained attention, global aphasia. Pt tolerating PO diet of pureed with thin liquids. Family educated on aspiration precautions. Pt scheduled to be discharged home today with hospice.       Subjective   Pt awake in bed upon SLP arrival. Pt's spouse and daughter at bedside. Pt able to maintain eye contact with ST but non-verbal during tx session. Pt did not follow commands or respond to simple yes/no questions.  Pt scheduled to be discharged home with hospice today.    Pain/Comfort:  Pain Rating 1: 0/10    Respiratory Status: Room air    Objective:     Has the patient been evaluated by SLP for swallowing?      Keep patient NPO?     Current Respiratory Status:        Pt repositioned with HOB elevated prior to introducing therapeutic PO trials.  Pt accepted and consumed 12 trials of thin liquids via spoon and 6 trials of applesauce administered by SLP. Pt demonstrated slow oral prep, oral holding, and prolonged mastication across consistencies. Pharyngeal phase revealed delayed swallow initiation, however, no throat clearing or coughing observed across trials.    Education: Pt and family were provided with extensive education regarding  aspiration precautions. Family was instructed to administer liquids via spoon, syringe, or dropper at home. Family  v/o of education provided.     Please note silent aspiration cannot be ruled out at bedside.  Pt scheduled to be discharge home today with hospice.               Goals:   Multidisciplinary Problems       SLP Goals          Problem: SLP    Goal Priority Disciplines Outcome   SLP Goal     SLP Ongoing, Progressing   Description: 1. Ms. Landis will participate in bedside swallow evaluation to determine safest and least restrictive diet.   2. Ms. Landis will participate in cognitive-linguistic assessment. (Goal met, 7/17)  3. Pt will tolerate puree with thin liquids without overt s/s of aspiration.   4. Pt will respond to name with eye contact 3 of 5 prompts.  5. Pt will perform simple motoric commands given model and tactile stimulation with 30% accuracy.                          Plan:     Patient to be seen:  2 x/week   Plan of Care expires:     Plan of Care reviewed with:  patient, spouse, daughter   SLP Follow-Up:  Yes       Discharge recommendations:  other (see comments) (No further ST is indicated post d/c from hospital)   Barriers to Discharge:  None    Time Tracking:     SLP Treatment Date:   07/20/23  Speech Start Time:  0929  Speech Stop Time:  0954     Speech Total Time (min):  25 min    Billable Minutes: Treatment Swallowing Dysfunction 16 min and Self Care/Home Management Training 9 min      07/20/2023

## 2023-07-20 NOTE — NURSING
Bedside report given to night nurse REGGIE Harrison/DIANA Chow. Walking rounds completed. Visualized and assessed patient. NAD noted. Safety precautions maintained and call light within reach.    Chart check completed.

## 2023-08-16 DIAGNOSIS — E78.5 HYPERLIPIDEMIA, UNSPECIFIED HYPERLIPIDEMIA TYPE: Chronic | ICD-10-CM

## 2023-08-16 RX ORDER — PRAVASTATIN SODIUM 80 MG/1
TABLET ORAL
Qty: 90 TABLET | Refills: 1 | Status: SHIPPED | OUTPATIENT
Start: 2023-08-16

## 2023-08-28 PROBLEM — K92.2 GI BLEED: Status: RESOLVED | Noted: 2023-05-25 | Resolved: 2023-08-28

## 2023-08-28 PROBLEM — N39.0 RECURRENT UTI: Status: RESOLVED | Noted: 2017-01-20 | Resolved: 2023-08-28

## 2023-10-23 PROBLEM — I63.9 ISCHEMIC STROKE: Status: RESOLVED | Noted: 2023-07-15 | Resolved: 2023-10-23

## 2024-01-17 NOTE — TELEPHONE ENCOUNTER
----- Message from Lindsey Fish sent at 3/15/2019  1:23 PM CDT -----  Contact: Self  She says she received a letter stating she has a colonoscopy on 4/2/19. She would like to speak to you about this.  
Instructed to call number on letter.  
Abnormal WBC: < 4,000 OR > 12,000

## 2024-02-01 NOTE — TELEPHONE ENCOUNTER
Please inform the patient of the following:    Your lab results are normal.  Please continue your current medications and doses.  Please feel free to contact me with any questions or concerns.  I will be in touch once the US and sleep study results are back.     Sincerely,  Xavier Lopez   
Pt given labs results and verbalized understanding.   
none